# Patient Record
Sex: MALE | Race: WHITE | Employment: OTHER | URBAN - METROPOLITAN AREA
[De-identification: names, ages, dates, MRNs, and addresses within clinical notes are randomized per-mention and may not be internally consistent; named-entity substitution may affect disease eponyms.]

---

## 2017-01-04 ENCOUNTER — OFFICE VISIT (OUTPATIENT)
Dept: INTERNAL MEDICINE CLINIC | Age: 67
End: 2017-01-04

## 2017-01-04 VITALS
WEIGHT: 256.25 LBS | SYSTOLIC BLOOD PRESSURE: 160 MMHG | RESPIRATION RATE: 18 BRPM | HEART RATE: 60 BPM | OXYGEN SATURATION: 97 % | DIASTOLIC BLOOD PRESSURE: 69 MMHG | TEMPERATURE: 98.1 F | BODY MASS INDEX: 38.84 KG/M2 | HEIGHT: 68 IN

## 2017-01-04 DIAGNOSIS — E11.9 TYPE 2 DIABETES MELLITUS WITH HEMOGLOBIN A1C GOAL OF LESS THAN 7.0% (HCC): ICD-10-CM

## 2017-01-04 DIAGNOSIS — J06.9 VIRAL UPPER RESPIRATORY TRACT INFECTION: Primary | ICD-10-CM

## 2017-01-04 LAB — HBA1C MFR BLD HPLC: 7.3 %

## 2017-01-04 RX ORDER — OXYMETAZOLINE HCL 0.05 %
2 SPRAY, NON-AEROSOL (ML) NASAL 2 TIMES DAILY
Qty: 1 BOTTLE | Refills: 0 | COMMUNITY
End: 2017-01-24 | Stop reason: ALTCHOICE

## 2017-01-04 RX ORDER — TADALAFIL 5 MG/1
5 TABLET, FILM COATED ORAL DAILY
COMMUNITY
Start: 2016-11-28 | End: 2020-08-10

## 2017-01-04 RX ORDER — HYDROCODONE POLISTIREX AND CHLORPHENIRAMINE POLISTIREX 10; 8 MG/5ML; MG/5ML
1 SUSPENSION, EXTENDED RELEASE ORAL
Qty: 120 ML | Refills: 0 | Status: SHIPPED | OUTPATIENT
Start: 2017-01-04 | End: 2017-01-24 | Stop reason: ALTCHOICE

## 2017-01-04 RX ORDER — BENZONATATE 200 MG/1
200 CAPSULE ORAL
Qty: 20 CAP | Refills: 1 | Status: SHIPPED | OUTPATIENT
Start: 2017-01-04 | End: 2017-01-11

## 2017-01-04 NOTE — PATIENT INSTRUCTIONS

## 2017-01-04 NOTE — MR AVS SNAPSHOT
Visit Information Date & Time Provider Department Dept. Phone Encounter #  
 1/4/2017  1:45 PM Dominga Way MD [facility] [address] [id number] Upcoming Health Maintenance Date Due DTaP/Tdap/Td series (1 - Tdap) 12/2/2007 FOOT EXAM Q1 7/7/2015 Pneumococcal 65+ Low/Medium Risk (2 of 2 - PPSV23) 9/29/2016 MEDICARE YEARLY EXAM 9/29/2016 LIPID PANEL Q1 12/21/2016 HEMOGLOBIN A1C Q6M 3/28/2017 COLONOSCOPY 7/11/2017 EYE EXAM RETINAL OR DILATED Q1 8/12/2017 MICROALBUMIN Q1 10/18/2017 GLAUCOMA SCREENING Q2Y 8/12/2018 Allergies as of 1/4/2017  Review Complete On: 1/4/2017 By: Dominga Way MD  
  
 Severity Noted Reaction Type Reactions Codeine  12/01/2008   Side Effect Nausea Only Current Immunizations  Reviewed on 9/28/2016 Name Date H1N1 FLU VACCINE 1/1/2010 Influenza Vaccine 8/30/2016, 10/26/2015, 10/2/2014, 10/5/2013 Influenza Vaccine Split 10/17/2011 Influenza Vaccine Whole 10/1/2012, 9/12/2009 Pneumococcal Vaccine (Unspecified Type) 12/1/2005 TD Vaccine 12/1/2007 Zoster Vaccine, Live 4/22/2011 Not reviewed this visit You Were Diagnosed With   
  
 Codes Comments Viral upper respiratory tract infection    -  Primary ICD-10-CM: J06.9, B97.89 ICD-9-CM: 465.9 Type 2 diabetes mellitus with hemoglobin A1c goal of less than 7.0% (HCC)     ICD-10-CM: E11.9 ICD-9-CM: 250.00 Vitals BP Pulse Temp Resp Height(growth percentile) Weight(growth percentile) 160/69 (BP 1 Location: Left arm, BP Patient Position: Sitting) 60 98.1 °F (36.7 °C) (Oral) 18 5' 8\" (1.727 m) 256 lb 4 oz (116.2 kg) SpO2 BMI Smoking Status 97% 38.96 kg/m2 Passive Smoke Exposure - Never Smoker Vitals History BMI and BSA Data Body Mass Index Body Surface Area  
 38.96 kg/m 2 2.36 m 2 Preferred Pharmacy Pharmacy Name Phone [facility] [place], [address] [place] AT [facility] [phone] Your Updated Medication List  
  
   
This list is accurate as of: 1/4/17  2:14 PM.  Always use your most recent med list.  
  
  
  
  
 Rosy Kingston Generic drug:  Blood-Glucose Meter  
by Does Not Apply route. ACCU-CHEK SMARTVIEW TEST STRIP strip Generic drug:  glucose blood VI test strips CHECK FASTING BLOOD SUGAR ONCE DAILY AFRIN (OXYMETAZOLINE) 0.05 % nasal spray Generic drug:  oxymetazoline 2 Sprays two (2) times a day. ARNUITY ELLIPTA IN Take  by inhalation. aspirin 325 mg tablet Commonly known as:  ASPIRIN  
take 325 mg by mouth daily. atorvastatin 40 mg tablet Commonly known as:  LIPITOR  
TAKE 1 TABLET DAILY  
  
 BD INSULIN PEN NEEDLE UF SHORT 31 gauge x 5/16\" Ndle Generic drug:  Insulin Needles (Disposable) USE EVERY DAY WITH LEVEMIR AS DIRECTED Lady Pen Needle 32 gauge x 5/32\" Ndle Generic drug:  Insulin Needles (Disposable) USE 1 FOUR TIMES A DAY WITH INSULIN  
  
 benzonatate 200 mg capsule Commonly known as:  TESSALON Take 1 Cap by mouth three (3) times daily as needed for Cough for up to 7 days. cetirizine 10 mg tablet Commonly known as:  ZYRTEC Take 1 Tab by mouth daily. chlorpheniramine-HYDROcodone 10-8 mg/5 mL suspension Commonly known as:  Lexine Mccreedy ER Take 5 mL by mouth every twelve (12) hours as needed for Cough. Max Daily Amount: 10 mL. CIALIS 5 mg tablet Generic drug:  tadalafil  
  
 citalopram 40 mg tablet Commonly known as:  CELEXA  
TAKE 1 TABLET DAILY  
  
 desonide 0.05 % topical lotion Commonly known as:  Araceli Canales  
as needed. Diabetic Supplies, Sbrown 24. Misc Commonly known as:  BD LANCET DEVICE  
1 Each by Does Not Apply route daily. FISH OIL EXTRA STRENGTH PO Take 1,400 mg by mouth daily (after breakfast). fluticasone 50 mcg/actuation nasal spray Commonly known as:  Renee Guild 2 Sprays by Both Nostrils route daily. Indications: ALLERGIC RHINITIS  
  
 insulin detemir 100 unit/mL (3 mL) Inpn Commonly known as:  LEVEMIR FLEXPEN  
40 Units by SubCUTAneous route nightly. insulin lispro 100 unit/mL injection Commonly known as:  HumaLOG  
15 Units by SubCUTAneous route Before breakfast, lunch, and dinner. Before meals  Indications: TYPE 2 DIABETES MELLITUS INVOKANA 300 mg tablet Generic drug:  canagliflozin TAKE 1 TABLET DAILY JANUMET 50-1,000 mg per tablet Generic drug:  SITagliptin-metFORMIN  
TAKE 1 TABLET TWICE A DAY WITH MEALS  
  
 ketoconazole 2 % topical cream  
Commonly known as:  NIZORAL Apply  to affected area as needed for Skin Irritation. Lancets Misc Commonly known as:  ACCU-CHEK FASTCLIX  
2 Packages by SubCUTAneous route two (2) times a day. Check Blood Glucose twice daily .00  
  
 lisinopril 20 mg tablet Commonly known as:  Ora Bee Take 20 mg by mouth daily. M-VIT PO  
take 1 Tab by mouth daily. meloxicam 15 mg tablet Commonly known as:  MOBIC TK 1 T PO D  
  
 metoprolol tartrate 50 mg tablet Commonly known as:  LOPRESSOR  
TAKE 1 TABLET TWICE A DAY  
  
 montelukast 10 mg tablet Commonly known as:  SINGULAIR  
TAKE 1 TABLET DAILY  
  
 neomycin-polymyxin-dexamethasone 3.5 mg/g-10,000 unit/g-0.1 % ophthalmic ointment Commonly known as:  DEXACINE  
CINDI A SMALL AMOUNT TO RIGHT EYE SOCKET BID PRF IRRITATION  
  
 tamsulosin 0.4 mg capsule Commonly known as:  FLOMAX Take 0.4 mg by mouth daily. VOLTAREN 1 % Gel Generic drug:  diclofenac  
every six (6) hours. Prescriptions Printed Refills  
 chlorpheniramine-HYDROcodone (TUSSIONEX PENNKINETIC ER) 10-8 mg/5 mL suspension 0 Sig: Take 5 mL by mouth every twelve (12) hours as needed for Cough. Max Daily Amount: 10 mL. Class: Print Route: Oral  
  
Prescriptions Sent to Pharmacy Refills  
 benzonatate (TESSALON) 200 mg capsule 1 Sig: Take 1 Cap by mouth three (3) times daily as needed for Cough for up to 7 days. Class: Normal  
 Pharmacy: [facility] [address] [facility] #: [phone] Route: Oral  
  
Patient Instructions Upper Respiratory Infection (Cold): Care Instructions Your Care Instructions An upper respiratory infection, or URI, is an infection of the nose, sinuses, or throat. URIs are spread by coughs, sneezes, and direct contact. The common cold is the most frequent kind of URI. The flu and sinus infections are other kinds of URIs. Almost all URIs are caused by viruses. Antibiotics won't cure them. But you can treat most infections with home care. This may include drinking lots of fluids and taking over-the-counter pain medicine. You will probably feel better in 4 to 10 days. The doctor has checked you carefully, but problems can develop later. If you notice any problems or new symptoms, get medical treatment right away. Follow-up care is a key part of your treatment and safety. Be sure to make and go to all appointments, and call your doctor if you are having problems. It's also a good idea to know your test results and keep a list of the medicines you take. How can you care for yourself at home? · To prevent dehydration, drink plenty of fluids, enough so that your urine is light yellow or clear like water. Choose water and other caffeine-free clear liquids until you feel better. If you have kidney, heart, or liver disease and have to limit fluids, talk with your doctor before you increase the amount of fluids you drink. · Take an over-the-counter pain medicine, such as acetaminophen (Tylenol), ibuprofen (Advil, Motrin), or naproxen (Aleve). Read and follow all instructions on the label. · Before you use cough and cold medicines, check the label. These medicines may not be safe for young children or for people with certain health problems. · Be careful when taking over-the-counter cold or flu medicines and Tylenol at the same time. Many of these medicines have acetaminophen, which is Tylenol. Read the labels to make sure that you are not taking more than the recommended dose. Too much acetaminophen (Tylenol) can be harmful. · Get plenty of rest. 
· Do not smoke or allow others to smoke around you. If you need help quitting, talk to your doctor about stop-smoking programs and medicines. These can increase your chances of quitting for good. When should you call for help? Call 911 anytime you think you may need emergency care. For example, call if: 
· You have severe trouble breathing. Call your doctor now or seek immediate medical care if: 
· You seem to be getting much sicker. · You have new or worse trouble breathing. · You have a new or higher fever. · You have a new rash. Watch closely for changes in your health, and be sure to contact your doctor if: 
· You have a new symptom, such as a sore throat, an earache, or sinus pain. · You cough more deeply or more often, especially if you notice more mucus or a change in the color of your mucus. · You do not get better as expected. Where can you learn more? Go to http://ana maria-anne marie.info/. Enter I614 in the search box to learn more about \"Upper Respiratory Infection (Cold): Care Instructions. \" Current as of: June 30, 2016 Content Version: 11.1 © 4905-5904 [employer]. Care instructions adapted under license by [employer] (which disclaims liability or warranty for this information). If you have questions about a medical condition or this instruction, always ask your healthcare professional. [address] any warranty or liability for your use of this information. Introducing [facility]! Dear Edson Wilson: Thank you for requesting a [employer] account. Our records indicate that you already have an active [employer] account. You can access your account anytime at [email or web address] Did you know that you can access your hospital and ER discharge instructions at any time in [employer]? You can also review all of your test results from your hospital stay or ER visit. Additional Information If you have questions, please visit the Frequently Asked Questions section of the [employer] website at [email or web address] Remember, [employer] is NOT to be used for urgent needs. For medical emergencies, dial 911. Now available from your iPhone and Android! Please provide this summary of care documentation to your next provider. Your primary care clinician is listed as Marta Flynn. If you have any questions after today's visit, please call [phone].

## 2017-01-04 NOTE — PROGRESS NOTES
HISTORY OF PRESENT ILLNESS  Shameka Zimmerman is a 73 y.o. male. HPI  Upper respiratory illness:  Shameka Zimmerman presents with complaints of congestion, post nasal drip and dry cough for 2 weeks. no nausea and no vomiting, but mild diarrhea in am .  he has not had  sore throat, productive cough, myalgias, fever and chills. Symptoms are mild. Over-the-counter remedies including robitussin   has been used with good relief of symptoms. Drinking plenty of fluids: yes  Asthma?:  no  non-smoker  Contacts with similar infections: no           ROS    Physical Exam   Constitutional: He appears well-developed and well-nourished. No distress. /69 (BP 1 Location: Left arm, BP Patient Position: Sitting)  Pulse 60  Temp 98.1 °F (36.7 °C) (Oral)   Resp 18  Ht 5' 8\" (1.727 m)  Wt 256 lb 4 oz (116.2 kg)  SpO2 97%  BMI 38.96 kg/m2   HENT:   Head: Normocephalic and atraumatic. Right Ear: Tympanic membrane and ear canal normal. No decreased hearing is noted. Left Ear: Tympanic membrane and ear canal normal. No decreased hearing is noted. Nose: Mucosal edema and rhinorrhea present. No epistaxis. Right sinus exhibits no maxillary sinus tenderness and no frontal sinus tenderness. Left sinus exhibits no maxillary sinus tenderness and no frontal sinus tenderness. Mouth/Throat: Uvula is midline and mucous membranes are normal. No oral lesions. Normal dentition. Posterior oropharyngeal erythema present. No oropharyngeal exudate, posterior oropharyngeal edema or tonsillar abscesses. Eyes: Conjunctivae are normal. Pupils are equal, round, and reactive to light. Right eye exhibits no discharge. Left eye exhibits no discharge. No scleral icterus. Neck: Neck supple. Cardiovascular: Normal rate, regular rhythm and normal heart sounds. Pulmonary/Chest: Effort normal and breath sounds normal. No accessory muscle usage. No respiratory distress. He has no decreased breath sounds. He has no wheezes. He has no rhonchi.  He has no rales.   Lymphadenopathy:     He has no cervical adenopathy. Neurological: He is alert. Skin: Skin is warm and dry. He is not diaphoretic. Nursing note and vitals reviewed. ASSESSMENT and PLAN  Rex Ulloa was seen today for uri. Diagnoses and all orders for this visit:    Viral upper respiratory tract infection  -     chlorpheniramine-HYDROcodone (TUSSIONEX PENNKINETIC ER) 10-8 mg/5 mL suspension; Take 5 mL by mouth every twelve (12) hours as needed for Cough. Max Daily Amount: 10 mL. -     benzonatate (TESSALON) 200 mg capsule; Take 1 Cap by mouth three (3) times daily as needed for Cough for up to 7 days. John Caldwell was diagnosed with a viral upper respiratory infection. he is advised to drink plenty of water, use shower steam or humidifier to loosen secretions, saline nasal lavage 3 times daily and get plenty of rest.  he may use mucinex 1200mg twice daily along with tylenol or advil as needed for fever and pain. Written instructions were given to the patient emphasizing these recommendations. Type 2 diabetes mellitus with hemoglobin A1c goal of less than 7.0% ([facility]) - improved control. No changes for now.     -     AMB POC HEMOGLOBIN A1C      Follow-up Disposition: Not on File

## 2017-01-24 ENCOUNTER — HOSPITAL ENCOUNTER (OUTPATIENT)
Dept: LAB | Age: 67
Discharge: HOME | End: 2017-01-24
Payer: MEDICARE

## 2017-01-24 ENCOUNTER — TELEPHONE (OUTPATIENT)
Dept: INTERNAL MEDICINE CLINIC | Age: 67
End: 2017-01-24

## 2017-01-24 ENCOUNTER — OFFICE VISIT (OUTPATIENT)
Dept: INTERNAL MEDICINE CLINIC | Age: 67
End: 2017-01-24

## 2017-01-24 VITALS
SYSTOLIC BLOOD PRESSURE: 151 MMHG | WEIGHT: 257.38 LBS | DIASTOLIC BLOOD PRESSURE: 71 MMHG | HEART RATE: 59 BPM | TEMPERATURE: 98.1 F | BODY MASS INDEX: 39.01 KG/M2 | HEIGHT: 68 IN | RESPIRATION RATE: 18 BRPM | OXYGEN SATURATION: 96 %

## 2017-01-24 DIAGNOSIS — Z23 ENCOUNTER FOR IMMUNIZATION: ICD-10-CM

## 2017-01-24 DIAGNOSIS — E78.00 PURE HYPERCHOLESTEROLEMIA: Primary | Chronic | ICD-10-CM

## 2017-01-24 DIAGNOSIS — Z12.11 COLON CANCER SCREENING: ICD-10-CM

## 2017-01-24 DIAGNOSIS — Z00.00 WELCOME TO MEDICARE PREVENTIVE VISIT: Primary | ICD-10-CM

## 2017-01-24 DIAGNOSIS — Z13.6 ENCOUNTER FOR ABDOMINAL AORTIC ANEURYSM (AAA) SCREENING: ICD-10-CM

## 2017-01-24 DIAGNOSIS — Z13.31 DEPRESSION SCREENING: ICD-10-CM

## 2017-01-24 DIAGNOSIS — Z12.5 PROSTATE CANCER SCREENING: ICD-10-CM

## 2017-01-24 DIAGNOSIS — Z13.39 ALCOHOL SCREENING: ICD-10-CM

## 2017-01-24 DIAGNOSIS — Z71.89 ADVANCE CARE PLANNING: ICD-10-CM

## 2017-01-24 PROCEDURE — 80061 LIPID PANEL: CPT

## 2017-01-24 PROCEDURE — 84153 ASSAY OF PSA TOTAL: CPT

## 2017-01-24 RX ORDER — METOPROLOL TARTRATE 50 MG/1
TABLET ORAL
Qty: 180 TAB | Refills: 1 | Status: SHIPPED | OUTPATIENT
Start: 2017-01-24 | End: 2017-07-21 | Stop reason: SDUPTHER

## 2017-01-24 RX ORDER — PEN NEEDLE, DIABETIC 30 GX3/16"
NEEDLE, DISPOSABLE MISCELLANEOUS
Qty: 100 PEN NEEDLE | Refills: 3 | Status: SHIPPED | OUTPATIENT
Start: 2017-01-24 | End: 2018-01-24 | Stop reason: SDUPTHER

## 2017-01-24 RX ORDER — LISINOPRIL 20 MG/1
20 TABLET ORAL DAILY
Qty: 180 TAB | Refills: 1 | Status: SHIPPED | OUTPATIENT
Start: 2017-01-24 | End: 2018-01-24 | Stop reason: SDUPTHER

## 2017-01-24 NOTE — MR AVS SNAPSHOT
Visit Information Date & Time Provider Department Dept. Phone Encounter #  
 1/24/2017  8:30 AM Angélica Quintana MD [facility] [address] [id number] Follow-up Instructions Return in about 3 months (around 4/24/2017). Upcoming Health Maintenance Date Due DTaP/Tdap/Td series (1 - Tdap) 12/2/2007 FOOT EXAM Q1 7/7/2015 Pneumococcal 65+ Low/Medium Risk (2 of 2 - PPSV23) 9/29/2016 MEDICARE YEARLY EXAM 9/29/2016 LIPID PANEL Q1 12/21/2016 COLONOSCOPY 7/11/2017 HEMOGLOBIN A1C Q6M 7/4/2017 EYE EXAM RETINAL OR DILATED Q1 8/12/2017 MICROALBUMIN Q1 10/18/2017 GLAUCOMA SCREENING Q2Y 8/12/2018 Allergies as of 1/24/2017  Review Complete On: 1/24/2017 By: Angélica Quintana MD  
  
 Severity Noted Reaction Type Reactions Codeine  12/01/2008   Side Effect Nausea Only Current Immunizations  Reviewed on 9/28/2016 Name Date H1N1 FLU VACCINE 1/1/2010 Influenza Vaccine 8/30/2016, 10/26/2015, 10/2/2014, 10/5/2013 Influenza Vaccine Split 10/17/2011 Influenza Vaccine Whole 10/1/2012, 9/12/2009 Pneumococcal Vaccine (Unspecified Type) 12/1/2005 TD Vaccine 12/1/2007 Zoster Vaccine, Live 4/22/2011 Not reviewed this visit You Were Diagnosed With   
  
 Codes Comments Welcome to Medicare preventive visit    -  Primary ICD-10-CM: Z00.00 ICD-9-CM: V70.0 Advance care planning     ICD-10-CM: Z71.89 ICD-9-CM: V65.49 Depression screening     ICD-10-CM: Z13.89 ICD-9-CM: V79.0 Alcohol screening     ICD-10-CM: Z13.89 ICD-9-CM: V79.1 Encounter for abdominal aortic aneurysm (AAA) screening     ICD-10-CM: Z13.6 ICD-9-CM: V81.2 Prostate cancer screening     ICD-10-CM: Z12.5 ICD-9-CM: V76.44 Encounter for immunization     ICD-10-CM: F71 ICD-9-CM: V03.89 Colon cancer screening     ICD-10-CM: Z12.11 ICD-9-CM: V76.51 Vitals BP Pulse Temp Resp Height(growth percentile) Weight(growth percentile) 151/71 (BP 1 Location: Left arm, BP Patient Position: Sitting) (!) 59 98.1 °F (36.7 °C) (Oral) 18 5' 8\" (1.727 m) 257 lb 6 oz (116.7 kg) SpO2 BMI Smoking Status 96% 39.13 kg/m2 Passive Smoke Exposure - Never Smoker BMI and BSA Data Body Mass Index Body Surface Area  
 39.13 kg/m 2 2.37 m 2 Preferred Pharmacy Pharmacy Name Phone [address] [facility] [phone] Your Updated Medication List  
  
   
This list is accurate as of: 1/24/17  9:03 AM.  Always use your most recent med list.  
  
  
  
  
 Joleen Mora Generic drug:  Blood-Glucose Meter  
by Does Not Apply route. ACCU-CHEK SMARTVIEW TEST STRIP strip Generic drug:  glucose blood VI test strips CHECK FASTING BLOOD SUGAR ONCE DAILY ARNUITY ELLIPTA IN Take  by inhalation. aspirin 325 mg tablet Commonly known as:  ASPIRIN  
take 325 mg by mouth daily. atorvastatin 40 mg tablet Commonly known as:  LIPITOR  
TAKE 1 TABLET DAILY  
  
 cetirizine 10 mg tablet Commonly known as:  ZYRTEC Take 1 Tab by mouth daily. CIALIS 5 mg tablet Generic drug:  tadalafil Take 5 mg by mouth daily. citalopram 40 mg tablet Commonly known as:  CELEXA  
TAKE 1 TABLET DAILY  
  
 desonide 0.05 % topical lotion Commonly known as:  Ke Anes  
as needed. Diabetic Supplies, [facility] 24. Misc Commonly known as:  BD LANCET DEVICE  
1 Each by Does Not Apply route daily. FISH OIL EXTRA STRENGTH PO Take 1,400 mg by mouth daily (after breakfast). fluticasone 50 mcg/actuation nasal spray Commonly known as:  Marsh Fails 2 Sprays by Both Nostrils route daily. Indications: ALLERGIC RHINITIS  
  
 insulin detemir 100 unit/mL (3 mL) Inpn Commonly known as:  LEVEMIR FLEXPEN  
40 Units by SubCUTAneous route nightly. insulin lispro 100 unit/mL injection Commonly known as:  HumaLOG  
15 Units by SubCUTAneous route Before breakfast, lunch, and dinner. Before meals  Indications: TYPE 2 DIABETES MELLITUS INVOKANA 300 mg tablet Generic drug:  canagliflozin TAKE 1 TABLET DAILY JANUMET 50-1,000 mg per tablet Generic drug:  SITagliptin-metFORMIN  
TAKE 1 TABLET TWICE A DAY WITH MEALS  
  
 ketoconazole 2 % topical cream  
Commonly known as:  NIZORAL Apply  to affected area as needed for Skin Irritation. Lancets Misc Commonly known as:  ACCU-CHEK FASTCLIX  
2 Packages by SubCUTAneous route two (2) times a day. Check Blood Glucose twice daily .00  
  
 lisinopril 20 mg tablet Commonly known as:  Robert Edman Take 1 Tab by mouth daily. M-VIT PO  
take 1 Tab by mouth daily. meloxicam 15 mg tablet Commonly known as:  MOBIC TK 1 T PO D  
  
 metoprolol tartrate 50 mg tablet Commonly known as:  LOPRESSOR  
TAKE 1 TABLET TWICE A DAY  
  
 montelukast 10 mg tablet Commonly known as:  SINGULAIR  
TAKE 1 TABLET DAILY * Lady Pen Needle 32 gauge x 5/32\" Ndle Generic drug:  Insulin Needles (Disposable) USE 1 FOUR TIMES A DAY WITH INSULIN  
  
 * Insulin Needles (Disposable) 31 gauge x 5/16\" Ndle Commonly known as:  BD INSULIN PEN NEEDLE UF SHORT  
USE EVERY DAY WITH LEVEMIR AS DIRECTED  
  
 pneumococcal 13 rona conj dip 0.5 mL Syrg injection Commonly known as:  PREVNAR-13  
0.5 mL by IntraMUSCular route once for 1 dose. tamsulosin 0.4 mg capsule Commonly known as:  FLOMAX Take 0.4 mg by mouth daily. VOLTAREN 1 % Gel Generic drug:  diclofenac  
every six (6) hours. * Notice: This list has 2 medication(s) that are the same as other medications prescribed for you. Read the directions carefully, and ask your doctor or other care provider to review them with you. Prescriptions Printed Refills pneumococcal 13 rona conj dip (PREVNAR-13) 0.5 mL syrg injection 0 Si.5 mL by IntraMUSCular route once for 1 dose. Class: Print Route: IntraMUSCular Prescriptions Sent to Pharmacy Refills  
 metoprolol tartrate (LOPRESSOR) 50 mg tablet 1 Sig: TAKE 1 TABLET TWICE A DAY Class: Normal  
 Pharmacy: [address], [address] Ph #: [phone]  
 lisinopril (PRINIVIL, ZESTRIL) 20 mg tablet 1 Sig: Take 1 Tab by mouth daily. Class: Normal  
 Pharmacy: [address], [address] Ph #: [phone] Route: Oral  
 Insulin Needles, Disposable, (BD INSULIN PEN NEEDLE UF SHORT) 31 gauge x 5/16\" ndle 3 Sig: USE EVERY DAY WITH LEVEMIR AS DIRECTED Class: Normal  
 Pharmacy: [address], [address] Ph #: [phone] We Performed the Following PSA DIAGNOSTIC (PROSTATIC SPECIFIC AG) [id number] CPT(R)] REFERRAL FOR COLONOSCOPY [ELQ212 Custom] Follow-up Instructions Return in about 3 months (around 2017). To-Do List   
 2017 Imaging:  US EXAM SCREENING AAA Referral Information Referral ID Referred By Referred To  
  
 [id number] [facility], [facility] [place] [address] Phone: [phone] Fax: [phone] Visits Status Start Date End Date 1 New Request 17 If your referral has a status of pending review or denied, additional information will be sent to support the outcome of this decision. Patient Instructions Medicare Part B Preventive Services Limitations Recommendation Scheduled Bone Mass Measurement 
(age 72 & older, biennial) Requires diagnosis related to osteoporosis or estrogen deficiency.  Biennial benefit unless patient has history of long-term glucocorticoid tx or baseline is needed because initial test was by other method Cardiovascular Screening Blood Tests (every 5 years) Total cholesterol, HDL, Triglycerides Order as a panel if possible Colorectal Cancer Screening 
-Fecal occult blood test (annual) -Flexible sigmoidoscopy (5y) 
-Screening colonoscopy (10y) -Barium Enema Counseling to Prevent Tobacco Use (up to 8 sessions per year) - Counseling greater than 3 and up to 10 minutes - Counseling greater than 10 minutes Patients must be asymptomatic of tobacco-related conditions to receive as preventive service Diabetes Screening Tests (at least every 3 years, Medicare covers annually or at 6-month intervals for prediabetic patients) Fasting blood sugar (FBS) or glucose tolerance test (GTT) Patient must be diagnosed with one of the following: 
-Hypertension, Dyslipidemia, obesity, previous impaired FBS or GTT 
Or any two of the following: overweight, FH of diabetes, age ? 72, history of gestational diabetes, birth of baby weighing more than 9 pounds Diabetes Self-Management Training (DSMT) (no USPSTF recommendation) Requires referral by treating physician for patient with diabetes or renal disease. 10 hours of initial DSMT session of no less than 30 minutes each in a continuous 12-month period. 2 hours of follow-up DSMT in subsequent years. Glaucoma Screening (no USPSTF recommendation) Diabetes mellitus, family history, , age 48 or over,  American, age 72 or over Human Immunodeficiency Virus (HIV) Screening (annually for increased risk patients) HIV-1 and HIV-2 by EIA, KHADIJAH, rapid antibody test, or oral mucosa transudate Patient must be at increased risk for HIV infection per USPSTF guidelines or pregnant. Tests covered annually for patients at increased risk. Pregnant patients may receive up to 3 test during pregnancy. Medical Nutrition Therapy (MNT) (for diabetes or renal disease not recommended schedule) Requires referral by treating physician for patient with diabetes or renal disease. Can be provided in same year as diabetes self-management training (DSMT), and CMS recommends medical nutrition therapy take place after DSMT. Up to 3 hours for initial year and 2 hours in subsequent years. Prostate Cancer Screening (annually up to age 76) - Digital rectal exam (YODIT) - Prostate specific antigen (PSA) Annually (age 48 or over), YODIT not paid separately when covered E/M service is provided on same date Men up to age 76 may need a screening blood test for prostate cancer at certain intervals, depending on their personal and family history. This decision is between the patient and his provider. Seasonal Influenza Vaccination (annually) Pneumococcal Vaccination (once after 65) Hepatitis B Vaccinations (if medium/high risk) Medium/high risk factors:  End-stage renal disease, Hemophiliacs who received Factor VIII or IX concentrates, Clients of institutions for the mentally retarded, Persons who live in the same house as a HepB virus carrier, Homosexual men, Illicit injectable drug abusers. Shingles Vaccination A shingles vaccine is also recommended once in a lifetime after age 61 Ultrasound Screening for Abdominal Aortic Aneurysm (AAA) (once) Patient must be referred through [facility] and not have had a screening for abdominal aortic aneurysm before under Medicare. Limited to patients who meet one of the following criteria: 
- Men who are 73-68 years old and have smoked more than 100 cigarettes in their lifetime. 
-Anyone with a FH of AAA 
-Anyone recommended for screening by USPSTF Introducing [facility]! Dear Desean Cheney: Thank you for requesting a [employer] account. Our records indicate that you already have an active [employer] account.   You can access your account anytime at [email or web address] Retention [email or web address] Did you know that you can access your hospital and ER discharge instructions at any time in [employer]? You can also review all of your test results from your hospital stay or ER visit. Additional Information If you have questions, please visit the Frequently Asked Questions section of the [employer] website at [email or web address] [email or web address]/[email or web address] Remember, [employer] is NOT to be used for urgent needs. For medical emergencies, dial 911. Now available from your iPhone and Android! Please provide this summary of care documentation to your next provider. Your primary care clinician is listed as Jerzy Scherer. If you have any questions after today's visit, please call [phone].

## 2017-01-24 NOTE — PATIENT INSTRUCTIONS
Medicare Part B Preventive Services Limitations Recommendation Scheduled   Bone Mass Measurement  (age 72 & older, biennial) Requires diagnosis related to osteoporosis or estrogen deficiency. Biennial benefit unless patient has history of long-term glucocorticoid tx or baseline is needed because initial test was by other method     Cardiovascular Screening Blood Tests (every 5 years)  Total cholesterol, HDL, Triglycerides Order as a panel if possible     Colorectal Cancer Screening  -Fecal occult blood test (annual)  -Flexible sigmoidoscopy (5y)  -Screening colonoscopy (10y)  -Barium Enema      Counseling to Prevent Tobacco Use (up to 8 sessions per year)  - Counseling greater than 3 and up to 10 minutes  - Counseling greater than 10 minutes Patients must be asymptomatic of tobacco-related conditions to receive as preventive service     Diabetes Screening Tests (at least every 3 years, Medicare covers annually or at 6-month intervals for prediabetic patients)    Fasting blood sugar (FBS) or glucose tolerance test (GTT) Patient must be diagnosed with one of the following:  -Hypertension, Dyslipidemia, obesity, previous impaired FBS or GTT  Or any two of the following: overweight, FH of diabetes, age ? 72, history of gestational diabetes, birth of baby weighing more than 9 pounds     Diabetes Self-Management Training (DSMT) (no USPSTF recommendation) Requires referral by treating physician for patient with diabetes or renal disease. 10 hours of initial DSMT session of no less than 30 minutes each in a continuous 12-month period. 2 hours of follow-up DSMT in subsequent years.      Glaucoma Screening (no USPSTF recommendation) Diabetes mellitus, family history, , age 48 or over,  American, age 72 or over     Human Immunodeficiency Virus (HIV) Screening (annually for increased risk patients)  HIV-1 and HIV-2 by EIA, KHADIJAH, rapid antibody test, or oral mucosa transudate Patient must be at increased risk for HIV infection per USPSTF guidelines or pregnant. Tests covered annually for patients at increased risk. Pregnant patients may receive up to 3 test during pregnancy. Medical Nutrition Therapy (MNT) (for diabetes or renal disease not recommended schedule) Requires referral by treating physician for patient with diabetes or renal disease. Can be provided in same year as diabetes self-management training (DSMT), and CMS recommends medical nutrition therapy take place after DSMT. Up to 3 hours for initial year and 2 hours in subsequent years. Prostate Cancer Screening (annually up to age 76)  - Digital rectal exam (YODIT)  - Prostate specific antigen (PSA) Annually (age 48 or over), YODIT not paid separately when covered E/M service is provided on same date  Men up to age 76 may need a screening blood test for prostate cancer at certain intervals, depending on their personal and family history. This decision is between the patient and his provider. Seasonal Influenza Vaccination (annually)        Pneumococcal Vaccination (once after 72)      Hepatitis B Vaccinations (if medium/high risk) Medium/high risk factors:  End-stage renal disease,  Hemophiliacs who received Factor VIII or IX concentrates, Clients of institutions for the mentally retarded, Persons who live in the same house as a HepB virus carrier, Homosexual men, Illicit injectable drug abusers. Shingles Vaccination A shingles vaccine is also recommended once in a lifetime after age 61     Ultrasound Screening for Abdominal Aortic Aneurysm (AAA) (once) Patient must be referred through [facility] and not have had a screening for abdominal aortic aneurysm before under Medicare.   Limited to patients who meet one of the following criteria:  - Men who are 73-68 years old and have smoked more than 100 cigarettes in their lifetime.  -Anyone with a FH of AAA  -Anyone recommended for screening by USPSTF

## 2017-01-24 NOTE — PROGRESS NOTES
HISTORY OF PRESENT ILLNESS  Riley Lazar is a 73 y.o. male. HPI  This is a welcome to Medicare Annual Wellness Visit     Patient Active Problem List   Diagnosis Code    DM (diabetes mellitus) ([facility] E11.9    HTN (hypertension) I10    CAD (coronary artery disease) I25.10    Hyperlipemia E78.5    Depressed F32.9    Hypogonadism male E29.1    AR (allergic rhinitis) J30.9    ED (erectile dysfunction) N52.9    HTN (hypertension) I10    LEONA (obstructive sleep apnea) G47.33    S/P excision of lipoma Z98.890, Z86.018    S/P excision of skin lesion, follow-up exam Z09    Essential hypertension with goal blood pressure less than 130/80 I10    Diabetes mellitus type 2, controlled (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 Social History Main Topics    Smoking status: Passive Smoke Exposure - Never Smoker     Years: 10.00    Smokeless tobacco: Never Used    Alcohol use 3.0 oz/week     6 Cans of beer per week      Comment: 1 beer or so per day    Drug use: No      Comment: occasionally smoked marijuana in the past    Sexual activity: Not Currently     Partners: Male     Other Topics Concern    Not on file     Social History Narrative     Family History   Problem Relation Age of Onset    Adopted:  Yes    Family history unknown: Yes     Current Outpatient Prescriptions   Medication Sig    pneumococcal 13 rona conj dip (PREVNAR-13) 0.5 mL syrg injection 0.5 mL by IntraMUSCular route once for 1 dose.  CIALIS 5 mg tablet Take 5 mg by mouth daily.  FLUTICASONE FUROATE (ARNUITY ELLIPTA IN) Take  by inhalation.  INVOKANA 300 mg tablet TAKE 1 TABLET DAILY    JANUMET 50-1,000 mg per tablet TAKE 1 TABLET TWICE A DAY WITH MEALS    citalopram (CELEXA) 40 mg tablet TAKE 1 TABLET DAILY    meloxicam (MOBIC) 15 mg tablet TK 1 T PO D    montelukast (SINGULAIR) 10 mg tablet TAKE 1 TABLET DAILY    atorvastatin (LIPITOR) 40 mg tablet TAKE 1 TABLET DAILY    tamsulosin (FLOMAX) 0.4 mg capsule Take 0.4 mg by mouth daily.  metoprolol tartrate (LOPRESSOR) 50 mg tablet TAKE 1 TABLET TWICE A DAY    insulin lispro (HUMALOG) 100 unit/mL injection 15 Units by SubCUTAneous route Before breakfast, lunch, and dinner. Before meals  Indications: TYPE 2 DIABETES MELLITUS    ACCU-CHEK SMARTVIEW TEST STRIP strip CHECK FASTING BLOOD SUGAR ONCE DAILY    lisinopril (PRINIVIL, ZESTRIL) 20 mg tablet Take 20 mg by mouth daily.  fluticasone (FLONASE) 50 mcg/actuation nasal spray 2 Sprays by Both Nostrils route daily. Indications: ALLERGIC RHINITIS    OMEGA-3 FATTY ACIDS/FISH OIL (FISH OIL EXTRA STRENGTH PO) Take 1,400 mg by mouth daily (after breakfast).  VOLTAREN 1 % gel every six (6) hours.  desonide (TRIDESILON) 0.05 % topical lotion as needed.  SARAI PEN NEEDLE 32 gauge x 5/32\" ndle USE 1 FOUR TIMES A DAY WITH INSULIN    insulin detemir (LEVEMIR FLEXPEN) 100 unit/mL (3 mL) pen 40 Units by SubCUTAneous route nightly.  Diabetic Supplies, [facility]. (BD LANCET DEVICE) misc 1 Each by Does Not Apply route daily.  Lancets (ACCU-CHEK FASTCLIX) misc 2 Packages by SubCUTAneous route two (2) times a day. Check Blood Glucose twice daily .00    Blood-Glucose Meter (ACCU-CHEK SARAI) Misc by Does Not Apply route.  BD INSULIN PEN NEEDLE UF SHORT 31 X 5/16 \" Ndle USE EVERY DAY WITH LEVEMIR AS DIRECTED    cetirizine (ZYRTEC) 10 mg tablet Take 1 Tab by mouth daily.     aspirin (ASPIRIN) 325 mg tablet take 325 mg by mouth daily.  M-VIT PO take 1 Tab by mouth daily.  ketoconazole (NIZORAL) 2 % topical cream Apply  to affected area as needed for Skin Irritation.  [DISCONTINUED] insulin glargine (LANTUS) 100 unit/mL injection 14 Units by SubCUTAneous route. As directed     No current facility-administered medications for this visit. Allergies   Allergen Reactions    Codeine Nausea Only     Immunization History   Administered Date(s) Administered    H1N1 Influenza Virus Vaccine 01/01/2010    Influenza Vaccine 10/05/2013, 10/02/2014, 10/26/2015, 08/30/2016    Influenza Vaccine Split 10/17/2011    Influenza Vaccine Whole 09/12/2009, 10/01/2012    Pneumococcal Vaccine (Unspecified Type) 12/01/2005    TD Vaccine 12/01/2007    Zoster Vaccine, Live 04/22/2011       Depression scale assessment:  PHQ 2 / 9, over the last two weeks 12/21/2015   Little interest or pleasure in doing things Not at all   Feeling down, depressed or hopeless Not at all   Total Score PHQ 2 0       Alcohol screening assessment  On any occasion during the past 3 months, have you had more than 4 drinks containing alcohol? Yes    Do you average more than 14 drinks per week? No      Functional assessment/ safety  Hearing Loss   severe  Uses hearing aids   Activities of Daily Living   Self-care. Requires assistance with: no ADLs    Fall Risk     Fall Risk Assessment, last 12 mths 1/24/2017   Able to walk? Yes   Fall in past 12 months? No       Abuse Risk:  Patient is not abused    Advanced Medical Directive screening:  Irene Dorman does not have an AMD on file in this chart. ( If not, He will provide for us to copy to chart or he has been counseled on the need for this to assist with decision making should they be incapacitated due to illness and the steps necessary to draw up this document.)    Health maintenance hx includes:  Exercise: moderately active.   Form of exercise: walking, gym - wt lifting Diet: generally follows a low fat low cholesterol diet, generally follows a low sodium diet, exercises sporadically, nonsmoker    Cancer screening:    Colon cancer screening:  Last Colonoscopy: 2007 and was normal     Men only: PSA testing 2013 and within normal limits         Review of Systems   Constitutional: Negative for chills, fever, malaise/fatigue and weight loss. HENT: Positive for sore throat (mild. recently on cruise and thinks he picked up infection). Eyes: Negative for blurred vision and pain. Respiratory: Negative for cough, shortness of breath and wheezing. Cardiovascular: Negative for chest pain, palpitations and leg swelling. Gastrointestinal: Negative for blood in stool, constipation, diarrhea, heartburn, nausea and vomiting. Genitourinary: Negative. Musculoskeletal: Negative for back pain, falls, joint pain and myalgias. Skin: Negative for rash. Neurological: Negative for dizziness and headaches. Endo/Heme/Allergies: Negative for environmental allergies. Does not bruise/bleed easily. Psychiatric/Behavioral: Negative for depression. The patient is not nervous/anxious and does not have insomnia. Physical Exam   Constitutional: He is oriented to person, place, and time. He appears well-developed and well-nourished. No distress. Body mass index is 39.13 kg/(m^2). /71 (BP 1 Location: Left arm, BP Patient Position: Sitting)  Pulse (!) 59  Temp 98.1 °F (36.7 °C) (Oral)   Resp 18  Ht 5' 8\" (1.727 m)  Wt 257 lb 6 oz (116.7 kg)  SpO2 96%  BMI 39.13 kg/m2   HENT:   Head: Normocephalic and atraumatic. Right Ear: Hearing normal.   Left Ear: Hearing normal.   Nose: Nose normal.   Mouth/Throat: Oropharynx is clear and moist and mucous membranes are normal. Normal dentition. Eyes: Conjunctivae and lids are normal. Pupils are equal, round, and reactive to light. Right eye exhibits no discharge. Left eye exhibits no discharge. No scleral icterus.    Neck: Trachea normal. No thyromegaly present. Cardiovascular: Normal rate, regular rhythm, normal heart sounds, intact distal pulses and normal pulses. Exam reveals no gallop and no friction rub. No murmur heard. Pulmonary/Chest: Effort normal and breath sounds normal. No respiratory distress. Abdominal: Soft. Normal appearance and bowel sounds are normal. He exhibits no distension and no mass. There is no hepatosplenomegaly. There is no tenderness. There is no CVA tenderness. Musculoskeletal: Normal range of motion. He exhibits no edema or tenderness. Lymphadenopathy:     He has no cervical adenopathy. Neurological: He is alert and oriented to person, place, and time. Skin: Skin is warm and dry. No rash noted. He is not diaphoretic. Psychiatric: He has a normal mood and affect. His speech is normal and behavior is normal. Judgment and thought content normal. Cognition and memory are normal.   Nursing note and vitals reviewed. ASSESSMENT and PLAN  Diagnoses and all orders for this visit:    Welcome to Medicare preventive visit  -     US EXAM SCREENING AAA; Future  Mary Mcghee was counseled on age-appropriate/ guideline-based risk prevention behaviors and screening for a 73y.o. year old   male . We also discussed adjustments in screening based on family history if necessary. Printed instructions for preventative screening guidelines and healthy behaviors given to patient with after visit summary. Advance care planning - he will bring in his AMD for our chart    Depression screening    Alcohol screening    Encounter for abdominal aortic aneurysm (AAA) screening  -     US EXAM SCREENING AAA; Future    Prostate cancer screening  -     PROSTATE SPECIFIC AG    Encounter for immunization  -     pneumococcal 13 rona conj dip (PREVNAR-13) 0.5 mL syrg injection; 0.5 mL by IntraMUSCular route once for 1 dose.     Colon cancer screening  he was advised to have follow up colonoscopy in 2017    -     REFERRAL FOR COLONOSCOPY    Other orders  -     metoprolol tartrate (LOPRESSOR) 50 mg tablet; TAKE 1 TABLET TWICE A DAY  -     lisinopril (PRINIVIL, ZESTRIL) 20 mg tablet; Take 1 Tab by mouth daily.  -     Insulin Needles, Disposable, (BD INSULIN PEN NEEDLE UF SHORT) 31 gauge x 5/16\" ndle; USE EVERY DAY WITH LEVEMIR AS DIRECTED      Follow-up Disposition:  Return in about 3 months (around 4/24/2017).

## 2017-01-24 NOTE — TELEPHONE ENCOUNTER
Pt was seen this morning and had labs done but his cholesterol was not added to his labs. The labs were drawn in our office and the lab needs a new order. Any questions please contact the pt on his home number.  Thanks

## 2017-01-25 LAB
CHOLEST SERPL-MCNC: 155 MG/DL (ref 100–199)
HDLC SERPL-MCNC: 50 MG/DL
INTERPRETATION, 910389: NORMAL
LDLC SERPL CALC-MCNC: 85 MG/DL (ref 0–99)
PSA SERPL-MCNC: 3.2 NG/ML (ref 0–4)
TRIGL SERPL-MCNC: 98 MG/DL (ref 0–149)
VLDLC SERPL CALC-MCNC: 20 MG/DL (ref 5–40)

## 2017-01-28 ENCOUNTER — HOSPITAL ENCOUNTER (OUTPATIENT)
Dept: ULTRASOUND IMAGING | Age: 67
Discharge: HOME | End: 2017-01-28
Attending: INTERNAL MEDICINE
Payer: MEDICARE

## 2017-01-28 DIAGNOSIS — Z00.00 WELCOME TO MEDICARE PREVENTIVE VISIT: ICD-10-CM

## 2017-01-28 DIAGNOSIS — Z13.6 ENCOUNTER FOR ABDOMINAL AORTIC ANEURYSM (AAA) SCREENING: ICD-10-CM

## 2017-01-28 PROCEDURE — 76706 US ABDL AORTA SCREEN AAA: CPT

## 2017-03-03 ENCOUNTER — OFFICE VISIT (OUTPATIENT)
Dept: INTERNAL MEDICINE CLINIC | Age: 67
End: 2017-03-03

## 2017-03-03 VITALS
RESPIRATION RATE: 18 BRPM | DIASTOLIC BLOOD PRESSURE: 57 MMHG | HEART RATE: 59 BPM | OXYGEN SATURATION: 98 % | TEMPERATURE: 98.1 F | WEIGHT: 253 LBS | BODY MASS INDEX: 38.34 KG/M2 | HEIGHT: 68 IN | SYSTOLIC BLOOD PRESSURE: 134 MMHG

## 2017-03-03 DIAGNOSIS — J45.20 MILD INTERMITTENT ASTHMA WITHOUT COMPLICATION: ICD-10-CM

## 2017-03-03 DIAGNOSIS — Z87.09 HISTORY OF BRONCHITIS: Primary | ICD-10-CM

## 2017-03-03 RX ORDER — ALBUTEROL SULFATE 90 UG/1
2 AEROSOL, METERED RESPIRATORY (INHALATION)
Qty: 1 INHALER | Refills: 1 | Status: SHIPPED | OUTPATIENT
Start: 2017-03-03 | End: 2018-02-28 | Stop reason: SDUPTHER

## 2017-03-03 RX ORDER — BENZONATATE 200 MG/1
200 CAPSULE ORAL
Qty: 25 CAP | Refills: 1 | Status: SHIPPED | OUTPATIENT
Start: 2017-03-03 | End: 2017-03-10

## 2017-03-03 RX ORDER — PSEUDOEPHEDRINE HCL 30 MG
TABLET ORAL
COMMUNITY
End: 2017-05-04 | Stop reason: ALTCHOICE

## 2017-03-03 NOTE — MR AVS SNAPSHOT
Visit Information Date & Time Provider Department Dept. Phone Encounter #  
 3/3/2017  2:00 PM Jona Libman, MD [facility] [address] [facility] [id number] Follow-up Instructions Return if symptoms worsen or fail to improve. Your Appointments 4/24/2017  9:45 AM  
ROUTINE CARE with Jona Libman, MD  
[facility] [place] [address] [facility]) Appt Note: 3 mo [address] [place] [facility] [id number]  
[phone]  
  
   
 [address] [place] [address] [facility] [id number] Upcoming Health Maintenance Date Due DTaP/Tdap/Td series (1 - Tdap) 12/2/2007 FOOT EXAM Q1 7/7/2015 Pneumococcal 65+ Low/Medium Risk (2 of 2 - PPSV23) 9/29/2016 COLONOSCOPY 7/11/2017 HEMOGLOBIN A1C Q6M 7/4/2017 EYE EXAM RETINAL OR DILATED Q1 8/12/2017 MICROALBUMIN Q1 10/18/2017 LIPID PANEL Q1 1/24/2018 MEDICARE YEARLY EXAM 1/25/2018 GLAUCOMA SCREENING Q2Y 8/12/2018 Allergies as of 3/3/2017  Review Complete On: 1/24/2017 By: Jona Libman, MD  
  
 Severity Noted Reaction Type Reactions Codeine  12/01/2008   Side Effect Nausea Only Current Immunizations  Reviewed on 9/28/2016 Name Date H1N1 FLU VACCINE 1/1/2010 Influenza Vaccine 8/30/2016, 10/26/2015, 10/2/2014, 10/5/2013 Influenza Vaccine Split 10/17/2011 Influenza Vaccine Whole 10/1/2012, 9/12/2009 Pneumococcal Vaccine (Unspecified Type) 12/1/2005 TD Vaccine 12/1/2007 Zoster Vaccine, Live 4/22/2011 Not reviewed this visit You Were Diagnosed With   
  
 Codes Comments History of bronchitis    -  Primary ICD-10-CM: Z87.09 
ICD-9-CM: V12.69 Mild intermittent asthma without complication     WOK-46-AURELIA: J45.20 ICD-9-CM: 493.90 Vitals BP  
  
  
  
  
  
 134/57 (BP 1 Location: Left arm, BP Patient Position: Sitting) Vitals History BMI and BSA Data Body Mass Index Body Surface Area 38.47 kg/m 2 2.35 m 2 Preferred Pharmacy Pharmacy Name Phone [facility] [address] [facility] [address] AT [facility] [phone] Your Updated Medication List  
  
   
This list is accurate as of: 3/3/17  2:32 PM.  Always use your most recent med list.  
  
  
  
  
 Angel Share Generic drug:  Blood-Glucose Meter  
by Does Not Apply route. ACCU-CHEK SMARTVIEW TEST STRIP strip Generic drug:  glucose blood VI test strips CHECK FASTING BLOOD SUGAR ONCE DAILY  
  
 albuterol 90 mcg/actuation inhaler Commonly known as:  PROVENTIL HFA, VENTOLIN HFA, PROAIR HFA Take 2 Puffs by inhalation every four (4) hours as needed for Wheezing for up to 90 days. ARNUITY ELLIPTA IN Take  by inhalation. aspirin 325 mg tablet Commonly known as:  ASPIRIN  
take 325 mg by mouth daily. atorvastatin 40 mg tablet Commonly known as:  LIPITOR  
TAKE 1 TABLET DAILY  
  
 benzonatate 200 mg capsule Commonly known as:  TESSALON Take 1 Cap by mouth three (3) times daily as needed for Cough for up to 7 days. cetirizine 10 mg tablet Commonly known as:  ZYRTEC Take 1 Tab by mouth daily. CIALIS 5 mg tablet Generic drug:  tadalafil Take 5 mg by mouth daily. citalopram 40 mg tablet Commonly known as:  CELEXA  
TAKE 1 TABLET DAILY  
  
 desonide 0.05 % topical lotion Commonly known as:  Foster Bridegroom  
as needed. Diabetic Supplies, [facility] 24. Misc Commonly known as:  BD LANCET DEVICE  
1 Each by Does Not Apply route daily. FISH OIL EXTRA STRENGTH PO Take 1,400 mg by mouth daily (after breakfast). fluticasone 50 mcg/actuation nasal spray Commonly known as:  Lella Solum 2 Sprays by Both Nostrils route daily. Indications: ALLERGIC RHINITIS  
  
 insulin detemir 100 unit/mL (3 mL) Inpn Commonly known as:  LEVEMIR FLEXPEN  
40 Units by SubCUTAneous route nightly. insulin lispro 100 unit/mL injection Commonly known as:  HumaLOG  
15 Units by SubCUTAneous route Before breakfast, lunch, and dinner. Before meals  Indications: TYPE 2 DIABETES MELLITUS INVOKANA 300 mg tablet Generic drug:  canagliflozin TAKE 1 TABLET DAILY JANUMET 50-1,000 mg per tablet Generic drug:  SITagliptin-metFORMIN  
TAKE 1 TABLET TWICE A DAY WITH MEALS  
  
 ketoconazole 2 % topical cream  
Commonly known as:  NIZORAL Apply  to affected area as needed for Skin Irritation. Lancets Misc Commonly known as:  ACCU-CHEK FASTCLIX  
2 Packages by SubCUTAneous route two (2) times a day. Check Blood Glucose twice daily .00  
  
 lisinopril 20 mg tablet Commonly known as:  Ronalee Ruffing Take 1 Tab by mouth daily. M-VIT PO  
take 1 Tab by mouth daily. meloxicam 15 mg tablet Commonly known as:  MOBIC TK 1 T PO D  
  
 metoprolol tartrate 50 mg tablet Commonly known as:  LOPRESSOR  
TAKE 1 TABLET TWICE A DAY  
  
 montelukast 10 mg tablet Commonly known as:  SINGULAIR  
TAKE 1 TABLET DAILY MUCINEX 1,200 mg Ta12 ER tablet Generic drug:  guaiFENesin Take 1,200 mg by mouth two (2) times a day. * Lady Pen Needle 32 gauge x 5/32\" Ndle Generic drug:  Insulin Needles (Disposable) USE 1 FOUR TIMES A DAY WITH INSULIN  
  
 * Insulin Needles (Disposable) 31 gauge x 5/16\" Ndle Commonly known as:  BD INSULIN PEN NEEDLE UF SHORT  
USE EVERY DAY WITH LEVEMIR AS DIRECTED  
  
 SUDAFED 30 mg tablet Generic drug:  pseudoephedrine Take  by mouth every four (4) hours as needed for Congestion. tamsulosin 0.4 mg capsule Commonly known as:  FLOMAX Take 0.4 mg by mouth daily. VOLTAREN 1 % Gel Generic drug:  diclofenac  
every six (6) hours. * Notice: This list has 2 medication(s) that are the same as other medications prescribed for you.  Read the directions carefully, and ask your doctor or other care provider to review them with you. Prescriptions Sent to Pharmacy Refills  
 benzonatate (TESSALON) 200 mg capsule 1 Sig: Take 1 Cap by mouth three (3) times daily as needed for Cough for up to 7 days. Class: Normal  
 Pharmacy: [address] Ph #: [phone] Route: Oral  
 albuterol (PROVENTIL HFA, VENTOLIN HFA, PROAIR HFA) 90 mcg/actuation inhaler 1 Sig: Take 2 Puffs by inhalation every four (4) hours as needed for Wheezing for up to 90 days. Class: Normal  
 Pharmacy: [address] Ph #: [phone] Route: Inhalation Follow-up Instructions Return if symptoms worsen or fail to improve. Introducing [address]! Dear Mary Alarcon: Thank you for requesting a [employer] account. Our records indicate that you already have an active [employer] account. You can access your account anytime at [email or web address] Did you know that you can access your hospital and ER discharge instructions at any time in [employer]? You can also review all of your test results from your hospital stay or ER visit. Additional Information If you have questions, please visit the Frequently Asked Questions section of the [employer] website at [email or web address] Remember, V[employer]e is NOT to be used for urgent needs. For medical emergencies, dial 911. Now available from your iPhone and Android! Please provide this summary of care documentation to your next provider. Your primary care clinician is listed as Sinan Lopez. If you have any questions after today's visit, please call [phone].

## 2017-03-03 NOTE — PROGRESS NOTES
HISTORY OF PRESENT ILLNESS  Srini Montero is a 73 y.o. male. HPI  Problem follow up: Srini Montero returns for follow up visit regarding dry cough. He was traveling in [place] and [place] over last month. he was seen 4 weeks ago in [place] while on vacation diagnosed with URI and treated with zpack. Workup was significant for URI. Notes, labs, studies, imaging related to this problem during prior visit were not available . Since that visit, he has improved. he has been compliant with prescribed treatment. Residual symptoms include: dry cough, wheezing some and prior dyspnea. Has been chronically on Arnuity inh. Fatigue , malaise, nasal congestion some better. The patient denies fevers, chills or sweats. No nausea, vomiting, diarrhea or constipation. He was also seen in [facility] for rash on lower legs. Treated with steroid cream and much better now. New issues associated with this problem include: none.       Patient Active Problem List   Diagnosis Code    DM (diabetes mellitus) ([facility] [address] E11.9    HTN (hypertension) I10    CAD (coronary artery disease) I25.10    Hyperlipemia E78.5    Depressed F32.9    Hypogonadism male E29.1    AR (allergic rhinitis) J30.9    ED (erectile dysfunction) N52.9    HTN (hypertension) I10    LEONA (obstructive sleep apnea) G47.33    S/P excision of lipoma Z98.890, Z86.018    S/P excision of skin lesion, follow-up exam Z09    Essential hypertension with goal blood pressure less than 130/80 I10    Diabetes mellitus type 2, controlled ([facility] [address] E11.9    Advance care planning Z71.89    Mild intermittent asthma without complication C69.12     Past Medical History:   Diagnosis Date    Arthritis     CAD (coronary artery disease)     CABG - 3 vessel    Diabetes ([address]     X 30years    Hypercholesteremia     Hypertension     Psychiatric disorder     depression    Psychotic disorder     Sleep apnea 1999    CPAP compliant     Allergies   Allergen Reactions    Codeine Nausea Only     Current Outpatient Prescriptions on File Prior to Visit   Medication Sig Dispense Refill    metoprolol tartrate (LOPRESSOR) 50 mg tablet TAKE 1 TABLET TWICE A  Tab 1    lisinopril (PRINIVIL, ZESTRIL) 20 mg tablet Take 1 Tab by mouth daily. 180 Tab 1    Insulin Needles, Disposable, (BD INSULIN PEN NEEDLE UF SHORT) 31 gauge x 5/16\" ndle USE EVERY DAY WITH LEVEMIR AS DIRECTED 100 Pen Needle 3    CIALIS 5 mg tablet Take 5 mg by mouth daily.  FLUTICASONE FUROATE (ARNUITY ELLIPTA IN) Take  by inhalation.  INVOKANA 300 mg tablet TAKE 1 TABLET DAILY 90 Tab 1    JANUMET 50-1,000 mg per tablet TAKE 1 TABLET TWICE A DAY WITH MEALS 180 Tab 1    citalopram (CELEXA) 40 mg tablet TAKE 1 TABLET DAILY 90 Tab 1    meloxicam (MOBIC) 15 mg tablet TK 1 T PO D  0    montelukast (SINGULAIR) 10 mg tablet TAKE 1 TABLET DAILY 90 Tab 1    atorvastatin (LIPITOR) 40 mg tablet TAKE 1 TABLET DAILY 90 Tab 1    tamsulosin (FLOMAX) 0.4 mg capsule Take 0.4 mg by mouth daily.  insulin lispro (HUMALOG) 100 unit/mL injection 15 Units by SubCUTAneous route Before breakfast, lunch, and dinner. Before meals  Indications: TYPE 2 DIABETES MELLITUS 3 Vial 2    ACCU-CHEK SMARTVIEW TEST STRIP strip CHECK FASTING BLOOD SUGAR ONCE DAILY 100 Strip 9    fluticasone (FLONASE) 50 mcg/actuation nasal spray 2 Sprays by Both Nostrils route daily. Indications: ALLERGIC RHINITIS 1 Bottle 0    OMEGA-3 FATTY ACIDS/FISH OIL (FISH OIL EXTRA STRENGTH PO) Take 1,400 mg by mouth daily (after breakfast).  ketoconazole (NIZORAL) 2 % topical cream Apply  to affected area as needed for Skin Irritation.  VOLTAREN 1 % gel every six (6) hours. 1    desonide (TRIDESILON) 0.05 % topical lotion as needed. 1    SARAI PEN NEEDLE 32 gauge x 5/32\" ndle USE 1 FOUR TIMES A DAY WITH INSULIN 360 Each 3    insulin detemir (LEVEMIR FLEXPEN) 100 unit/mL (3 mL) pen 40 Units by SubCUTAneous route nightly.  1 Package 0    Diabetic Supplies, Miscellan. (BD LANCET DEVICE) misc 1 Each by Does Not Apply route daily. 1 Each 0    Lancets (ACCU-CHEK FASTCLIX) misc 2 Packages by SubCUTAneous route two (2) times a day. Check Blood Glucose twice daily .00 2 Package 11    Blood-Glucose Meter (ACCU-CHEK SARAI) Misc by Does Not Apply route.  cetirizine (ZYRTEC) 10 mg tablet Take 1 Tab by mouth daily. 0    aspirin (ASPIRIN) 325 mg tablet take 325 mg by mouth daily.  M-VIT PO take 1 Tab by mouth daily.  [DISCONTINUED] insulin glargine (LANTUS) 100 unit/mL injection 14 Units by SubCUTAneous route. As directed 2 Vial 2     No current facility-administered medications on file prior to visit. Social History   Substance Use Topics    Smoking status: Passive Smoke Exposure - Never Smoker     Years: 10.00    Smokeless tobacco: Never Used    Alcohol use 3.0 oz/week     6 Cans of beer per week      Comment: 1 beer or so per day         ROS    Physical Exam   Constitutional: He appears well-developed and well-nourished. Non-toxic appearance. He does not have a sickly appearance. He does not appear ill. No distress. /57 (BP 1 Location: Left arm, BP Patient Position: Sitting)  Pulse (!) 59  Temp 98.1 °F (36.7 °C) (Oral)   Resp 18  Ht 5' 8\" (1.727 m)  Wt 253 lb (114.8 kg)  SpO2 98%  BMI 38.47 kg/m2   HENT:   Head: Normocephalic and atraumatic. Right Ear: Tympanic membrane and ear canal normal. No decreased hearing is noted. Left Ear: Tympanic membrane and ear canal normal. No decreased hearing is noted. Nose: No mucosal edema or rhinorrhea. No epistaxis. Right sinus exhibits no maxillary sinus tenderness and no frontal sinus tenderness. Left sinus exhibits no maxillary sinus tenderness and no frontal sinus tenderness. Mouth/Throat: Uvula is midline and mucous membranes are normal. No oral lesions. Normal dentition.  No oropharyngeal exudate, posterior oropharyngeal edema, posterior oropharyngeal erythema or tonsillar abscesses. Eyes: Conjunctivae are normal. Pupils are equal, round, and reactive to light. Right eye exhibits no discharge. Left eye exhibits no discharge. No scleral icterus. Neck: Neck supple. Cardiovascular: Normal rate, regular rhythm and normal heart sounds. Pulmonary/Chest: Effort normal. No accessory muscle usage. No respiratory distress. He has decreased breath sounds. He has no wheezes. He has no rhonchi. He has no rales. Lymphadenopathy:     He has no cervical adenopathy. Neurological: He is alert. Skin: Skin is warm and dry. He is not diaphoretic. Nursing note and vitals reviewed. ASSESSMENT and PLAN  Suzanna Kaba was seen today for cough. Diagnoses and all orders for this visit:    History of bronchitis -persistant dry cough -? Allergies vs post bronchitic cough vs cough variant asthma. Continue ICS, consider LABA  -     benzonatate (TESSALON) 200 mg capsule; Take 1 Cap by mouth three (3) times daily as needed for Cough for up to 7 days. -     albuterol (PROVENTIL HFA, VENTOLIN HFA, PROAIR HFA) 90 mcg/actuation inhaler; Take 2 Puffs by inhalation every four (4) hours as needed for Wheezing for up to 90 days. Mild intermittent asthma without complication  -     albuterol (PROVENTIL HFA, VENTOLIN HFA, PROAIR HFA) 90 mcg/actuation inhaler; Take 2 Puffs by inhalation every four (4) hours as needed for Wheezing for up to 90 days. Follow-up Disposition:  Return if symptoms worsen or fail to improve.

## 2017-05-04 ENCOUNTER — HOSPITAL ENCOUNTER (OUTPATIENT)
Dept: LAB | Age: 67
Discharge: HOME | End: 2017-05-04
Payer: MEDICARE

## 2017-05-04 ENCOUNTER — OFFICE VISIT (OUTPATIENT)
Dept: INTERNAL MEDICINE CLINIC | Age: 67
End: 2017-05-04

## 2017-05-04 VITALS
WEIGHT: 254.38 LBS | DIASTOLIC BLOOD PRESSURE: 61 MMHG | TEMPERATURE: 98.5 F | HEIGHT: 68 IN | HEART RATE: 68 BPM | RESPIRATION RATE: 18 BRPM | SYSTOLIC BLOOD PRESSURE: 131 MMHG | OXYGEN SATURATION: 98 % | BODY MASS INDEX: 38.55 KG/M2

## 2017-05-04 DIAGNOSIS — E78.00 PURE HYPERCHOLESTEROLEMIA: Chronic | ICD-10-CM

## 2017-05-04 DIAGNOSIS — E11.9 TYPE 2 DIABETES MELLITUS WITH HEMOGLOBIN A1C GOAL OF LESS THAN 7.0% (HCC): Primary | ICD-10-CM

## 2017-05-04 DIAGNOSIS — I10 ESSENTIAL HYPERTENSION WITH GOAL BLOOD PRESSURE LESS THAN 130/80: Chronic | ICD-10-CM

## 2017-05-04 DIAGNOSIS — I25.10 CORONARY ARTERY DISEASE INVOLVING NATIVE CORONARY ARTERY OF NATIVE HEART WITHOUT ANGINA PECTORIS: ICD-10-CM

## 2017-05-04 LAB — HBA1C MFR BLD HPLC: 6.8 %

## 2017-05-04 PROCEDURE — 80048 BASIC METABOLIC PNL TOTAL CA: CPT

## 2017-05-04 NOTE — PROGRESS NOTES
HISTORY OF PRESENT ILLNESS  David Weathers is a 73 y.o. male. HPI  Diabetes:  He is here for follow up of diabetes. Proteinuria: no  Neuropathy: no  Medication change since last visit:  No   Diabetic Review of Systems - medication compliance: compliant all of the time, diabetic diet compliance: compliant most of the time. Lab Results   Component Value Date/Time    Hemoglobin A1c 8.4 09/28/2016 10:30 AM    Hemoglobin A1c (POC) 6.8 05/04/2017 04:00 AM     Lab Results   Component Value Date/Time    Microalbumin/Creat ratio (mg/g creat) 8 09/14/2009 08:50 AM    Microalb/Creat ratio (ug/mg creat.) 14.0 10/18/2016 01:59 PM    Microalbumin,urine random 1.83 09/14/2009 08:50 AM    Microalbumin, urine 4.4 10/18/2016 01:59 PM         Last Point of Care HGB A1C  Hemoglobin A1c (POC)   Date Value Ref Range Status   05/04/2017 6.8 % Final          Hypertension:  David Weathers is a 73 y.o. male with hypertension. with Chronic kidney disease stage 2   Medication change since last visit: No  The patient reports:  taking medications as instructed, no medication side effects noted, patient does not perform home BP monitoring, no chest pain on exertion, no dyspnea on exertion, noting swelling of ankles, no orthostatic dizziness or lightheadedness, no palpitations.        Lifestyle modification/social history: generally follows a low fat low cholesterol diet, generally follows a low sodium diet, exercises sporadically, nonsmoker    Lab Results   Component Value Date/Time    Sodium 141 09/28/2016 10:30 AM    Potassium 4.9 09/28/2016 10:30 AM    Chloride 99 09/28/2016 10:30 AM    CO2 27 09/28/2016 10:30 AM    Anion gap 6 05/09/2016 01:50 PM    Glucose 124 09/28/2016 10:30 AM    BUN 21 09/28/2016 10:30 AM    Creatinine 1.12 09/28/2016 10:30 AM    BUN/Creatinine ratio 19 09/28/2016 10:30 AM    GFR est AA 80 09/28/2016 10:30 AM    GFR est non-AA 69 09/28/2016 10:30 AM    Calcium 9.6 09/28/2016 10:30 AM Hyperlipidemia:  Flex Torres is following up on his dyslipidemia. Cardiovascular risks for him are: LDL goal is under 80  diabetic  existing CAD  hypertension  obese  sedentary lifestyle. Currently he takes Lipitor (atorvastatin) ,   Lab Results   Component Value Date/Time    Cholesterol, total 155 01/24/2017 09:30 AM    Cholesterol, total 154 12/21/2015 08:50 AM    Cholesterol, total 133 02/20/2015 09:06 AM    Cholesterol, total 160 02/17/2014 08:51 AM    Cholesterol, total 158 08/19/2013 10:09 AM    HDL Cholesterol 50 01/24/2017 09:30 AM    HDL Cholesterol 43 12/21/2015 08:50 AM    HDL Cholesterol 38 02/20/2015 09:06 AM    HDL Cholesterol 41 02/17/2014 08:51 AM    HDL Cholesterol 43 08/19/2013 10:09 AM    LDL, calculated 85 01/24/2017 09:30 AM    LDL, calculated 94 12/21/2015 08:50 AM    LDL, calculated 79 02/20/2015 09:06 AM    LDL, calculated 98 02/17/2014 08:51 AM    LDL, calculated 98 08/19/2013 10:09 AM    Triglyceride 98 01/24/2017 09:30 AM    Triglyceride 87 12/21/2015 08:50 AM    Triglyceride 82 02/20/2015 09:06 AM    Triglyceride 105 02/17/2014 08:51 AM    Triglyceride 86 08/19/2013 10:09 AM    CHOL/HDL Ratio 3.3 03/30/2010 08:48 AM    CHOL/HDL Ratio 3.3 09/14/2009 08:50 AM    CHOL/HDL Ratio 3.1 03/16/2009 08:38 AM     Lab Results   Component Value Date/Time    ALT (SGPT) CANCELED 12/19/2013 12:00 AM    AST (SGOT) CANCELED 12/19/2013 12:00 AM    Alk. phosphatase CANCELED 12/19/2013 12:00 AM    Bilirubin, total CANCELED 12/19/2013 12:00 AM       Myalgias: no  Fatigue: no    Anxiety / depression - he reports control with SSRI.     Patient Active Problem List   Diagnosis Code    DM (diabetes mellitus) ([facility] [address] E11.9    HTN (hypertension) I10    CAD (coronary artery disease) I25.10    Hyperlipemia E78.5    Depressed F32.9    Hypogonadism male E29.1    AR (allergic rhinitis) J30.9    ED (erectile dysfunction) N52.9    HTN (hypertension) I10    LEONA (obstructive sleep apnea) G47.33    S/P excision of lipoma Z98.890, Z86.018    S/P excision of skin lesion, follow-up exam Z09    Essential hypertension with goal blood pressure less than 130/80 I10    Diabetes mellitus type 2, controlled ([facility] E11.9    Advance care planning Z71.89    Mild intermittent asthma without complication B16.70     Past Medical History:   Diagnosis Date    Arthritis     CAD (coronary artery disease)     CABG - 3 vessel    Diabetes (HCC)     X 30years    Hypercholesteremia     Hypertension     Psychiatric disorder     depression    Psychotic disorder     Sleep apnea 1999    CPAP compliant     Allergies   Allergen Reactions    Codeine Nausea Only     Current Outpatient Prescriptions on File Prior to Visit   Medication Sig Dispense Refill    montelukast (SINGULAIR) 10 mg tablet TAKE 1 TABLET DAILY 90 Tab 3    atorvastatin (LIPITOR) 40 mg tablet TAKE 1 TABLET DAILY 90 Tab 1    albuterol (PROVENTIL HFA, VENTOLIN HFA, PROAIR HFA) 90 mcg/actuation inhaler Take 2 Puffs by inhalation every four (4) hours as needed for Wheezing for up to 90 days. 1 Inhaler 1    metoprolol tartrate (LOPRESSOR) 50 mg tablet TAKE 1 TABLET TWICE A  Tab 1    lisinopril (PRINIVIL, ZESTRIL) 20 mg tablet Take 1 Tab by mouth daily. 180 Tab 1    Insulin Needles, Disposable, (BD INSULIN PEN NEEDLE UF SHORT) 31 gauge x 5/16\" ndle USE EVERY DAY WITH LEVEMIR AS DIRECTED 100 Pen Needle 3    CIALIS 5 mg tablet Take 5 mg by mouth daily.  FLUTICASONE FUROATE (ARNUITY ELLIPTA IN) Take  by inhalation.  INVOKANA 300 mg tablet TAKE 1 TABLET DAILY 90 Tab 1    JANUMET 50-1,000 mg per tablet TAKE 1 TABLET TWICE A DAY WITH MEALS 180 Tab 1    citalopram (CELEXA) 40 mg tablet TAKE 1 TABLET DAILY 90 Tab 1    meloxicam (MOBIC) 15 mg tablet TK 1 T PO D  0    tamsulosin (FLOMAX) 0.4 mg capsule Take 0.4 mg by mouth daily.  insulin lispro (HUMALOG) 100 unit/mL injection 15 Units by SubCUTAneous route Before breakfast, lunch, and dinner.  Before meals  Indications: TYPE 2 DIABETES MELLITUS 3 Vial 2    ACCU-CHEK SMARTVIEW TEST STRIP strip CHECK FASTING BLOOD SUGAR ONCE DAILY 100 Strip 9    fluticasone (FLONASE) 50 mcg/actuation nasal spray 2 Sprays by Both Nostrils route daily. Indications: ALLERGIC RHINITIS 1 Bottle 0    OMEGA-3 FATTY ACIDS/FISH OIL (FISH OIL EXTRA STRENGTH PO) Take 1,400 mg by mouth daily (after breakfast).  ketoconazole (NIZORAL) 2 % topical cream Apply  to affected area as needed for Skin Irritation.  VOLTAREN 1 % gel every six (6) hours. 1    desonide (TRIDESILON) 0.05 % topical lotion as needed. 1    SARAI PEN NEEDLE 32 gauge x 5/32\" ndle USE 1 FOUR TIMES A DAY WITH INSULIN 360 Each 3    insulin detemir (LEVEMIR FLEXPEN) 100 unit/mL (3 mL) pen 40 Units by SubCUTAneous route nightly. 1 Package 0    Diabetic Supplies, Miscellan. (BD LANCET DEVICE) misc 1 Each by Does Not Apply route daily. 1 Each 0    Lancets (ACCU-CHEK FASTCLIX) misc 2 Packages by SubCUTAneous route two (2) times a day. Check Blood Glucose twice daily .00 2 Package 11    Blood-Glucose Meter (ACCU-CHEK SARAI) Misc by Does Not Apply route.  cetirizine (ZYRTEC) 10 mg tablet Take 1 Tab by mouth daily. 0    aspirin (ASPIRIN) 325 mg tablet take 325 mg by mouth daily.  M-VIT PO take 1 Tab by mouth daily.  [DISCONTINUED] insulin glargine (LANTUS) 100 unit/mL injection 14 Units by SubCUTAneous route. As directed 2 Vial 2     No current facility-administered medications on file prior to visit. Social History   Substance Use Topics    Smoking status: Passive Smoke Exposure - Never Smoker     Years: 10.00    Smokeless tobacco: Never Used    Alcohol use 3.0 oz/week     6 Cans of beer per week      Comment: 1 beer or so per day             ROS    Physical Exam   Constitutional: He appears well-developed and well-nourished. No distress.    /61 (BP 1 Location: Left arm, BP Patient Position: Sitting)  Pulse 68  Temp 98.5 °F (36.9 °C) (Oral)   Resp 18  Ht 5' 8\" (1.727 m)  Wt 254 lb 6 oz (115.4 kg)  SpO2 98%  BMI 38.68 kg/m2Body mass index is 38.68 kg/(m^2). HENT:   Mouth/Throat: Oropharynx is clear and moist.   Neck: No JVD present. Carotid bruit is not present. Cardiovascular: Normal rate, regular rhythm and intact distal pulses. Murmur heard. Systolic murmur is present with a grade of 2/6   Pulses:       Posterior tibial pulses are 1+ on the right side, and 1+ on the left side. Pulmonary/Chest: Effort normal and breath sounds normal. No respiratory distress. He has no wheezes. He has no rales. Musculoskeletal: He exhibits edema (trace edema bilateral shins). He exhibits no tenderness. Neurological: He is alert. Skin: Skin is warm and dry. He is not diaphoretic. Psychiatric: He has a normal mood and affect. His speech is normal and behavior is normal. Judgment and thought content normal. Cognition and memory are normal.   Nursing note and vitals reviewed. ASSESSMENT and PLAN  John Mccabe was seen today for diabetes. Diagnoses and all orders for this visit:    Type 2 diabetes mellitus with hemoglobin A1c goal of less than 7.0% ([facility]) - Well controlled and stable. his medications were reviewed and refilled where necessary as noted below. Labs ordered as noted. -     AMB POC HEMOGLOBIN A1C    Essential hypertension with goal blood pressure less than 130/80 - Well controlled and stable. his medications were reviewed and refilled where necessary as noted below. Labs ordered as noted. -     METABOLIC PANEL, BASIC    Pure hypercholesterolemia - Well controlled and stable. his medications were reviewed and refilled where necessary as noted below. Labs ordered as noted. Coronary artery disease involving native coronary artery of native heart without angina pectoris - no ischemic symptoms. Good RF control. Follow-up Disposition:  Return in about 6 months (around 11/4/2017).

## 2017-05-04 NOTE — MR AVS SNAPSHOT
Visit Information Date & Time Provider Department Dept. Phone Encounter #  
 5/4/2017  4:00 PM Shant Cooper MD [facility] [address] [id number] Follow-up Instructions Return in about 6 months (around 11/4/2017). Upcoming Health Maintenance Date Due DTaP/Tdap/Td series (1 - Tdap) 12/2/2007 FOOT EXAM Q1 7/7/2015 Pneumococcal 65+ Low/Medium Risk (2 of 2 - PPSV23) 9/29/2016 COLONOSCOPY 7/11/2017 HEMOGLOBIN A1C Q6M 7/4/2017 INFLUENZA AGE 9 TO ADULT 8/1/2017 EYE EXAM RETINAL OR DILATED Q1 8/12/2017 MICROALBUMIN Q1 10/18/2017 LIPID PANEL Q1 1/24/2018 MEDICARE YEARLY EXAM 1/25/2018 GLAUCOMA SCREENING Q2Y 8/12/2018 Allergies as of 5/4/2017  Review Complete On: 5/4/2017 By: Tamika Lynne LPN Severity Noted Reaction Type Reactions Codeine  12/01/2008   Side Effect Nausea Only Current Immunizations  Reviewed on 9/28/2016 Name Date H1N1 FLU VACCINE 1/1/2010 Influenza Vaccine 8/30/2016, 10/26/2015, 10/2/2014, 10/5/2013 Influenza Vaccine Split 10/17/2011 Influenza Vaccine Whole 10/1/2012, 9/12/2009 Pneumococcal Vaccine (Unspecified Type) 12/1/2005 TD Vaccine 12/1/2007 Zoster Vaccine, Live 4/22/2011 Not reviewed this visit You Were Diagnosed With   
  
 Codes Comments Type 2 diabetes mellitus with hemoglobin A1c goal of less than 7.0% ([facility])    -  Primary ICD-10-CM: E11.9 ICD-9-CM: 250.00 Essential hypertension with goal blood pressure less than 130/80     ICD-10-CM: I10 
ICD-9-CM: 401.9 Pure hypercholesterolemia     ICD-10-CM: E78.00 ICD-9-CM: 272.0 Coronary artery disease involving native coronary artery of native heart without angina pectoris     ICD-10-CM: I25.10 ICD-9-CM: 414.01 Vitals BP Pulse Temp Resp Height(growth percentile) Weight(growth percentile)  131/61 (BP 1 Location: Left arm, BP Patient Position: Sitting) 68 98.5 °F (36.9 °C) (Oral) 18 5' 8\" (1.727 m) 254 lb 6 oz (115.4 kg) SpO2 BMI Smoking Status 98% 38.68 kg/m2 Passive Smoke Exposure - Never Smoker Vitals History BMI and BSA Data Body Mass Index Body Surface Area  
 38.68 kg/m 2 2.35 m 2 Preferred Pharmacy Pharmacy Name Phone [address] [facility] [phone] Your Updated Medication List  
  
   
This list is accurate as of: 5/4/17  4:27 PM.  Always use your most recent med list.  
  
  
  
  
 Coralee Lota Generic drug:  Blood-Glucose Meter  
by Does Not Apply route. ACCU-CHEK SMARTVIEW TEST STRIP strip Generic drug:  glucose blood VI test strips CHECK FASTING BLOOD SUGAR ONCE DAILY  
  
 albuterol 90 mcg/actuation inhaler Commonly known as:  PROVENTIL HFA, VENTOLIN HFA, PROAIR HFA Take 2 Puffs by inhalation every four (4) hours as needed for Wheezing for up to 90 days. ARNUITY ELLIPTA IN Take  by inhalation. aspirin 325 mg tablet Commonly known as:  ASPIRIN  
take 325 mg by mouth daily. atorvastatin 40 mg tablet Commonly known as:  LIPITOR  
TAKE 1 TABLET DAILY  
  
 cetirizine 10 mg tablet Commonly known as:  ZYRTEC Take 1 Tab by mouth daily. CIALIS 5 mg tablet Generic drug:  tadalafil Take 5 mg by mouth daily. citalopram 40 mg tablet Commonly known as:  CELEXA  
TAKE 1 TABLET DAILY  
  
 desonide 0.05 % topical lotion Commonly known as:  Eleuterio Pair  
as needed. Diabetic Supplies, [facility]. Misc Commonly known as:  BD LANCET DEVICE  
1 Each by Does Not Apply route daily. FISH OIL EXTRA STRENGTH PO Take 1,400 mg by mouth daily (after breakfast). fluticasone 50 mcg/actuation nasal spray Commonly known as:  Roxi Mad 2 Sprays by Both Nostrils route daily. Indications: ALLERGIC RHINITIS  
  
 insulin detemir 100 unit/mL (3 mL) Inpn Commonly known as:  Yue Thomas  
 40 Units by SubCUTAneous route nightly. insulin lispro 100 unit/mL injection Commonly known as:  HumaLOG  
15 Units by SubCUTAneous route Before breakfast, lunch, and dinner. Before meals  Indications: TYPE 2 DIABETES MELLITUS INVOKANA 300 mg tablet Generic drug:  canagliflozin TAKE 1 TABLET DAILY JANUMET 50-1,000 mg per tablet Generic drug:  SITagliptin-metFORMIN  
TAKE 1 TABLET TWICE A DAY WITH MEALS  
  
 ketoconazole 2 % topical cream  
Commonly known as:  NIZORAL Apply  to affected area as needed for Skin Irritation. Lancets Misc Commonly known as:  ACCU-CHEK FASTCLIX  
2 Packages by SubCUTAneous route two (2) times a day. Check Blood Glucose twice daily .00  
  
 lisinopril 20 mg tablet Commonly known as:  Dulcie Mcburney Take 1 Tab by mouth daily. M-VIT PO  
take 1 Tab by mouth daily. meloxicam 15 mg tablet Commonly known as:  MOBIC TK 1 T PO D  
  
 metoprolol tartrate 50 mg tablet Commonly known as:  LOPRESSOR  
TAKE 1 TABLET TWICE A DAY  
  
 montelukast 10 mg tablet Commonly known as:  SINGULAIR  
TAKE 1 TABLET DAILY * Lady Pen Needle 32 gauge x 5/32\" Ndle Generic drug:  Insulin Needles (Disposable) USE 1 FOUR TIMES A DAY WITH INSULIN  
  
 * Insulin Needles (Disposable) 31 gauge x 5/16\" Ndle Commonly known as:  BD INSULIN PEN NEEDLE UF SHORT  
USE EVERY DAY WITH LEVEMIR AS DIRECTED  
  
 tamsulosin 0.4 mg capsule Commonly known as:  FLOMAX Take 0.4 mg by mouth daily. VOLTAREN 1 % Gel Generic drug:  diclofenac  
every six (6) hours. * Notice: This list has 2 medication(s) that are the same as other medications prescribed for you. Read the directions carefully, and ask your doctor or other care provider to review them with you. We Performed the Following AMB POC HEMOGLOBIN A1C [52851 CPT(R)] METABOLIC PANEL, BASIC [35002 CPT(R)] Follow-up Instructions Return in about 6 months (around 11/4/2017). Introducing [facility]! Dear Radha Elena: Thank you for requesting a [employer] account. Our records indicate that you already have an active [employer] account. You can access your account anytime at [email or web address] Did you know that you can access your hospital and ER discharge instructions at any time in [employer]? You can also review all of your test results from your hospital stay or ER visit. Additional Information If you have questions, please visit the Frequently Asked Questions section of the [employer] website at [email or web address] Remember, [employer] is NOT to be used for urgent needs. For medical emergencies, dial 911. Now available from your iPhone and Android! Please provide this summary of care documentation to your next provider. Your primary care clinician is listed as Ramon Vergara. If you have any questions after today's visit, please call [phone].

## 2017-05-05 LAB
BUN SERPL-MCNC: 21 MG/DL (ref 8–27)
BUN/CREAT SERPL: 20 (ref 10–24)
CALCIUM SERPL-MCNC: 9.4 MG/DL (ref 8.6–10.2)
CHLORIDE SERPL-SCNC: 97 MMOL/L (ref 96–106)
CO2 SERPL-SCNC: 22 MMOL/L (ref 18–29)
CREAT SERPL-MCNC: 1.06 MG/DL (ref 0.76–1.27)
GLUCOSE SERPL-MCNC: 170 MG/DL (ref 65–99)
POTASSIUM SERPL-SCNC: 5 MMOL/L (ref 3.5–5.2)
SODIUM SERPL-SCNC: 139 MMOL/L (ref 134–144)

## 2017-09-29 ENCOUNTER — OFFICE VISIT (OUTPATIENT)
Dept: INTERNAL MEDICINE CLINIC | Age: 67
End: 2017-09-29

## 2017-09-29 VITALS
SYSTOLIC BLOOD PRESSURE: 150 MMHG | TEMPERATURE: 98.3 F | OXYGEN SATURATION: 98 % | BODY MASS INDEX: 39.4 KG/M2 | DIASTOLIC BLOOD PRESSURE: 69 MMHG | WEIGHT: 260 LBS | RESPIRATION RATE: 18 BRPM | HEART RATE: 46 BPM | HEIGHT: 68 IN

## 2017-09-29 DIAGNOSIS — J45.31 MILD PERSISTENT ASTHMA WITH ACUTE EXACERBATION: ICD-10-CM

## 2017-09-29 DIAGNOSIS — J20.9 ACUTE BRONCHITIS, UNSPECIFIED ORGANISM: ICD-10-CM

## 2017-09-29 DIAGNOSIS — R21 FACIAL RASH: ICD-10-CM

## 2017-09-29 DIAGNOSIS — I10 ESSENTIAL HYPERTENSION: Chronic | ICD-10-CM

## 2017-09-29 DIAGNOSIS — E11.9 TYPE 2 DIABETES MELLITUS WITH HEMOGLOBIN A1C GOAL OF LESS THAN 7.0% (HCC): Primary | ICD-10-CM

## 2017-09-29 LAB — HBA1C MFR BLD HPLC: 7.2 %

## 2017-09-29 RX ORDER — FLUTICASONE FUROATE AND VILANTEROL 100; 25 UG/1; UG/1
1 POWDER RESPIRATORY (INHALATION) DAILY
Qty: 3 INHALER | Refills: 1 | Status: SHIPPED | OUTPATIENT
Start: 2017-09-29 | End: 2018-02-28 | Stop reason: SDUPTHER

## 2017-09-29 RX ORDER — CEFDINIR 300 MG/1
600 CAPSULE ORAL DAILY
COMMUNITY
Start: 2017-09-23 | End: 2017-11-15 | Stop reason: ALTCHOICE

## 2017-09-29 NOTE — PROGRESS NOTES
HISTORY OF PRESENT ILLNESS  Moustapha Stevens is a 78 y.o. male. HPI  Problem follow up: Moustapha Stevens returns for follow up visit regarding acute bronchitis. he was seen 6 days ago in [facility] with same and treated with cefdinir, nebulizer. Workup was significant for chest X-ray with ? Fluid lower lungs. Notes, labs, studies, imaging related to this problem during prior visit were not available . Since that visit, he has improved. he has been compliant with prescribed treatment. Residual symptoms include: mild cough improved. The patient denies fevers, chills or sweats. No nausea, vomiting, diarrhea. dyspnea on exertion. New issues associated with this problem include: bilateral red facial rash over last 2 days. No pain, itching associated. No other rashes. Going to allergist and sleep specialist.  On shots. Still having wheezing with climbing stairs. Now on arnuity ellipta and albuterol prn. Still having problems  Diabetes:  He is here for follow up of diabetes. Proteinuria: no  Neuropathy: no  Medication change since last visit:  No        Lab Results   Component Value Date/Time    Hemoglobin A1c 8.4 09/28/2016 10:30 AM    Hemoglobin A1c (POC) 7.2 09/29/2017 08:20 AM     Lab Results   Component Value Date/Time    Microalbumin/Creat ratio (mg/g creat) 8 09/14/2009 08:50 AM    Microalb/Creat ratio (ug/mg creat.) 14.0 10/18/2016 01:59 PM    Microalbumin,urine random 1.83 09/14/2009 08:50 AM         Last Point of Care HGB A1C  Hemoglobin A1c (POC)   Date Value Ref Range Status   09/29/2017 7.2 % Final            ROS    Physical Exam   Constitutional: He appears well-developed and well-nourished. No distress. /69 (BP 1 Location: Left arm, BP Patient Position: Sitting)  Pulse (!) 46  Temp 98.3 °F (36.8 °C) (Oral)   Resp 18  Ht 5' 8\" (1.727 m)  Wt 260 lb (117.9 kg)  SpO2 98%  BMI 39.53 kg/m2Body mass index is 39.53 kg/(m^2).    HENT:   Head:       Mouth/Throat: Oropharynx is clear and moist.   Flat erythematous rash, ? Telangiectatic?, not warm. No other skin rask over torso, extremities. Neck: No JVD present. Carotid bruit is not present. Cardiovascular: Regular rhythm, normal heart sounds and intact distal pulses. Bradycardia present. Pulmonary/Chest: No accessory muscle usage. No respiratory distress. He has decreased breath sounds. He has no wheezes. He has no rhonchi. He has no rales. Musculoskeletal: He exhibits no edema. Neurological: He is alert. Skin: Skin is warm and dry. He is not diaphoretic. Nursing note and vitals reviewed. ASSESSMENT and PLAN  Diagnoses and all orders for this visit:    1. Type 2 diabetes mellitus with hemoglobin A1c goal of less than 7.0% ([facility]) - fair control  -     AMB POC HEMOGLOBIN A1C    2. Mild persistent asthma with acute exacerbation -still having symptoms on steroid/ rescue. Change to ICS/LABA. Quoc Carmona was counseled on this new medication prescribed. Adverse effects, risks and monitoring of medication along with potential benefits of medication were discussed. All of his questions about the medication were answered. -     fluticasone-vilanterol (BREO ELLIPTA) 100-25 mcg/dose inhaler; Take 1 Puff by inhalation daily. 3. Essential hypertension - mild elevation today - ? Due to illness    4. Facial rash - ? Photosensitivity rash vs rosacea? Call if progressing, not resolving or new symptoms/other rash develops. 5. Acute bronchitis, unspecified organism -resolving on antibx      Follow-up Disposition:  Return in about 2 months (around 11/29/2017).

## 2017-09-29 NOTE — MR AVS SNAPSHOT
Visit Information Date & Time Provider Department Dept. Phone Encounter #  
 9/29/2017  8:15 AM Pop Morales MD [facility] [facility] [id number] Follow-up Instructions Return in about 2 months (around 11/29/2017). Your Appointments 11/15/2017  8:00 AM  
ROUTINE CARE with Pop Morales MD  
[facility] [place] [address] [place] [address]) Appt Note: 6 mnth fu  
 [address] [facility] [address] [address]  
[phone]  
  
   
 [address] [place] [address] Upcoming Health Maintenance Date Due DTaP/Tdap/Td series (1 - Tdap) 12/2/2007 FOOT EXAM Q1 7/7/2015 Pneumococcal 65+ Low/Medium Risk (2 of 2 - PPSV23) 9/29/2016 COLONOSCOPY 7/11/2017 EYE EXAM RETINAL OR DILATED Q1 8/12/2017 MICROALBUMIN Q1 10/18/2017 HEMOGLOBIN A1C Q6M 11/4/2017 LIPID PANEL Q1 1/24/2018 MEDICARE YEARLY EXAM 1/25/2018 GLAUCOMA SCREENING Q2Y 8/12/2018 Allergies as of 9/29/2017  Review Complete On: 5/4/2017 By: Korina Miller, LPN Severity Noted Reaction Type Reactions Codeine  12/01/2008   Side Effect Nausea Only Current Immunizations  Reviewed on 9/28/2016 Name Date H1N1 FLU VACCINE 1/1/2010 Influenza High Dose Vaccine PF 9/22/2017 Influenza Vaccine 8/30/2016, 10/26/2015, 10/2/2014, 10/5/2013 Influenza Vaccine Split 10/17/2011 Influenza Vaccine Whole 10/1/2012, 9/12/2009 TD Vaccine 12/1/2007 ZZZ-RETIRED (DO NOT USE) Pneumococcal Vaccine (Unspecified Type) 12/1/2005 Zoster Vaccine, Live 4/22/2011 Not reviewed this visit You Were Diagnosed With   
  
 Codes Comments Type 2 diabetes mellitus with hemoglobin A1c goal of less than 7.0% ([facility])    -  Primary ICD-10-CM: E11.9 ICD-9-CM: 250.00 Mild persistent asthma with acute exacerbation     ICD-10-CM: J45.31 
ICD-9-CM: 493.92 Essential hypertension     ICD-10-CM: I10 
ICD-9-CM: 401.9 Facial rash     ICD-10-CM: R21 
ICD-9-CM: 782.1 Acute bronchitis, unspecified organism     ICD-10-CM: J20.9 ICD-9-CM: 466.0 Vitals BP Pulse Temp Resp Height(growth percentile) Weight(growth percentile) 150/69 (BP 1 Location: Left arm, BP Patient Position: Sitting) (!) 46 98.3 °F (36.8 °C) (Oral) 18 5' 8\" (1.727 m) 260 lb (117.9 kg) SpO2 BMI Smoking Status 98% 39.53 kg/m2 Passive Smoke Exposure - Never Smoker BMI and BSA Data Body Mass Index Body Surface Area  
 39.53 kg/m 2 2.38 m 2 Preferred Pharmacy Pharmacy Name Phone [address] [facility] [phone] Your Updated Medication List  
  
   
This list is accurate as of: 9/29/17  8:43 AM.  Always use your most recent med list.  
  
  
  
  
 Jesus Duncan Generic drug:  Blood-Glucose Meter  
by Does Not Apply route. ACCU-CHEK SMARTVIEW TEST STRIP strip Generic drug:  glucose blood VI test strips CHECK FASTING BLOOD SUGAR ONCE DAILY ARNUITY ELLIPTA IN Take  by inhalation. aspirin 325 mg tablet Commonly known as:  ASPIRIN  
take 325 mg by mouth daily. atorvastatin 40 mg tablet Commonly known as:  LIPITOR  
TAKE 1 TABLET DAILY  
  
 cefdinir 300 mg capsule Commonly known as:  OMNICEF Take 600 mg by mouth daily. cetirizine 10 mg tablet Commonly known as:  ZYRTEC Take 1 Tab by mouth daily. CIALIS 5 mg tablet Generic drug:  tadalafil Take 5 mg by mouth daily. citalopram 40 mg tablet Commonly known as:  CELEXA  
TAKE 1 TABLET DAILY  
  
 desonide 0.05 % topical lotion Commonly known as:  Bibiana An  
as needed. Diabetic Supplies, [address]. Misc Commonly known as:  BD LANCET DEVICE  
1 Each by Does Not Apply route daily. FISH OIL EXTRA STRENGTH PO Take 1,400 mg by mouth daily (after breakfast). fluticasone 50 mcg/actuation nasal spray Commonly known as:  Lorenzo Calk 2 Sprays by Both Nostrils route daily. Indications: ALLERGIC RHINITIS  
  
 fluticasone-vilanterol 100-25 mcg/dose inhaler Commonly known as:  BREO ELLIPTA Take 1 Puff by inhalation daily. HumaLOG KwikPen 100 unit/mL kwikpen Generic drug:  insulin lispro INJECT 15 UNITS UNDER THE SKIN BEFORE BREAKFAST, LUNCH AND DINNER ( BEFORE MEALS ) FOR TYPE 2 DIABETES MELLITUS INVOKANA 300 mg tablet Generic drug:  canagliflozin TAKE 1 TABLET DAILY JANUMET 50-1,000 mg per tablet Generic drug:  SITagliptin-metFORMIN  
TAKE 1 TABLET TWICE A DAY WITH MEALS  
  
 ketoconazole 2 % topical cream  
Commonly known as:  NIZORAL Apply  to affected area as needed for Skin Irritation. Lancets Misc Commonly known as:  ACCU-CHEK FASTCLIX  
2 Packages by SubCUTAneous route two (2) times a day. Check Blood Glucose twice daily .00 LEVEMIR FLEXTOUCH 100 unit/mL (3 mL) Inpn Generic drug:  insulin detemir INJECT 40 UNITS UNDER THE SKIN AS DIRECTED  
  
 lisinopril 20 mg tablet Commonly known as:  Rosa Mark Anthony Take 1 Tab by mouth daily. M-VIT PO  
take 1 Tab by mouth daily. meloxicam 15 mg tablet Commonly known as:  MOBIC TK 1 T PO D  
  
 metoprolol tartrate 50 mg tablet Commonly known as:  LOPRESSOR  
TAKE 1 TABLET TWICE A DAY  
  
 montelukast 10 mg tablet Commonly known as:  SINGULAIR  
TAKE 1 TABLET DAILY * Lady Pen Needle 32 gauge x 5/32\" Ndle Generic drug:  Insulin Needles (Disposable) USE 1 FOUR TIMES A DAY WITH INSULIN  
  
 * Insulin Needles (Disposable) 31 gauge x 5/16\" Ndle Commonly known as:  BD INSULIN PEN NEEDLE UF SHORT  
USE EVERY DAY WITH LEVEMIR AS DIRECTED  
  
 tamsulosin 0.4 mg capsule Commonly known as:  FLOMAX Take 0.4 mg by mouth daily. VOLTAREN 1 % Gel Generic drug:  diclofenac  
every six (6) hours. * Notice:   This list has 2 medication(s) that are the same as other medications prescribed for you. Read the directions carefully, and ask your doctor or other care provider to review them with you. Prescriptions Sent to Pharmacy Refills  
 fluticasone-vilanterol (BREO ELLIPTA) 100-25 mcg/dose inhaler 1 Sig: Take 1 Puff by inhalation daily. Class: Normal  
 Pharmacy: [address], [address] [place] [address] #: [phone] Route: Inhalation We Performed the Following AMB POC HEMOGLOBIN A1C [31429 CPT(R)] Follow-up Instructions Return in about 2 months (around 11/29/2017). Introducing [facility] SERVICES! Dear Zachery Saldana: Thank you for requesting a [employer] account. Our records indicate that you already have an active [employer] account. You can access your account anytime at [email or web address] Did you know that you can access your hospital and ER discharge instructions at any time in [employer]? You can also review all of your test results from your hospital stay or ER visit. Additional Information If you have questions, please visit the Frequently Asked Questions section of the [employer] website at [email or web address] Remember, [employer] is NOT to be used for urgent needs. For medical emergencies, dial 911. Now available from your iPhone and Android! Please provide this summary of care documentation to your next provider. Your primary care clinician is listed as Renetta Madrid. If you have any questions after today's visit, please call [phone].

## 2017-11-06 RX ORDER — ATORVASTATIN CALCIUM 40 MG/1
TABLET, FILM COATED ORAL
Qty: 90 TAB | Refills: 1 | Status: SHIPPED | OUTPATIENT
Start: 2017-11-06 | End: 2017-11-28 | Stop reason: DRUGHIGH

## 2017-11-06 NOTE — TELEPHONE ENCOUNTER
From: Taunya Spatz  To:  Marissa Unger MD  Sent: 11/5/2017 8:56 PM EST  Subject: Medication Renewal Request    Original authorizing provider: Ruthanne Pleasant, MD Taunya Spatz would like a refill of the following medications:  ACCU-CHEK SMARTVIEW TEST STRIP strip Marissa Unger MD]    Preferred pharmacy: [place]    Comment:

## 2017-11-15 ENCOUNTER — OFFICE VISIT (OUTPATIENT)
Dept: INTERNAL MEDICINE CLINIC | Age: 67
End: 2017-11-15

## 2017-11-15 VITALS
RESPIRATION RATE: 18 BRPM | HEIGHT: 68 IN | DIASTOLIC BLOOD PRESSURE: 71 MMHG | TEMPERATURE: 98.5 F | SYSTOLIC BLOOD PRESSURE: 123 MMHG | OXYGEN SATURATION: 98 % | BODY MASS INDEX: 39.31 KG/M2 | WEIGHT: 259.38 LBS | HEART RATE: 57 BPM

## 2017-11-15 DIAGNOSIS — E78.00 PURE HYPERCHOLESTEROLEMIA: Chronic | ICD-10-CM

## 2017-11-15 DIAGNOSIS — L89.212 DECUBITUS ULCER OF RIGHT THIGH, STAGE 2 (HCC): ICD-10-CM

## 2017-11-15 DIAGNOSIS — L98.9 SKIN LESION OF LEFT LEG: ICD-10-CM

## 2017-11-15 DIAGNOSIS — I10 ESSENTIAL HYPERTENSION: Primary | Chronic | ICD-10-CM

## 2017-11-15 DIAGNOSIS — I25.10 CORONARY ARTERY DISEASE INVOLVING NATIVE CORONARY ARTERY OF NATIVE HEART WITHOUT ANGINA PECTORIS: ICD-10-CM

## 2017-11-15 DIAGNOSIS — J45.20 MILD INTERMITTENT ASTHMA WITHOUT COMPLICATION: ICD-10-CM

## 2017-11-15 DIAGNOSIS — E11.9 TYPE 2 DIABETES MELLITUS WITH HEMOGLOBIN A1C GOAL OF LESS THAN 7.0% (HCC): ICD-10-CM

## 2017-11-15 NOTE — MR AVS SNAPSHOT
Visit Information Date & Time Provider Department Dept. Phone Encounter #  
 11/15/2017  8:00 AM Archana Vu MD [facility] [address] [id number] Follow-up Instructions Return in about 4 months (around 3/15/2018). Upcoming Health Maintenance Date Due  
 FOOT EXAM Q1 7/7/2015 COLONOSCOPY 7/11/2017 EYE EXAM RETINAL OR DILATED Q1 8/12/2017 DTaP/Tdap/Td series (1 - Tdap) 9/1/2017 MICROALBUMIN Q1 10/18/2017 Pneumococcal 65+ Low/Medium Risk (2 of 2 - PPSV23) 1/24/2018 LIPID PANEL Q1 1/24/2018 MEDICARE YEARLY EXAM 1/25/2018 HEMOGLOBIN A1C Q6M 3/29/2018 GLAUCOMA SCREENING Q2Y 8/12/2018 Allergies as of 11/15/2017  Review Complete On: 11/15/2017 By: Geraldine Campos LPN Severity Noted Reaction Type Reactions Cefdinir  11/15/2017    Rash Codeine  12/01/2008   Side Effect Nausea Only Current Immunizations  Reviewed on 9/28/2016 Name Date H1N1 FLU VACCINE 1/1/2010 Influenza High Dose Vaccine PF 9/22/2017 12:00 AM  
 Influenza Vaccine 8/30/2016, 10/26/2015, 10/2/2014, 10/5/2013 Influenza Vaccine Split 10/17/2011 Influenza Vaccine Whole 10/1/2012, 9/12/2009 Pneumococcal Conjugate (PCV-13) 1/24/2017 12:00 AM  
 TD Vaccine 12/1/2007 Td 8/31/2017 ZZZ-RETIRED (DO NOT USE) Pneumococcal Vaccine (Unspecified Type) 12/1/2005 Zoster Vaccine, Live 4/22/2011 Not reviewed this visit You Were Diagnosed With   
  
 Codes Comments Essential hypertension    -  Primary ICD-10-CM: I10 
ICD-9-CM: 401.9 Coronary artery disease involving native coronary artery of native heart without angina pectoris     ICD-10-CM: I25.10 ICD-9-CM: 414.01   
 Pure hypercholesterolemia     ICD-10-CM: E78.00 ICD-9-CM: 272.0 Mild intermittent asthma without complication     YVM-18-RH: J45.20 ICD-9-CM: 493.90 Type 2 diabetes mellitus with hemoglobin A1c goal of less than 7.0% ([facility])     ICD-10-CM: E11.9 ICD-9-CM: 250.00 Decubitus ulcer of right thigh, stage 2     ICD-10-CM: U50.001 ICD-9-CM: 707.09, 707.22 Skin lesion of left leg     ICD-10-CM: L98.9 ICD-9-CM: 709.9 Vitals BP Pulse Temp Resp Height(growth percentile) Weight(growth percentile) 123/71 (BP 1 Location: Left arm, BP Patient Position: Sitting) (!) 57 98.5 °F (36.9 °C) (Oral) 18 5' 8\" (1.727 m) 259 lb 6 oz (117.7 kg) SpO2 BMI Smoking Status 98% 39.44 kg/m2 Passive Smoke Exposure - Never Smoker BMI and BSA Data Body Mass Index Body Surface Area  
 39.44 kg/m 2 2.38 m 2 Preferred Pharmacy Pharmacy Name Phone [address] [facility] [phone] Your Updated Medication List  
  
   
This list is accurate as of: 11/15/17  8:24 AM.  Always use your most recent med list.  
  
  
  
  
 Naty Everett Generic drug:  Blood-Glucose Meter  
by Does Not Apply route. aspirin 325 mg tablet Commonly known as:  ASPIRIN  
take 325 mg by mouth daily. atorvastatin 40 mg tablet Commonly known as:  LIPITOR  
TAKE 1 TABLET DAILY  
  
 cetirizine 10 mg tablet Commonly known as:  ZYRTEC Take 1 Tab by mouth daily. CIALIS 5 mg tablet Generic drug:  tadalafil Take 5 mg by mouth daily. citalopram 40 mg tablet Commonly known as:  CELEXA  
TAKE 1 TABLET DAILY  
  
 desonide 0.05 % topical lotion Commonly known as:  Gerard Rolon  
as needed. [facility] Supp[facility], [facility] Misc Commonly known as:  BD LANCET DEVICE  
1 Each by Does Not Apply route daily. FISH OIL EXTRA STRENGTH PO Take 1,400 mg by mouth daily (after breakfast). fluticasone 50 mcg/actuation nasal spray Commonly known as:  Chon Sacks 2 Sprays by Both Nostrils route daily. Indications: ALLERGIC RHINITIS  
  
 fluticasone-vilanterol 100-25 mcg/dose inhaler Commonly known as:  BREO ELLIPTA Take 1 Puff by inhalation daily. glucose blood VI test strips strip Commonly known as:  [address] For blood sugar testing once daily. (ICD-10: E11.9) HumaLOG KwikPen 100 unit/mL kwikpen Generic drug:  insulin lispro INJECT 15 UNITS UNDER THE SKIN BEFORE BREAKFAST, LUNCH AND DINNER ( BEFORE MEALS ) FOR TYPE 2 DIABETES MELLITUS INVOKANA 300 mg tablet Generic drug:  canagliflozin TAKE 1 TABLET DAILY JANUMET 50-1,000 mg per tablet Generic drug:  SITagliptin-metFORMIN  
TAKE 1 TABLET TWICE A DAY WITH MEALS  
  
 ketoconazole 2 % topical cream  
Commonly known as:  NIZORAL Apply  to affected area as needed for Skin Irritation. Lancets Misc Commonly known as:  ACCU-CHEK FASTCLIX  
2 Packages by SubCUTAneous route two (2) times a day. Check Blood Glucose twice daily .00 LEVEMIR FLEXTOUCH 100 unit/mL (3 mL) Inpn Generic drug:  insulin detemir INJECT 40 UNITS UNDER THE SKIN AS DIRECTED  
  
 lisinopril 20 mg tablet Commonly known as:  Marge Alma Take 1 Tab by mouth daily. M-VIT PO  
take 1 Tab by mouth daily. meloxicam 15 mg tablet Commonly known as:  MOBIC TK 1 T PO D  
  
 metoprolol tartrate 50 mg tablet Commonly known as:  LOPRESSOR  
TAKE 1 TABLET TWICE A DAY  
  
 montelukast 10 mg tablet Commonly known as:  SINGULAIR  
TAKE 1 TABLET DAILY * Lady Pen Needle 32 gauge x 5/32\" Ndle Generic drug:  Insulin Needles (Disposable) USE 1 FOUR TIMES A DAY WITH INSULIN  
  
 * Insulin Needles (Disposable) 31 gauge x 5/16\" Ndle Commonly known as:  BD INSULIN PEN NEEDLE UF SHORT  
USE EVERY DAY WITH LEVEMIR AS DIRECTED  
  
 tamsulosin 0.4 mg capsule Commonly known as:  FLOMAX Take 0.4 mg by mouth daily. VOLTAREN 1 % Gel Generic drug:  diclofenac  
every six (6) hours. * Notice: This list has 2 medication(s) that are the same as other medications prescribed for you.  Read the directions carefully, and ask your doctor or other care provider to review them with you. We Performed the Following LIPID PANEL [84264 CPT(R)] METABOLIC PANEL, BASIC [56429 CPT(R)] MICROALBUMIN, UR, RAND W/ MICROALBUMIN/CREA RATIO [id number] CPT(R)] REFERRAL TO DERMATOLOGY [REF19 Custom] Follow-up Instructions Return in about 4 months (around 3/15/2018). Referral Information Referral ID Referred By Referred To  
  
 [id number] Edy Pacheco Not Available Visits Status Start Date End Date 1 New Request 11/15/17 11/15/18 If your referral has a status of pending review or denied, additional information will be sent to support the outcome of this decision. Introducing [facility] HEALTH SERVICES! Dear Makayla Lim: Thank you for requesting a [employer] account. Our records indicate that you already have an active [employer] account. You can access your account anytime at [email or web address] Did you know that you can access your hospital and ER discharge instructions at any time in [employer]? You can also review all of your test results from your hospital stay or ER visit. Additional Information If you have questions, please visit the Frequently Asked Questions section of the [employer] website at [email or web address] Remember, [employer] is NOT to be used for urgent needs. For medical emergencies, dial 911. Now available from your iPhone and Android! Please provide this summary of care documentation to your next provider. Your primary care clinician is listed as Sky Gray. If you have any questions after today's visit, please call [phone].

## 2017-11-15 NOTE — PROGRESS NOTES
HISTORY OF PRESENT ILLNESS  Tung Rodriguez is a 78 y.o. male. HPI  Hypertension:  Tung Rodriguez is a 78 y.o. male with hypertension. without Chronic kidney disease stage    Medication change since last visit: No  The patient reports:  taking medications as instructed, no medication side effects noted, patient does not perform home BP monitoring, no chest pain on exertion, no dyspnea on exertion, no swelling of ankles, no orthostatic dizziness or lightheadedness, no palpitations. Lifestyle modification/social history: generally follows a low fat low cholesterol diet, generally follows a low sodium diet, nonsmoker    Lab Results   Component Value Date/Time    Sodium 139 05/04/2017 05:09 PM    Potassium 5.0 05/04/2017 05:09 PM    Chloride 97 05/04/2017 05:09 PM    CO2 22 05/04/2017 05:09 PM    Anion gap 6 05/09/2016 01:50 PM    Glucose 170 05/04/2017 05:09 PM    BUN 21 05/04/2017 05:09 PM    Creatinine 1.06 05/04/2017 05:09 PM    BUN/Creatinine ratio 20 05/04/2017 05:09 PM    GFR est AA 85 05/04/2017 05:09 PM    GFR est non-AA 73 05/04/2017 05:09 PM    Calcium 9.4 05/04/2017 05:09 PM     Slipped and strained R knee several weeks ago. Ortho put him in rigid brace. Now with pressure ulcer lateral thigh. Using trip antib oint. No drainage, redness. The patient denies fevers, chills or sweats. 2 cm pink nodule L groin for 3 weeks, not changing. Gets irritated with contact.   Patient Active Problem List   Diagnosis Code    DM (diabetes mellitus) ([facility] [address] [facility] E11.9    HTN (hypertension) I10    CAD (coronary artery disease) I25.10    Depressed F32.9    Hypogonadism male E29.1    AR (allergic rhinitis) J30.9    ED (erectile dysfunction) N52.9    LEONA (obstructive sleep apnea) G47.33    S/P excision of lipoma Z98.890, Z86.018    S/P excision of skin lesion, follow-up exam Z09    Essential hypertension with goal blood pressure less than 130/80 I10    Diabetes mellitus type 2, controlled ([facility] [address] E11.9    Advance care planning Z71.89    Mild intermittent asthma without complication D27.18    Pure hypercholesterolemia E78.00     Past Medical History:   Diagnosis Date    Arthritis     CAD (coronary artery disease)     CABG - 3 vessel    Diabetes (HCC)     X 30years    Hypercholesteremia     Hypertension     Psychiatric disorder     depression    Psychotic disorder     Sleep apnea 1999    CPAP compliant     Allergies   Allergen Reactions    Cefdinir Rash    Codeine Nausea Only     Current Outpatient Prescriptions on File Prior to Visit   Medication Sig Dispense Refill    atorvastatin (LIPITOR) 40 mg tablet TAKE 1 TABLET DAILY 90 Tab 1    glucose blood VI test strips (ACCU-CHEK SMARTVIEW TEST STRIP) strip For blood sugar testing once daily. (ICD-10: E11.9) 100 Strip 9    fluticasone-vilanterol (BREO ELLIPTA) 100-25 mcg/dose inhaler Take 1 Puff by inhalation daily. 3 Inhaler 1    metoprolol tartrate (LOPRESSOR) 50 mg tablet TAKE 1 TABLET TWICE A  Tab 1    INVOKANA 300 mg tablet TAKE 1 TABLET DAILY 90 Tab 1    JANUMET 50-1,000 mg per tablet TAKE 1 TABLET TWICE A DAY WITH MEALS 180 Tab 1    HUMALOG KWIKPEN 100 unit/mL kwikpen INJECT 15 UNITS UNDER THE SKIN BEFORE BREAKFAST, LUNCH AND DINNER ( BEFORE MEALS ) FOR TYPE 2 DIABETES MELLITUS 45 mL 1    LEVEMIR FLEXTOUCH 100 unit/mL (3 mL) inpn INJECT 40 UNITS UNDER THE SKIN AS DIRECTED 45 mL 1    citalopram (CELEXA) 40 mg tablet TAKE 1 TABLET DAILY 90 Tab 1    montelukast (SINGULAIR) 10 mg tablet TAKE 1 TABLET DAILY 90 Tab 3    lisinopril (PRINIVIL, ZESTRIL) 20 mg tablet Take 1 Tab by mouth daily. 180 Tab 1    Insulin Needles, Disposable, (BD INSULIN PEN NEEDLE UF SHORT) 31 gauge x 5/16\" ndle USE EVERY DAY WITH LEVEMIR AS DIRECTED 100 Pen Needle 3    CIALIS 5 mg tablet Take 5 mg by mouth daily.  meloxicam (MOBIC) 15 mg tablet TK 1 T PO D  0    tamsulosin (FLOMAX) 0.4 mg capsule Take 0.4 mg by mouth daily.       fluticasone (FLONASE) 50 mcg/actuation nasal spray 2 Sprays by Both Nostrils route daily. Indications: ALLERGIC RHINITIS 1 Bottle 0    OMEGA-3 FATTY ACIDS/FISH OIL (FISH OIL EXTRA STRENGTH PO) Take 1,400 mg by mouth daily (after breakfast).  ketoconazole (NIZORAL) 2 % topical cream Apply  to affected area as needed for Skin Irritation.  VOLTAREN 1 % gel every six (6) hours. 1    desonide (TRIDESILON) 0.05 % topical lotion as needed. 1    SARAI PEN NEEDLE 32 gauge x 5/32\" ndle USE 1 FOUR TIMES A DAY WITH INSULIN 360 Each 3    Diabetic Supplies, Miscellan. (BD LANCET DEVICE) misc 1 Each by Does Not Apply route daily. 1 Each 0    Lancets (ACCU-CHEK FASTCLIX) misc 2 Packages by SubCUTAneous route two (2) times a day. Check Blood Glucose twice daily .00 2 Package 11    Blood-Glucose Meter (ACCU-CHEK SARAI) Misc by Does Not Apply route.  cetirizine (ZYRTEC) 10 mg tablet Take 1 Tab by mouth daily. 0    aspirin (ASPIRIN) 325 mg tablet take 325 mg by mouth daily.  M-VIT PO take 1 Tab by mouth daily.  [DISCONTINUED] insulin glargine (LANTUS) 100 unit/mL injection 14 Units by SubCUTAneous route. As directed 2 Vial 2     No current facility-administered medications on file prior to visit. Social History   Substance Use Topics    Smoking status: Passive Smoke Exposure - Never Smoker     Years: 10.00    Smokeless tobacco: Never Used    Alcohol use 3.0 oz/week     6 Cans of beer per week      Comment: 1 beer or so per day             ROS    Physical Exam   Constitutional: He appears well-developed and well-nourished. No distress. /71 (BP 1 Location: Left arm, BP Patient Position: Sitting)  Pulse (!) 57  Temp 98.5 °F (36.9 °C) (Oral)   Resp 18  Ht 5' 8\" (1.727 m)  Wt 259 lb 6 oz (117.7 kg)  SpO2 98%  BMI 39.44 kg/m2Body mass index is 39.44 kg/(m^2). HENT:   Mouth/Throat: Oropharynx is clear and moist.   Neck: No JVD present. Carotid bruit is not present.    Cardiovascular: Normal rate, regular rhythm, normal heart sounds and intact distal pulses. Pulmonary/Chest: Effort normal and breath sounds normal.   Musculoskeletal: He exhibits no edema. Legs:  2 cm raised round rough pink lesion L groin. No ulceration, pustules. Healing 4 cm shallow ulceration R lat thigh. No erythema, drainage, warmth associated   Neurological: He is alert. Skin: Skin is warm and dry. He is not diaphoretic. Nursing note and vitals reviewed. ASSESSMENT and PLAN  Diagnoses and all orders for this visit:    1. Essential hypertension - Well controlled and stable. his medications were reviewed and refilled where necessary as noted below. Labs ordered as noted. -     METABOLIC PANEL, BASIC    2. Coronary artery disease involving native coronary artery of native heart without angina pectoris  Aggressive RF control  3. Pure hypercholesterolemia  -     LIPID PANEL    4. Mild intermittent asthma without complication - improved control with Breo. No changes    5. Type 2 diabetes mellitus with hemoglobin A1c goal of less than 7.0% ([facility]) - almost to goal  No changes. -     MICROALBUMIN, UR, RAND W/ MICROALBUMIN/CREA RATIO    6. Decubitus ulcer of right thigh, stage 2 - wound care with trip antbx ointment. No cellulitis     7. Skin lesion of left leg -needs derm eval  -     REFERRAL TO DERMATOLOGY      Follow-up Disposition:  Return in about 4 months (around 3/15/2018).

## 2017-11-16 RX ORDER — CITALOPRAM 40 MG/1
TABLET, FILM COATED ORAL
Qty: 90 TAB | Refills: 1 | Status: SHIPPED | OUTPATIENT
Start: 2017-11-16 | End: 2018-06-20 | Stop reason: SDUPTHER

## 2017-11-20 ENCOUNTER — HOSPITAL ENCOUNTER (OUTPATIENT)
Dept: LAB | Age: 67
Discharge: HOME | End: 2017-11-20
Payer: MEDICARE

## 2017-11-20 PROCEDURE — 82043 UR ALBUMIN QUANTITATIVE: CPT

## 2017-11-20 PROCEDURE — 80048 BASIC METABOLIC PNL TOTAL CA: CPT

## 2017-11-20 PROCEDURE — 36415 COLL VENOUS BLD VENIPUNCTURE: CPT

## 2017-11-20 PROCEDURE — 80061 LIPID PANEL: CPT

## 2017-11-21 LAB
ALBUMIN/CREAT UR: 49.5 MG/G CREAT (ref 0–30)
BUN SERPL-MCNC: 22 MG/DL (ref 8–27)
BUN/CREAT SERPL: 19 (ref 10–24)
CALCIUM SERPL-MCNC: 9.5 MG/DL (ref 8.6–10.2)
CHLORIDE SERPL-SCNC: 101 MMOL/L (ref 96–106)
CHOLEST SERPL-MCNC: 166 MG/DL (ref 100–199)
CO2 SERPL-SCNC: 25 MMOL/L (ref 18–29)
CREAT SERPL-MCNC: 1.18 MG/DL (ref 0.76–1.27)
CREAT UR-MCNC: 66.9 MG/DL
GFR SERPLBLD CREATININE-BSD FMLA CKD-EPI: 64 ML/MIN/1.73
GFR SERPLBLD CREATININE-BSD FMLA CKD-EPI: 74 ML/MIN/1.73
GLUCOSE SERPL-MCNC: 173 MG/DL (ref 65–99)
HDLC SERPL-MCNC: 47 MG/DL
INTERPRETATION, 910389: NORMAL
LDLC SERPL CALC-MCNC: 93 MG/DL (ref 0–99)
Lab: NORMAL
MICROALBUMIN UR-MCNC: 33.1 UG/ML
POTASSIUM SERPL-SCNC: 5.2 MMOL/L (ref 3.5–5.2)
SODIUM SERPL-SCNC: 141 MMOL/L (ref 134–144)
TRIGL SERPL-MCNC: 132 MG/DL (ref 0–149)
VLDLC SERPL CALC-MCNC: 26 MG/DL (ref 5–40)

## 2017-11-28 ENCOUNTER — TELEPHONE (OUTPATIENT)
Dept: INTERNAL MEDICINE CLINIC | Age: 67
End: 2017-11-28

## 2017-11-28 RX ORDER — ATORVASTATIN CALCIUM 80 MG/1
80 TABLET, FILM COATED ORAL DAILY
Qty: 90 TAB | Refills: 2 | Status: SHIPPED | OUTPATIENT
Start: 2017-11-28 | End: 2018-07-19 | Stop reason: SDUPTHER

## 2017-11-28 NOTE — TELEPHONE ENCOUNTER
----- Message from Kandra Goldmann, LPN sent at 81/56/2952  9:23 AM EST -----  Regarding: FW: labs  Contact: [phone]  Patient reply, willing to start new medication.  ----- Message -----     From: Stephen Toussaint     Sent: 11/26/2017   5:56 PM       To: [facility]  Subject: RE: labs                                         yes please send the new script over, I will double up on current supply    ----- Message -----  From: Ana Coelho MD  Sent: 11/26/17 5:33 PM  To: Stephen Toussaint  Subject: labs    Dear Stephen Toussaint,    I've reviewed your test results. Results are normal/stable unless otherwise noted. Sodium: NORMAL/STABLE  Potassium: NORMAL/STABLE  Kidney Function: NORMAL/STABLE  Sugar (glucose): elevated  Urine: mild increase in protein  Lipids: Total Cholesterol: NORMAL            HDL (good Cholesterol): NORMAL            LDL (bad Cholesterol): not quite to goal           Triglycerides (bad Cholesterol): NORMAL           RECOMMENDATIONS:  I would advise increasing atorvastatin to 80mg/ day. If your're willing, I'll send that to your pharmacy. Otherwise, Please continue with the plans we discussed at your office visit. Please let me know if you have questions or further concerns. Sincerely,    Nicolás Wilkerson.  Gillian Vasquez MD

## 2017-12-28 ENCOUNTER — TELEPHONE (OUTPATIENT)
Dept: INTERNAL MEDICINE CLINIC | Age: 67
End: 2017-12-28

## 2017-12-28 NOTE — TELEPHONE ENCOUNTER
----- Message from Chelle Askew LPN sent at 81/26/2983 12:59 PM EST -----  Regarding: FW: Prescription Question  Contact: [phone]   Your thoughts on this?   ----- Message -----     From: Taunya Spatz     Sent: 12/27/2017   7:54 PM       To: ac Nurses Pool  Subject: Prescription Question                            I ordered a refill of Levemir Flextouch Pen 3ml 5's100U/ML today and then cancelled it because the last time I was in the office you said we might be able to try a different prescription that did not cause the burning feeling and the welts I sometimes get when using the Levemir. Could you please submit a [facility] day script to [facility] for a replacement med.     thanks

## 2017-12-28 NOTE — TELEPHONE ENCOUNTER
Will try changing to toujeo this time due to irritation with levemir. New medication or Refill was escribed to patient's pharmacy as requested.

## 2018-01-04 ENCOUNTER — PATIENT MESSAGE (OUTPATIENT)
Dept: INTERNAL MEDICINE CLINIC | Age: 68
End: 2018-01-04

## 2018-01-24 RX ORDER — LISINOPRIL 20 MG/1
TABLET ORAL
Qty: 180 TAB | Refills: 1 | Status: SHIPPED | OUTPATIENT
Start: 2018-01-24 | End: 2018-03-22 | Stop reason: SDUPTHER

## 2018-01-24 RX ORDER — SITAGLIPTIN AND METFORMIN HYDROCHLORIDE 50; 1000 MG/1; MG/1
TABLET, FILM COATED ORAL
Qty: 180 TAB | Refills: 1 | Status: SHIPPED | OUTPATIENT
Start: 2018-01-24 | End: 2018-08-13 | Stop reason: SDUPTHER

## 2018-01-24 RX ORDER — CANAGLIFLOZIN 300 MG/1
TABLET, FILM COATED ORAL
Qty: 90 TAB | Refills: 1 | Status: SHIPPED | OUTPATIENT
Start: 2018-01-24 | End: 2018-08-13 | Stop reason: SDUPTHER

## 2018-01-24 RX ORDER — PEN NEEDLE, DIABETIC 31 GX5/16"
NEEDLE, DISPOSABLE MISCELLANEOUS
Qty: 90 PEN NEEDLE | Refills: 3 | Status: SHIPPED | OUTPATIENT
Start: 2018-01-24 | End: 2018-12-31 | Stop reason: SDUPTHER

## 2018-02-19 ENCOUNTER — PATIENT MESSAGE (OUTPATIENT)
Dept: INTERNAL MEDICINE CLINIC | Age: 68
End: 2018-02-19

## 2018-02-28 ENCOUNTER — TELEPHONE (OUTPATIENT)
Dept: INTERNAL MEDICINE CLINIC | Age: 68
End: 2018-02-28

## 2018-02-28 DIAGNOSIS — J45.31 MILD PERSISTENT ASTHMA WITH ACUTE EXACERBATION: ICD-10-CM

## 2018-02-28 DIAGNOSIS — Z87.09 HISTORY OF BRONCHITIS: ICD-10-CM

## 2018-02-28 DIAGNOSIS — J45.20 MILD INTERMITTENT ASTHMA WITHOUT COMPLICATION: ICD-10-CM

## 2018-02-28 RX ORDER — FLUTICASONE FUROATE AND VILANTEROL 100; 25 UG/1; UG/1
1 POWDER RESPIRATORY (INHALATION) DAILY
Qty: 3 INHALER | Refills: 2 | Status: SHIPPED | OUTPATIENT
Start: 2018-02-28 | End: 2019-03-26 | Stop reason: SDUPTHER

## 2018-02-28 RX ORDER — ALBUTEROL SULFATE 90 UG/1
2 AEROSOL, METERED RESPIRATORY (INHALATION)
Qty: 3 INHALER | Refills: 2 | Status: SHIPPED | OUTPATIENT
Start: 2018-02-28 | End: 2019-03-30 | Stop reason: SDUPTHER

## 2018-02-28 NOTE — TELEPHONE ENCOUNTER
----- Message from Sylvia Doherty LPN sent at 0/06/5984  8:29 AM EST -----  Regarding: FW: RE: Prescription Question  Contact: [phone]  Patient requesting a prescription for an inhaler. ----- Message -----     From: Dora Felipe     Sent: 2/26/2018   2:29 PM       To: [facility]  Subject: RE: RE: Prescription Question                    I believe it was Albuterol.    ----- Message -----  From: Sylvia Doherty LPN  Sent: 2/53/98 89:98 AM  To: Dora Felipe  Subject: RE: Prescription Question    Good morning:  I need to know which one you are requesting.    ----- Message -----     From: Dora Felipe     Sent: 2/26/2018  8:10 AM EST       To: Kain Resendiz MD  Subject: Prescription Question    Can you call in a 90 day script for my rescue inhaler, it is the one I use as needed in addition to the once a day Brio.

## 2018-03-12 ENCOUNTER — OFFICE VISIT (OUTPATIENT)
Dept: INTERNAL MEDICINE CLINIC | Age: 68
End: 2018-03-12

## 2018-03-12 VITALS
RESPIRATION RATE: 18 BRPM | OXYGEN SATURATION: 98 % | WEIGHT: 257.25 LBS | HEART RATE: 54 BPM | TEMPERATURE: 98.9 F | HEIGHT: 68 IN | DIASTOLIC BLOOD PRESSURE: 68 MMHG | SYSTOLIC BLOOD PRESSURE: 140 MMHG | BODY MASS INDEX: 38.99 KG/M2

## 2018-03-12 DIAGNOSIS — I10 ESSENTIAL HYPERTENSION: Chronic | ICD-10-CM

## 2018-03-12 DIAGNOSIS — Z23 ENCOUNTER FOR IMMUNIZATION: ICD-10-CM

## 2018-03-12 DIAGNOSIS — E11.9 TYPE 2 DIABETES MELLITUS WITH HEMOGLOBIN A1C GOAL OF LESS THAN 7.0% (HCC): Primary | ICD-10-CM

## 2018-03-12 DIAGNOSIS — E78.00 PURE HYPERCHOLESTEROLEMIA: Chronic | ICD-10-CM

## 2018-03-12 PROBLEM — E11.21 TYPE 2 DIABETES WITH NEPHROPATHY (HCC): Status: ACTIVE | Noted: 2018-03-12

## 2018-03-12 LAB — HBA1C MFR BLD HPLC: 6.7 %

## 2018-03-12 NOTE — MR AVS SNAPSHOT
[facility] 
 
 
 [address] [place] [address] [facility] 
[phone] Patient: Debbie Garcia MRN: [id number] ACV:7/51/[birth year]25 Visit Information Date & Time Provider Department Dept. Phone Encounter #  
 3/12/2018  8:00 AM Vicki Caldwell MD [facility] [address] [facility] [id number] Follow-up Instructions Return in about 3 months (around 6/12/2018). Upcoming Health Maintenance Date Due  
 FOOT EXAM Q1 7/7/2015 Bone Densitometry (Dexa) Screening 9/29/2016 COLONOSCOPY 7/11/2017 EYE EXAM RETINAL OR DILATED Q1 8/12/2017 DTaP/Tdap/Td series (1 - Tdap) 9/1/2017 Pneumococcal 65+ Low/Medium Risk (2 of 2 - PPSV23) 1/24/2018 MEDICARE YEARLY EXAM 1/25/2018 HEMOGLOBIN A1C Q6M 3/29/2018 GLAUCOMA SCREENING Q2Y 8/12/2018 MICROALBUMIN Q1 11/20/2018 LIPID PANEL Q1 11/20/2018 Allergies as of 3/12/2018  Review Complete On: 3/12/2018 By: Michel Bueno LPN Severity Noted Reaction Type Reactions Cefdinir  11/15/2017    Rash Codeine  12/01/2008   Side Effect Nausea Only Current Immunizations  Reviewed on 9/28/2016 Name Date H1N1 FLU VACCINE 1/1/2010 Influenza High Dose Vaccine PF 9/22/2017 12:00 AM  
 Influenza Vaccine 8/30/2016, 10/26/2015, 10/2/2014, 10/5/2013 Influenza Vaccine Split 10/17/2011 Influenza Vaccine Whole 10/1/2012, 9/12/2009 Pneumococcal Conjugate (PCV-13) 1/24/2017 12:00 AM  
 TD Vaccine 12/1/2007 Td 8/31/2017 ZZZ-RETIRED (DO NOT USE) Pneumococcal Vaccine (Unspecified Type) 12/1/2005 Zoster Vaccine, Live 4/22/2011 Not reviewed this visit You Were Diagnosed With   
  
 Codes Comments Type 2 diabetes mellitus with hemoglobin A1c goal of less than 7.0% (HCC)    -  Primary ICD-10-CM: E11.9 ICD-9-CM: 250.00 Encounter for immunization     ICD-10-CM: Y47 ICD-9-CM: V03.89 Essential hypertension     ICD-10-CM: I10 
ICD-9-CM: 401.9 Pure hypercholesterolemia     ICD-10-CM: E78.00 ICD-9-CM: 272.0 Vitals BP Pulse Temp Resp Height(growth percentile) Weight(growth percentile) 140/68 (!) 54 98.9 °F (37.2 °C) (Oral) 18 5' 8\" (1.727 m) 257 lb 4 oz (116.7 kg) SpO2 BMI Smoking Status 98% 39.11 kg/m2 Passive Smoke Exposure - Never Smoker Vitals History BMI and BSA Data Body Mass Index Body Surface Area  
 39.11 kg/m 2 2.37 m 2 Preferred Pharmacy Pharmacy Name Phone [address] [facility] [phone] Your Updated Medication List  
  
   
This list is accurate as of 3/12/18  8:34 AM.  Always use your most recent med list.  
  
  
  
  
 Will Amador Generic drug:  Blood-Glucose Meter  
by Does Not Apply route. albuterol 90 mcg/actuation inhaler Commonly known as:  PROVENTIL HFA, VENTOLIN HFA, PROAIR HFA Take 2 Puffs by inhalation every four (4) hours as needed for Wheezing for up to 90 days. aspirin 325 mg tablet Commonly known as:  ASPIRIN  
take 325 mg by mouth daily. atorvastatin 80 mg tablet Commonly known as:  LIPITOR Take 1 Tab by mouth daily. cetirizine 10 mg tablet Commonly known as:  ZYRTEC Take 1 Tab by mouth daily. CIALIS 5 mg tablet Generic drug:  tadalafil Take 5 mg by mouth daily. citalopram 40 mg tablet Commonly known as:  CELEXA  
TAKE 1 TABLET DAILY  
  
 desonide 0.05 % topical lotion Commonly known as:  Alicia   
as needed. Diabetic Supp[facility], [facility] Misc Commonly known as:  BD LANCET DEVICE  
1 Each by Does Not Apply route daily. FISH OIL EXTRA STRENGTH PO Take 1,400 mg by mouth daily (after breakfast). fluticasone 50 mcg/actuation nasal spray Commonly known as:  Loren Paget 2 Sprays by Both Nostrils route daily. Indications: ALLERGIC RHINITIS  
  
 fluticasone-vilanterol 100-25 mcg/dose inhaler Commonly known as:  BREO ELLIPTA Take 1 Puff by inhalation daily. glucose blood VI test strips strip Commonly known as:  [address] For blood sugar testing once daily. (ICD-10: E11.9) HumaLOG KwikPen Insulin 100 unit/mL kwikpen Generic drug:  insulin lispro INJECT 15 UNITS UNDER THE SKIN BEFORE BREAKFAST, LUNCH AND DINNER ( BEFORE MEALS ) FOR TYPE 2 DIABETES MELLITUS  
  
 insulin glargine 300 unit/mL (1.5 mL) Inpn Commonly known as:  TOUJEO SOLOSTAR U-300 INSULIN  
40 Units by SubCUTAneous route daily. INVOKANA 300 mg tablet Generic drug:  canagliflozin TAKE 1 TABLET DAILY JANUMET 50-1,000 mg per tablet Generic drug:  SITagliptin-metFORMIN  
TAKE 1 TABLET TWICE A DAY WITH MEALS  
  
 ketoconazole 2 % topical cream  
Commonly known as:  NIZORAL Apply  to affected area as needed for Skin Irritation. Lancets Misc Commonly known as:  ACCU-CHEK FASTCLIX  
2 Packages by SubCUTAneous route two (2) times a day. Check Blood Glucose twice daily .00  
  
 lisinopril 20 mg tablet Commonly known as:  PRINIVIL, ZESTRIL  
TAKE 1 TABLET DAILY  
  
 M-VIT PO  
take 1 Tab by mouth daily. metoprolol tartrate 50 mg tablet Commonly known as:  LOPRESSOR  
TAKE 1 TABLET TWICE A DAY  
  
 montelukast 10 mg tablet Commonly known as:  SINGULAIR  
TAKE 1 TABLET DAILY * Lady Pen Needle 32 gauge x 5/32\" Ndle Generic drug:  Insulin Needles (Disposable) USE 1 FOUR TIMES A DAY WITH INSULIN  
  
 * BD INSULIN PEN NEEDLE UF SHORT 31 gauge x 5/16\" Ndle Generic drug:  Insulin Needles (Disposable) USE DAILY WITH LEVEMIR AS DIRECTED  
  
 pneumococcal 23-valent 25 mcg/0.5 mL injection Commonly known as:  PNEUMOVAX 23  
0.5 mL by IntraMUSCular route once for 1 dose. tamsulosin 0.4 mg capsule Commonly known as:  FLOMAX Take 0.4 mg by mouth daily. VOLTAREN 1 % Gel Generic drug:  diclofenac  
every six (6) hours. * Notice: This list has 2 medication(s) that are the same as other medications prescribed for you. Read the directions carefully, and ask your doctor or other care provider to review them with you. Prescriptions Printed Refills  
 pneumococcal 23-valent (PNEUMOVAX 23) 25 mcg/0.5 mL injection 0 Si.5 mL by IntraMUSCular route once for 1 dose. Class: Print Route: IntraMUSCular We Performed the Following AMB POC HEMOGLOBIN A1C [44295 CPT(R)] Follow-up Instructions Return in about 3 months (around 2018). Introducing [facility]! Dear Héctor Bashir: Thank you for requesting a [employer] account. Our records indicate that you already have an active [employer] account. You can access your account anytime at [email or web address] Did you know that you can access your hospital and ER discharge instructions at any time in [employer]? You can also review all of your test results from your hospital stay or ER visit. Additional Information If you have questions, please visit the Frequently Asked Questions section of the [employer] website at [email or web address] Remember, [employer] is NOT to be used for urgent needs. For medical emergencies, dial 911. Now available from your iPhone and Android! Please provide this summary of care documentation to your next provider. Your primary care clinician is listed as Scott Goldberg. If you have any questions after today's visit, please call [phone].

## 2018-03-12 NOTE — PROGRESS NOTES
HISTORY OF PRESENT ILLNESS  Marianela Zaman is a 78 y.o. male. HPI  Diabetes:  He is here for follow up of diabetes. Proteinuria: yes  Neuropathy: no  Medication change since last visit:  No   Diabetic Review of Systems - home glucose monitoring: is not performed. Hypoglycemic symptoms: no  Dilated eye exam in the last year: yes      Lab Results   Component Value Date/Time    Hemoglobin A1c 8.4 (H) 09/28/2016 10:30 AM    Hemoglobin A1c (POC) 6.7 03/12/2018 08:10 AM     Lab Results   Component Value Date/Time    Microalbumin/Creat ratio (mg/g creat) 8 09/14/2009 08:50 AM    Microalb/Creat ratio (ug/mg creat.) 49.5 (H) 11/20/2017 09:26 AM    Microalbumin,urine random 1.83 09/14/2009 08:50 AM         Last Point of Care HGB A1C  Hemoglobin A1c (POC)   Date Value Ref Range Status   03/12/2018 6.7 % Final      Hypertension:  Marianela Zaman is a 78 y.o. male with hypertension. with Chronic kidney disease stage 2   Medication change since last visit: No  The patient reports:  taking medications as instructed, no medication side effects noted, home BP monitoring in range of 577'F systolic over 49'E diastolic, no chest pain on exertion, no dyspnea on exertion, no swelling of ankles, no orthostatic dizziness or lightheadedness, no palpitations.        Lifestyle modification/social history: generally follows a low fat low cholesterol diet, generally follows a low sodium diet, exercises regularly    Lab Results   Component Value Date/Time    Sodium 141 11/20/2017 09:26 AM    Potassium 5.2 11/20/2017 09:26 AM    Chloride 101 11/20/2017 09:26 AM    CO2 25 11/20/2017 09:26 AM    Anion gap 6 05/09/2016 01:50 PM    Glucose 173 (H) 11/20/2017 09:26 AM    BUN 22 11/20/2017 09:26 AM    Creatinine 1.18 11/20/2017 09:26 AM    BUN/Creatinine ratio 19 11/20/2017 09:26 AM    GFR est AA 74 11/20/2017 09:26 AM    GFR est non-AA 64 11/20/2017 09:26 AM    Calcium 9.5 11/20/2017 09:26 AM     Hyperlipidemia:  Marianela Zaman is following up on his dyslipidemia. Cardiovascular risks for him are: LDL goal is under 80  diabetic  existing CAD  hypertension  obese. Currently he takes Lipitor (atorvastatin) ,   Lab Results   Component Value Date/Time    Cholesterol, total 166 11/20/2017 09:26 AM    Cholesterol, total 155 01/24/2017 09:30 AM    Cholesterol, total 154 12/21/2015 08:50 AM    Cholesterol, total 133 02/20/2015 09:06 AM    Cholesterol, total 160 02/17/2014 08:51 AM    HDL Cholesterol 47 11/20/2017 09:26 AM    HDL Cholesterol 50 01/24/2017 09:30 AM    HDL Cholesterol 43 12/21/2015 08:50 AM    HDL Cholesterol 38 (L) 02/20/2015 09:06 AM    HDL Cholesterol 41 02/17/2014 08:51 AM    LDL, calculated 93 11/20/2017 09:26 AM    LDL, calculated 85 01/24/2017 09:30 AM    LDL, calculated 94 12/21/2015 08:50 AM    LDL, calculated 79 02/20/2015 09:06 AM    LDL, calculated 98 02/17/2014 08:51 AM    Triglyceride 132 11/20/2017 09:26 AM    Triglyceride 98 01/24/2017 09:30 AM    Triglyceride 87 12/21/2015 08:50 AM    Triglyceride 82 02/20/2015 09:06 AM    Triglyceride 105 02/17/2014 08:51 AM    CHOL/HDL Ratio 3.3 03/30/2010 08:48 AM    CHOL/HDL Ratio 3.3 09/14/2009 08:50 AM    CHOL/HDL Ratio 3.1 03/16/2009 08:38 AM     Lab Results   Component Value Date/Time    ALT (SGPT) CANCELED 12/19/2013 12:00 AM    AST (SGOT) CANCELED 12/19/2013 12:00 AM    Alk. phosphatase CANCELED 12/19/2013 12:00 AM    Bilirubin, total CANCELED 12/19/2013 12:00 AM       Myalgias: no  Fatigue: no          ROS    Physical Exam  Visit Vitals    /68    Pulse (!) 54    Temp 98.9 °F (37.2 °C) (Oral)    Resp 18    Ht 5' 8\" (1.727 m)    Wt 257 lb 4 oz (116.7 kg)    SpO2 98%    BMI 39.11 kg/m2       ASSESSMENT and PLAN  Diagnoses and all orders for this visit:    1. Type 2 diabetes mellitus with hemoglobin A1c goal of less than 7.0% ([facility]) - Well controlled and stable. his medications were reviewed and refilled where necessary as noted below. Labs ordered as noted.   Continue good exercise/ diet  -     AMB POC HEMOGLOBIN A1C    2. Encounter for immunization  -     pneumococcal 23-valent (PNEUMOVAX 23) 25 mcg/0.5 mL injection; 0.5 mL by IntraMUSCular route once for 1 dose. 3. Essential hypertension - just above goal.   Log blood pressures at home while sitting, relaxed 3-5 times weekly and bring to next visit. Pt educated on goal BP of 130/80 on average or lower. Call office as soon as possible if BP's over 140/90 or below 110/50 on multiple occasions and/or with symptoms of dizziness, chest pain, shortness of breath, headache or ankle swelling. Recheck log and bp here in 3 month(s). 4. Pure hypercholesterolemia - near goal.  Recheck after good exercise/ diet routine for wt loss in 3 months. Follow-up Disposition:  Return in about 3 months (around 6/12/2018).

## 2018-03-22 DIAGNOSIS — I10 ESSENTIAL HYPERTENSION WITH GOAL BLOOD PRESSURE LESS THAN 130/80: Primary | Chronic | ICD-10-CM

## 2018-03-22 RX ORDER — LISINOPRIL 20 MG/1
20 TABLET ORAL 2 TIMES DAILY
Qty: 180 TAB | Refills: 1 | Status: SHIPPED | OUTPATIENT
Start: 2018-03-22 | End: 2018-09-29 | Stop reason: SDUPTHER

## 2018-04-17 RX ORDER — MONTELUKAST SODIUM 10 MG/1
TABLET ORAL
Qty: 90 TAB | Refills: 3 | Status: SHIPPED | OUTPATIENT
Start: 2018-04-17 | End: 2019-05-19 | Stop reason: SDUPTHER

## 2018-04-25 ENCOUNTER — TELEPHONE (OUTPATIENT)
Dept: INTERNAL MEDICINE CLINIC | Age: 68
End: 2018-04-25

## 2018-04-25 RX ORDER — CHLORTHALIDONE 25 MG/1
25 TABLET ORAL DAILY
Qty: 30 TAB | Refills: 1 | Status: SHIPPED | OUTPATIENT
Start: 2018-04-25 | End: 2019-03-26 | Stop reason: SDUPTHER

## 2018-04-25 NOTE — TELEPHONE ENCOUNTER
----- Message from Aishwarya Snyder LPN sent at 4/32/7455  2:55 PM EDT -----  Regarding: FW: Non-Urgent Medical Question  Contact: [phone]  BP readings. ----- Message -----     From: Lance Nina     Sent: 4/24/2018  12:44 PM       To:  [facility] Nurses Pool  Subject: RE: Non-Urgent Medical Question                  these are the only ones I have taken so far but given how high they were and the headacks I tjought I should report them  ----- Message -----  From: Aishwarya Snyder LPN  Sent: 5/88/0971 11:29 AM EDT  To: Lance Nina  Subject: RE: Non-Urgent Medical Question    Good morning:  What have they been since 3/22 when you were instructed to increase your medication?    ----- Message -----     From: Lance Nina     Sent: 4/24/2018  9:33 AM EDT       To: Selena Nixon MD  Subject: Non-Urgent Medical Question    two blood pressures this week both in the am    Sun 156/78.   64    today 164/78.  67    i have been waking up with headacks in the morning occasionally have one this morning

## 2018-06-18 ENCOUNTER — OFFICE VISIT (OUTPATIENT)
Dept: INTERNAL MEDICINE CLINIC | Age: 68
End: 2018-06-18

## 2018-06-18 ENCOUNTER — HOSPITAL ENCOUNTER (OUTPATIENT)
Dept: LAB | Age: 68
Discharge: HOME | End: 2018-06-18
Payer: MEDICARE

## 2018-06-18 VITALS
OXYGEN SATURATION: 100 % | DIASTOLIC BLOOD PRESSURE: 62 MMHG | TEMPERATURE: 98 F | HEART RATE: 49 BPM | WEIGHT: 257 LBS | HEIGHT: 68 IN | BODY MASS INDEX: 38.95 KG/M2 | RESPIRATION RATE: 18 BRPM | SYSTOLIC BLOOD PRESSURE: 100 MMHG

## 2018-06-18 DIAGNOSIS — E78.00 PURE HYPERCHOLESTEROLEMIA: Chronic | ICD-10-CM

## 2018-06-18 DIAGNOSIS — I10 ESSENTIAL HYPERTENSION WITH GOAL BLOOD PRESSURE LESS THAN 130/80: Chronic | ICD-10-CM

## 2018-06-18 DIAGNOSIS — E11.21 TYPE 2 DIABETES WITH NEPHROPATHY (HCC): Primary | ICD-10-CM

## 2018-06-18 PROBLEM — E66.01 SEVERE OBESITY (BMI 35.0-39.9): Status: ACTIVE | Noted: 2018-06-18

## 2018-06-18 LAB — HBA1C MFR BLD HPLC: 7.3 %

## 2018-06-18 PROCEDURE — 80061 LIPID PANEL: CPT

## 2018-06-18 PROCEDURE — 80048 BASIC METABOLIC PNL TOTAL CA: CPT

## 2018-06-18 NOTE — MR AVS SNAPSHOT
[address] [facility] 
 
 
 [address] 
[phone] Patient: Marsha García MRN: [id number] GVP:[id number] Visit Information Date & Time Provider Department Dept. Phone Encounter #  
 6/18/2018  8:00 AM Malia Mosley MD [facility] [address]  [id number] Follow-up Instructions Return in about 3 months (around 9/18/2018). Upcoming Health Maintenance Date Due  
 FOOT EXAM Q1 7/7/2015 EYE EXAM RETINAL OR DILATED Q1 8/12/2017 DTaP/Tdap/Td series (1 - Tdap) 9/1/2017 MEDICARE YEARLY EXAM 3/14/2018 GLAUCOMA SCREENING Q2Y 8/12/2018 Influenza Age 5 to Adult 8/1/2018 HEMOGLOBIN A1C Q6M 9/12/2018 MICROALBUMIN Q1 11/20/2018 LIPID PANEL Q1 11/20/2018 Allergies as of 6/18/2018  Review Complete On: 6/18/2018 By: Nadege Duran LPN Severity Noted Reaction Type Reactions Cefdinir  11/15/2017    Rash Codeine  12/01/2008   Side Effect Nausea Only Current Immunizations  Reviewed on 9/28/2016 Name Date H1N1 FLU VACCINE 1/1/2010 Influenza High Dose Vaccine PF 9/22/2017 12:00 AM  
 Influenza Vaccine 8/30/2016, 10/26/2015, 10/2/2014, 10/5/2013 Influenza Vaccine Split 10/17/2011 Influenza Vaccine Whole 10/1/2012, 9/12/2009 Pneumococcal Conjugate (PCV-13) 1/24/2017 12:00 AM  
 Pneumococcal Polysaccharide (PPSV-23) 3/18/2018 12:00 AM  
 TD Vaccine 12/1/2007 Td 8/31/2017 ZZZ-RETIRED (DO NOT USE) Pneumococcal Vaccine (Unspecified Type) 12/1/2005 Zoster Vaccine, Live 4/22/2011 Not reviewed this visit You Were Diagnosed With   
  
 Codes Comments Type 2 diabetes with nephropathy (HCC)    -  Primary ICD-10-CM: E11.21 
ICD-9-CM: 250.40, 583.81 Essential hypertension with goal blood pressure less than 130/80     ICD-10-CM: I10 
ICD-9-CM: 401.9 Pure hypercholesterolemia     ICD-10-CM: E78.00 ICD-9-CM: 272.0 Vitals BP Pulse Temp Resp Height(growth percentile) Weight(growth percentile) 100/62 (BP 1 Location: Left arm, BP Patient Position: Sitting) (!) 49 98 °F (36.7 °C) (Oral) 18 5' 8\" (1.727 m) 257 lb (116.6 kg) SpO2 BMI Smoking Status 100% 39.08 kg/m2 Passive Smoke Exposure - Never Smoker BMI and BSA Data Body Mass Index Body Surface Area 39.08 kg/m 2 2.37 m 2 Preferred Pharmacy Pharmacy Name Phone [address] [phone] Your Updated Medication List  
  
   
This list is accurate as of 6/18/18  8:20 AM.  Always use your most recent med list.  
  
  
  
  
 Ana Downing Generic drug:  Blood-Glucose Meter  
by Does Not Apply route. aspirin 325 mg tablet Commonly known as:  ASPIRIN  
take 325 mg by mouth daily. atorvastatin 80 mg tablet Commonly known as:  LIPITOR Take 1 Tab by mouth daily. cetirizine 10 mg tablet Commonly known as:  ZYRTEC Take 1 Tab by mouth daily. chlorthalidone 25 mg tablet Commonly known as:  Micki [place] Take 1 Tab by mouth daily. CIALIS 5 mg tablet Generic drug:  tadalafil Take 5 mg by mouth daily. citalopram 40 mg tablet Commonly known as:  CELEXA  
TAKE 1 TABLET DAILY  
  
 desonide 0.05 % topical lotion Commonly known as:  Vivienne Hampton  
as needed. Diabetic Supplies, [facility] 24. Misc Commonly known as:  BD LANCET DEVICE  
1 Each by Does Not Apply route daily. FISH OIL EXTRA STRENGTH PO Take 1,400 mg by mouth daily (after breakfast). fluticasone 50 mcg/actuation nasal spray Commonly known as:  Truett Dowse 2 Sprays by Both Nostrils route daily. Indications: ALLERGIC RHINITIS  
  
 fluticasone-vilanterol 100-25 mcg/dose inhaler Commonly known as:  BREO ELLIPTA Take 1 Puff by inhalation daily. glucose blood VI test strips strip Commonly known as:  [address]  
 For blood sugar testing once daily. (ICD-10: E11.9) HumaLOG KwikPen Insulin 100 unit/mL kwikpen Generic drug:  insulin lispro INJECT 15 UNITS UNDER THE SKIN BEFORE BREAKFAST, LUNCH AND DINNER ( BEFORE MEALS ) FOR TYPE 2 DIABETES MELLITUS  
  
 insulin glargine 300 unit/mL (1.5 mL) Inpn Commonly known as:  TOUJEO SOLOSTAR U-300 INSULIN INJECT 40 UNITS UNDER THE SKIN DAILY INVOKANA 300 mg tablet Generic drug:  canagliflozin TAKE 1 TABLET DAILY JANUMET 50-1,000 mg per tablet Generic drug:  SITagliptin-metFORMIN  
TAKE 1 TABLET TWICE A DAY WITH MEALS  
  
 ketoconazole 2 % topical cream  
Commonly known as:  NIZORAL Apply  to affected area as needed for Skin Irritation. Lancets Misc Commonly known as:  ACCU-CHEK FASTCLIX  
2 Packages by SubCUTAneous route two (2) times a day. Check Blood Glucose twice daily .00  
  
 lisinopril 20 mg tablet Commonly known as:  Randa Golder Take 1 Tab by mouth two (2) times a day. M-VIT PO  
take 1 Tab by mouth daily. metoprolol tartrate 50 mg tablet Commonly known as:  LOPRESSOR  
TAKE 1 TABLET TWICE A DAY  
  
 montelukast 10 mg tablet Commonly known as:  SINGULAIR  
TAKE 1 TABLET DAILY * Lady Pen Needle 32 gauge x 5/32\" Ndle Generic drug:  Insulin Needles (Disposable) USE 1 FOUR TIMES A DAY WITH INSULIN  
  
 * BD ULTRA-FINE SHORT PEN NEEDLE 31 gauge x 5/16\" Ndle Generic drug:  Insulin Needles (Disposable) USE DAILY WITH LEVEMIR AS DIRECTED  
  
 tamsulosin 0.4 mg capsule Commonly known as:  FLOMAX Take 0.4 mg by mouth daily. VOLTAREN 1 % Gel Generic drug:  diclofenac  
every six (6) hours. * Notice: This list has 2 medication(s) that are the same as other medications prescribed for you. Read the directions carefully, and ask your doctor or other care provider to review them with you. Prescriptions Sent to Pharmacy Refills insulin glargine (TOUJEO SOLOSTAR U-300 INSULIN) 300 unit/mL (1.5 mL) inpn 2 Sig: INJECT 40 UNITS UNDER THE SKIN DAILY Class: Normal  
 Pharmacy: [address], [address] [place] [address] #: [phone] We Performed the Following AMB POC HEMOGLOBIN A1C [01945 CPT(R)] LIPID PANEL [46767 CPT(R)] METABOLIC PANEL, BASIC [55436 CPT(R)] Follow-up Instructions Return in about 3 months (around 9/18/2018). Introducing [facility] [place] [facility] SERVICES! Dear Fabiano Menchaca: Thank you for requesting a Springr account. Our records indicate that you already have an active Springr account. You can access your account anytime at [email or web address] Query [email or web address] Did you know that you can access your hospital and ER discharge instructions at any time in Springr? You can also review all of your test results from your hospital stay or ER visit. Additional Information If you have questions, please visit the Frequently Asked Questions section of the Springr website at [email or web address] Query [email or web address] Remember, Springr is NOT to be used for urgent needs. For medical emergencies, dial 911. Now available from your iPhone and Android! Please provide this summary of care documentation to your next provider. Your primary care clinician is listed as Sergio Singletary. If you have any questions after today's visit, please call [phone].

## 2018-06-18 NOTE — PROGRESS NOTES
HISTORY OF PRESENT ILLNESS  Diana House is a 78 y.o. male. HPI  Hyperlipidemia:  Diana House is following up on his dyslipidemia. Cardiovascular risks for him are: LDL goal is under 80  diabetic  existing CAD  hypertension  prior CVA/TIA or known carotid artery disease. Currently he takes Lipitor (atorvastatin) , high intensity    Lab Results   Component Value Date/Time    Cholesterol, total 166 11/20/2017 09:26 AM    Cholesterol, total 155 01/24/2017 09:30 AM    Cholesterol, total 154 12/21/2015 08:50 AM    Cholesterol, total 133 02/20/2015 09:06 AM    Cholesterol, total 160 02/17/2014 08:51 AM    HDL Cholesterol 47 11/20/2017 09:26 AM    HDL Cholesterol 50 01/24/2017 09:30 AM    HDL Cholesterol 43 12/21/2015 08:50 AM    HDL Cholesterol 38 (L) 02/20/2015 09:06 AM    HDL Cholesterol 41 02/17/2014 08:51 AM    LDL, calculated 93 11/20/2017 09:26 AM    LDL, calculated 85 01/24/2017 09:30 AM    LDL, calculated 94 12/21/2015 08:50 AM    LDL, calculated 79 02/20/2015 09:06 AM    LDL, calculated 98 02/17/2014 08:51 AM    Triglyceride 132 11/20/2017 09:26 AM    Triglyceride 98 01/24/2017 09:30 AM    Triglyceride 87 12/21/2015 08:50 AM    Triglyceride 82 02/20/2015 09:06 AM    Triglyceride 105 02/17/2014 08:51 AM    CHOL/HDL Ratio 3.3 03/30/2010 08:48 AM    CHOL/HDL Ratio 3.3 09/14/2009 08:50 AM    CHOL/HDL Ratio 3.1 03/16/2009 08:38 AM     Lab Results   Component Value Date/Time    ALT (SGPT) CANCELED 12/19/2013 12:00 AM    AST (SGOT) CANCELED 12/19/2013 12:00 AM    Alk. phosphatase CANCELED 12/19/2013 12:00 AM    Bilirubin, total CANCELED 12/19/2013 12:00 AM       Myalgias: no  Fatigue: no        Diabetes:  He is here for follow up of diabetes. Proteinuria: yes  Neuropathy: no  Medication change since last visit:  No   Diabetic Review of Systems - medication compliance: compliant all of the time, diabetic diet compliance: compliant most of the time.     Hypoglycemic symptoms: no  Dilated eye exam in the last year: yes      Lab Results   Component Value Date/Time    Hemoglobin A1c 8.4 (H) 09/28/2016 10:30 AM    Hemoglobin A1c (POC) 7.3 06/18/2018 08:04 AM     Lab Results   Component Value Date/Time    Microalbumin/Creat ratio (mg/g creat) 8 09/14/2009 08:50 AM    Microalb/Creat ratio (ug/mg creat.) 49.5 (H) 11/20/2017 09:26 AM    Microalbumin,urine random 1.83 09/14/2009 08:50 AM         Last Point of Care HGB A1C  Hemoglobin A1c (POC)   Date Value Ref Range Status   06/18/2018 7.3 % Final      .    ROS    Physical Exam   Constitutional: He appears well-developed and well-nourished. No distress. /62 (BP 1 Location: Left arm, BP Patient Position: Sitting)  Pulse (!) 49  Temp 98 °F (36.7 °C) (Oral)   Resp 18  Ht 5' 8\" (1.727 m)  Wt 257 lb (116.6 kg)  SpO2 100%  BMI 39.08 kg/m2Body mass index is 39.08 kg/(m^2). HENT:   Mouth/Throat: Oropharynx is clear and moist.   Neck: No JVD present. Carotid bruit is not present. Cardiovascular: Normal rate, regular rhythm, normal heart sounds and intact distal pulses. Pulmonary/Chest: Effort normal and breath sounds normal.   Musculoskeletal: He exhibits edema (trace bilateral pretibial areas). Neurological: He is alert. Skin: Skin is warm and dry. He is not diaphoretic. Nursing note and vitals reviewed. ASSESSMENT and PLAN  Diagnoses and all orders for this visit:    1. Type 2 diabetes with nephropathy (HCC) - almost to goal.  Good exercise/ diet now but not losing much wt. Continue current plans and recheck in 3 months. -     AMB POC HEMOGLOBIN A1C  -     insulin glargine (TOUJEO SOLOSTAR U-300 INSULIN) 300 unit/mL (1.5 mL) inpn; INJECT 40 UNITS UNDER THE SKIN DAILY  -     METABOLIC PANEL, BASIC    2. Essential hypertension with goal blood pressure less than 130/80 -Well controlled and improved with increased exercise. his medications were reviewed and refilled where necessary as noted below. Labs ordered as noted.       3. Pure hypercholesterolemia - recheck now. -     LIPID PANEL      Follow-up Disposition:  Return in about 3 months (around 9/18/2018).

## 2018-06-19 LAB
BUN SERPL-MCNC: 19 MG/DL (ref 8–27)
BUN/CREAT SERPL: 17 (ref 10–24)
CALCIUM SERPL-MCNC: 9.1 MG/DL (ref 8.6–10.2)
CHLORIDE SERPL-SCNC: 104 MMOL/L (ref 96–106)
CHOLEST SERPL-MCNC: 116 MG/DL (ref 100–199)
CO2 SERPL-SCNC: 23 MMOL/L (ref 20–29)
CREAT SERPL-MCNC: 1.13 MG/DL (ref 0.76–1.27)
GFR SERPLBLD CREATININE-BSD FMLA CKD-EPI: 67 ML/MIN/1.73
GFR SERPLBLD CREATININE-BSD FMLA CKD-EPI: 78 ML/MIN/1.73
GLUCOSE SERPL-MCNC: 153 MG/DL (ref 65–99)
HDLC SERPL-MCNC: 39 MG/DL
INTERPRETATION, 910389: NORMAL
LDLC SERPL CALC-MCNC: 62 MG/DL (ref 0–99)
POTASSIUM SERPL-SCNC: 4.8 MMOL/L (ref 3.5–5.2)
SODIUM SERPL-SCNC: 142 MMOL/L (ref 134–144)
TRIGL SERPL-MCNC: 74 MG/DL (ref 0–149)
VLDLC SERPL CALC-MCNC: 15 MG/DL (ref 5–40)

## 2018-06-20 RX ORDER — CITALOPRAM 40 MG/1
TABLET, FILM COATED ORAL
Qty: 90 TAB | Refills: 1 | Status: SHIPPED | OUTPATIENT
Start: 2018-06-20 | End: 2019-01-25 | Stop reason: SDUPTHER

## 2018-06-20 RX ORDER — METOPROLOL TARTRATE 50 MG/1
TABLET ORAL
Qty: 180 TAB | Refills: 1 | Status: SHIPPED | OUTPATIENT
Start: 2018-06-20 | End: 2018-08-20 | Stop reason: ALTCHOICE

## 2018-07-19 ENCOUNTER — OFFICE VISIT (OUTPATIENT)
Dept: INTERNAL MEDICINE CLINIC | Age: 68
End: 2018-07-19

## 2018-07-19 VITALS
HEART RATE: 59 BPM | OXYGEN SATURATION: 97 % | TEMPERATURE: 98.6 F | BODY MASS INDEX: 37.51 KG/M2 | RESPIRATION RATE: 18 BRPM | HEIGHT: 68 IN | SYSTOLIC BLOOD PRESSURE: 126 MMHG | WEIGHT: 247.5 LBS | DIASTOLIC BLOOD PRESSURE: 72 MMHG

## 2018-07-19 DIAGNOSIS — I87.8 VENOUS STASIS: Primary | ICD-10-CM

## 2018-07-19 RX ORDER — FUROSEMIDE 20 MG/1
TABLET ORAL
Refills: 0 | COMMUNITY
Start: 2018-06-30 | End: 2018-07-19 | Stop reason: ALTCHOICE

## 2018-07-19 RX ORDER — ATORVASTATIN CALCIUM 80 MG/1
40 TABLET, FILM COATED ORAL DAILY
Qty: 1 TAB | Refills: 2
Start: 2018-07-19 | End: 2019-01-25 | Stop reason: SDUPTHER

## 2018-07-19 NOTE — MR AVS SNAPSHOT
[address] [facility] [place] 
 
 
 [address] [facility] [address] [facility] 
[phone] Patient: Mikey Johnson MRN: [id number] AOD:9/89/[birth year] Visit Information Date & Time Provider Department Dept. Phone Encounter #  
 7/19/2018  8:15 AM Angélica Quintana MD [facility] [id number] Follow-up Instructions Return if symptoms worsen or fail to improve. Your Appointments 9/26/2018  9:00 AM  
Medicare Physical with Angélica Quintana MD  
[facility]) Appt Note: lindsey Davaloslacarmen [facility] [id number]  
[phone]  
  
   
 [address] [facility] [address] [facility] [address] Upcoming Health Maintenance Date Due  
 FOOT EXAM Q1 7/7/2015 EYE EXAM RETINAL OR DILATED Q1 8/12/2017 DTaP/Tdap/Td series (1 - Tdap) 9/1/2017 MEDICARE YEARLY EXAM 3/14/2018 GLAUCOMA SCREENING Q2Y 8/12/2018 Influenza Age 5 to Adult 8/1/2018 MICROALBUMIN Q1 11/20/2018 HEMOGLOBIN A1C Q6M 12/18/2018 LIPID PANEL Q1 6/18/2019 Allergies as of 7/19/2018  Review Complete On: 7/19/2018 By: Db Gomez LPN Severity Noted Reaction Type Reactions Cefdinir  11/15/2017    Rash Codeine  12/01/2008   Side Effect Nausea Only Current Immunizations  Reviewed on 9/28/2016 Name Date H1N1 FLU VACCINE 1/1/2010 Influenza High Dose Vaccine PF 9/22/2017 12:00 AM  
 Influenza Vaccine 8/30/2016, 10/26/2015, 10/2/2014, 10/5/2013 Influenza Vaccine Split 10/17/2011 Influenza Vaccine Whole 10/1/2012, 9/12/2009 Pneumococcal Conjugate (PCV-13) 1/24/2017 12:00 AM  
 Pneumococcal Polysaccharide (PPSV-23) 3/18/2018 12:00 AM  
 TD Vaccine 12/1/2007 Td 8/31/2017 ZZZ-RETIRED (DO NOT USE) Pneumococcal Vaccine (Unspecified Type) 12/1/2005 Zoster Vaccine, Live 4/22/2011 Not reviewed this visit You Were Diagnosed With   
  
 Codes Comments Venous stasis    -  Primary ICD-10-CM: I87.8 ICD-9-CM: 459.81 Vitals BP Pulse Temp Resp Height(growth percentile) Weight(growth percentile) 126/72 (BP 1 Location: Left arm, BP Patient Position: Sitting) (!) 59 98.6 °F (37 °C) (Oral) 18 5' 8\" (1.727 m) 247 lb 8 oz (112.3 kg) SpO2 BMI Smoking Status 97% 37.63 kg/m2 Passive Smoke Exposure - Never Smoker Vitals History BMI and BSA Data Body Mass Index Body Surface Area  
 37.63 kg/m 2 2.32 m 2 Preferred Pharmacy Pharmacy Name Phone [facility], [facility] [phone] Your Updated Medication List  
  
   
This list is accurate as of 7/19/18  8:42 AM.  Always use your most recent med list.  
  
  
  
  
 Tamiko Key Generic drug:  Blood-Glucose Meter  
by Does Not Apply route. aspirin 325 mg tablet Commonly known as:  ASPIRIN  
take 325 mg by mouth daily. atorvastatin 80 mg tablet Commonly known as:  LIPITOR Take 0.5 Tabs by mouth daily. cetirizine 10 mg tablet Commonly known as:  ZYRTEC Take 1 Tab by mouth daily. chlorthalidone 25 mg tablet Commonly known as:  Minor Median Take 1 Tab by mouth daily. CIALIS 5 mg tablet Generic drug:  tadalafil Take 5 mg by mouth daily. citalopram 40 mg tablet Commonly known as:  CELEXA  
TAKE 1 TABLET DAILY  
  
 desonide 0.05 % topical lotion Commonly known as:  Lady Jorge A  
as needed. [facility] Misc Commonly known as:  BD LANCET DEVICE  
1 Each by Does Not Apply route daily. FISH OIL EXTRA STRENGTH PO Take 1,400 mg by mouth daily (after breakfast). fluticasone 50 mcg/actuation nasal spray Commonly known as:  Shelvia Birks 2 Sprays by Both Nostrils route daily. Indications: ALLERGIC RHINITIS  
  
 fluticasone-vilanterol 100-25 mcg/dose inhaler Commonly known as:  BREO ELLIPTA Take 1 Puff by inhalation daily. glucose blood VI test strips strip Commonly known as:  [address] For blood sugar testing once daily. (ICD-10: E11.9) HumaLOG KwikPen Insulin 100 unit/mL kwikpen Generic drug:  insulin lispro INJECT 15 UNITS UNDER THE SKIN BEFORE BREAKFAST, LUNCH AND DINNER ( BEFORE MEALS ) FOR TYPE 2 DIABETES MELLITUS  
  
 insulin glargine 300 unit/mL (1.5 mL) Inpn Commonly known as:  TOUJEO SOLOSTAR U-300 INSULIN INJECT 40 UNITS UNDER THE SKIN DAILY INVOKANA 300 mg tablet Generic drug:  canagliflozin TAKE 1 TABLET DAILY JANUMET 50-1,000 mg per tablet Generic drug:  SITagliptin-metFORMIN  
TAKE 1 TABLET TWICE A DAY WITH MEALS  
  
 ketoconazole 2 % topical cream  
Commonly known as:  NIZORAL Apply  to affected area as needed for Skin Irritation. Lancets Misc Commonly known as:  ACCU-CHEK FASTCLIX  
2 Packages by SubCUTAneous route two (2) times a day. Check Blood Glucose twice daily .00  
  
 lisinopril 20 mg tablet Commonly known as:  Marilynne Shows Take 1 Tab by mouth two (2) times a day. M-VIT PO  
take 1 Tab by mouth daily. metoprolol tartrate 50 mg tablet Commonly known as:  LOPRESSOR  
TAKE 1 TABLET TWICE A DAY  
  
 montelukast 10 mg tablet Commonly known as:  SINGULAIR  
TAKE 1 TABLET DAILY * Lady Pen Needle 32 gauge x 5/32\" Ndle Generic drug:  Insulin Needles (Disposable) USE 1 FOUR TIMES A DAY WITH INSULIN  
  
 * BD ULTRA-FINE SHORT PEN NEEDLE 31 gauge x 5/16\" Ndle Generic drug:  Insulin Needles (Disposable) USE DAILY WITH LEVEMIR AS DIRECTED  
  
 tamsulosin 0.4 mg capsule Commonly known as:  FLOMAX Take 0.4 mg by mouth daily. VOLTAREN 1 % Gel Generic drug:  diclofenac  
every six (6) hours. * Notice: This list has 2 medication(s) that are the same as other medications prescribed for you. Read the directions carefully, and ask your doctor or other care provider to review them with you. Follow-up Instructions Return if symptoms worsen or fail to improve. Introducing [facility] [place] & HEALTH SERVICES! Dear Suzanna Kaba: Thank you for requesting a Ward[employer] Housekeeper account. Our records indicate that you already have an active [employer]keeper account. You can access your account anytime at [email or web address] Did you know that you can access your hospital and ER discharge instructions at any time in [employer] Housekeeper? You can also review all of your test results from your hospital stay or ER visit. Additional Information If you have questions, please visit the Frequently Asked Questions section of the [employer] website at [email or web address] Remember, [employer]keeper is NOT to be used for urgent needs. For medical emergencies, dial 911. Now available from your iPhone and Android! Please provide this summary of care documentation to your next provider. Your primary care clinician is listed as Stacey Gary. If you have any questions after today's visit, please call [phone].

## 2018-07-19 NOTE — PROGRESS NOTES
HISTORY OF PRESENT ILLNESS  Mason Salazar is a 78 y.o. male. HPI  Problem follow up: Mason Salazar returns for follow up visit regarding leg pains and swelling. he was seen 3 weeks ago in [facility] ER diagnosed with venous stasis and treated with lasix. Workup was significant for normal bp, negative doppler US, BNP was 561, EKG was at baseline. Notes, labs, studies, imaging related to this problem during prior visit were available . Since that visit, he has not changed. he has been compliant with prescribed treatment. Residual symptoms include: swelling ? unchanged. Still having pain in back of legs bilaterally. New issues associated with this problem include: none.       Patient Active Problem List   Diagnosis Code    CAD (coronary artery disease) I25.10    Depressed F32.9    Hypogonadism male E29.1    AR (allergic rhinitis) J30.9    ED (erectile dysfunction) N52.9    LEONA (obstructive sleep apnea) G47.33    S/P excision of lipoma Z98.890, Z86.018    S/P excision of skin lesion, follow-up exam Z09    Essential hypertension with goal blood pressure less than 130/80 I10    Diabetes mellitus type 2, controlled ([address] E11.9    Advance care planning Z71.89    Mild intermittent asthma without complication C66.53    Pure hypercholesterolemia E78.00    Type 2 diabetes with nephropathy ([address] E11.21    Severe obesity (BMI 35.0-39.9) ([address] E66.01     Past Medical History:   Diagnosis Date    Arthritis     CAD (coronary artery disease)     CABG - 3 vessel    Diabetes ([address]     X 30years    Hypercholesteremia     Hypertension     Psychiatric disorder     depression    Psychotic disorder     Sleep apnea 1999    CPAP compliant     Allergies   Allergen Reactions    Cefdinir Rash    Codeine Nausea Only     Current Outpatient Prescriptions on File Prior to Visit   Medication Sig Dispense Refill    citalopram (CELEXA) 40 mg tablet TAKE 1 TABLET DAILY 90 Tab 1    metoprolol tartrate (LOPRESSOR) 50 mg tablet TAKE 1 TABLET TWICE A  Tab 1    insulin glargine (TOUJEO SOLOSTAR U-300 INSULIN) 300 unit/mL (1.5 mL) inpn INJECT 40 UNITS UNDER THE SKIN DAILY 13.5 mL 2    chlorthalidone (HYGROTEN) 25 mg tablet Take 1 Tab by mouth daily. 30 Tab 1    montelukast (SINGULAIR) 10 mg tablet TAKE 1 TABLET DAILY 90 Tab 3    lisinopril (PRINIVIL, ZESTRIL) 20 mg tablet Take 1 Tab by mouth two (2) times a day. 180 Tab 1    fluticasone-vilanterol (BREO ELLIPTA) 100-25 mcg/dose inhaler Take 1 Puff by inhalation daily. 3 Inhaler 2    BD INSULIN PEN NEEDLE UF SHORT 31 gauge x 5/16\" ndle USE DAILY WITH LEVEMIR AS DIRECTED 90 Pen Needle 3    JANUMET 50-1,000 mg per tablet TAKE 1 TABLET TWICE A DAY WITH MEALS 180 Tab 1    INVOKANA 300 mg tablet TAKE 1 TABLET DAILY 90 Tab 1    glucose blood VI test strips (ACCU-CHEK SMARTVIEW TEST STRIP) strip For blood sugar testing once daily. (ICD-10: E11.9) 100 Strip 9    HUMALOG KWIKPEN 100 unit/mL kwikpen INJECT 15 UNITS UNDER THE SKIN BEFORE BREAKFAST, LUNCH AND DINNER ( BEFORE MEALS ) FOR TYPE 2 DIABETES MELLITUS 45 mL 1    CIALIS 5 mg tablet Take 5 mg by mouth daily.  tamsulosin (FLOMAX) 0.4 mg capsule Take 0.4 mg by mouth daily.  fluticasone (FLONASE) 50 mcg/actuation nasal spray 2 Sprays by Both Nostrils route daily. Indications: ALLERGIC RHINITIS 1 Bottle 0    OMEGA-3 FATTY ACIDS/FISH OIL (FISH OIL EXTRA STRENGTH PO) Take 1,400 mg by mouth daily (after breakfast).  ketoconazole (NIZORAL) 2 % topical cream Apply  to affected area as needed for Skin Irritation.  VOLTAREN 1 % gel every six (6) hours. 1    desonide (TRIDESILON) 0.05 % topical lotion as needed. 1    SARAI PEN NEEDLE 32 gauge x 5/32\" ndle USE 1 FOUR TIMES A DAY WITH INSULIN 360 Each 3    Lancets (ACCU-CHEK FASTCLIX) misc 2 Packages by SubCUTAneous route two (2) times a day.  Check Blood Glucose twice daily .00 2 Package 11    Blood-Glucose Meter (ACCU-CHEK SARAI) Misc by Does Not Apply route.      cetirizine (ZYRTEC) 10 mg tablet Take 1 Tab by mouth daily. 0    aspirin (ASPIRIN) 325 mg tablet take 325 mg by mouth daily.  M-VIT PO take 1 Tab by mouth daily.  Diabetic Supplies, [address] (BD LANCET DEVICE) misc 1 Each by Does Not Apply route daily. 1 Each 0     No current facility-administered medications on file prior to visit. Social History   Substance Use Topics    Smoking status: Passive Smoke Exposure - Never Smoker     Years: 10.00    Smokeless tobacco: Never Used    Alcohol use 3.0 oz/week     6 Cans of beer per week      Comment: 1 beer or so per day         ROS    Physical Exam   Constitutional: He appears well-developed and well-nourished. No distress. /72 (BP 1 Location: Left arm, BP Patient Position: Sitting)  Pulse (!) 59  Temp 98.6 °F (37 °C) (Oral)   Resp 18  Ht 5' 8\" (1.727 m)  Wt 247 lb 8 oz (112.3 kg)  SpO2 97%  BMI 37.63 kg/m2Body mass index is 37.63 kg/(m^2). HENT:   Mouth/Throat: Oropharynx is clear and moist.   Neck: No JVD present. Carotid bruit is not present. Cardiovascular: Normal rate, regular rhythm, normal heart sounds and intact distal pulses. Pulmonary/Chest: Effort normal and breath sounds normal.   Musculoskeletal: He exhibits no edema (no edema notable. ). Neurological: He is alert. Skin: Skin is warm and dry. He is not diaphoretic. Nursing note and vitals reviewed. ASSESSMENT and PLAN  Diagnoses and all orders for this visit:    1. Venous stasis -can stop lasix. Compression hose to knees recommended. Posterior leg pain could be due to statin again. He feels it may be better on lower dose of lipitor. Continue for now. Other orders  -     atorvastatin (LIPITOR) 80 mg tablet; Take 0.5 Tabs by mouth daily. Follow-up Disposition:  Return if symptoms worsen or fail to improve.

## 2018-08-13 RX ORDER — CANAGLIFLOZIN 300 MG/1
TABLET, FILM COATED ORAL
Qty: 90 TAB | Refills: 1 | Status: SHIPPED | OUTPATIENT
Start: 2018-08-13 | End: 2019-03-26 | Stop reason: SDUPTHER

## 2018-08-13 RX ORDER — SITAGLIPTIN AND METFORMIN HYDROCHLORIDE 50; 1000 MG/1; MG/1
TABLET, FILM COATED ORAL
Qty: 180 TAB | Refills: 1 | Status: SHIPPED | OUTPATIENT
Start: 2018-08-13 | End: 2019-02-08 | Stop reason: SDUPTHER

## 2018-08-20 ENCOUNTER — OFFICE VISIT (OUTPATIENT)
Dept: INTERNAL MEDICINE CLINIC | Age: 68
End: 2018-08-20

## 2018-08-20 VITALS
RESPIRATION RATE: 18 BRPM | HEIGHT: 68 IN | HEART RATE: 48 BPM | WEIGHT: 260 LBS | BODY MASS INDEX: 39.4 KG/M2 | SYSTOLIC BLOOD PRESSURE: 137 MMHG | OXYGEN SATURATION: 94 % | TEMPERATURE: 98.1 F | DIASTOLIC BLOOD PRESSURE: 65 MMHG

## 2018-08-20 DIAGNOSIS — R55 POSTURAL DIZZINESS WITH PRESYNCOPE: ICD-10-CM

## 2018-08-20 DIAGNOSIS — R42 POSTURAL DIZZINESS WITH PRESYNCOPE: Primary | ICD-10-CM

## 2018-08-20 DIAGNOSIS — R55 POSTURAL DIZZINESS WITH PRESYNCOPE: Primary | ICD-10-CM

## 2018-08-20 DIAGNOSIS — R42 POSTURAL DIZZINESS WITH PRESYNCOPE: ICD-10-CM

## 2018-08-20 RX ORDER — METOPROLOL TARTRATE 25 MG/1
TABLET, FILM COATED ORAL
Qty: 180 TAB | Refills: 1 | Status: SHIPPED | OUTPATIENT
Start: 2018-08-20 | End: 2018-09-26 | Stop reason: SDUPTHER

## 2018-08-20 RX ORDER — METOPROLOL TARTRATE 25 MG/1
25 TABLET, FILM COATED ORAL 2 TIMES DAILY
Qty: 60 TAB | Refills: 1 | Status: SHIPPED | OUTPATIENT
Start: 2018-08-20 | End: 2018-08-20 | Stop reason: SDUPTHER

## 2018-08-20 NOTE — MR AVS SNAPSHOT
[address] [facility] 
 
 
 [address] [place] [address] [facility] 
[phone] Patient: Altagracia Mills MRN: [id number] DSM:[id number] Visit Information Date & Time Provider Department Dept. Phone Encounter #  
 8/20/2018  8:00 AM Maryan Alvarado MD [facility] [address] [facility] [id number] Your Appointments 9/26/2018  9:00 AM  
Medicare Physical with Maninder Noe MD  
[facility]) Appt Note: lindsey Wallace [facility] [address]  
[phone]  
  
   
 [address] [facility] [address] Upcoming Health Maintenance Date Due  
 FOOT EXAM Q1 7/7/2015 EYE EXAM RETINAL OR DILATED Q1 8/12/2017 DTaP/Tdap/Td series (1 - Tdap) 9/1/2017 MEDICARE YEARLY EXAM 3/14/2018 Influenza Age 5 to Adult 8/1/2018 GLAUCOMA SCREENING Q2Y 8/12/2018 MICROALBUMIN Q1 11/20/2018 HEMOGLOBIN A1C Q6M 12/18/2018 LIPID PANEL Q1 6/18/2019 Allergies as of 8/20/2018  Review Complete On: 7/50/2613 By: Crescencio Patient Wears Severity Noted Reaction Type Reactions Cefdinir  11/15/2017    Rash Codeine  12/01/2008   Side Effect Nausea Only Current Immunizations  Reviewed on 9/28/2016 Name Date H1N1 FLU VACCINE 1/1/2010 Influenza High Dose Vaccine PF 9/22/2017 12:00 AM  
 Influenza Vaccine 8/30/2016, 10/26/2015, 10/2/2014, 10/5/2013 Influenza Vaccine Split 10/17/2011 Influenza Vaccine Whole 10/1/2012, 9/12/2009 Pneumococcal Conjugate (PCV-13) 1/24/2017 12:00 AM  
 Pneumococcal Polysaccharide (PPSV-23) 3/18/2018 12:00 AM  
 TD Vaccine 12/1/2007 Td 8/31/2017 ZZZ-RETIRED (DO NOT USE) Pneumococcal Vaccine (Unspecified Type) 12/1/2005 Zoster Vaccine, Live 4/22/2011 Not reviewed this visit You Were Diagnosed With   
  
 Codes Comments  Postural dizziness with presyncope    -  Primary ICD-10-CM: Sean Lawrence 
 ICD-9-CM: 780.4, 780.2 Vitals BP Pulse Temp Resp Height(growth percentile) Weight(growth percentile)  
 137/65 (BP 1 Location: Left arm, BP Patient Position: Sitting) (!) 48 98.1 °F (36.7 °C) (Oral) 18 5' 8\" (1.727 m) 260 lb (117.9 kg) SpO2 BMI Smoking Status 94% 39.53 kg/m2 Passive Smoke Exposure - Never Smoker Vitals History BMI and BSA Data Body Mass Index Body Surface Area  
 39.53 kg/m 2 2.38 m 2 Preferred Pharmacy Pharmacy Name Phone [facility] [address] [place] [facility] [phone] Your Updated Medication List  
  
   
This list is accurate as of 8/20/18  8:40 AM.  Always use your most recent med list.  
  
  
  
  
 Stacey Carcamo Generic drug:  Blood-Glucose Meter  
by Does Not Apply route. aspirin 325 mg tablet Commonly known as:  ASPIRIN  
take 325 mg by mouth daily. atorvastatin 80 mg tablet Commonly known as:  LIPITOR Take 0.5 Tabs by mouth daily. cetirizine 10 mg tablet Commonly known as:  ZYRTEC Take 1 Tab by mouth daily. chlorthalidone 25 mg tablet Commonly known as:  Frankey Shook Take 1 Tab by mouth daily. CIALIS 5 mg tablet Generic drug:  tadalafil Take 5 mg by mouth daily. citalopram 40 mg tablet Commonly known as:  CELEXA  
TAKE 1 TABLET DAILY  
  
 desonide 0.05 % topical lotion Commonly known as:  Lulu La  
as needed. Diabetic Supplies, [facility] 24. Misc Commonly known as:  BD LANCET DEVICE  
1 Each by Does Not Apply route daily. FISH OIL EXTRA STRENGTH PO Take 1,400 mg by mouth daily (after breakfast). fluticasone 50 mcg/actuation nasal spray Commonly known as:  Chandrakant Holladay 2 Sprays by Both Nostrils route daily. Indications: ALLERGIC RHINITIS  
  
 fluticasone-vilanterol 100-25 mcg/dose inhaler Commonly known as:  BREO ELLIPTA Take 1 Puff by inhalation daily. glucose blood VI test strips strip Commonly known as:  [address] For blood sugar testing once daily. (ICD-10: E11.9) HumaLOG KwikPen Insulin 100 unit/mL kwikpen Generic drug:  insulin lispro INJECT 15 UNITS UNDER THE SKIN BEFORE BREAKFAST, LUNCH AND DINNER ( BEFORE MEALS ) FOR TYPE 2 DIABETES MELLITUS  
  
 insulin glargine 300 unit/mL (1.5 mL) Inpn Commonly known as:  TOUJEO SOLOSTAR U-300 INSULIN INJECT 40 UNITS UNDER THE SKIN DAILY INVOKANA 300 mg tablet Generic drug:  canagliflozin TAKE 1 TABLET DAILY JANUMET 50-1,000 mg per tablet Generic drug:  SITagliptin-metFORMIN  
TAKE 1 TABLET TWICE A DAY WITH MEALS  
  
 ketoconazole 2 % topical cream  
Commonly known as:  NIZORAL Apply  to affected area as needed for Skin Irritation. Lancets Misc Commonly known as:  ACCU-CHEK FASTCLIX LANCING DEV  
2 Packages by SubCUTAneous route two (2) times a day. Check Blood Glucose twice daily .00  
  
 lisinopril 20 mg tablet Commonly known as:  Teresita Sanford Take 1 Tab by mouth two (2) times a day. M-VIT PO  
take 1 Tab by mouth daily. metoprolol tartrate 25 mg tablet Commonly known as:  LOPRESSOR Take 1 Tab by mouth two (2) times a day. montelukast 10 mg tablet Commonly known as:  SINGULAIR  
TAKE 1 TABLET DAILY * Lady Pen Needle 32 gauge x 5/32\" Ndle Generic drug:  Insulin Needles (Disposable) USE 1 FOUR TIMES A DAY WITH INSULIN  
  
 * BD ULTRA-FINE SHORT PEN NEEDLE 31 gauge x 5/16\" Ndle Generic drug:  Insulin Needles (Disposable) USE DAILY WITH LEVEMIR AS DIRECTED  
  
 tamsulosin 0.4 mg capsule Commonly known as:  FLOMAX Take 0.4 mg by mouth daily. VOLTAREN 1 % Gel Generic drug:  diclofenac  
every six (6) hours. * Notice: This list has 2 medication(s) that are the same as other medications prescribed for you.  Read the directions carefully, and ask your doctor or other care provider to review them with you. Prescriptions Sent to Pharmacy Refills  
 metoprolol tartrate (LOPRESSOR) 25 mg tablet 1 Sig: Take 1 Tab by mouth two (2) times a day. Class: Normal  
 Pharmacy: [facility] [address] [address] [facility] #: [phone] Route: Oral  
  
Introducing [facility]! Dear Reina Tate: Thank you for requesting a [employer] account. Our records indicate that you already have an active [employer] account. You can access your account anytime at [email or web address] Did you know that you can access your hospital and ER discharge instructions at any time in [employer]? You can also review all of your test results from your hospital stay or ER visit. Additional Information If you have questions, please visit the Frequently Asked Questions section of the [employer] website at [email or web address] Remember, [employer] is NOT to be used for urgent needs. For medical emergencies, dial 911. Now available from your iPhone and Android! Please provide this summary of care documentation to your next provider. Your primary care clinician is listed as Meredith Del Castillo. If you have any questions after today's visit, please call [phone].

## 2018-08-20 NOTE — PROGRESS NOTES
Tayla Lamar is a 78 y.o. male who presents today for Dizziness and Extremity Weakness  . He has a history of   Patient Active Problem List   Diagnosis Code    CAD (coronary artery disease) I25.10    Depressed F32.9    Hypogonadism male E29.1    AR (allergic rhinitis) J30.9    ED (erectile dysfunction) N52.9    LEONA (obstructive sleep apnea) G47.33    S/P excision of lipoma Z98.890, Z86.018    S/P excision of skin lesion, follow-up exam Z09    Essential hypertension with goal blood pressure less than 130/80 I10    Diabetes mellitus type 2, controlled ([facility] E11.9    Advance care planning Z71.89    Mild intermittent asthma without complication J64.27    Pure hypercholesterolemia E78.00    Type 2 diabetes with nephropathy ([facility] E11.21    Severe obesity (BMI 35.0-39.9) ([facility] E66.01   . Today patient is here for an acute visit. Problem visit:  Tayla Lamar is here for complaint of presyncope and dizziness. Problem began 1 year(s) ago. Severity is moderate  Character of problem: dizziness upon sitting up. Notes often after sitting for a while. Never LOC. Pulse today very low. Noted that he had more symptoms last week leading to this visit. Usually feels dizzy for 30 seconds, but last week lasted for a minute or two. Notes that his watch does show that his pulse is often in low 50's. No CP, palpitations or SOB. ROS  Review of Systems   Constitutional: Negative for chills, fever, malaise/fatigue and weight loss. Eyes: Negative for blurred vision, double vision, photophobia and pain. Respiratory: Negative for cough and shortness of breath. Cardiovascular: Negative for chest pain, palpitations and leg swelling. Gastrointestinal: Negative for abdominal pain, constipation, diarrhea, nausea and vomiting. Genitourinary: Negative for dysuria, frequency, hematuria and urgency. Musculoskeletal: Positive for joint pain. Negative for back pain, falls, myalgias and neck pain. Neurological: Positive for dizziness. Negative for tingling, tremors, sensory change, speech change, focal weakness, seizures and headaches. Endo/Heme/Allergies: Does not bruise/bleed easily. Psychiatric/Behavioral: Negative for depression, hallucinations, substance abuse and suicidal ideas. The patient is not nervous/anxious. Visit Vitals    /65 (BP 1 Location: Left arm, BP Patient Position: Sitting)    Pulse (!) 48    Temp 98.1 °F (36.7 °C) (Oral)    Resp 18    Ht 5' 8\" (1.727 m)    Wt 260 lb (117.9 kg)    SpO2 94%    BMI 39.53 kg/m2       Physical Exam   Constitutional: He is oriented to person, place, and time. He appears well-developed and well-nourished. HENT:   Head: Normocephalic and atraumatic. Right Ear: External ear normal.   Left Ear: External ear normal.   Chronic eye changes. Cardiovascular: Regular rhythm. No murmur heard. Bradycardic   Pulmonary/Chest: Effort normal and breath sounds normal. No respiratory distress. Musculoskeletal: Normal range of motion. He exhibits no edema. Neurological: He is alert and oriented to person, place, and time. Skin: Skin is warm and dry. He is not diaphoretic. Psychiatric: He has a normal mood and affect. His behavior is normal.         Current Outpatient Prescriptions   Medication Sig    metoprolol tartrate (LOPRESSOR) 25 mg tablet Take 1 Tab by mouth two (2) times a day.  JANUMET 50-1,000 mg per tablet TAKE 1 TABLET TWICE A DAY WITH MEALS    INVOKANA 300 mg tablet TAKE 1 TABLET DAILY    atorvastatin (LIPITOR) 80 mg tablet Take 0.5 Tabs by mouth daily.  citalopram (CELEXA) 40 mg tablet TAKE 1 TABLET DAILY    insulin glargine (TOUJEO SOLOSTAR U-300 INSULIN) 300 unit/mL (1.5 mL) inpn INJECT 40 UNITS UNDER THE SKIN DAILY    chlorthalidone (HYGROTEN) 25 mg tablet Take 1 Tab by mouth daily.     montelukast (SINGULAIR) 10 mg tablet TAKE 1 TABLET DAILY    lisinopril (PRINIVIL, ZESTRIL) 20 mg tablet Take 1 Tab by mouth two (2) times a day. (Patient taking differently: Take 20 mg by mouth daily.)    fluticasone-vilanterol (BREO ELLIPTA) 100-25 mcg/dose inhaler Take 1 Puff by inhalation daily.  BD INSULIN PEN NEEDLE UF SHORT 31 gauge x 5/16\" ndle USE DAILY WITH LEVEMIR AS DIRECTED    glucose blood VI test strips (ACCU-CHEK SMARTVIEW TEST STRIP) strip For blood sugar testing once daily. (ICD-10: E11.9)    HUMALOG KWIKPEN 100 unit/mL kwikpen INJECT 15 UNITS UNDER THE SKIN BEFORE BREAKFAST, LUNCH AND DINNER ( BEFORE MEALS ) FOR TYPE 2 DIABETES MELLITUS    CIALIS 5 mg tablet Take 5 mg by mouth daily.  tamsulosin (FLOMAX) 0.4 mg capsule Take 0.4 mg by mouth daily.  fluticasone (FLONASE) 50 mcg/actuation nasal spray 2 Sprays by Both Nostrils route daily. Indications: ALLERGIC RHINITIS    OMEGA-3 FATTY ACIDS/FISH OIL (FISH OIL EXTRA STRENGTH PO) Take 1,400 mg by mouth daily (after breakfast).  ketoconazole (NIZORAL) 2 % topical cream Apply  to affected area as needed for Skin Irritation.  VOLTAREN 1 % gel every six (6) hours.  desonide (TRIDESILON) 0.05 % topical lotion as needed.  SARAI PEN NEEDLE 32 gauge x 5/32\" ndle USE 1 FOUR TIMES A DAY WITH INSULIN    Diabetic Supplies, Miscellan. (BD LANCET DEVICE) misc 1 Each by Does Not Apply route daily.  Lancets (ACCU-CHEK FASTCLIX) misc 2 Packages by SubCUTAneous route two (2) times a day. Check Blood Glucose twice daily .00    Blood-Glucose Meter (ACCU-CHEK SARAI) Misc by Does Not Apply route.  cetirizine (ZYRTEC) 10 mg tablet Take 1 Tab by mouth daily.  aspirin (ASPIRIN) 325 mg tablet take 325 mg by mouth daily.  M-VIT PO take 1 Tab by mouth daily. No current facility-administered medications for this visit.          Past Medical History:   Diagnosis Date    Arthritis     CAD (coronary artery disease)     CABG - 3 vessel    Diabetes ([address]     X 30years    Hypercholesteremia     Hypertension     Psychiatric disorder     depression  Psychotic disorder     Sleep apnea 1999    CPAP compliant      Past Surgical History:   Procedure Laterality Date    CABG, ARTERY-VEIN, THREE  2005    Del Cid    HX CYST REMOVAL  5/12/2016    neck and chest    HX HEENT  2012    right eye prosthesis    HX ORTHOPAEDIC      right shoulder surgery, rotator cuff repair    HX RETINAL DETACHMENT REPAIR  1984    R Blindness    HX RHINOPLASTY      septoplasty      Social History   Substance Use Topics    Smoking status: Passive Smoke Exposure - Never Smoker     Years: 10.00    Smokeless tobacco: Never Used    Alcohol use 3.0 oz/week     6 Cans of beer per week      Comment: 1 beer or so per day      Family History   Problem Relation Age of Onset    Adopted: Yes    Family history unknown: Yes        Allergies   Allergen Reactions    Cefdinir Rash    Codeine Nausea Only        Assessment/Plan  Diagnoses and all orders for this visit:    1. Postural dizziness with presyncope - always upon standing. Suggested watching pulse. Decrease BB to 25mg PO BID. If better, keep PCP appt in sept, if not see PCP sooner. -     metoprolol tartrate (LOPRESSOR) 25 mg tablet; Take 1 Tab by mouth two (2) times a day. Follow-up Disposition:  Return if symptoms worsen or fail to improve.     Ronak Valdivia MD  8/20/2018

## 2018-09-14 ENCOUNTER — PATIENT MESSAGE (OUTPATIENT)
Dept: INTERNAL MEDICINE CLINIC | Age: 68
End: 2018-09-14

## 2018-09-14 NOTE — TELEPHONE ENCOUNTER
From: Waleska Lewis  Sent: 9/14/2018 1:11 PM EDT  Subject: Update Medical Information    Last eye appt with dilation 8/9/18

## 2018-09-26 ENCOUNTER — HOSPITAL ENCOUNTER (OUTPATIENT)
Dept: LAB | Age: 68
Discharge: HOME | End: 2018-09-26
Payer: MEDICARE

## 2018-09-26 ENCOUNTER — OFFICE VISIT (OUTPATIENT)
Dept: INTERNAL MEDICINE CLINIC | Age: 68
End: 2018-09-26

## 2018-09-26 VITALS
HEIGHT: 68 IN | RESPIRATION RATE: 18 BRPM | TEMPERATURE: 98.5 F | OXYGEN SATURATION: 97 % | DIASTOLIC BLOOD PRESSURE: 70 MMHG | WEIGHT: 256.25 LBS | SYSTOLIC BLOOD PRESSURE: 126 MMHG | HEART RATE: 52 BPM | BODY MASS INDEX: 38.84 KG/M2

## 2018-09-26 DIAGNOSIS — Z71.89 ADVANCE CARE PLANNING: ICD-10-CM

## 2018-09-26 DIAGNOSIS — I25.10 CORONARY ARTERY DISEASE INVOLVING NATIVE CORONARY ARTERY OF NATIVE HEART WITHOUT ANGINA PECTORIS: ICD-10-CM

## 2018-09-26 DIAGNOSIS — I10 ESSENTIAL HYPERTENSION WITH GOAL BLOOD PRESSURE LESS THAN 130/80: Chronic | ICD-10-CM

## 2018-09-26 DIAGNOSIS — R42 POSTURAL DIZZINESS WITH PRESYNCOPE: ICD-10-CM

## 2018-09-26 DIAGNOSIS — E78.00 PURE HYPERCHOLESTEROLEMIA: Chronic | ICD-10-CM

## 2018-09-26 DIAGNOSIS — Z00.00 MEDICARE ANNUAL WELLNESS VISIT, INITIAL: Primary | ICD-10-CM

## 2018-09-26 DIAGNOSIS — R55 POSTURAL DIZZINESS WITH PRESYNCOPE: ICD-10-CM

## 2018-09-26 DIAGNOSIS — F34.1 DYSTHYMIA: ICD-10-CM

## 2018-09-26 DIAGNOSIS — Z13.31 SCREENING FOR DEPRESSION: ICD-10-CM

## 2018-09-26 DIAGNOSIS — E11.21 TYPE 2 DIABETES WITH NEPHROPATHY (HCC): ICD-10-CM

## 2018-09-26 DIAGNOSIS — Z23 ENCOUNTER FOR IMMUNIZATION: ICD-10-CM

## 2018-09-26 DIAGNOSIS — G47.33 OSA (OBSTRUCTIVE SLEEP APNEA): ICD-10-CM

## 2018-09-26 DIAGNOSIS — Z12.5 PROSTATE CANCER SCREENING: ICD-10-CM

## 2018-09-26 LAB — HBA1C MFR BLD HPLC: 6.6 %

## 2018-09-26 PROCEDURE — 84153 ASSAY OF PSA TOTAL: CPT

## 2018-09-26 RX ORDER — METOPROLOL TARTRATE 25 MG/1
TABLET, FILM COATED ORAL
Qty: 180 TAB | Refills: 1 | Status: SHIPPED | OUTPATIENT
Start: 2018-09-26 | End: 2019-05-19 | Stop reason: SDUPTHER

## 2018-09-26 NOTE — PROGRESS NOTES
HISTORY OF PRESENT ILLNESS Tyler Huerta is a 78 y.o. male. HPI Diabetes: He is here for follow up of diabetes. Proteinuria: yes Neuropathy: no 
Medication change since last visit:  No 
 Diabetic Review of Systems - medication compliance: compliant most of the time, diabetic diet compliance: compliant most of the time, home glucose monitoring: is not performed. Hypoglycemic symptoms: no 
Dilated eye exam in the last year: yes Lab Results Component Value Date/Time Hemoglobin A1c 8.4 (H) 09/28/2016 10:30 AM  
 Hemoglobin A1c (POC) 6.6 09/26/2018 09:10 AM  
 
Lab Results Component Value Date/Time Microalbumin/Creat ratio (mg/g creat) 8 09/14/2009 08:50 AM  
 Microalb/Creat ratio (ug/mg creat.) 49.5 (H) 11/20/2017 09:26 AM  
 Microalbumin,urine random 1.83 09/14/2009 08:50 AM  
 
 
 
Last Point of Care HGB A1C Hemoglobin A1c (POC) Date Value Ref Range Status 09/26/2018 6.6 % Final  
  
 
Hypertension: 
Tyler Huerta is a 78 y.o. male with hypertension. with Chronic kidney disease stage 2 Medication change since last visit: Yes: Comment: decreased metoprolol The patient reports:  taking medications as instructed, no medication side effects noted, home BP monitoring in range of 460'D systolic over 13'C diastolic, no chest pain on exertion, no dyspnea on exertion, no swelling of ankles, no orthostatic dizziness or lightheadedness, no palpitations. Lifestyle modification/social history: generally follows a low sodium diet, sedentary, nonsmoker Lab Results Component Value Date/Time  Sodium 142 06/18/2018 08:30 AM  
 Potassium 4.8 06/18/2018 08:30 AM  
 Chloride 104 06/18/2018 08:30 AM  
 CO2 23 06/18/2018 08:30 AM  
 Anion gap 6 05/09/2016 01:50 PM  
 Glucose 153 (H) 06/18/2018 08:30 AM  
 BUN 19 06/18/2018 08:30 AM  
 Creatinine 1.13 06/18/2018 08:30 AM  
 BUN/Creatinine ratio 17 06/18/2018 08:30 AM  
 GFR est AA 78 06/18/2018 08:30 AM  
 GFR est non-AA 67 06/18/2018 08:30 AM  
 Calcium 9.1 06/18/2018 08:30 AM  
 
 
Hyperlipidemia: 
Willian Benson is following up on his dyslipidemia. Cardiovascular risks for him are: LDL goal is under 80 
diabetic 
existing CAD 
hypertension 
obese. Currently he takes Lipitor (atorvastatin) , Lab Results Component Value Date/Time Cholesterol, total 116 06/18/2018 08:30 AM  
 Cholesterol, total 166 11/20/2017 09:26 AM  
 Cholesterol, total 155 01/24/2017 09:30 AM  
 Cholesterol, total 154 12/21/2015 08:50 AM  
 Cholesterol, total 133 02/20/2015 09:06 AM  
 HDL Cholesterol 39 (L) 06/18/2018 08:30 AM  
 HDL Cholesterol 47 11/20/2017 09:26 AM  
 HDL Cholesterol 50 01/24/2017 09:30 AM  
 HDL Cholesterol 43 12/21/2015 08:50 AM  
 HDL Cholesterol 38 (L) 02/20/2015 09:06 AM  
 LDL, calculated 62 06/18/2018 08:30 AM  
 LDL, calculated 93 11/20/2017 09:26 AM  
 LDL, calculated 85 01/24/2017 09:30 AM  
 LDL, calculated 94 12/21/2015 08:50 AM  
 LDL, calculated 79 02/20/2015 09:06 AM  
 Triglyceride 74 06/18/2018 08:30 AM  
 Triglyceride 132 11/20/2017 09:26 AM  
 Triglyceride 98 01/24/2017 09:30 AM  
 Triglyceride 87 12/21/2015 08:50 AM  
 Triglyceride 82 02/20/2015 09:06 AM  
 CHOL/HDL Ratio 3.3 03/30/2010 08:48 AM  
 CHOL/HDL Ratio 3.3 09/14/2009 08:50 AM  
 CHOL/HDL Ratio 3.1 03/16/2009 08:38 AM  
 
Lab Results Component Value Date/Time ALT (SGPT) CANCELED 12/19/2013 12:00 AM  
 AST (SGOT) CANCELED 12/19/2013 12:00 AM  
 Alk. phosphatase CANCELED 12/19/2013 12:00 AM  
 Bilirubin, total CANCELED 12/19/2013 12:00 AM  
 
 
Myalgias: no Fatigue: no 
 
 
History of depression -still on SSRI with good result. No new symptoms 
 
 
obstructive sleep apnea - 
 he currently is using CPAP/oral appliance every night.  he does not report daytime hypersomnolence. he does not report am headaches. Compliance with CPAP/ oral appliance is reinforced with him.   he  does follow up with a sleep specialist -  
 
 Patient Active Problem List  
Diagnosis Code  CAD (coronary artery disease) I25.10  Hypogonadism male E29.1  AR (allergic rhinitis) J30.9  ED (erectile dysfunction) N52.9  LEONA (obstructive sleep apnea) G47.33  
 S/P excision of lipoma Z98.890, Z86.018  
 S/P excision of skin lesion, follow-up exam Z09  Essential hypertension with goal blood pressure less than 130/80 I10  
 Diabetes mellitus type 2, controlled ([facility] E11.9  Advance care planning Z71.89  
 Mild intermittent asthma without complication P08.57  Pure hypercholesterolemia E78.00  Type 2 diabetes with nephropathy ([facility]) E11.21  
 Severe obesity (BMI 35.0-39.9) ([facility]) E66.01  
 Dysthymia F34.1 Past Medical History:  
Diagnosis Date  Arthritis  CAD (coronary artery disease) CABG - 3 vessel  Diabetes ([facility] X 30years  Hypercholesteremia  Hypertension  Psychiatric disorder   
 depression  Psychotic disorder  Sleep apnea 1999 CPAP compliant Allergies Allergen Reactions  Cefdinir Rash  Codeine Nausea Only Current Outpatient Prescriptions on File Prior to Visit Medication Sig Dispense Refill  metoprolol tartrate (LOPRESSOR) 25 mg tablet TAKE 1 TABLET BY MOUTH TWICE DAILY 180 Tab 1  JANUMET 50-1,000 mg per tablet TAKE 1 TABLET TWICE A DAY WITH MEALS 180 Tab 1  
 INVOKANA 300 mg tablet TAKE 1 TABLET DAILY 90 Tab 1  
 atorvastatin (LIPITOR) 80 mg tablet Take 0.5 Tabs by mouth daily. 1 Tab 2  
 citalopram (CELEXA) 40 mg tablet TAKE 1 TABLET DAILY 90 Tab 1  
 insulin glargine (TOUJEO SOLOSTAR U-300 INSULIN) 300 unit/mL (1.5 mL) inpn INJECT 40 UNITS UNDER THE SKIN DAILY 13.5 mL 2  chlorthalidone (HYGROTEN) 25 mg tablet Take 1 Tab by mouth daily.  30 Tab 1  
 montelukast (SINGULAIR) 10 mg tablet TAKE 1 TABLET DAILY 90 Tab 3  
 lisinopril (PRINIVIL, ZESTRIL) 20 mg tablet Take 1 Tab by mouth two (2) times a day. (Patient taking differently: Take 20 mg by mouth daily. ) 180 Tab 1  
 fluticasone-vilanterol (BREO ELLIPTA) 100-25 mcg/dose inhaler Take 1 Puff by inhalation daily. 3 Inhaler 2  
 BD INSULIN PEN NEEDLE UF SHORT 31 gauge x 5/16\" ndle USE DAILY WITH LEVEMIR AS DIRECTED 90 Pen Needle 3  
 glucose blood VI test strips (ACCU-CHEK SMARTVIEW TEST STRIP) strip For blood sugar testing once daily. (ICD-10: E11.9) 100 Strip 9  
 HUMALOG KWIKPEN 100 unit/mL kwikpen INJECT 15 UNITS UNDER THE SKIN BEFORE BREAKFAST, LUNCH AND DINNER ( BEFORE MEALS ) FOR TYPE 2 DIABETES MELLITUS 45 mL 1  
 CIALIS 5 mg tablet Take 5 mg by mouth daily.  tamsulosin (FLOMAX) 0.4 mg capsule Take 0.4 mg by mouth daily.  fluticasone (FLONASE) 50 mcg/actuation nasal spray 2 Sprays by Both Nostrils route daily. Indications: ALLERGIC RHINITIS 1 Bottle 0  
 OMEGA-3 FATTY ACIDS/FISH OIL (FISH OIL EXTRA STRENGTH PO) Take 1,400 mg by mouth daily (after breakfast).  ketoconazole (NIZORAL) 2 % topical cream Apply  to affected area as needed for Skin Irritation.  VOLTAREN 1 % gel every six (6) hours. 1  
 desonide (TRIDESILON) 0.05 % topical lotion as needed. 1  
 SARAI PEN NEEDLE 32 gauge x 5/32\" ndle USE 1 FOUR TIMES A DAY WITH INSULIN 360 Each 3  
 Diabetic Supplies, Miscellan. (BD LANCET DEVICE) misc 1 Each by Does Not Apply route daily. 1 Each 0  
 Lancets (ACCU-CHEK FASTCLIX) misc 2 Packages by SubCUTAneous route two (2) times a day. Check Blood Glucose twice daily .00 2 Package 11  Blood-Glucose Meter (ACCU-CHEK SARAI) Misc by Does Not Apply route.  cetirizine (ZYRTEC) 10 mg tablet Take 1 Tab by mouth daily. 0  
 aspirin (ASPIRIN) 325 mg tablet take 325 mg by mouth daily.  M-VIT PO take 1 Tab by mouth daily. No current facility-administered medications on file prior to visit. Social History Substance Use Topics  Smoking status: Passive Smoke Exposure - Never Smoker Years: 10.00  Smokeless tobacco: Never Used  Alcohol use 3.0 oz/week 6 Cans of beer per week Comment: 1 beer or so per day Nelsyandria Chaudhary [facility] Physical Exam  
Constitutional: He appears well-developed and well-nourished. No distress. /70 (BP 1 Location: Left arm, BP Patient Position: Sitting)  Pulse (!) 52  Temp 98.5 °F (36.9 °C) (Oral)   Resp 18  Ht 5' 8\" (1.727 m)  Wt 256 lb 4 oz (116.2 kg)  SpO2 97%  BMI 38.96 kg/m2Body mass index is 38.96 kg/(m^2). HENT:  
Mouth/Throat: Oropharynx is clear and moist.  
Neck: No JVD present. Carotid bruit is not present. Cardiovascular: Normal rate, regular rhythm, normal heart sounds and intact distal pulses. Pulmonary/Chest: Effort normal and breath sounds normal.  
Musculoskeletal: He exhibits no edema. Neurological: He is alert. Skin: Skin is warm and dry. He is not diaphoretic. Psychiatric: He has a normal mood and affect. His behavior is normal. Judgment and thought content normal.  
Nursing note and vitals reviewed. ASSESSMENT and PLAN Diagnoses and all orders for this visit: 
 
1. Medicare annual wellness visit, initial 
Deric Walsh received a complete medicare wellness assessment today by Valerie Robin RN. I've reviewed her assessment note and all screening/preventative plans addressed. I also addressed all questions and concerns surrounding the assessment and plans with Deric Walsh. 2. Type 2 diabetes with nephropathy ([facility] - Well controlled and stable. his medications were reviewed and refilled where necessary as noted below. Labs ordered as noted. -     AMB POC HEMOGLOBIN A1C 
-     MICROALBUMIN, UR, RAND W/ MICROALB/CREAT RATIO 3. Encounter for immunization -     Influenza Vaccine Inactivated (IIV) (FLUAD), Subunit, Adjuvanted, IM ([id number]) -     NE IMMUNIZ ADMIN,1 SINGLE/COMB VAC/TOXOID 4. Pure hypercholesterolemia -Well controlled and stable.   his medications were reviewed and refilled where necessary as noted below. Labs ordered as noted. Recheck next visit 5. Essential hypertension with goal blood pressure less than 130/80 -Well controlled and stable. his medications were reviewed and refilled where necessary as noted below. Labs ordered as noted. 6. Dysthymia -Well controlled and stable. his medications were reviewed and refilled where necessary as noted below. Labs ordered as noted. 7. Coronary artery disease involving native coronary artery of native heart without angina pectoris -no ischemic symptoms 8. LEONA (obstructive sleep apnea) -Stephen Toussaint 's sleep apnea appears controlled by history. he reports good compliance with CPAP or oral appliance prescribed by his sleep specialist.  No changes were made today. 9. Prostate cancer screening 
-     PROSTATE SPECIFIC AG 
 
10. Postural dizziness with presyncope -resolved with reduction in BB 
-     metoprolol tartrate (LOPRESSOR) 25 mg tablet; TAKE 1 TABLET BY MOUTH TWICE DAILY Follow-up Disposition: 
Return in about 6 months (around 3/26/2019).

## 2018-09-26 NOTE — PATIENT INSTRUCTIONS
Medicare Part B Preventive Services Guidelines/Limitations Date last completed and Frequency Due Date Cardiovascular Screening Blood Tests (every 5 years) Total cholesterol, HDL, Triglycerides Order as a panel if possible Completed 6/2018 As recommended by your PCP As recommended by your PCP or Specialist  
Colorectal Cancer Screening 
-Fecal occult blood test (annual) -Flexible sigmoidoscopy (5y) 
-Screening colonoscopy (10y) -Barium Enema Age 49-80; After age [de-identified] if history of abnormal results Completed 7/25/2017 Recommended follow-up in 3 years  As recommended by your PCP or Specialist 
  
Counseling to Prevent Tobacco Use (up to 8 sessions per year) - Counseling greater than 3 and up to 10 minutes - Counseling greater than 10 minutes Patients must be asymptomatic of tobacco-related conditions to receive as preventive service N/A N/A Diabetes Screening Tests (at least every 3 years, Medicare covers annually or at 6-month intervals for prediabetic patients) Fasting blood sugar (FBS) or glucose tolerance test (GTT) Patient must be diagnosed with one of the following: 
-Hypertension, Dyslipidemia, obesity, previous impaired FBS or GTT 
Or any two of the following: overweight, FH of diabetes, age ? 72, history of gestational diabetes, birth of baby weighing more than 9 pounds Completed 6/2018 Recommended every 3 years for non-diabetics Recommended every 3-6 months for Pre-Diabetics and Diabetics As recommended by your PCP or Specialist 
  
Glaucoma Screening (no USPSTF recommendation) Diabetes mellitus, family history, , age 48 or over,  American, age 72 or over Completed 8/2018 Recommended annually As recommended by your PCP or Specialist  
Prostate Cancer Screening (annually up to age 76) - Digital rectal exam (YODIT) - Prostate specific antigen (PSA) Annually (age 48 or over), YODIT not paid separately when covered E/M service is provided on same date Completed 1/2017 Recommended annually to age 76 As recommended by your PCP or Specialist 
  
Seasonal Influenza Vaccination (annually)  Completed 9/2018 Recommended Annually Complete TDAP Vaccination  Td completed 8/2017 Recommended every 10 years As recommended by your PCP or Specialist  
Zoster (Shingles) Vaccination Covered by Medicare Part D through the pharmacy- PCP provides prescription Completed 4/2011 Recommended once over age 48  Complete Pneumococcal Vaccination (once after 65)  Pneumo 23- 3/2018 Recommended once over the age of 72 Prevnar 13- 1/2017 Recommended once over the age of 72 Complete Complete Ultrasound Screening for Abdominal Aortic Aneurysm (AAA) (once) Patient must be referred through IPPE and not have had a screening for abdominal aortic aneurysm before under Medicare. Limited to patients who meet one of the following criteria: 
- Men who are 73-68 years old and have smoked more than 100 cigarettes in their lifetime. 
-Anyone with a FH of AAA 
-Anyone recommended for screening by USPSTF Not indicated unless recommended by PCP Not indicated unless recommended by PCP Family Practice Management 2011 If you have any questions or concerns please feel free to contact me at [phone]. It was a pleasure meeting you today and participating in your healthcare. Vandy Crigler, RN Medicare Wellness Visit, Male The best way to live healthy is to have a lifestyle where you eat a well-balanced diet, exercise regularly, limit alcohol use, and quit all forms of tobacco/nicotine, if applicable. Regular preventive services are another way to keep healthy. Preventive services (vaccines, screening tests, monitoring & exams) can help personalize your care plan, which helps you manage your own care. Screening tests can find health problems at the earliest stages, when they are easiest to treat.   
Arvind Flanagan follows the current, evidence-based guidelines published by the [facility] (USPSTF) when recommending preventive services for our patients. Because we follow these guidelines, sometimes recommendations change over time as research supports it. (For example, a prostate screening blood test is no longer routinely recommended for men with no symptoms.) Of course, you and your doctor may decide to screen more often for some diseases, based on your risk and co-morbidities (chronic disease you are already diagnosed with). Preventive services for you include: - Medicare offers their members a free annual wellness visit, which is time for you and your primary care provider to discuss and plan for your preventive service needs. Take advantage of this benefit every year! 
-All adults over age 72 should receive the recommended pneumonia vaccines. Current USPSTF guidelines recommend a series of two vaccines for the best pneumonia protection.  
-All adults should have a flu vaccine yearly and an ECG. All adults age 61 and older should receive a shingles vaccine once in their lifetime.   
-All adults age 38-68 who are overweight should have a diabetes screening test once every three years.  
-Other screening tests & preventive services for persons with diabetes include: an eye exam to screen for diabetic retinopathy, a kidney function test, a foot exam, and stricter control over your cholesterol.  
-Cardiovascular screening for adults with routine risk involves an electrocardiogram (ECG) at intervals determined by the provider.  
-Colorectal cancer screening should be done for adults age 54-65 with no increased risk factors for colorectal cancer. There are a number of acceptable methods of screening for this type of cancer. Each test has its own benefits and drawbacks. Discuss with your provider what is most appropriate for you during your annual wellness visit.  The different tests include: colonoscopy (considered the best screening method), a fecal occult blood test, a fecal DNA test, and sigmoidoscopy. 
-All adults born between [facility] should be screened once for Hepatitis C. 
-An Abdominal Aortic Aneurysm (AAA) Screening is recommended for men age 73-68 who has ever smoked in their lifetime. Here is a list of your current Health Maintenance items (your personalized list of preventive services) with a due date: 
Health Maintenance Due Topic Date Due  Shingles Vaccine (1 of 2) 09/29/2001 [facility] Diabetic Foot [facility]  07/07/2015  Eye Exam  08/12/2017  
 DTaP/Tdap/Td  (1 - Tdap) 09/01/2017  Glaucoma Screening   08/12/2018

## 2018-09-26 NOTE — PROGRESS NOTES
Nurse Navigator Medicare Wellness Visit performed by PENNIE Claros This is an Initial Medicare Annual Wellness Exam (AWV) (Performed 12 months after IPPE or effective date of Medicare Part B enrollment, Once in a lifetime) I have reviewed the patient's medical history in detail and updated the computerized patient record. History Past Medical History:  
Diagnosis Date  Arthritis  CAD (coronary artery disease) CABG - 3 vessel  Diabetes ([address] X 30years  Hypercholesteremia  Hypertension  Psychiatric disorder   
 depression  Psychotic disorder  Sleep apnea 1999 CPAP compliant Past Surgical History:  
Procedure Laterality Date  CABG, ARTERY-VEIN, THREE  2005 [facility]  HX CYST REMOVAL  5/12/2016  
 neck and chest  
 HX HEENT  2012  
 right eye prosthesis  HX ORTHOPAEDIC    
 right shoulder surgery, rotator cuff repair [address] R Blindness  HX RHINOPLASTY    
 septoplasty Current Outpatient Prescriptions Medication Sig Dispense Refill  metoprolol tartrate (LOPRESSOR) 25 mg tablet TAKE 1 TABLET BY MOUTH TWICE DAILY 180 Tab 1  JANUMET 50-1,000 mg per tablet TAKE 1 TABLET TWICE A DAY WITH MEALS 180 Tab 1  
 INVOKANA 300 mg tablet TAKE 1 TABLET DAILY 90 Tab 1  
 atorvastatin (LIPITOR) 80 mg tablet Take 0.5 Tabs by mouth daily. 1 Tab 2  
 citalopram (CELEXA) 40 mg tablet TAKE 1 TABLET DAILY 90 Tab 1  
 insulin glargine (TOUJEO SOLOSTAR U-300 INSULIN) 300 unit/mL (1.5 mL) inpn INJECT 40 UNITS UNDER THE SKIN DAILY 13.5 mL 2  chlorthalidone (HYGROTEN) 25 mg tablet Take 1 Tab by mouth daily. 30 Tab 1  
 montelukast (SINGULAIR) 10 mg tablet TAKE 1 TABLET DAILY 90 Tab 3  
 lisinopril (PRINIVIL, ZESTRIL) 20 mg tablet Take 1 Tab by mouth two (2) times a day. (Patient taking differently: Take 20 mg by mouth daily. ) 180 Tab 1  
 fluticasone-vilanterol (BREO ELLIPTA) 100-25 mcg/dose inhaler Take 1 Puff by inhalation daily. 3 Inhaler 2  
 BD INSULIN PEN NEEDLE UF SHORT 31 gauge x 5/16\" ndle USE DAILY WITH LEVEMIR AS DIRECTED 90 Pen Needle 3  
 glucose blood VI test strips (ACCU-CHEK SMARTVIEW TEST STRIP) strip For blood sugar testing once daily. (ICD-10: E11.9) 100 Strip 9  
 HUMALOG KWIKPEN 100 unit/mL kwikpen INJECT 15 UNITS UNDER THE SKIN BEFORE BREAKFAST, LUNCH AND DINNER ( BEFORE MEALS ) FOR TYPE 2 DIABETES MELLITUS 45 mL 1  
 CIALIS 5 mg tablet Take 5 mg by mouth daily.  tamsulosin (FLOMAX) 0.4 mg capsule Take 0.4 mg by mouth daily.  fluticasone (FLONASE) 50 mcg/actuation nasal spray 2 Sprays by Both Nostrils route daily. Indications: ALLERGIC RHINITIS 1 Bottle 0  
 OMEGA-3 FATTY ACIDS/FISH OIL (FISH OIL EXTRA STRENGTH PO) Take 1,400 mg by mouth daily (after breakfast).  ketoconazole (NIZORAL) 2 % topical cream Apply  to affected area as needed for Skin Irritation.  VOLTAREN 1 % gel every six (6) hours. 1  
 desonide (TRIDESILON) 0.05 % topical lotion as needed. 1  
 SARAI PEN NEEDLE 32 gauge x 5/32\" ndle USE 1 FOUR TIMES A DAY WITH INSULIN 360 Each 3  
 Diabetic Supplies, Miscellan. (BD LANCET DEVICE) misc 1 Each by Does Not Apply route daily. 1 Each 0  
 Lancets (ACCU-CHEK FASTCLIX) misc 2 Packages by SubCUTAneous route two (2) times a day. Check Blood Glucose twice daily .00 2 Package 11  Blood-Glucose Meter (ACCU-CHEK SARAI) Misc by Does Not Apply route.  cetirizine (ZYRTEC) 10 mg tablet Take 1 Tab by mouth daily. 0  
 aspirin (ASPIRIN) 325 mg tablet take 325 mg by mouth daily.  M-VIT PO take 1 Tab by mouth daily. Allergies Allergen Reactions  Cefdinir Rash  Codeine Nausea Only Family History Problem Relation Age of Onset  Adopted: Yes  Family history unknown: Yes  
 
Social History Substance Use Topics  Smoking status: Passive Smoke Exposure - Never Smoker Years: 10.00  Smokeless tobacco: Never Used  Alcohol use 3.0 oz/week 6 Cans of beer per week Comment: 1 beer or so per day Patient Active Problem List  
Diagnosis Code  CAD (coronary artery disease) I25.10  Hypogonadism male E29.1  AR (allergic rhinitis) J30.9  ED (erectile dysfunction) N52.9  LEONA (obstructive sleep apnea) G47.33  
 S/P excision of lipoma Z98.890, Z86.018  
 S/P excision of skin lesion, follow-up exam Z09  Essential hypertension with goal blood pressure less than 130/80 I10  
 Diabetes mellitus type 2, controlled ([facility] E11.9  Advance care planning Z71.89  
 Mild intermittent asthma without complication S33.52  Pure hypercholesterolemia E78.00  Type 2 diabetes with nephropathy ([facility]) E11.21  
 Severe obesity (BMI 35.0-39.9) ([facility]) E66.01  
 Dysthymia F34.1 Depression Risk Factor Screening:  
Patient denies feelings of being down, depressed or hopeless at this time. Patient states that they have a strong support system within their family & friends. PHQ over the last two weeks 9/26/2018 Little interest or pleasure in doing things Not at all Feeling down, depressed, irritable, or hopeless Not at all Total Score PHQ 2 0 Alcohol Risk Factor Screening: You do not drink alcohol or very rarely. Functional Ability and Level of Safety:  
 
Hearing Loss The patient wears hearing aids. Activities of Daily Living The home contains: no safety equipment. Patient does total self care Patient states that he lives in a private residence. Patient states independence in all ADLs & denies the use of assistive devices for ambulation. NN encouraged patient to continue and/ or introduce routine physical exercise into their daily routine as applicable & as recommended by PCP. Patient verbalized understanding & agreement to take this into consideration. Patient shares that he is engaged in keeping his preventative screenings up to date. ADL Assessment 9/26/2018 Feeding yourself No Help Needed Getting from bed to chair No Help Needed Getting dressed No Help Needed Bathing or showering No Help Needed Walk across the room (includes cane/walker) No Help Needed Using the telphone No Help Needed Taking your medications No Help Needed Preparing meals No Help Needed Managing money (expenses/bills) No Help Needed Moderately strenuous housework (laundry) No Help Needed Shopping for personal items (toiletries/medicines) No Help Needed Shopping for groceries No Help Needed Driving No Help Needed Climbing a flight of stairs No Help Needed Getting to places beyond walking distances No Help Needed Patient denies falls within the past year & verbalizes awareness of fall prevention strategies. Fall Risk Fall Risk Assessment, last 12 mths 9/26/2018 Able to walk? Yes Fall in past 12 months? No  
Fall with injury? -  
Number of falls in past 12 months - Fall Risk Score -  
 
 
Abuse Screen Patient is not abused Abuse Screening Questionnaire 9/26/2018 Do you ever feel afraid of your partner? Cesilia Ores Are you in a relationship with someone who physically or mentally threatens you? Cesilia Ores Is it safe for you to go home? Romario Guardian Cognitive Screening Evaluation of Cognitive Function: 
Has your family/caregiver stated any concerns about your memory: no 
 
Patient Care Team  
Patient Care Team: 
Alex Herrera MD as PCP - Gonzales Donald MD as Surgeon (Surgery) Assessment/Plan Education and counseling provided: 
Are appropriate based on today's review and evaluation End-of-Life planning (with patient's consent) Pneumococcal Vaccine Influenza Vaccine Prostate cancer screening tests (PSA, covered annually) Colorectal cancer screening tests Screening for glaucoma 
tdap & shingles vaccinations Diagnoses and all orders for this visit: 
 
1. Medicare annual wellness visit, initial 
 
2. Type 2 diabetes with nephropathy (HCC) -     AMB POC HEMOGLOBIN A1C 
-     MICROALBUMIN, UR, RAND W/ MICROALB/CREAT RATIO 3. Encounter for immunization -     Influenza Vaccine Inactivated (IIV) (FLUAD), Subunit, Adjuvanted, IM ([id number]) -     IA IMMUNIZ ADMIN,1 SINGLE/COMB VAC/TOXOID 4. Pure hypercholesterolemia 5. Essential hypertension with goal blood pressure less than 130/80 6. Dysthymia 7. Coronary artery disease involving native coronary artery of native heart without angina pectoris 8. LEONA (obstructive sleep apnea) 9. Prostate cancer screening 
-     PROSTATE SPECIFIC AG 
 
10. Postural dizziness with presyncope 
-     metoprolol tartrate (LOPRESSOR) 25 mg tablet; TAKE 1 TABLET BY MOUTH TWICE DAILY 11. Advance care planning AWV Summary: 1. A copy of patient's completed Advanced Medical Directive is on file in the patient's medical record. NN reviewed Advanced Medical Directive document with patient & patient denies the need for changes/ updates. 2. Patient is up to date on the following immunizations: 
Immunization History Administered Date(s) Administered  H1N1 Influenza Virus Vaccine 01/01/2010  Influenza High Dose Vaccine PF 09/22/2017  Influenza Vaccine 10/05/2013, 10/02/2014, 10/26/2015, 08/30/2016  Influenza Vaccine (Tri) Adjuvanted 09/26/2018  Influenza Vaccine Split 10/17/2011  Influenza Vaccine Whole 09/12/2009, 10/01/2012  Pneumococcal Conjugate (PCV-13) 01/24/2017  Pneumococcal Polysaccharide (PPSV-23) 03/18/2018  Pneumococcal Vaccine (Unspecified Type) 10/01/2012  TD Vaccine 12/01/2007  Td 08/31/2017  ZZZ-RETIRED (DO NOT USE) Pneumococcal Vaccine (Unspecified Type) 12/01/2005  Zoster Vaccine, Live 04/22/2011 Patient confirmed the aforementioned preventative immunization dates are correct. Patient states that he is on the waiting list at [facility] to get his Shingrix vaccines. Patient's health maintenance immunization record has been updated & is current. 3. Patient states that he follows his PCP's recommendations for PSA screenings (report on file from 1/2017) & Colonoscopy screenings (report on file from 7/25/2017 performed by Dr. Karen Keyes at [facility] with recommended follow-up screening in 3 years, 2020). Patient confirmed the aforementioned preventative screening dates. Today, PCP provided patient with an order for a screening PSA lab test.  
 
4. Patient wears corrective lenses. Patient reports having a routine eye exam & glaucoma screening within the last year performed by an ophthalmologist at [facility]. ROSEMARY faxed requesting a copy of patient's last eye exam with glaucoma screening with patient's verbal approval.  
 
Patient verbalized understanding of all information discussed. Patient was given the opportunity to ask questions. Medication reconciliation completed by MA/ LPN and reviewed by PCP. Patient provided AVS, either a printed version or electronic version in [employer], which includes Medicare Wellness Preventative Screening Table. Health Maintenance Due Topic Date Due  Shingrix Vaccine Age 50> (1 of 2) 09/29/2001  
 FOOT EXAM Q1  07/07/2015  
 EYE EXAM RETINAL OR DILATED Q1  08/12/2017  
 DTaP/Tdap/Td series (1 - Tdap) 09/01/2017  GLAUCOMA SCREENING Q2Y  08/12/2018

## 2018-09-26 NOTE — MR AVS SNAPSHOT
Nestor Isaac 
 
 
 [address] [facility] [place] 
[phone] Patient: Zacarias Bustillos MRN: [id number] FXX:[id number] Visit Information Date & Time Provider Department Dept. Phone Encounter #  
 9/26/2018  9:00 AM Kathy Ontiveros MD [facility] [address]  [id number] Follow-up Instructions Return in about 6 months (around 3/26/2019). Upcoming Health Maintenance Date Due Shingrix Vaccine Age 50> (1 of 2) 9/29/2001 FOOT EXAM Q1 7/7/2015 EYE EXAM RETINAL OR DILATED Q1 8/12/2017 DTaP/Tdap/Td series (1 - Tdap) 9/1/2017 MEDICARE YEARLY EXAM 3/14/2018 Influenza Age 5 to Adult 8/1/2018 GLAUCOMA SCREENING Q2Y 8/12/2018 MICROALBUMIN Q1 11/20/2018 HEMOGLOBIN A1C Q6M 12/18/2018 LIPID PANEL Q1 6/18/2019 COLONOSCOPY 7/25/2020 Allergies as of 9/26/2018  Review Complete On: 8/20/2018 By: Sergio Pritchard MD  
  
 Severity Noted Reaction Type Reactions Cefdinir  11/15/2017    Rash Codeine  12/01/2008   Side Effect Nausea Only Current Immunizations  Reviewed on 9/28/2016 Name Date H1N1 FLU VACCINE 1/1/2010 Influenza High Dose Vaccine PF 9/22/2017 12:00 AM  
 Influenza Vaccine 8/30/2016, 10/26/2015, 10/2/2014, 10/5/2013 Influenza Vaccine (Tri) Adjuvanted 9/26/2018 Influenza Vaccine Split 10/17/2011 Influenza Vaccine Whole 10/1/2012, 9/12/2009 Pneumococcal Conjugate (PCV-13) 1/24/2017 12:00 AM  
 Pneumococcal Polysaccharide (PPSV-23) 3/18/2018 12:00 AM  
 Pneumococcal Vaccine (Unspecified Type) 10/1/2012 12:00 AM  
 TD Vaccine 12/1/2007 Td 8/31/2017 ZZZ-RETIRED (DO NOT USE) Pneumococcal Vaccine (Unspecified Type) 12/1/2005 Zoster Vaccine, Live 4/22/2011 Not reviewed this visit You Were Diagnosed With   
  
 Codes Comments  Type 2 diabetes with nephropathy (HCC)    -  Primary ICD-10-CM: E11.21 
ICD-9-CM: 250.40, 583.81   
 Encounter for immunization     ICD-10-CM: W32 ICD-9-CM: V03.89 Pure hypercholesterolemia     ICD-10-CM: E78.00 ICD-9-CM: 272.0 Essential hypertension with goal blood pressure less than 130/80     ICD-10-CM: I10 
ICD-9-CM: 401.9 Dysthymia     ICD-10-CM: F34.1 ICD-9-CM: 300.4 Coronary artery disease involving native coronary artery of native heart without angina pectoris     ICD-10-CM: I25.10 ICD-9-CM: 414.01   
 LEONA (obstructive sleep apnea)     ICD-10-CM: G47.33 
ICD-9-CM: 327.23 Prostate cancer screening     ICD-10-CM: Z12.5 ICD-9-CM: V76.44 Postural dizziness with presyncope     ICD-10-CM: R42, R55 
ICD-9-CM: 780.4, 780.2 Medicare annual wellness visit, initial     ICD-10-CM: Z00.00 ICD-9-CM: V70.0 Vitals BP Pulse Temp Resp Height(growth percentile) Weight(growth percentile) 126/70 (BP 1 Location: Left arm, BP Patient Position: Sitting) (!) 52 98.5 °F (36.9 °C) (Oral) 18 5' 8\" (1.727 m) 256 lb 4 oz (116.2 kg) SpO2 BMI Smoking Status 97% 38.96 kg/m2 Passive Smoke Exposure - Never Smoker Vitals History BMI and BSA Data Body Mass Index Body Surface Area  
 38.96 kg/m 2 2.36 m 2 Preferred Pharmacy Pharmacy Name Phone [facility], [facility] [phone] Your Updated Medication List  
  
   
This list is accurate as of 9/26/18  9:38 AM.  Always use your most recent med list.  
  
  
  
  
 Dusty Lyons Generic drug:  Blood-Glucose Meter  
by Does Not Apply route. aspirin 325 mg tablet Commonly known as:  ASPIRIN  
take 325 mg by mouth daily. atorvastatin 80 mg tablet Commonly known as:  LIPITOR Take 0.5 Tabs by mouth daily. cetirizine 10 mg tablet Commonly known as:  ZYRTEC Take 1 Tab by mouth daily. chlorthalidone 25 mg tablet Commonly known as:  Bernadette Juniper Take 1 Tab by mouth daily. CIALIS 5 mg tablet Generic drug:  tadalafil Take 5 mg by mouth daily. citalopram 40 mg tablet Commonly known as:  CELEXA  
TAKE 1 TABLET DAILY  
  
 desonide 0.05 % topical lotion Commonly known as:  Kenton Parisa  
as needed. Diabetic Supplies, [facility]. Misc Commonly known as:  BD LANCET DEVICE  
1 Each by Does Not Apply route daily. FISH OIL EXTRA STRENGTH PO Take 1,400 mg by mouth daily (after breakfast). fluticasone 50 mcg/actuation nasal spray Commonly known as:  [place] Finely 2 Sprays by Both Nostrils route daily. Indications: ALLERGIC RHINITIS  
  
 fluticasone-vilanterol 100-25 mcg/dose inhaler Commonly known as:  BREO ELLIPTA Take 1 Puff by inhalation daily. glucose blood VI test strips strip Commonly known as:  [address] For blood sugar testing once daily. (ICD-10: E11.9) HumaLOG KwikPen Insulin 100 unit/mL kwikpen Generic drug:  insulin lispro INJECT 15 UNITS UNDER THE SKIN BEFORE BREAKFAST, LUNCH AND DINNER ( BEFORE MEALS ) FOR TYPE 2 DIABETES MELLITUS  
  
 insulin glargine 300 unit/mL (1.5 mL) Inpn Commonly known as:  TOUJEO SOLOSTAR U-300 INSULIN INJECT 40 UNITS UNDER THE SKIN DAILY INVOKANA 300 mg tablet Generic drug:  canagliflozin TAKE 1 TABLET DAILY JANUMET 50-1,000 mg per tablet Generic drug:  SITagliptin-metFORMIN  
TAKE 1 TABLET TWICE A DAY WITH MEALS  
  
 ketoconazole 2 % topical cream  
Commonly known as:  NIZORAL Apply  to affected area as needed for Skin Irritation. Lancets Misc Commonly known as:  ACCU-CHEK FASTCLIX LANCING DEV  
2 Packages by SubCUTAneous route two (2) times a day. Check Blood Glucose twice daily .00  
  
 lisinopril 20 mg tablet Commonly known as:  Judi Reasons Take 1 Tab by mouth two (2) times a day. M-VIT PO  
take 1 Tab by mouth daily. metoprolol tartrate 25 mg tablet Commonly known as:  LOPRESSOR  
TAKE 1 TABLET BY MOUTH TWICE DAILY montelukast 10 mg tablet Commonly known as:  SINGULAIR  
TAKE 1 TABLET DAILY * Lady Pen Needle 32 gauge x 5/32\" Ndle Generic drug:  Insulin Needles (Disposable) USE 1 FOUR TIMES A DAY WITH INSULIN  
  
 * BD ULTRA-FINE SHORT PEN NEEDLE 31 gauge x 5/16\" Ndle Generic drug:  Insulin Needles (Disposable) USE DAILY WITH LEVEMIR AS DIRECTED  
  
 tamsulosin 0.4 mg capsule Commonly known as:  FLOMAX Take 0.4 mg by mouth daily. VOLTAREN 1 % Gel Generic drug:  diclofenac  
every six (6) hours. * Notice: This list has 2 medication(s) that are the same as other medications prescribed for you. Read the directions carefully, and ask your doctor or other care provider to review them with you. Prescriptions Sent to Pharmacy Refills  
 metoprolol tartrate (LOPRESSOR) 25 mg tablet 1 Sig: TAKE 1 TABLET BY MOUTH TWICE DAILY Class: Normal  
 Pharmacy: [address] [place] [address], [address] [place] [address] #: [phone] We Performed the Following AMB POC HEMOGLOBIN A1C [86135 CPT(R)] INFLUENZA VACCINE INACTIVATED (IIV), SUBUNIT, ADJUVANTED, IM [id number] CPT(R)] MICROALBUMIN, UR, RAND W/ MICROALB/CREAT RATIO [id number] CPT(R)] MA IMMUNIZ ADMIN,1 SINGLE/COMB VAC/TOXOID [id number] CPT(R)] PSA, DIAGNOSTIC (PROSTATE SPECIFIC AG) [id number] CPT(R)] Follow-up Instructions Return in about 6 months (around 3/26/2019). Introducing [facility]! Dear Ginny Paige: Thank you for requesting a [employer] account. Our records indicate that you already have an active [employer] account. You can access your account anytime at [email or web address] Did you know that you can access your hospital and ER discharge instructions at any time in [employer]? You can also review all of your test results from your hospital stay or ER visit. Additional Information If you have questions, please visit the Frequently Asked Questions section of the [employer] website at [email or web address] Remember, [employer] is NOT to be used for urgent needs. For medical emergencies, dial 911. Now available from your iPhone and Android! Please provide this summary of care documentation to your next provider. Your primary care clinician is listed as Gisela Taylor. If you have any questions after today's visit, please call [phone].

## 2018-09-27 LAB — PSA SERPL-MCNC: 3.3 NG/ML (ref 0–4)

## 2018-09-29 LAB
CREAT UR-MCNC: NORMAL MG/DL
Lab: NORMAL
MICROALBUMIN UR-MCNC: NORMAL

## 2018-09-29 RX ORDER — LISINOPRIL 20 MG/1
TABLET ORAL
Qty: 180 TAB | Refills: 1 | Status: SHIPPED | OUTPATIENT
Start: 2018-09-29 | End: 2019-04-24 | Stop reason: SDUPTHER

## 2018-09-29 RX ORDER — INSULIN LISPRO 100 [IU]/ML
INJECTION, SOLUTION INTRAVENOUS; SUBCUTANEOUS
Qty: 45 ML | Refills: 1 | Status: SHIPPED | OUTPATIENT
Start: 2018-09-29 | End: 2019-08-09 | Stop reason: SDUPTHER

## 2018-10-01 ENCOUNTER — TELEPHONE (OUTPATIENT)
Dept: INTERNAL MEDICINE CLINIC | Age: 68
End: 2018-10-01

## 2018-10-01 DIAGNOSIS — E11.21 TYPE 2 DIABETES WITH NEPHROPATHY (HCC): Primary | ICD-10-CM

## 2018-10-01 NOTE — TELEPHONE ENCOUNTER
----- Message from Anna Mccullough LPN sent at 92/8/0989  4:04 PM EDT -----  Regarding: FW: Non-Urgent Medical Question  Contact: [phone]      ----- Message -----     From: Radha Lucas     Sent: 10/1/2018  12:47 PM       To: [facility]  Subject: Non-Urgent Medical Question                      It appears the urine sample was insufficient for testing do I need a new order to get it done? Do I need to be fasting?     This is a lab [facility] that is close to me    [address] [facility] [address] [facility], [address]    [phone]

## 2018-10-08 ENCOUNTER — HOSPITAL ENCOUNTER (OUTPATIENT)
Dept: LAB | Age: 68
Discharge: HOME | End: 2018-10-08
Payer: MEDICARE

## 2018-10-08 PROCEDURE — 82043 UR ALBUMIN QUANTITATIVE: CPT

## 2018-10-09 LAB
ALBUMIN/CREAT UR: 19.3 MG/G CREAT (ref 0–30)
CREAT UR-MCNC: 147.9 MG/DL
MICROALBUMIN UR-MCNC: 28.5 UG/ML

## 2018-11-26 ENCOUNTER — TELEPHONE (OUTPATIENT)
Dept: INTERNAL MEDICINE CLINIC | Age: 68
End: 2018-11-26

## 2018-11-26 NOTE — TELEPHONE ENCOUNTER
Regarding: FW: Non-Urgent Medical Question  Contact: [phone]  If approved, prescription placed on desk to be signed. ----- Message -----  From: Pierre Chahal  Sent: 11/25/2018   2:27 PM  To: [facility] Nurses Pool  Subject: Non-Urgent Medical Question                      ----- Message from [address] [place] [address] 951, [facility] sent at 11/25/2018  2:27 PM EST -----    I would like to try this since I hate to stick my finger and therefore don't monitor like I should. Can you mail me a script or sent one to [facility]. [employer] 14 day system!     Thanks

## 2018-12-31 RX ORDER — PEN NEEDLE, DIABETIC 31 GX5/16"
NEEDLE, DISPOSABLE MISCELLANEOUS
Qty: 90 PEN NEEDLE | Refills: 3 | Status: SHIPPED | OUTPATIENT
Start: 2018-12-31 | End: 2019-03-26 | Stop reason: ALTCHOICE

## 2019-01-25 RX ORDER — CITALOPRAM 40 MG/1
TABLET, FILM COATED ORAL
Qty: 90 TAB | Refills: 1 | Status: SHIPPED | OUTPATIENT
Start: 2019-01-25 | End: 2019-06-03 | Stop reason: DRUGHIGH

## 2019-01-25 NOTE — TELEPHONE ENCOUNTER
Last visit noted, labs checked and refill deemed appropriate at this time. Verbal refill order authorized by covering physicians at [facility] for Dr. Brii Allen during his absence.     Claudio Dasilva, KrystynaD, BCPS, CDE

## 2019-02-08 RX ORDER — SITAGLIPTIN AND METFORMIN HYDROCHLORIDE 50; 1000 MG/1; MG/1
TABLET, FILM COATED ORAL
Qty: 180 TAB | Refills: 1 | Status: SHIPPED | OUTPATIENT
Start: 2019-02-08 | End: 2019-08-09 | Stop reason: SDUPTHER

## 2019-02-08 NOTE — TELEPHONE ENCOUNTER
Last visit noted, labs checked and refill deemed appropriate at this time. Verbal refill order authorized by covering physicians at [facility] for Dr. Nely Robertson during his absence.     Tara Bass, PharmD, BCPS, CDE

## 2019-03-26 ENCOUNTER — OFFICE VISIT (OUTPATIENT)
Dept: INTERNAL MEDICINE CLINIC | Age: 69
End: 2019-03-26

## 2019-03-26 ENCOUNTER — HOSPITAL ENCOUNTER (OUTPATIENT)
Dept: LAB | Age: 69
Discharge: HOME | End: 2019-03-26
Payer: MEDICARE

## 2019-03-26 VITALS
OXYGEN SATURATION: 97 % | BODY MASS INDEX: 38.99 KG/M2 | DIASTOLIC BLOOD PRESSURE: 70 MMHG | RESPIRATION RATE: 18 BRPM | HEIGHT: 68 IN | HEART RATE: 55 BPM | WEIGHT: 257.25 LBS | SYSTOLIC BLOOD PRESSURE: 158 MMHG | TEMPERATURE: 97.6 F

## 2019-03-26 DIAGNOSIS — J45.20 MILD INTERMITTENT ASTHMA WITHOUT COMPLICATION: ICD-10-CM

## 2019-03-26 DIAGNOSIS — R80.9 TYPE 2 DIABETES MELLITUS WITH MICROALBUMINURIA, WITH LONG-TERM CURRENT USE OF INSULIN (HCC): Primary | ICD-10-CM

## 2019-03-26 DIAGNOSIS — M19.049 HAND ARTHROPATHY: ICD-10-CM

## 2019-03-26 DIAGNOSIS — E11.29 TYPE 2 DIABETES MELLITUS WITH MICROALBUMINURIA, WITH LONG-TERM CURRENT USE OF INSULIN (HCC): Primary | ICD-10-CM

## 2019-03-26 DIAGNOSIS — J45.31 MILD PERSISTENT ASTHMA WITH ACUTE EXACERBATION: ICD-10-CM

## 2019-03-26 DIAGNOSIS — I10 ESSENTIAL HYPERTENSION WITH GOAL BLOOD PRESSURE LESS THAN 130/80: Chronic | ICD-10-CM

## 2019-03-26 DIAGNOSIS — I25.10 CORONARY ARTERY DISEASE WITHOUT ANGINA PECTORIS, UNSPECIFIED VESSEL OR LESION TYPE, UNSPECIFIED WHETHER NATIVE OR TRANSPLANTED HEART: ICD-10-CM

## 2019-03-26 DIAGNOSIS — Z79.4 TYPE 2 DIABETES MELLITUS WITH MICROALBUMINURIA, WITH LONG-TERM CURRENT USE OF INSULIN (HCC): Primary | ICD-10-CM

## 2019-03-26 DIAGNOSIS — E78.00 PURE HYPERCHOLESTEROLEMIA: Chronic | ICD-10-CM

## 2019-03-26 DIAGNOSIS — E66.01 SEVERE OBESITY WITH BODY MASS INDEX (BMI) OF 35.0 TO 39.9 WITH SERIOUS COMORBIDITY (HCC): ICD-10-CM

## 2019-03-26 DIAGNOSIS — E11.21 TYPE 2 DIABETES WITH NEPHROPATHY (HCC): ICD-10-CM

## 2019-03-26 LAB — HBA1C MFR BLD HPLC: 7.1 %

## 2019-03-26 PROCEDURE — 80061 LIPID PANEL: CPT

## 2019-03-26 PROCEDURE — 80053 COMPREHEN METABOLIC PANEL: CPT

## 2019-03-26 PROCEDURE — 36415 COLL VENOUS BLD VENIPUNCTURE: CPT

## 2019-03-26 RX ORDER — CANAGLIFLOZIN 300 MG/1
TABLET, FILM COATED ORAL
Qty: 90 TAB | Refills: 1 | Status: SHIPPED | OUTPATIENT
Start: 2019-03-26 | End: 2019-09-02 | Stop reason: SDUPTHER

## 2019-03-26 RX ORDER — FLUTICASONE FUROATE AND VILANTEROL TRIFENATATE 100; 25 UG/1; UG/1
POWDER RESPIRATORY (INHALATION)
Qty: 3 INHALER | Refills: 2 | Status: SHIPPED | OUTPATIENT
Start: 2019-03-26 | End: 2019-06-09 | Stop reason: ALTCHOICE

## 2019-03-26 RX ORDER — CHLORTHALIDONE 25 MG/1
25 TABLET ORAL DAILY
Qty: 90 TAB | Refills: 1 | Status: SHIPPED | COMMUNITY
Start: 2019-03-26 | End: 2019-08-12 | Stop reason: SDUPTHER

## 2019-03-26 RX ORDER — BENZONATATE 100 MG/1
CAPSULE ORAL
Refills: 0 | COMMUNITY
Start: 2019-03-21 | End: 2019-05-02

## 2019-03-26 RX ORDER — CEFUROXIME AXETIL 250 MG/1
250 TABLET ORAL 2 TIMES DAILY
COMMUNITY
Start: 2019-03-18 | End: 2019-05-02

## 2019-03-26 RX ORDER — PREDNISONE 20 MG/1
TABLET ORAL
Refills: 0 | COMMUNITY
Start: 2019-03-21 | End: 2019-05-02

## 2019-03-26 RX ORDER — FLASH GLUCOSE SCANNING READER
1 EACH MISCELLANEOUS ONCE
Qty: 1 EACH | Refills: 0 | Status: SHIPPED | OUTPATIENT
Start: 2019-03-26 | End: 2019-03-26

## 2019-03-26 RX ORDER — FLASH GLUCOSE SENSOR
1 KIT MISCELLANEOUS
Qty: 1 KIT | Refills: 5 | Status: SHIPPED | OUTPATIENT
Start: 2019-03-26 | End: 2020-06-10 | Stop reason: SDUPTHER

## 2019-03-26 RX ORDER — CHLORTHALIDONE 25 MG/1
25 TABLET ORAL DAILY
Qty: 90 TAB | Refills: 1 | Status: SHIPPED | OUTPATIENT
Start: 2019-03-26 | End: 2019-03-26 | Stop reason: SDUPTHER

## 2019-03-26 NOTE — PROGRESS NOTES
HISTORY OF PRESENT ILLNESS    Chief Complaint   Patient presents with    Diabetes     With A1c = 7.1       Presents for follow-up  Patient of Dr. Reta Mosley of 20 years. Is scheduled to see me today for interim follow-up. Saw urgent care last week for bronchitis and asthma. Feeling better on ceftin and prednisone. He does not think he necessarily been has asthma. That said, he has a prescription for Brio. Diabetes Mellitus follow-up  Last hemoglobin a1c   Lab Results   Component Value Date/Time    Hemoglobin A1c 8.4 (H) 09/28/2016 10:30 AM    Hemoglobin A1c (POC) 7.1 03/26/2019 08:40 AM   medication compliance: compliant all of the time. He is using Humalog 15 units tid-ac and Toujeo 40 units hs. Diabetic diet compliance: compliant most of the time. Home glucose monitoring: fasting values range not avail. -He admits he does not like to check his glucoses due to needle fear  Hypoglycemic episodes:  None. Further diabetic ROS: no polyuria or polydipsia, no chest pain, dyspnea or TIA's, no numbness, tingling or pain in extremities. Hypertension- NOT taking chlorthalidone. On lower dose of metoprolol due to bradycardia this summer. Hypertension ROS: taking medications as instructed, no medication side effects noted, no TIA's, no chest pain on exertion, no dyspnea on exertion, no swelling of ankles     reports that he is a non-smoker but has been exposed to tobacco smoke. He has been exposed to tobacco smoke for the past 10.00 years. He has never used smokeless tobacco.    reports that he drinks about 3.0 oz of alcohol per week.    BP Readings from Last 2 Encounters:   03/26/19 158/70   09/26/18 126/70         Review of Systems   All other systems reviewed and are negative, except as noted in HPI    Past Medical and Surgical History   has a past medical history of AR (allergic rhinitis) (12/1/2008), Arthritis, CAD (coronary artery disease), Depression, DM type 2 (diabetes mellitus, type 2) ([facility] [address] Essential hypertension with goal blood pressure less than 130/80 (9/28/2016), Hypercholesteremia, Hypertension, Hypogonadism male (12/1/2008), Mild intermittent asthma without complication (9/0/6193), LEONA (obstructive sleep apnea) (7/16/2010), Pure hypercholesterolemia (5/4/2017), Severe obesity (BMI 35.0-39.9) (6/18/2018), Sleep apnea (1999), and Type 2 diabetes with nephropathy ([facility] [address] (3/12/2018). has a past surgical history that includes hx rhinoplasty; hx retinal detachment repair (1984); hx heent (2012); pr cabg, artery-vein, three (2005); hx orthopaedic; and hx cyst removal (5/12/2016). reports that he is a non-smoker but has been exposed to tobacco smoke. He has been exposed to tobacco smoke for the past 10.00 years. He has never used smokeless tobacco. He reports that he drinks about 3.0 oz of alcohol per week. He reports that he does not use drugs. He was adopted. Family history is unknown by patient. Physical Exam   Nursing note and vitals reviewed. Blood pressure 184/81, pulse (!) 55, temperature 97.6 °F (36.4 °C), temperature source Oral, resp. rate 18, height 5' 8\" (1.727 m), weight 257 lb 4 oz (116.7 kg), SpO2 97 %. Constitutional:  No distress. Eyes: Conjunctivae are normal.   Ears:  Hearing grossly intact  Cardiovascular: Normal rate. regular rhythm, no murmurs or gallops  No edema  Pulmonary/Chest: Effort normal.   CTAB  Musculoskeletal: moves all 4 extremities   Neurological: Alert and oriented to person, place, and time. Skin: No rash noted. Psychiatric: Normal mood and affect. Behavior is normal.     ASSESSMENT and PLAN  Diagnoses and all orders for this visit:    1. Type 2 diabetes mellitus with microalbuminuria, with long-term current use of insulin ([facility] [address]  2. Type 2 diabetes with nephropathy (HCC)  A1c is in reasonable control, 7.1.     Addendum 5/23/19  A needleless glucometer is medically necessary to monitor his glucose  He takes insulin 4 times per day (3 fast acting and 1 timed release), dosing per sliding scale. He needs to test at least 4-5 times per day, in AM, bedtime and before meals evening since insulin injections will cause high risk of hypoglycemia and glucose fluctuations. He also has some concerns about finger sticks. -     AMB POC HEMOGLOBIN A1C  -     FREESTYLE SILVINO 14 DAY READER misc; 1 Units by Does Not Apply route once for 1 dose. -     FREESTYLE SILVINO 14 DAY SENSOR kit; 1 Units by Does Not Apply route every fourteen (14) days. 3. Coronary artery disease without angina pectoris, unspecified vessel or lesion type, unspecified whether native or transplanted heart  Currently asymptomatic    4. Pure hypercholesterolemia  Controlled on current regimen. Continue current medications as written in chart.  -     LIPID PANEL  -     METABOLIC PANEL, COMPREHENSIVE    5. Essential hypertension with goal blood pressure less than 130/80  Uncontrolled in the office today. Resume chlorthalidone. Monitor electrolytes and renal function since he is also taking Invokana  -     METABOLIC PANEL, COMPREHENSIVE  -     chlorthalidone (HYGROTEN) 25 mg tablet; Take 1 Tab by mouth daily. 6. Hand arthropathy  Very likely osteoarthritis. Offered trial of diclofenac gel, but he declined at this time. 7. Severe obesity with body mass index (BMI) of 35.0 to 39.9 with serious comorbidity ([address]  The patient is asked to make an attempt to improve diet and exercise patterns to aid in medical management of this problem. 8. Mild intermittent asthma without complication  Recent exacerbation. Completing prednisone. Refilled Brio    lab results and schedule of future lab studies reviewed with patient  reviewed medications and side effects in detail  Return to clinic for further evaluation if new symptoms develop    He should have repeat A1c in 4 months. Follow-up with Dr. Verito Pabon if available. He is aware he may need to find a new medical practice since his doctor is on medical leave. Very pleasant patient. Current Outpatient Medications   Medication Sig    benzonatate (TESSALON) 100 mg capsule TK ONE C PO  Q 12 H PRF COUGH    predniSONE (DELTASONE) 20 mg tablet     cefUROXime (CEFTIN) 250 mg tablet Take 250 mg by mouth two (2) times a day.  chlorthalidone (HYGROTEN) 25 mg tablet Take 1 Tab by mouth daily.  FREESTYLE SILVINO 14 DAY READER misc 1 Units by Does Not Apply route once for 1 dose.  FREESTYLE SILVINO 14 DAY SENSOR kit 1 Units by Does Not Apply route every fourteen (14) days.  JANUMET 50-1,000 mg per tablet TAKE 1 TABLET TWICE A DAY WITH MEALS    atorvastatin (LIPITOR) 40 mg tablet Take 1 Tab by mouth daily.  citalopram (CELEXA) 40 mg tablet TAKE 1 TABLET DAILY    HUMALOG KWIKPEN INSULIN 100 unit/mL kwikpen INJECT 15 UNITS UNDER THE SKIN BEFORE BREAKFAST, LUNCH AND DINNER ( BEFORE MEALS ) FOR TYPE 2 DIABETES MELLITUS    lisinopril (PRINIVIL, ZESTRIL) 20 mg tablet TAKE 1 TABLET TWICE A DAY    metoprolol tartrate (LOPRESSOR) 25 mg tablet TAKE 1 TABLET BY MOUTH TWICE DAILY    insulin glargine (TOUJEO SOLOSTAR U-300 INSULIN) 300 unit/mL (1.5 mL) inpn INJECT 40 UNITS UNDER THE SKIN DAILY    montelukast (SINGULAIR) 10 mg tablet TAKE 1 TABLET DAILY    CIALIS 5 mg tablet Take 5 mg by mouth daily.  tamsulosin (FLOMAX) 0.4 mg capsule Take 0.4 mg by mouth daily.  fluticasone (FLONASE) 50 mcg/actuation nasal spray 2 Sprays by Both Nostrils route daily. Indications: ALLERGIC RHINITIS    OMEGA-3 FATTY ACIDS/FISH OIL (FISH OIL EXTRA STRENGTH PO) Take 1,400 mg by mouth daily (after breakfast).  ketoconazole (NIZORAL) 2 % topical cream Apply  to affected area as needed for Skin Irritation.  VOLTAREN 1 % gel every six (6) hours.  desonide (TRIDESILON) 0.05 % topical lotion as needed.  cetirizine (ZYRTEC) 10 mg tablet Take 1 Tab by mouth daily.  aspirin (ASPIRIN) 325 mg tablet take 325 mg by mouth daily.  M-VIT PO take 1 Tab by mouth daily.      BREO ELLIPTA 100-25 mcg/dose inhaler USE 1 INHALATION DAILY    INVOKANA 300 mg tablet TAKE 1 TABLET DAILY     No current facility-administered medications for this visit.

## 2019-03-27 LAB
ALBUMIN SERPL-MCNC: 4.5 G/DL (ref 3.6–4.8)
ALBUMIN/GLOB SERPL: 1.7 {RATIO} (ref 1.2–2.2)
ALP SERPL-CCNC: 58 IU/L (ref 39–117)
ALT SERPL-CCNC: 23 IU/L (ref 0–44)
AST SERPL-CCNC: 18 IU/L (ref 0–40)
BILIRUB SERPL-MCNC: 0.5 MG/DL (ref 0–1.2)
BUN SERPL-MCNC: 29 MG/DL (ref 8–27)
BUN/CREAT SERPL: 23 (ref 10–24)
CALCIUM SERPL-MCNC: 9.8 MG/DL (ref 8.6–10.2)
CHLORIDE SERPL-SCNC: 101 MMOL/L (ref 96–106)
CHOLEST SERPL-MCNC: 184 MG/DL (ref 100–199)
CO2 SERPL-SCNC: 22 MMOL/L (ref 20–29)
CREAT SERPL-MCNC: 1.24 MG/DL (ref 0.76–1.27)
GLOBULIN SER CALC-MCNC: 2.6 G/DL (ref 1.5–4.5)
GLUCOSE SERPL-MCNC: 163 MG/DL (ref 65–99)
HDLC SERPL-MCNC: 51 MG/DL
INTERPRETATION, 910389: NORMAL
LDLC SERPL CALC-MCNC: 113 MG/DL (ref 0–99)
POTASSIUM SERPL-SCNC: 5 MMOL/L (ref 3.5–5.2)
PROT SERPL-MCNC: 7.1 G/DL (ref 6–8.5)
SODIUM SERPL-SCNC: 136 MMOL/L (ref 134–144)
TRIGL SERPL-MCNC: 101 MG/DL (ref 0–149)
VLDLC SERPL CALC-MCNC: 20 MG/DL (ref 5–40)

## 2019-03-30 DIAGNOSIS — Z87.09 HISTORY OF BRONCHITIS: ICD-10-CM

## 2019-03-30 DIAGNOSIS — J45.20 MILD INTERMITTENT ASTHMA WITHOUT COMPLICATION: ICD-10-CM

## 2019-04-01 RX ORDER — ALBUTEROL SULFATE 90 UG/1
AEROSOL, METERED RESPIRATORY (INHALATION)
Qty: 3 INHALER | Refills: 2 | Status: SHIPPED | OUTPATIENT
Start: 2019-04-01 | End: 2019-04-08 | Stop reason: CLARIF

## 2019-04-01 NOTE — TELEPHONE ENCOUNTER
Last visit noted, labs checked and refill deemed appropriate at this time. Verbal refill order authorized by covering physicians at [facility] for Dr. Jordan Galeano during his absence. Just [facility] last week.     Nelly Bundy, PharmD, BCPS, CDE

## 2019-04-05 ENCOUNTER — TELEPHONE (OUTPATIENT)
Dept: INTERNAL MEDICINE CLINIC | Age: 69
End: 2019-04-05

## 2019-04-05 DIAGNOSIS — Z87.09 HISTORY OF BRONCHITIS: ICD-10-CM

## 2019-04-05 DIAGNOSIS — J45.20 MILD INTERMITTENT ASTHMA WITHOUT COMPLICATION: ICD-10-CM

## 2019-04-05 NOTE — TELEPHONE ENCOUNTER
----- Message from Ellen Gunderson sent at 4/5/2019  4:59 PM EDT -----  Regarding: Dr. Darlene Longoria  Contact: [phone]  Tamanna Thurman with [facility] called to speak with someone regarding a prescription \"Proair inhaler\" written by Dr. Mana Damon. The pt's insurance does not cover the inhaler and she would like to discuss alternative options, such as \"Ventolin inhaler\". Reference number for call back is [phone].

## 2019-04-08 ENCOUNTER — TELEPHONE (OUTPATIENT)
Dept: INTERNAL MEDICINE CLINIC | Age: 69
End: 2019-04-08

## 2019-04-08 RX ORDER — ALBUTEROL SULFATE 90 UG/1
2 AEROSOL, METERED RESPIRATORY (INHALATION)
Qty: 3 INHALER | Refills: 1 | Status: SHIPPED | OUTPATIENT
Start: 2019-04-08 | End: 2020-10-30 | Stop reason: SDUPTHER

## 2019-04-08 NOTE — TELEPHONE ENCOUNTER
----- Message from Ellen Gunderson sent at 4/5/2019  4:59 PM EDT -----  Regarding: Dr. Darlene Longoria  Contact: [phone]  Tamanna Thurman with [facility] called to speak with someone regarding a prescription \"Proair inhaler\" written by Dr. Mana Damon. The pt's insurance does not cover the inhaler and she would like to discuss alternative options, such as \"Ventolin inhaler\". Reference number for call back is [phone].

## 2019-04-08 NOTE — TELEPHONE ENCOUNTER
[facility] called back to say they need us to call back and speak to them about Ventolin In [facility]. Contact: [phone]  Ebonilo Singer with [facility] called to speak with someone regarding a prescription \"Proair inhaler\" written by Dr. Marco Ruiz. The pt's insurance does not cover the inhaler and she would like to discuss alternative options, such as \"Ventolin inhaler\".  Reference number for call back is [id number].     negative...

## 2019-04-09 ENCOUNTER — TELEPHONE (OUTPATIENT)
Dept: INTERNAL MEDICINE CLINIC | Age: 69
End: 2019-04-09

## 2019-04-09 NOTE — TELEPHONE ENCOUNTER
Attempted call to Yana Lew at [facility], (o) [phone] , but hold time for representative was over 10 minutes.  If Yana Lew calls back, please have her refax the request. Thank you

## 2019-04-09 NOTE — TELEPHONE ENCOUNTER
Elver Scherer, (o) [phone] , requesting confirmation that request for recent office notes regarding pt's diabetic care have been received by office for pt to receive Diabetic Freestyle [facility] and [place].

## 2019-04-11 ENCOUNTER — PATIENT MESSAGE (OUTPATIENT)
Dept: INTERNAL MEDICINE CLINIC | Age: 69
End: 2019-04-11

## 2019-04-12 NOTE — TELEPHONE ENCOUNTER
Spoke with patient an advised his insurance would cover ventolin inhaler and not the proair. Advised Rx for ventolin was e-scribed to local pharamacy. Pt stated he would pick that up an to disregard the PA on the proair.

## 2019-04-12 NOTE — TELEPHONE ENCOUNTER
From: Shameka Search  To: Delvis Madrid MD  Sent: 4/11/2019 3:50 PM EDT  Subject: Prescription Question    Insurance is requesting a pre authorization in order to fill my Proair, apparently this is a new requirement for 2019. They said the number for you to call is [phone].  See attached

## 2019-04-24 RX ORDER — LISINOPRIL 20 MG/1
TABLET ORAL
Qty: 180 TAB | Refills: 0 | Status: SHIPPED | OUTPATIENT
Start: 2019-04-24 | End: 2019-07-25 | Stop reason: DRUGHIGH

## 2019-04-24 NOTE — TELEPHONE ENCOUNTER
Last visit noted, labs checked and refill deemed appropriate at this time. Verbal refill order authorized by covering physicians at [facility] for Dr. Trino Ellsworth during his absence. Patient made aware at March appointment with  [facility] that would likely need to find new PCP given Dr. Trino Ellsworth situation.      Vick Bailey, PharmD, BCPS, CDE

## 2019-05-01 NOTE — PROGRESS NOTES
ICD-10-CM ICD-9-CM   1. Essential hypertension with goal blood pressure less than 130/80 I10 401.9   2. Coronary artery disease involving native coronary artery of native heart without angina pectoris I25.10 414.01   3. Type 2 diabetes mellitus with microalbuminuria, with long-term current use of insulin ([facility]) E11.29 250.40    R80.9 791.0    Z79.4 V58.67            Deric Walsh is a 80 y.o. male with diabetes, hypercholesterolemia, CAD, LEONA, and HTN is a new patient who was self-referred. Cardiac risk factors: diabetes mellitus, male gender, hypertension  I have personally obtained the history from the patient. HISTORY OF PRESENTING ILLNESS     Deric Walsh reports he has not seen cardiologist since 2006. He states he had bypass in June 2005. He notes he had a stress test in 2015. Today, pt states he has intermittent chest pain occasionally with certain movements. He adds he also has intermittent SOB but attributes this to his congestion. He states his last A1c was 7.1. He notes he also has some swelling in his legs. He denies regular exercise. The patient denies orthopnea, palpitations, syncope, presyncope or fatigue.          ACTIVE PROBLEM LIST     Patient Active Problem List    Diagnosis Date Noted    Dysthymia 09/26/2018    Severe obesity (BMI 35.0-39.9) 06/18/2018    Type 2 diabetes mellitus with microalbuminuria, with long-term current use of insulin ([facility] [address] 03/12/2018    Pure hypercholesterolemia 05/04/2017    Mild intermittent asthma without complication 45/82/0781    Advance care planning 01/24/2017    Essential hypertension with goal blood pressure less than 130/80 09/28/2016    Diabetes mellitus type 2, controlled ([facility] [address] 09/28/2016    S/P excision of lipoma 05/25/2016    S/P excision of skin lesion, follow-up exam 05/25/2016    LEONA (obstructive sleep apnea) 07/16/2010    CAD (coronary artery disease) 12/01/2008    Hypogonadism male 12/01/2008    AR (allergic rhinitis) 12/01/2008    ED (erectile dysfunction) 12/01/2008           PAST MEDICAL HISTORY     Past Medical History:   Diagnosis Date    AR (allergic rhinitis) 12/01/2008    sees allergist for shots    Arthritis     BPH (benign prostatic hyperplasia)     Dr. Alaina Forman CAD (coronary artery disease)     CABG - 3 vessel    Depression     DM type 2 (diabetes mellitus, type 2) ([facility] [address]     27 years    Essential hypertension with goal blood pressure less than 130/80 9/28/2016    Hypercholesteremia     Hypertension     Hypogonadism male 12/1/2008    Mild intermittent asthma without complication 19/69/9374    sees allergy    LEONA on CPAP 07/16/2010    Dr. Dennis Galvez    Pure hypercholesterolemia 5/4/2017    Severe obesity (BMI 35.0-39.9) 6/18/2018    Type 2 diabetes with nephropathy ([facility] 3/12/2018           PAST SURGICAL HISTORY     Past Surgical History:   Procedure Laterality Date    CABG, ARTERY-VEIN, THREE  2005    [place] Cid    HX CYST REMOVAL  5/12/2016    neck and chest    HX HEENT  2012    right eye prosthesis    HX ORTHOPAEDIC      right shoulder surgery, rotator cuff repair    HX RETINAL DETACHMENT REPAIR  1984    R Blindness    HX RHINOPLASTY      septoplasty          ALLERGIES     Allergies   Allergen Reactions    Cefdinir Rash    Codeine Nausea Only          FAMILY HISTORY     Family History   Adopted: Yes   Family history unknown: Yes    negative for cardiac disease       SOCIAL HISTORY     Social History     Socioeconomic History    Marital status: SINGLE     Spouse name: Not on file    Number of children: Not on file    Years of education: Not on file    Highest education level: Not on file   Tobacco Use    Smoking status: Passive Smoke Exposure - Never Smoker    Smokeless tobacco: Never Used   Substance and Sexual Activity    Alcohol use:  Yes     Alcohol/week: 3.0 oz     Types: 6 Cans of beer per week     Comment: 1 beer or so per day    Drug use: No     Comment: occasionally smoked marijuana in the past    Sexual activity: Not Currently     Partners: Male         MEDICATIONS     Current Outpatient Medications   Medication Sig    rosuvastatin (CRESTOR) 40 mg tablet Take 1 Tab by mouth daily.  lisinopril (PRINIVIL, ZESTRIL) 20 mg tablet TAKE 1 TABLET TWICE A DAY    albuterol (PROVENTIL HFA, VENTOLIN HFA, PROAIR HFA) 90 mcg/actuation inhaler Take 2 Puffs by inhalation every four (4) hours as needed for Wheezing.  BREO ELLIPTA 100-25 mcg/dose inhaler USE 1 INHALATION DAILY    INVOKANA 300 mg tablet TAKE 1 TABLET DAILY    chlorthalidone (HYGROTEN) 25 mg tablet Take 1 Tab by mouth daily.  FREESTYLE SILVINO 14 DAY SENSOR kit 1 Units by Does Not Apply route every fourteen (14) days.  JANUMET 50-1,000 mg per tablet TAKE 1 TABLET TWICE A DAY WITH MEALS    citalopram (CELEXA) 40 mg tablet TAKE 1 TABLET DAILY    HUMALOG KWIKPEN INSULIN 100 unit/mL kwikpen INJECT 15 UNITS UNDER THE SKIN BEFORE BREAKFAST, LUNCH AND DINNER ( BEFORE MEALS ) FOR TYPE 2 DIABETES MELLITUS    metoprolol tartrate (LOPRESSOR) 25 mg tablet TAKE 1 TABLET BY MOUTH TWICE DAILY    insulin glargine (TOUJEO SOLOSTAR U-300 INSULIN) 300 unit/mL (1.5 mL) inpn INJECT 40 UNITS UNDER THE SKIN DAILY    montelukast (SINGULAIR) 10 mg tablet TAKE 1 TABLET DAILY    CIALIS 5 mg tablet Take 5 mg by mouth daily.  tamsulosin (FLOMAX) 0.4 mg capsule Take 0.4 mg by mouth daily.  fluticasone (FLONASE) 50 mcg/actuation nasal spray 2 Sprays by Both Nostrils route daily. Indications: ALLERGIC RHINITIS    OMEGA-3 FATTY ACIDS/FISH OIL (FISH OIL EXTRA STRENGTH PO) Take 1,400 mg by mouth daily (after breakfast).  ketoconazole (NIZORAL) 2 % topical cream Apply  to affected area as needed for Skin Irritation.  VOLTAREN 1 % gel every six (6) hours.  desonide (TRIDESILON) 0.05 % topical lotion as needed.  cetirizine (ZYRTEC) 10 mg tablet Take 1 Tab by mouth daily.  aspirin (ASPIRIN) 325 mg tablet take 325 mg by mouth daily.      M-VIT PO take 1 Tab by mouth daily. No current facility-administered medications for this visit. I have reviewed the nurses notes, vitals, problem list, allergy list, medical history, family, social history and medications. REVIEW OF SYMPTOMS      General: Pt denies excessive weight gain or loss. Pt is able to conduct ADL's  HEENT: Denies blurred vision, headaches, hearing loss, epistaxis and difficulty swallowing. Respiratory: Denies cough, wheezing or stridor. +SOB, congestion  Cardiovascular: Denies, palpitations. +chest pain, edema  Gastrointestinal: Denies poor appetite, indigestion, abdominal pain or blood in stool  Genitourinary: Denies hematuria, dysuria, increased urinary frequency  Musculoskeletal: Denies joint pain or swelling from muscles or joints  Neurologic: Denies tremor, paresthesias, headache, or sensory motor disturbance  Psychiatric: Denies confusion, insomnia, depression  Integumentray: Denies rash, itching or ulcers. Hematologic: Denies easy bruising, bleeding     PHYSICAL EXAMINATION      Vitals:    05/02/19 1101   BP: 142/62   Pulse: (!) 56   Resp: 18   SpO2: 93%   Weight: 253 lb (114.8 kg)   Height: 5' 8\" (1.727 m)     General: Well developed, in no acute distress. HEENT: No jaundice, oral mucosa moist, no oral ulcers  Neck: Supple, no stiffness, no lymphadenopathy, supple  Heart:  Normal S1/S2 negative S3 or S4. Regular, no murmur, gallop or rub, no jugular venous distention  Respiratory: Clear bilaterally x 4, no wheezing or rales  Abdomen:   Soft, non-tender, bowel sounds are active. Extremities:  No edema, normal cap refill, no cyanosis. Musculoskeletal: No clubbing, no deformities  Neuro: A&Ox3, speech clear, gait stable, cooperative, no focal neurologic deficits  Skin: Skin color is normal. No rashes or lesions. Non diaphoretic, moist.  Vascular: 2+ pulses symmetric in all extremities      EKG: Sinus brachycardia with first degree AV block of 216 seconds.       DIAGNOSTIC DATA     1. Stress Test  NM- 3/17/15- no ischemia, EF 65%    2. LE Venous Doppler  6/30/18- no DVT isaak    3. Lipids  3/26/19- , HDL 51, ,          LABORATORY DATA            Lab Results   Component Value Date/Time    WBC 6.5 05/09/2016 01:50 PM    HGB 13.9 05/09/2016 01:50 PM    HCT 43.2 05/09/2016 01:50 PM    PLATELET 608 (L) 48/85/4563 01:50 PM    MCV 89.3 05/09/2016 01:50 PM      Lab Results   Component Value Date/Time    Sodium 136 03/26/2019 09:38 AM    Potassium 5.0 03/26/2019 09:38 AM    Chloride 101 03/26/2019 09:38 AM    CO2 22 03/26/2019 09:38 AM    Anion gap 6 05/09/2016 01:50 PM    Glucose 163 (H) 03/26/2019 09:38 AM    BUN 29 (H) 03/26/2019 09:38 AM    Creatinine 1.24 03/26/2019 09:38 AM    BUN/Creatinine ratio 23 03/26/2019 09:38 AM    GFR est AA 69 03/26/2019 09:38 AM    GFR est non-AA 60 03/26/2019 09:38 AM    Calcium 9.8 03/26/2019 09:38 AM    Bilirubin, total 0.5 03/26/2019 09:38 AM    AST (SGOT) 18 03/26/2019 09:38 AM    Alk. phosphatase 58 03/26/2019 09:38 AM    Protein, total 7.1 03/26/2019 09:38 AM    Albumin 4.5 03/26/2019 09:38 AM    Globulin 3.0 03/30/2010 08:48 AM    A-G Ratio 1.7 03/26/2019 09:38 AM    ALT (SGPT) 23 03/26/2019 09:38 AM           ASSESSMENT/RECOMMENDATIONS:.        1. CAD, S/p CABG 2005. - He has some limitations including SOB which is somewhat concerning. He is overweight with elevated A1C and diabetes. Last stress test was 2015 so I favor moving forward with Exercise Cardiolite. We will also order SHARAD scan to look at LV function. - ECG was ordered ordered. - Nuclear stress test was ordered. 1. HTN  - BP is borderline, continue current regiment at this point. 2. Dyslipidemia  - LDL is currently 113. LDL goal should be closer to 70. - I favor increased stop Lipitor and start Crestor 40MG  - Start Coenzyme Q10 200MG   - Recheck cholesterol in 2 months. 3. DM  - On insulin.  - His last A1c on 3/26/19 was 7.1.  Goal should be 6.0 or lower.   - I am concerned about vascular disease so will check his carotids. 4. LEONA  - Continue wearing C-PAP machine, 12 mercury. Return in 6 weeks or PRN      Orders Placed This Encounter    HEPATIC FUNCTION PANEL     Standing Status:   Future     Standing Expiration Date:   5/2/2020    LIPID PANEL    rosuvastatin (CRESTOR) 40 mg tablet     Sig: Take 1 Tab by mouth daily. Dispense:  90 Tab     Refill:  4          Follow-up and Dispositions  ·   Return in about 6 weeks (around 6/13/2019). I have discussed the diagnosis with  Deric Walsh and the intended plan as seen in the above orders. Questions were answered concerning future plans. I have discussed medication side effects and warnings with the patient as well. Thank you,  Saloni Sanchez MD for involving me in the care of  Deric Walsh. Please do not hesitate to contact me for further questions/concerns. Written by Kaila Huizar, as dictated by Re Garcia MD.     July Raygoza MD, [facility] [place]    Patient Care Team:  Saloni Sanchez MD as PCP - Jesús Barcenas MD as Surgeon (Surgery)    [address], [address] [place] [address] Drive      [phone] / [phone] Fax

## 2019-05-02 ENCOUNTER — OFFICE VISIT (OUTPATIENT)
Dept: CARDIOLOGY CLINIC | Age: 69
End: 2019-05-02

## 2019-05-02 VITALS
SYSTOLIC BLOOD PRESSURE: 142 MMHG | WEIGHT: 253 LBS | OXYGEN SATURATION: 93 % | BODY MASS INDEX: 38.34 KG/M2 | RESPIRATION RATE: 18 BRPM | HEART RATE: 56 BPM | HEIGHT: 68 IN | DIASTOLIC BLOOD PRESSURE: 62 MMHG

## 2019-05-02 DIAGNOSIS — I25.10 CORONARY ARTERY DISEASE INVOLVING NATIVE CORONARY ARTERY OF NATIVE HEART WITHOUT ANGINA PECTORIS: ICD-10-CM

## 2019-05-02 DIAGNOSIS — I10 ESSENTIAL HYPERTENSION WITH GOAL BLOOD PRESSURE LESS THAN 130/80: Primary | ICD-10-CM

## 2019-05-02 DIAGNOSIS — E11.29 TYPE 2 DIABETES MELLITUS WITH MICROALBUMINURIA, WITH LONG-TERM CURRENT USE OF INSULIN (HCC): ICD-10-CM

## 2019-05-02 DIAGNOSIS — R80.9 TYPE 2 DIABETES MELLITUS WITH MICROALBUMINURIA, WITH LONG-TERM CURRENT USE OF INSULIN (HCC): ICD-10-CM

## 2019-05-02 DIAGNOSIS — Z79.4 TYPE 2 DIABETES MELLITUS WITH MICROALBUMINURIA, WITH LONG-TERM CURRENT USE OF INSULIN (HCC): ICD-10-CM

## 2019-05-02 DIAGNOSIS — I10 ESSENTIAL HYPERTENSION WITH GOAL BLOOD PRESSURE LESS THAN 130/80: ICD-10-CM

## 2019-05-02 RX ORDER — ROSUVASTATIN CALCIUM 40 MG/1
40 TABLET, COATED ORAL DAILY
Qty: 90 TAB | Refills: 4 | Status: SHIPPED | OUTPATIENT
Start: 2019-05-02 | End: 2020-06-04

## 2019-05-02 NOTE — PROGRESS NOTES
Tami George is a 80 y.o. male    Chief Complaint   Patient presents with    New Patient    Abnormal Chest X Ray       Visit Vitals  /62 (BP 1 Location: Left arm, BP Patient Position: Sitting)   Pulse (!) 56   Resp 18   Ht 5' 8\" (1.727 m)   Wt 253 lb (114.8 kg)   SpO2 93%   BMI 38.47 kg/m²       1. Have you been to the ER, urgent care clinic since your last visit? Hospitalized since your last visit? NO    2. Have you seen or consulted any other health care providers outside of the [address] [place] [address] since your last visit? Include any pap smears or colon screening.   NO

## 2019-05-02 NOTE — PATIENT INSTRUCTIONS
1. Stop Lipitor and begin Crestor 40MG daily. 2. Start Coenzyme Q10 over the counter 200MG    3.  Have cholesterol rechecked in 2 months

## 2019-05-19 DIAGNOSIS — R55 POSTURAL DIZZINESS WITH PRESYNCOPE: ICD-10-CM

## 2019-05-19 DIAGNOSIS — R42 POSTURAL DIZZINESS WITH PRESYNCOPE: ICD-10-CM

## 2019-05-20 RX ORDER — METOPROLOL TARTRATE 25 MG/1
TABLET, FILM COATED ORAL
Qty: 180 TAB | Refills: 0 | Status: SHIPPED | OUTPATIENT
Start: 2019-05-20 | End: 2019-08-09 | Stop reason: SDUPTHER

## 2019-05-20 RX ORDER — MONTELUKAST SODIUM 10 MG/1
TABLET ORAL
Qty: 90 TAB | Refills: 0 | Status: SHIPPED | OUTPATIENT
Start: 2019-05-20 | End: 2019-08-09 | Stop reason: SDUPTHER

## 2019-05-20 NOTE — TELEPHONE ENCOUNTER
Last visit noted, labs checked and refill deemed appropriate at this time. Verbal refill order authorized by covering physicians at [facility] for Dr. Trino Ellsworth during his absence.     Patient has an appt with Dr. Lnida Byrd on 6/3/19    Vick Bailey, PharmD, BCPS, CDE

## 2019-05-28 ENCOUNTER — TELEPHONE (OUTPATIENT)
Dept: CARDIOLOGY CLINIC | Age: 69
End: 2019-05-28

## 2019-05-28 NOTE — TELEPHONE ENCOUNTER
Informed pt that he will have a cardiolite stress test - hold metoprolol night prior and morning of.

## 2019-05-28 NOTE — TELEPHONE ENCOUNTER
Patient states he has a couple questions about his upcoming tests tomorrow, 5/29. Please call back at [phone].

## 2019-06-01 ENCOUNTER — DOCUMENTATION ONLY (OUTPATIENT)
Dept: CARDIOLOGY CLINIC | Age: 69
End: 2019-06-01

## 2019-06-03 ENCOUNTER — OFFICE VISIT (OUTPATIENT)
Dept: FAMILY MEDICINE CLINIC | Age: 69
End: 2019-06-03

## 2019-06-03 VITALS
TEMPERATURE: 98.3 F | BODY MASS INDEX: 38.83 KG/M2 | HEIGHT: 68 IN | WEIGHT: 256.2 LBS | SYSTOLIC BLOOD PRESSURE: 145 MMHG | RESPIRATION RATE: 18 BRPM | DIASTOLIC BLOOD PRESSURE: 73 MMHG | OXYGEN SATURATION: 95 % | HEART RATE: 53 BPM

## 2019-06-03 DIAGNOSIS — J45.20 MILD INTERMITTENT ASTHMA WITHOUT COMPLICATION: ICD-10-CM

## 2019-06-03 DIAGNOSIS — Z79.4 TYPE 2 DIABETES MELLITUS WITH MICROALBUMINURIA, WITH LONG-TERM CURRENT USE OF INSULIN (HCC): ICD-10-CM

## 2019-06-03 DIAGNOSIS — F34.1 DYSTHYMIA: ICD-10-CM

## 2019-06-03 DIAGNOSIS — I10 ESSENTIAL HYPERTENSION WITH GOAL BLOOD PRESSURE LESS THAN 130/80: Chronic | ICD-10-CM

## 2019-06-03 DIAGNOSIS — E66.9 OBESITY, CLASS II, BMI 35-39.9: ICD-10-CM

## 2019-06-03 DIAGNOSIS — I25.10 CORONARY ARTERY DISEASE INVOLVING NATIVE CORONARY ARTERY OF NATIVE HEART WITHOUT ANGINA PECTORIS: ICD-10-CM

## 2019-06-03 DIAGNOSIS — E66.01 SEVERE OBESITY (HCC): ICD-10-CM

## 2019-06-03 DIAGNOSIS — E11.29 TYPE 2 DIABETES MELLITUS WITH MICROALBUMINURIA, WITH LONG-TERM CURRENT USE OF INSULIN (HCC): ICD-10-CM

## 2019-06-03 DIAGNOSIS — R80.9 TYPE 2 DIABETES MELLITUS WITH MICROALBUMINURIA, WITH LONG-TERM CURRENT USE OF INSULIN (HCC): ICD-10-CM

## 2019-06-03 DIAGNOSIS — F34.1 DYSTHYMIA: Primary | ICD-10-CM

## 2019-06-03 RX ORDER — FLUTICASONE FUROATE AND VILANTEROL TRIFENATATE 200; 25 UG/1; UG/1
POWDER RESPIRATORY (INHALATION)
Refills: 6 | COMMUNITY
Start: 2019-05-22 | End: 2019-09-30 | Stop reason: SDUPTHER

## 2019-06-03 RX ORDER — CITALOPRAM 20 MG/1
20 TABLET, FILM COATED ORAL DAILY
Qty: 30 TAB | Refills: 0 | Status: SHIPPED | OUTPATIENT
Start: 2019-06-03 | End: 2019-06-03 | Stop reason: SDUPTHER

## 2019-06-03 NOTE — PROGRESS NOTES
Chief Complaint   Patient presents with    Establish Care     Blood pressure 145/73, pulse (!) 53, temperature 98.3 °F (36.8 °C), temperature source Oral, resp. rate 18, height 5' 8\" (1.727 m), weight 256 lb 3.2 oz (116.2 kg), SpO2 95 %. 1. Have you been to the ER, urgent care clinic since your last visit? Hospitalized since your last visit? No    2. Have you seen or consulted any other health care providers outside of the [address] [place] [address] since your last visit? Include any pap smears or colon screening.  No

## 2019-06-03 NOTE — PATIENT INSTRUCTIONS
Deciding About Stopping Your Antidepressant  How can you decide about stopping your antidepressant? How do antidepressants work? Antidepressants help restore the normal balance of brain chemicals. When these brain chemicals are in proper balance, your depression gets better. Most people need to take this medicine for 6 to 8 weeks to get the full benefit and feel much better. Taking your medicine for at least 6 months after you feel better can help keep you from getting depressed again. If this is not the first time you have been depressed, your doctor may want you to take it for an even longer time. But don't worry. No matter how long you take this medicine, you can't get addicted to it. If you have questions or concerns about your medicines, talk to your doctor. What are key points about this decision? · The best reason to stop taking your antidepressant is because you feel better and you and your doctor believe that you will stay well after you stop taking it. · An antidepressant needs time to work. You may need to take it for 1 to 3 weeks before you start to feel better. And it may take 6 to 8 weeks before you feel much better. · Most side effects are more bothersome than serious. They can often be managed. Or your doctor may be able to prescribe a different medicine. · If you feel you can't afford the medicine, your doctor may be able to prescribe one that costs less. · At least half of people who have depression will get it again. This is called a relapse. But if you keep taking your medicine for at least 6 months after you feel better, you may lower the chance that you will have a relapse. · If you plan to stop taking your medicine, talk with your doctor first about how to do it safely. You may need to stop slowly over time. Suddenly stopping some medicines may cause side effects. These include flu-like symptoms and dizziness.   · Seeing a counselor works well to help people with depression feel better. · Depression is nothing to be embarrassed about. It is a health problem, not a character flaw. Why might you choose to stop taking your medicine? · You are feeling better, and you and the doctor agree that it is time to stop. · You have been taking the medicine for at least 6 months. · You are having counseling to help you cope with problems and help change how you think and feel. · You are not worried about the depression coming back. Why might you choose not to stop your medicine? · You are not yet feeling better. · You have not taken the medicine for 6 months. · You need to make a plan to stop taking the medicine when you and your doctor think you are ready. · You are worried that the depression may come back. Your decision  Thinking about the facts and your feelings can help you make a decision that is right for you. Be sure you understand the benefits and risks of your options, and think about what else you need to do before you make the decision. Where can you learn more? Go to http://ana maria-anne marie.info/. Enter R575 in the search box to learn more about \"Deciding About Stopping Your Antidepressant. \"  Current as of: September 11, 2018  Content Version: 11.9  © 8926-4725 [employer]. Care instructions adapted under license by [employer] (which disclaims liability or warranty for this information). If you have questions about a medical condition or this instruction, always ask your healthcare professional. [place] [address] any warranty or liability for your use of this information.

## 2019-06-03 NOTE — PROGRESS NOTES
Kenia Pearson  80 y.o. male  1950  [address] [facility] [address]  [address] [place] [address] Ne: Progress Note  Elie Perez MD       Encounter Date: 6/3/2019    Chief Complaint   Patient presents with   [address] [place] [address] Care     History of Present Illness   Kenia Pearson is a 80 y.o. male who presents to clinic today to establish care with this office and provider. Patient previously followed by Dr. Isabelle Pappas. Due to Dr. Ritu Ulloa health issues patient has been advised to seek a new primary care physician. I have reviewed his medical history as documented in [facility]. I have updated it as needed. Patient with known history of hypertension and CAD, followed by Dr. John Caldwell.  He has obstructive sleep apnea and uses CPAP regularly. He has diabetes, last hemoglobin A1c was 7.1% in March. Notes his blood sugars are fairly well controlled in the 100s to 150s. He does use a new glucose meter Belkis 14-day, as prescribed by Dr. Ritu Ulloa. This provides real-time blood glucose monitoring. Patient (has made it easier to control his blood sugars. He is currently on Janumet, Invokana, Toujeo, and Humalog. Patient has history of dysthymia. Is been on Celexa. He is interested in weaning off of this medication. Denies any suicidal thoughts or ideations at this time.     Rejoined the ca and increased exercise    Review of Systems   _General: Denies fevers, chills, confusion  HEENT: Denies congestion, rhinorrhea, sore throat, ear pain  Cardiac: Denies chest pain, palpitations, dyspnea on exertion  Pulmonary: Denies shortness of breath, wheezing, cough  Abdominal: Denies abdominal pain, nausea, vomiting, diarrhea or constipation  : Denies dysuria, hematuria  MSK: Denies musculoskeletal or joint pain  Skin: Denies rash  Psych: Denies depression or anxiety  Vitals/Objective:     Vitals:    06/03/19 0821   BP: 145/73   Pulse: (!) 53   Resp: 18   Temp: 98.3 °F (36.8 °C) TempSrc: Oral   SpO2: 95%   Weight: 256 lb 3.2 oz (116.2 kg)   Height: 5' 8\" (1.727 m)     Body mass index is 38.96 kg/m². General: Patient alert and oriented and in NAD  HEENT: PER/EOMI, no conjunctival pallor or scleral icterus. No thyromegaly or cervical lymphadenopathy  Heart: Regular rate and rhythm, No murmurs, rubs or gallops. 2+ peripheral pulses  Lungs: Clear to auscultation bilaterally, no wheezing, rales or rhonchi  Abd: +BS, non-tender, non-distended  Ext: No edema  Skin: No rashes or lesions noted on exposed skin,  Psych: Appropriate mood and affect      Assessment and Plan:   1. Dysthymia  Discussed the importance of weaning off Celexa. Have given a weaning schedule, see patient instructions. 2. Essential hypertension with goal blood pressure less than 130/80  Blood pressure slightly elevated, especially with diabetes. Goal less than 130/80. Advised patient to check blood pressures at home with close follow-up. He is advised to bring a log at that time. We may need to adjust his medications at that time. 3. Coronary artery disease involving native coronary artery of native heart without angina pectoris  Followed by Dr. Addie Miller.    4. Type 2 diabetes mellitus with microalbuminuria, with long-term current use of insulin ([address]  We will plan on getting A1c at next visit. Continue current therapies. Continue blood glucose monitoring. 5. Obesity, Class II, BMI 35-39.9  Patient joining [employer] and working on portion size. Will follow up weight at next visit  6. Severe obesity ([address]      7. Mild intermittent asthma without complication  Unclear PFTs have been completed previously. The patient is on Breo regularly, it is unlikely that he has mild intermittent asthma.   We will follow-up at next visit and potentially scheduled for pulmonary function testing.  - BREO ELLIPTA 200-25 mcg/dose inhaler; INHALE 1 PUFF PO QD; Refill: 6      I have discussed the diagnosis with the patient and the intended plan as seen in the above orders. he has expressed understanding. The patient has received an after-visit summary and questions were answered concerning future plans. I have discussed medication side effects and warnings with the patient as well. Electronically Signed: Homer Lopez MD     History   Patients past medical, surgical and family histories were reviewed and updated.     Past Medical History:   Diagnosis Date    AR (allergic rhinitis) 12/01/2008    sees allergist for shots    Arthritis     BPH (benign prostatic hyperplasia)     Dr. Kira Greenowod CAD (coronary artery disease)     CABG - 3 vessel    Depression     DM type 2 (diabetes mellitus, type 2) ([facility]     30 years    Essential hypertension with goal blood pressure less than 130/80 9/28/2016    Hypercholesteremia     Hypertension     Hypogonadism male 12/1/2008    Mild intermittent asthma without complication 07/90/7920    sees allergy    LEONA on CPAP 07/16/2010    Dr. Maria Luisa Mariano    Pure hypercholesterolemia 5/4/2017    Severe obesity (BMI 35.0-39.9) 6/18/2018    Type 2 diabetes with nephropathy ([facility] 3/12/2018     Past Surgical History:   Procedure Laterality Date    CABG, ARTERY-VEIN, THREE  2005    [place]    HX CYST REMOVAL  5/12/2016    neck and chest    HX HEENT  2012    right eye prosthesis    HX ORTHOPAEDIC      right shoulder surgery, rotator cuff repair    HX RETINAL DETACHMENT REPAIR  1984    R Blindness    HX RHINOPLASTY      septoplasty     Family History   Adopted: Yes   Family history unknown: Yes     Social History     Socioeconomic History    Marital status: SINGLE     Spouse name: Not on file    Number of children: Not on file    Years of education: Not on file    Highest education level: Not on file   Occupational History    Not on file   Social Needs    Financial resource strain: Not on file    Food insecurity:     Worry: Not on file     Inability: Not on file    Transportation needs: Medical: Not on file     Non-medical: Not on file   Tobacco Use    Smoking status: Passive Smoke Exposure - Never Smoker    Smokeless tobacco: Never Used   Substance and Sexual Activity    Alcohol use: Yes     Alcohol/week: 3.0 oz     Types: 6 Cans of beer per week     Comment: 1 beer or so per day    Drug use: No     Comment: occasionally smoked marijuana in the past    Sexual activity: Not Currently     Partners: Male   Lifestyle    Physical activity:     Days per week: Not on file     Minutes per session: Not on file    Stress: Not on file   Relationships    Social connections:     Talks on phone: Not on file     Gets together: Not on file     Attends Jain service: Not on file     Active member of club or organization: Not on file     Attends meetings of clubs or organizations: Not on file     Relationship status: Not on file    Intimate partner violence:     Fear of current or ex partner: Not on file     Emotionally abused: Not on file     Physically abused: Not on file     Forced sexual activity: Not on file   Other Topics Concern    Not on file   Social History Narrative    Not on file            Current Medications/Allergies     Current Outpatient Medications   Medication Sig Dispense Refill    montelukast (SINGULAIR) 10 mg tablet TAKE 1 TABLET DAILY 90 Tab 0    metoprolol tartrate (LOPRESSOR) 25 mg tablet TAKE 1 TABLET TWICE A  Tab 0    rosuvastatin (CRESTOR) 40 mg tablet Take 1 Tab by mouth daily. 90 Tab 4    lisinopril (PRINIVIL, ZESTRIL) 20 mg tablet TAKE 1 TABLET TWICE A  Tab 0    albuterol (PROVENTIL HFA, VENTOLIN HFA, PROAIR HFA) 90 mcg/actuation inhaler Take 2 Puffs by inhalation every four (4) hours as needed for Wheezing. 3 Inhaler 1    INVOKANA 300 mg tablet TAKE 1 TABLET DAILY 90 Tab 1    chlorthalidone (HYGROTEN) 25 mg tablet Take 1 Tab by mouth daily. 90 Tab 1    FREESTYLE SILVINO 14 DAY SENSOR kit 1 Units by Does Not Apply route every fourteen (14) days.  1 Kit 5    JANUMET 50-1,000 mg per tablet TAKE 1 TABLET TWICE A DAY WITH MEALS 180 Tab 1    citalopram (CELEXA) 40 mg tablet TAKE 1 TABLET DAILY 90 Tab 1    HUMALOG KWIKPEN INSULIN 100 unit/mL kwikpen INJECT 15 UNITS UNDER THE SKIN BEFORE BREAKFAST, LUNCH AND DINNER ( BEFORE MEALS ) FOR TYPE 2 DIABETES MELLITUS 45 mL 1    insulin glargine (TOUJEO SOLOSTAR U-300 INSULIN) 300 unit/mL (1.5 mL) inpn INJECT 40 UNITS UNDER THE SKIN DAILY 13.5 mL 2    CIALIS 5 mg tablet Take 5 mg by mouth daily.  tamsulosin (FLOMAX) 0.4 mg capsule Take 0.4 mg by mouth daily.  fluticasone (FLONASE) 50 mcg/actuation nasal spray 2 Sprays by Both Nostrils route daily. Indications: ALLERGIC RHINITIS 1 Bottle 0    OMEGA-3 FATTY ACIDS/FISH OIL (FISH OIL EXTRA STRENGTH PO) Take 1,400 mg by mouth daily (after breakfast).  ketoconazole (NIZORAL) 2 % topical cream Apply  to affected area as needed for Skin Irritation.  desonide (TRIDESILON) 0.05 % topical lotion as needed. 1    cetirizine (ZYRTEC) 10 mg tablet Take 1 Tab by mouth daily. 0    aspirin (ASPIRIN) 325 mg tablet take 325 mg by mouth daily.  M-VIT PO take 1 Tab by mouth daily.  BREO ELLIPTA 200-25 mcg/dose inhaler INHALE 1 PUFF PO QD  6    BREO ELLIPTA 100-25 mcg/dose inhaler USE 1 INHALATION DAILY 3 Inhaler 2    VOLTAREN 1 % gel every six (6) hours.   1     Allergies   Allergen Reactions    Cefdinir Rash    Codeine Nausea Only

## 2019-06-04 RX ORDER — CITALOPRAM 20 MG/1
TABLET, FILM COATED ORAL
Qty: 90 TAB | Refills: 0 | Status: SHIPPED | OUTPATIENT
Start: 2019-06-04 | End: 2020-10-30 | Stop reason: ALTCHOICE

## 2019-06-09 PROBLEM — E66.01 SEVERE OBESITY (HCC): Status: ACTIVE | Noted: 2019-06-09

## 2019-07-04 LAB
ALBUMIN SERPL-MCNC: 4.5 G/DL (ref 3.6–4.8)
ALP SERPL-CCNC: 46 IU/L (ref 39–117)
ALT SERPL-CCNC: 19 IU/L (ref 0–44)
AST SERPL-CCNC: 23 IU/L (ref 0–40)
BILIRUB DIRECT SERPL-MCNC: 0.13 MG/DL (ref 0–0.4)
BILIRUB SERPL-MCNC: 0.4 MG/DL (ref 0–1.2)
CHOLEST SERPL-MCNC: 154 MG/DL (ref 100–199)
HDLC SERPL-MCNC: 43 MG/DL
INTERPRETATION, 910389: NORMAL
LDLC SERPL CALC-MCNC: 85 MG/DL (ref 0–99)
PROT SERPL-MCNC: 6.9 G/DL (ref 6–8.5)
TRIGL SERPL-MCNC: 128 MG/DL (ref 0–149)
VLDLC SERPL CALC-MCNC: 26 MG/DL (ref 5–40)

## 2019-07-10 ENCOUNTER — OFFICE VISIT (OUTPATIENT)
Dept: CARDIOLOGY CLINIC | Age: 69
End: 2019-07-10

## 2019-07-10 VITALS
HEART RATE: 60 BPM | OXYGEN SATURATION: 98 % | SYSTOLIC BLOOD PRESSURE: 102 MMHG | HEIGHT: 68 IN | WEIGHT: 253.2 LBS | RESPIRATION RATE: 17 BRPM | DIASTOLIC BLOOD PRESSURE: 54 MMHG | BODY MASS INDEX: 38.37 KG/M2

## 2019-07-10 DIAGNOSIS — I10 ESSENTIAL HYPERTENSION WITH GOAL BLOOD PRESSURE LESS THAN 130/80: ICD-10-CM

## 2019-07-10 DIAGNOSIS — I25.10 CORONARY ARTERY DISEASE INVOLVING NATIVE CORONARY ARTERY OF NATIVE HEART WITHOUT ANGINA PECTORIS: ICD-10-CM

## 2019-07-10 DIAGNOSIS — E78.5 DYSLIPIDEMIA: ICD-10-CM

## 2019-07-10 DIAGNOSIS — I25.10 CORONARY ARTERY DISEASE INVOLVING NATIVE CORONARY ARTERY OF NATIVE HEART WITHOUT ANGINA PECTORIS: Primary | ICD-10-CM

## 2019-07-10 DIAGNOSIS — E66.01 SEVERE OBESITY (HCC): ICD-10-CM

## 2019-07-10 DIAGNOSIS — G47.33 OSA (OBSTRUCTIVE SLEEP APNEA): ICD-10-CM

## 2019-07-10 DIAGNOSIS — Z79.4 TYPE 2 DIABETES MELLITUS WITH MICROALBUMINURIA, WITH LONG-TERM CURRENT USE OF INSULIN (HCC): ICD-10-CM

## 2019-07-10 DIAGNOSIS — R80.9 TYPE 2 DIABETES MELLITUS WITH MICROALBUMINURIA, WITH LONG-TERM CURRENT USE OF INSULIN (HCC): ICD-10-CM

## 2019-07-10 DIAGNOSIS — E11.29 TYPE 2 DIABETES MELLITUS WITH MICROALBUMINURIA, WITH LONG-TERM CURRENT USE OF INSULIN (HCC): ICD-10-CM

## 2019-07-10 RX ORDER — EZETIMIBE 10 MG/1
10 TABLET ORAL DAILY
Qty: 30 TAB | Refills: 5 | Status: SHIPPED | OUTPATIENT
Start: 2019-07-10 | End: 2019-07-25 | Stop reason: SDUPTHER

## 2019-07-10 NOTE — LETTER
7/12/19 Patient: Eleuterio Rodriguez YOB: 1950 Date of Visit: 7/10/2019 Julisa Kovacs MD 
[address] [facility] [address] VIA In Basket Dear Julisa Kovacs MD, Thank you for referring Mr. Glenn Mccloud to [facility] for evaluation. My notes for this consultation are attached. If you have questions, please do not hesitate to call me. I look forward to following your patient along with you.  
 
 
Sincerely, 
 
Oanh Mccormick MD 
 
 (0) independent

## 2019-07-10 NOTE — PROGRESS NOTES
LAST VISIT: 5/2/19      ICD-10-CM ICD-9-CM   1. Coronary artery disease involving native coronary artery of native heart without angina pectoris I25.10 414.01   2. Essential hypertension with goal blood pressure less than 130/80 I10 401.9   3. Dyslipidemia E78.5 272.4   4. Type 2 diabetes mellitus with microalbuminuria, with long-term current use of insulin (formerly [facility]) E11.29 250.40    R80.9 791.0    Z79.4 V58.67   5. LEONA (obstructive sleep apnea) G47.33 327.23   6. Severe obesity (formerly [facility]) E66.01 278.01            Kristy Luis is a 80 y.o. male with diabetes, hypercholesterolemia, CAD, LEONA, and HTN last seen by me 2 months ago. Cardiac risk factors: diabetes mellitus, male gender, hypertension  I have personally obtained the history from the patient. HISTORY OF PRESENTING ILLNESS     Kristy Luis reports his breathing has improved. He still experiences some wheezing. He reports his diet \"comes and goes\" and he loves red meat. He has not kept up with exercise. He is interested in going to [employer]. He is adherent with CPAP. Due to venous insufficiency, he wears compression stockings when he stands for long periods of time or flies. BP on recheck 110/50. The patient denies exertional chest pain, orthopnea, palpitations, syncope, presyncope or fatigue.          ACTIVE PROBLEM LIST     Patient Active Problem List    Diagnosis Date Noted    Severe obesity ([facility] [address] 06/09/2019    Dysthymia 09/26/2018    Obesity, Class II, BMI 35-39.9 06/18/2018    Type 2 diabetes mellitus with microalbuminuria, with long-term current use of insulin ([facility] [address] 03/12/2018    Pure hypercholesterolemia 05/04/2017    Mild intermittent asthma without complication 96/97/6370    Advance care planning 01/24/2017    Essential hypertension with goal blood pressure less than 130/80 09/28/2016    Diabetes mellitus type 2, controlled ([facility] [address] 09/28/2016    S/P excision of lipoma 05/25/2016    S/P excision of skin lesion, follow-up exam 05/25/2016    LEONA (obstructive sleep apnea) 07/16/2010    CAD (coronary artery disease) 12/01/2008    Hypogonadism male 12/01/2008    AR (allergic rhinitis) 12/01/2008    ED (erectile dysfunction) 12/01/2008           PAST MEDICAL HISTORY     Past Medical History:   Diagnosis Date    AR (allergic rhinitis) 12/01/2008    sees allergist for shots    Arthritis     Blind right eye     BPH (benign prostatic hyperplasia)     Dr. Staci Morrow CAD (coronary artery disease)     CABG - 3 vessel    Depression     DM type 2 (diabetes mellitus, type 2) ([facility] [address]     27 years    Essential hypertension with goal blood pressure less than 130/80 9/28/2016    Hearing loss     Hypercholesteremia     Hypogonadism male 12/1/2008    Mild intermittent asthma without complication 56/45/4972    sees allergy    LEONA on CPAP 07/16/2010    Dr. Tyler Handy    Severe obesity (BMI 35.0-39.9) 6/18/2018    Type 2 diabetes with nephropathy ([facility] [address] 3/12/2018           PAST SURGICAL HISTORY     Past Surgical History:   Procedure Laterality Date    CABG, ARTERY-VEIN, THREE  2005    [place]    HX CIRCUMCISION  2016    HX CYST REMOVAL  5/12/2016    neck and chest    HX HEENT  2012    right eye prosthesis    HX ORTHOPAEDIC      right shoulder surgery, rotator cuff repair    HX RETINAL DETACHMENT REPAIR  1984    R Blindness    HX RHINOPLASTY      septoplasty          ALLERGIES     Allergies   Allergen Reactions    Cefdinir Rash    Codeine Nausea Only          FAMILY HISTORY     Family History   Adopted: Yes   Family history unknown: Yes    negative for cardiac disease       SOCIAL HISTORY     Social History     Socioeconomic History    Marital status: SINGLE     Spouse name: Not on file    Number of children: Not on file    Years of education: Not on file    Highest education level: Associate degree: academic program   Occupational History     Comment: Technology     Comment: [employer]   Social Needs    Financial resource strain: Not hard at all    Food insecurity:     Worry: Never true     Inability: Never true    Transportation needs:     Medical: No     Non-medical: No   Tobacco Use    Smoking status: Passive Smoke Exposure - Never Smoker    Smokeless tobacco: Never Used   Substance and Sexual Activity    Alcohol use: Yes     Alcohol/week: 3.0 oz     Types: 6 Cans of beer per week     Comment: 1 beer or so per day    Drug use: No     Comment: occasionally smoked marijuana in the past    Sexual activity: Not Currently     Partners: Male   Lifestyle    Physical activity:     Days per week: 2 days     Minutes per session: 40 min    Stress: To some extent   Relationships    Social connections:     Talks on phone: Three times a week     Gets together: Twice a week     Attends Sabianist service: Never     Active member of club or organization: Yes     Attends meetings of clubs or organizations: More than 4 times per year     Relationship status: Living with partner         MEDICATIONS     Current Outpatient Medications   Medication Sig    ezetimibe (ZETIA) 10 mg tablet Take 1 Tab by mouth daily.  insulin glargine U-300 conc (TOUJEO SOLOSTAR U-300 INSULIN) 300 unit/mL (1.5 mL) inpn pen INJECT 40 UNITS UNDER THE SKIN DAILY    citalopram (CELEXA) 20 mg tablet TAKE 1 TABLET BY MOUTH EVERY DAY. WEAN AS DIRECTED    BREO ELLIPTA 200-25 mcg/dose inhaler INHALE 1 PUFF PO QD    montelukast (SINGULAIR) 10 mg tablet TAKE 1 TABLET DAILY    metoprolol tartrate (LOPRESSOR) 25 mg tablet TAKE 1 TABLET TWICE A DAY    rosuvastatin (CRESTOR) 40 mg tablet Take 1 Tab by mouth daily.  lisinopril (PRINIVIL, ZESTRIL) 20 mg tablet TAKE 1 TABLET TWICE A DAY    albuterol (PROVENTIL HFA, VENTOLIN HFA, PROAIR HFA) 90 mcg/actuation inhaler Take 2 Puffs by inhalation every four (4) hours as needed for Wheezing.  INVOKANA 300 mg tablet TAKE 1 TABLET DAILY    chlorthalidone (HYGROTEN) 25 mg tablet Take 1 Tab by mouth daily.     FREESTYLE SILVINO 14 DAY SENSOR kit 1 Units by Does Not Apply route every fourteen (14) days.  JANUMET 50-1,000 mg per tablet TAKE 1 TABLET TWICE A DAY WITH MEALS    HUMALOG KWIKPEN INSULIN 100 unit/mL kwikpen INJECT 15 UNITS UNDER THE SKIN BEFORE BREAKFAST, LUNCH AND DINNER ( BEFORE MEALS ) FOR TYPE 2 DIABETES MELLITUS    CIALIS 5 mg tablet Take 5 mg by mouth daily.  tamsulosin (FLOMAX) 0.4 mg capsule Take 0.4 mg by mouth daily.  fluticasone (FLONASE) 50 mcg/actuation nasal spray 2 Sprays by Both Nostrils route daily. Indications: ALLERGIC RHINITIS    OMEGA-3 FATTY ACIDS/FISH OIL (FISH OIL EXTRA STRENGTH PO) Take 1,400 mg by mouth daily (after breakfast).  ketoconazole (NIZORAL) 2 % topical cream Apply  to affected area as needed for Skin Irritation.  VOLTAREN 1 % gel every six (6) hours.  desonide (TRIDESILON) 0.05 % topical lotion as needed.  cetirizine (ZYRTEC) 10 mg tablet Take 1 Tab by mouth daily.  aspirin (ASPIRIN) 325 mg tablet take 325 mg by mouth daily.  M-VIT PO take 1 Tab by mouth daily. No current facility-administered medications for this visit. I have reviewed the nurses notes, vitals, problem list, allergy list, medical history, family, social history and medications. REVIEW OF SYMPTOMS      General: Pt denies excessive weight gain or loss. Pt is able to conduct ADL's  HEENT: Denies blurred vision, headaches, hearing loss, epistaxis and difficulty swallowing. Respiratory: Denies cough or stridor. +SOB, wheezing  Cardiovascular: Denies, palpitations. edema  Gastrointestinal: Denies poor appetite, indigestion, abdominal pain or blood in stool  Genitourinary: Denies hematuria, dysuria, increased urinary frequency  Musculoskeletal: Denies joint pain or swelling from muscles or joints  Neurologic: Denies tremor, paresthesias, headache, or sensory motor disturbance  Psychiatric: Denies confusion, insomnia, depression  Integumentray: Denies rash, itching or ulcers.   Hematologic: Denies easy bruising, bleeding     PHYSICAL EXAMINATION      Vitals:    07/10/19 1449   BP: 102/54   Pulse: 60   Resp: 17   SpO2: 98%   Weight: 253 lb 3.2 oz (114.9 kg)   Height: 5' 8\" (1.727 m)     General: Well developed, in no acute distress. HEENT: No jaundice, oral mucosa moist, no oral ulcers  Neck: Supple, no stiffness, no lymphadenopathy, supple  Heart:  Normal S1/S2 negative S3 or S4. Regular, no murmur, gallop or rub, no jugular venous distention  Respiratory: Clear bilaterally x 4, no wheezing or rales  Abdomen:   Soft, non-tender, bowel sounds are active. Extremities:  No  normal cap refill, no cyanosis. +trace pedal edema  Musculoskeletal: No clubbing, no deformities  Neuro: A&Ox3, speech clear, gait stable, cooperative, no focal neurologic deficits  Skin: Skin color is normal. No rashes or lesions. Non diaphoretic, moist.  Vascular: 2+ pulses symmetric in all extremities      EKG: Sinus brachycardia with first degree AV block of 216 seconds. DIAGNOSTIC DATA     1. Stress Test  NM- 3/17/15- no ischemia, EF 65%  NM- 5/29/19-· Abnormal stress myocardial perfusion with very small area of mild perfusion defect of the mid anterior wall and mid infeiror wall with SDS 2 which is reversible. This suggest very small area of focal ischemia. Abnormal septal motion consistent with prior cardiac surgery. Normal wall motion with normal LV function with LVEF 67%. · Excellent functional capacity. · Compared to prior study of 3/17/2015, there is minimal change. The small anterior mid wall stress perfusion defect may be new    2. LE Venous Doppler  6/30/18- no DVT isaak    3. Lipids  3/26/19- , HDL 51, ,   7/3/19- , HDL 43, LDL 85,     4.  Carotid Doppler  5/31/19- 0-9% R/L    5. Echo  5/29/19- EF 61-65%         LABORATORY DATA            Lab Results   Component Value Date/Time    WBC 6.5 05/09/2016 01:50 PM    HGB 13.9 05/09/2016 01:50 PM    HCT 43.2 05/09/2016 01:50 PM    PLATELET 997 (L) 05/09/2016 01:50 PM    MCV 89.3 05/09/2016 01:50 PM      Lab Results   Component Value Date/Time    Sodium 136 03/26/2019 09:38 AM    Potassium 5.0 03/26/2019 09:38 AM    Chloride 101 03/26/2019 09:38 AM    CO2 22 03/26/2019 09:38 AM    Anion gap 6 05/09/2016 01:50 PM    Glucose 163 (H) 03/26/2019 09:38 AM    BUN 29 (H) 03/26/2019 09:38 AM    Creatinine 1.24 03/26/2019 09:38 AM    BUN/Creatinine ratio 23 03/26/2019 09:38 AM    GFR est AA 69 03/26/2019 09:38 AM    GFR est non-AA 60 03/26/2019 09:38 AM    Calcium 9.8 03/26/2019 09:38 AM    Bilirubin, total 0.4 07/03/2019 11:28 AM    AST (SGOT) 23 07/03/2019 11:28 AM    Alk. phosphatase 46 07/03/2019 11:28 AM    Protein, total 6.9 07/03/2019 11:28 AM    Albumin 4.5 07/03/2019 11:28 AM    Globulin 3.0 03/30/2010 08:48 AM    A-G Ratio 1.7 03/26/2019 09:38 AM    ALT (SGPT) 19 07/03/2019 11:28 AM           ASSESSMENT/RECOMMENDATIONS:.        1. CAD, S/p CABG 2005.   - Nuclear stress test 5/19 suggested a very small area of ischemia with EF 67%, compared to 2015 there was minimal change. Echo demonstrated EF normal at 61-65%. cardiac testing demonstrated inferior defect from the past unchanged.  -Continue risk factor modification  - I do no think he needs to under go cath. His EF is normal by echo  - Will check ProBNP today    1. HTN  - BP is low today. Decrease Lisinopril to 10mg. On recheck today it was 110/50    2. Dyslipidemia  -Add Zetia 10mg/d to his regimen to get his LDL under 70  - Will recheck again in 2 months. 3. DM  - On insulin. - A1c goal should be 6.0  - Carotids were normal, 0-9% bilaterally. 4. LEONA  - Continue wearing C-PAP machine, 12 mercury. Return in 6 months or PRN      Orders Placed This Encounter    HEPATIC FUNCTION PANEL     Standing Status:   Future     Standing Expiration Date:   7/10/2020    LIPID PANEL    NT-PRO BNP    ezetimibe (ZETIA) 10 mg tablet     Sig: Take 1 Tab by mouth daily.      Dispense:  30 Tab     Refill:  5 Follow-up and Dispositions  ·   Return in about 6 months (around 1/10/2020). I have discussed the diagnosis with  [place] and the intended plan as seen in the above orders. Questions were answered concerning future plans. I have discussed medication side effects and warnings with the patient as well. Thank you,  Jeevan Deutsch MD for involving me in the care of  [place]. Please do not hesitate to contact me for further questions/concerns. Written by Lulu Lorenzana, as dictated by Wali Harrington MD.      Kevyn Arellano. Brenna Morfin MD, [facility] [place]    Patient Care Team:  Jeevan Deutsch MD as PCP - General (Family Practice)  Ruperto Dawson MD as Surgeon (Surgery)  Bala Arguelles MD (Urology)    [address]      [phone] / [phone] Fax

## 2019-07-10 NOTE — PROGRESS NOTES
Griselda Popper is a 80 y.o. male    Chief Complaint   Patient presents with    Shortness of Breath     follow up     1. Have you been to the ER, urgent care clinic since your last visit? Hospitalized since your last visit? No    2. Have you seen or consulted any other health care providers outside of the [address] [place] [address] since your last visit? Include any pap smears or colon screening.  No     .  Visit Vitals  /54 (BP 1 Location: Right arm, BP Patient Position: Sitting)   Pulse 60   Resp 17   Ht 5' 8\" (1.727 m)   Wt 253 lb 3.2 oz (114.9 kg)   SpO2 98%   BMI 38.50 kg/m²

## 2019-07-11 LAB — NT-PROBNP SERPL-MCNC: 64 PG/ML (ref 0–376)

## 2019-07-25 ENCOUNTER — OFFICE VISIT (OUTPATIENT)
Dept: CARDIOLOGY CLINIC | Age: 69
End: 2019-07-25

## 2019-07-25 VITALS
HEIGHT: 68 IN | DIASTOLIC BLOOD PRESSURE: 50 MMHG | SYSTOLIC BLOOD PRESSURE: 120 MMHG | HEART RATE: 64 BPM | BODY MASS INDEX: 38.65 KG/M2 | WEIGHT: 255 LBS

## 2019-07-25 DIAGNOSIS — I25.10 CORONARY ARTERY DISEASE INVOLVING NATIVE CORONARY ARTERY OF NATIVE HEART WITHOUT ANGINA PECTORIS: Primary | ICD-10-CM

## 2019-07-25 RX ORDER — LISINOPRIL 10 MG/1
TABLET ORAL 2 TIMES DAILY
COMMUNITY
End: 2019-07-25 | Stop reason: SDUPTHER

## 2019-07-25 RX ORDER — LISINOPRIL 10 MG/1
10 TABLET ORAL 2 TIMES DAILY
Qty: 180 TAB | Refills: 3 | Status: SHIPPED | OUTPATIENT
Start: 2019-07-25 | End: 2019-09-02 | Stop reason: SDUPTHER

## 2019-07-25 RX ORDER — EZETIMIBE 10 MG/1
10 TABLET ORAL DAILY
Qty: 90 TAB | Refills: 3 | Status: SHIPPED | OUTPATIENT
Start: 2019-07-25 | End: 2020-07-13

## 2019-07-25 NOTE — PROGRESS NOTES
LAST VISIT: 7/10/19    No diagnosis found. Juana Kim is a 80 y.o. male with diabetes, hypercholesterolemia, CAD, LEONA, and HTN last seen by me 2 weeks ago    Cardiac risk factors: diabetes mellitus, male gender, hypertension  I have personally obtained the history from the patient. HISTORY OF PRESENTING ILLNESS     Juana Kim reports doing well and he comes in with his BP readings. He does have some increase in lower extermity edema. No chest pain or shortness of breath. He knows his BNP is 65. Adherent with CPAP    Due to venous insufficiency, he wears compression stockings when he stands for long periods of time or flies. The patient denies exertional chest pain, orthopnea, palpitations, syncope, presyncope or fatigue.          ACTIVE PROBLEM LIST     Patient Active Problem List    Diagnosis Date Noted    Severe obesity ([facility] 06/09/2019    Dysthymia 09/26/2018    Obesity, Class II, BMI 35-39.9 06/18/2018    Type 2 diabetes mellitus with microalbuminuria, with long-term current use of insulin ([facility] 03/12/2018    Pure hypercholesterolemia 05/04/2017    Mild intermittent asthma without complication 03/13/2753    Advance care planning 01/24/2017    Essential hypertension with goal blood pressure less than 130/80 09/28/2016    Diabetes mellitus type 2, controlled ([facility] 09/28/2016    S/P excision of lipoma 05/25/2016    S/P excision of skin lesion, follow-up exam 05/25/2016    LEONA (obstructive sleep apnea) 07/16/2010    CAD (coronary artery disease) 12/01/2008    Hypogonadism male 12/01/2008    AR (allergic rhinitis) 12/01/2008    ED (erectile dysfunction) 12/01/2008           PAST MEDICAL HISTORY     Past Medical History:   Diagnosis Date    AR (allergic rhinitis) 12/01/2008    sees allergist for shots    Arthritis     Blind right eye     BPH (benign prostatic hyperplasia)     Dr. Lawson Pineda CAD (coronary artery disease)     CABG - 3 vessel    Depression     DM type 2 (diabetes mellitus, type 2) ([facility]     30 years    Essential hypertension with goal blood pressure less than 130/80 9/28/2016    Hearing loss     Hypercholesteremia     Hypogonadism male 12/1/2008    Mild intermittent asthma without complication 30/85/5956    sees allergy    LEONA on CPAP 07/16/2010    Dr. Joaquina Webster    Severe obesity (BMI 35.0-39.9) 6/18/2018    Type 2 diabetes with nephropathy ([facility] 3/12/2018           PAST SURGICAL HISTORY     Past Surgical History:   Procedure Laterality Date    CABG, ARTERY-VEIN, THREE  2005    [place] Cid    HX CIRCUMCISION  2016    HX CYST REMOVAL  5/12/2016    neck and chest    HX HEENT  2012    right eye prosthesis    HX ORTHOPAEDIC      right shoulder surgery, rotator cuff repair    HX RETINAL DETACHMENT REPAIR  1984    R Blindness    HX RHINOPLASTY      septoplasty          ALLERGIES     Allergies   Allergen Reactions    Cefdinir Rash    Codeine Nausea Only          FAMILY HISTORY     Family History   Adopted: Yes   Family history unknown: Yes    negative for cardiac disease       SOCIAL HISTORY     Social History     Socioeconomic History    Marital status: SINGLE     Spouse name: Not on file    Number of children: Not on file    Years of education: Not on file    Highest education level: Associate degree: academic program   Occupational History     Comment: Technology     Comment: [employer]   Social Needs    Financial resource strain: Not hard at all   [employer] insecurity:     Worry: Never true     Inability: Never true   [employer] needs:     Medical: No     Non-medical: No   Tobacco Use    Smoking status: Passive Smoke Exposure - Never Smoker    Smokeless tobacco: Never Used   Substance and Sexual Activity    Alcohol use:  Yes     Alcohol/week: 5.0 standard drinks     Types: 6 Cans of beer per week     Comment: 1 beer or so per day    Drug use: No     Comment: occasionally smoked marijuana in the past    Sexual activity: Not Currently     Partners: Male Lifestyle    Physical activity:     Days per week: 2 days     Minutes per session: 40 min    Stress: To some extent   Relationships    Social connections:     Talks on phone: Three times a week     Gets together: Twice a week     Attends [employer] service: Never     Active member of club or organization: Yes     Attends meetings of clubs or organizations: More than 4 times per year     Relationship status: Living with partner         MEDICATIONS     Current Outpatient Medications   Medication Sig    ezetimibe (ZETIA) 10 mg tablet Take 1 Tab by mouth daily.  insulin glargine U-300 conc (TOUJEO SOLOSTAR U-300 INSULIN) 300 unit/mL (1.5 mL) inpn pen INJECT 40 UNITS UNDER THE SKIN DAILY    citalopram (CELEXA) 20 mg tablet TAKE 1 TABLET BY MOUTH EVERY DAY. WEAN AS DIRECTED    BREO ELLIPTA 200-25 mcg/dose inhaler INHALE 1 PUFF PO QD    montelukast (SINGULAIR) 10 mg tablet TAKE 1 TABLET DAILY    metoprolol tartrate (LOPRESSOR) 25 mg tablet TAKE 1 TABLET TWICE A DAY    rosuvastatin (CRESTOR) 40 mg tablet Take 1 Tab by mouth daily.  lisinopril (PRINIVIL, ZESTRIL) 20 mg tablet TAKE 1 TABLET TWICE A DAY    albuterol (PROVENTIL HFA, VENTOLIN HFA, PROAIR HFA) 90 mcg/actuation inhaler Take 2 Puffs by inhalation every four (4) hours as needed for Wheezing.  INVOKANA 300 mg tablet TAKE 1 TABLET DAILY    chlorthalidone (HYGROTEN) 25 mg tablet Take 1 Tab by mouth daily.  FREESTYLE SILVINO 14 DAY SENSOR kit 1 Units by Does Not Apply route every fourteen (14) days.  JANUMET 50-1,000 mg per tablet TAKE 1 TABLET TWICE A DAY WITH MEALS    HUMALOG KWIKPEN INSULIN 100 unit/mL kwikpen INJECT 15 UNITS UNDER THE SKIN BEFORE BREAKFAST, LUNCH AND DINNER ( BEFORE MEALS ) FOR TYPE 2 DIABETES MELLITUS    CIALIS 5 mg tablet Take 5 mg by mouth daily.  tamsulosin (FLOMAX) 0.4 mg capsule Take 0.4 mg by mouth daily.  fluticasone (FLONASE) 50 mcg/actuation nasal spray 2 Sprays by Both Nostrils route daily.  Indications: ALLERGIC RHINITIS    OMEGA-3 FATTY ACIDS/FISH OIL (FISH OIL EXTRA STRENGTH PO) Take 1,400 mg by mouth daily (after breakfast).  ketoconazole (NIZORAL) 2 % topical cream Apply  to affected area as needed for Skin Irritation.  VOLTAREN 1 % gel every six (6) hours.  desonide (TRIDESILON) 0.05 % topical lotion as needed.  cetirizine (ZYRTEC) 10 mg tablet Take 1 Tab by mouth daily.  aspirin (ASPIRIN) 325 mg tablet take 325 mg by mouth daily.  M-VIT PO take 1 Tab by mouth daily. No current facility-administered medications for this visit. I have reviewed the nurses notes, vitals, problem list, allergy list, medical history, family, social history and medications. REVIEW OF SYMPTOMS      General: Pt denies excessive weight gain or loss. Pt is able to conduct ADL's  HEENT: Denies blurred vision, headaches, hearing loss, epistaxis and difficulty swallowing. Respiratory: Denies cough or stridor. +SOB, wheezing  Cardiovascular: Denies, palpitations. >1+ edema  Gastrointestinal: Denies poor appetite, indigestion, abdominal pain or blood in stool  Genitourinary: Denies hematuria, dysuria, increased urinary frequency  Musculoskeletal: Denies joint pain or swelling from muscles or joints  Neurologic: Denies tremor, paresthesias, headache, or sensory motor disturbance  Psychiatric: Denies confusion, insomnia, depression  Integumentray: Denies rash, itching or ulcers. Hematologic: Denies easy bruising, bleeding     PHYSICAL EXAMINATION      Visit Vitals  /50   Pulse 64   Ht 5' 8\" (1.727 m)   Wt 255 lb (115.7 kg)   BMI 38.77 kg/m²         General: Well developed, in no acute distress. HEENT: No jaundice, oral mucosa moist, no oral ulcers  Neck: Supple, no stiffness, no lymphadenopathy, supple  Heart:  RRR  Respiratory: Clear bilaterally x 4, no wheezing or rales  Abdomen:   Soft, non-tender, bowel sounds are active. Extremities:  No  normal cap refill, no cyanosis.  +trace pedal edema;discolorization from hemosiderin. Musculoskeletal: No clubbing, no deformities  Neuro: A&Ox3, speech clear, gait stable, cooperative, no focal neurologic deficits  Skin: Skin color is normal. No rashes or lesions. Non diaphoretic, moist.        EKG: Sinus brachycardia with first degree AV block of 216 seconds. DIAGNOSTIC DATA     1. Stress Test  NM- 3/17/15- no ischemia, EF 65%  NM- 5/29/19-· Abnormal stress myocardial perfusion with very small area of mild perfusion defect of the mid anterior wall and mid infeiror wall with SDS 2 which is reversible. This suggest very small area of focal ischemia. Abnormal septal motion consistent with prior cardiac surgery. Normal wall motion with normal LV function with LVEF 67%. · Excellent functional capacity. · Compared to prior study of 3/17/2015, there is minimal change. The small anterior mid wall stress perfusion defect may be new    2. LE Venous Doppler  6/30/18- no DVT isaak    3. Lipids  3/26/19- , HDL 51, ,   7/3/19- , HDL 43, LDL 85,     4. Carotid Doppler  5/31/19- 0-9% R/L    5. Echo  5/29/19- EF 61-65%         LABORATORY DATA            Lab Results   Component Value Date/Time    WBC 6.5 05/09/2016 01:50 PM    HGB 13.9 05/09/2016 01:50 PM    HCT 43.2 05/09/2016 01:50 PM    PLATELET 916 (L) 67/49/7688 01:50 PM    MCV 89.3 05/09/2016 01:50 PM      Lab Results   Component Value Date/Time    Sodium 136 03/26/2019 09:38 AM    Potassium 5.0 03/26/2019 09:38 AM    Chloride 101 03/26/2019 09:38 AM    CO2 22 03/26/2019 09:38 AM    Anion gap 6 05/09/2016 01:50 PM    Glucose 163 (H) 03/26/2019 09:38 AM    BUN 29 (H) 03/26/2019 09:38 AM    Creatinine 1.24 03/26/2019 09:38 AM    BUN/Creatinine ratio 23 03/26/2019 09:38 AM    GFR est AA 69 03/26/2019 09:38 AM    GFR est non-AA 60 03/26/2019 09:38 AM    Calcium 9.8 03/26/2019 09:38 AM    Bilirubin, total 0.4 07/03/2019 11:28 AM    AST (SGOT) 23 07/03/2019 11:28 AM    Alk.  phosphatase 46 07/03/2019 11:28 AM    Protein, total 6.9 07/03/2019 11:28 AM    Albumin 4.5 07/03/2019 11:28 AM    Globulin 3.0 03/30/2010 08:48 AM    A-G Ratio 1.7 03/26/2019 09:38 AM    ALT (SGPT) 19 07/03/2019 11:28 AM           ASSESSMENT/RECOMMENDATIONS:.        1. CAD, S/p CABG 2005.   - no chest pain and workup essentially negative    1. HTN  - BP better on his readings and mine today. 118/58    2. Dyslipidemia  -Add Zetia 10mg/d to his regimen to get his LDL under 70  - Will recheck again in Sept.    3. DM  - On insulin. - A1c goal should be 6.0  - Carotids were normal, 0-9% bilaterally. 4. LEONA  - Continue wearing C-PAP machine, 12 mercury. Return in 6 months or PRN      No orders of the defined types were placed in this encounter. I have discussed the diagnosis with  Ulices Olivarez and the intended plan as seen in the above orders. Questions were answered concerning future plans. I have discussed medication side effects and warnings with the patient as well. Thank you,  Raymond Correia MD for involving me in the care of  Ulices Olivarez. Please do not hesitate to contact me for further questions/concerns. Written by Jose Yi, as dictated by Roxi Cavanaugh MD.       Dameon Ramos. Rissa Schumacher MD, [facility] [place]    Patient Care Team:  Raymond Correia MD as PCP - General (Family Practice)  Pola Gamboa., MD as Surgeon (Surgery)  Korin Farmer MD (Urology)    [address]      [phone] / [phone] Fax

## 2019-07-25 NOTE — LETTER
7/25/19 Patient: David Toth YOB: 1950 Date of Visit: 7/25/2019 Colby Plasencia MD 
[address] [facility] [address] [id number] VIA In Basket Dear Colby Plasencia MD, Thank you for referring Mr. Marshall Richter to [facility] for evaluation. My notes for this consultation are attached. If you have questions, please do not hesitate to call me. I look forward to following your patient along with you.  
 
 
Sincerely, 
 
Do Ontiveros MD

## 2019-08-07 NOTE — TELEPHONE ENCOUNTER
Spoke with Alma Stacy at [facility] to provide him with patient's new PCP information. Per Alma Stacy, they cannot accept new PCP information over the phone. A prescription will have to be submitted from the new PCP for the [facility] records to be updated. Nurse informed Alma Stacy that the medication refill requests that were received from [facility] have been refused with the comment, 'patient no longer under this provider's care.' Alma Stacy verbalized agreement that this was the correct process.

## 2019-08-08 DIAGNOSIS — R42 POSTURAL DIZZINESS WITH PRESYNCOPE: ICD-10-CM

## 2019-08-08 DIAGNOSIS — R55 POSTURAL DIZZINESS WITH PRESYNCOPE: ICD-10-CM

## 2019-08-08 NOTE — TELEPHONE ENCOUNTER
Requested Prescriptions     Pending Prescriptions Disp Refills    SITagliptin-metFORMIN (JANUMET) 50-1,000 mg per tablet 180 Tab 1    montelukast (SINGULAIR) 10 mg tablet 90 Tab 0    metoprolol tartrate (LOPRESSOR) 25 mg tablet 180 Tab 0     Sig: TAKE 1 TABLET TWICE A DAY    insulin lispro (HUMALOG KWIKPEN INSULIN) 100 unit/mL kwikpen 45 mL 1     90 day supplies.

## 2019-08-09 DIAGNOSIS — R55 POSTURAL DIZZINESS WITH PRESYNCOPE: ICD-10-CM

## 2019-08-09 DIAGNOSIS — R42 POSTURAL DIZZINESS WITH PRESYNCOPE: ICD-10-CM

## 2019-08-09 RX ORDER — MONTELUKAST SODIUM 10 MG/1
TABLET ORAL
Qty: 90 TAB | Refills: 0 | Status: CANCELLED | OUTPATIENT
Start: 2019-08-09

## 2019-08-09 RX ORDER — INSULIN LISPRO 100 [IU]/ML
INJECTION, SOLUTION INTRAVENOUS; SUBCUTANEOUS
Qty: 45 ML | Refills: 1 | Status: SHIPPED | OUTPATIENT
Start: 2019-08-09 | End: 2019-08-27 | Stop reason: SDUPTHER

## 2019-08-09 RX ORDER — METOPROLOL TARTRATE 25 MG/1
TABLET, FILM COATED ORAL
Qty: 180 TAB | Refills: 0 | Status: SHIPPED | OUTPATIENT
Start: 2019-08-09 | End: 2019-09-30 | Stop reason: SDUPTHER

## 2019-08-09 RX ORDER — MONTELUKAST SODIUM 10 MG/1
TABLET ORAL
Qty: 90 TAB | Refills: 0 | Status: SHIPPED | OUTPATIENT
Start: 2019-08-09 | End: 2019-11-03 | Stop reason: SDUPTHER

## 2019-08-09 RX ORDER — METOPROLOL TARTRATE 25 MG/1
TABLET, FILM COATED ORAL
Qty: 180 TAB | Refills: 0 | Status: CANCELLED | OUTPATIENT
Start: 2019-08-09

## 2019-08-09 RX ORDER — INSULIN LISPRO 100 [IU]/ML
INJECTION, SOLUTION INTRAVENOUS; SUBCUTANEOUS
Qty: 45 ML | Refills: 1 | Status: CANCELLED | OUTPATIENT
Start: 2019-08-09

## 2019-08-09 NOTE — TELEPHONE ENCOUNTER
--[phone]    Patient called for update and is now just about out of the humalog. Please order a 30 day supply of this at  [facility]. Patient is leaving town on a plane this afternoon and will be out of town next week. Please contact patient as soon as possible.

## 2019-08-11 DIAGNOSIS — I10 ESSENTIAL HYPERTENSION WITH GOAL BLOOD PRESSURE LESS THAN 130/80: Chronic | ICD-10-CM

## 2019-08-12 RX ORDER — CHLORTHALIDONE 25 MG/1
25 TABLET ORAL DAILY
Qty: 90 TAB | Refills: 1 | Status: SHIPPED | OUTPATIENT
Start: 2019-08-12 | End: 2020-02-05 | Stop reason: ALTCHOICE

## 2019-08-12 NOTE — TELEPHONE ENCOUNTER
From: Eleuterio Rodriguez  Sent: 8/11/2019 8:53 PM EDT  Subject: Medication Renewal Request    Eleuterio Jennifer would like a refill of the following medications:    Other - n/a    Preferred pharmacy: [address] [place] [address]      Medication renewals requested in this message routed separately:     chlorthalidone (HYGROTEN) 25 mg tablet Maksim Flood MD]   Patient Comment: Please refer the above request to Dr Treva Bejarano since I no longer see  [facility]

## 2019-08-29 RX ORDER — INSULIN LISPRO 100 [IU]/ML
INJECTION, SOLUTION INTRAVENOUS; SUBCUTANEOUS
Qty: 14 PEN | Refills: 1 | Status: SHIPPED | OUTPATIENT
Start: 2019-08-29 | End: 2019-09-30 | Stop reason: CLARIF

## 2019-08-29 NOTE — TELEPHONE ENCOUNTER
[phone]    Patient is now switching this medication to mail order as it is cheaper than getting through local pharmacy. Please order correctly through mail order. Said he is getting low and this request should go to Dr. Bruna Jackson and not Dr. Leon Aguirre.

## 2019-09-03 RX ORDER — LISINOPRIL 10 MG/1
10 TABLET ORAL 2 TIMES DAILY
Qty: 180 TAB | Refills: 3 | Status: SHIPPED | OUTPATIENT
Start: 2019-09-03 | End: 2020-08-27

## 2019-09-03 NOTE — TELEPHONE ENCOUNTER
Refill for lisinopril 10 mg BID per Dr. Melody Laughlin.    Lieutenant Gutiérrez 7/25/19  NOV 1/9/20

## 2019-09-20 ENCOUNTER — HOSPITAL ENCOUNTER (OUTPATIENT)
Dept: LAB | Age: 69
Discharge: HOME | End: 2019-09-20
Payer: MEDICARE

## 2019-09-20 PROCEDURE — 36415 COLL VENOUS BLD VENIPUNCTURE: CPT

## 2019-09-20 PROCEDURE — 80076 HEPATIC FUNCTION PANEL: CPT

## 2019-09-20 PROCEDURE — 80061 LIPID PANEL: CPT

## 2019-09-21 LAB
ALBUMIN SERPL-MCNC: 4.3 G/DL (ref 3.6–4.8)
ALP SERPL-CCNC: 36 IU/L (ref 39–117)
ALT SERPL-CCNC: 26 IU/L (ref 0–44)
AST SERPL-CCNC: 27 IU/L (ref 0–40)
BILIRUB DIRECT SERPL-MCNC: 0.14 MG/DL (ref 0–0.4)
BILIRUB SERPL-MCNC: 0.4 MG/DL (ref 0–1.2)
CHOLEST SERPL-MCNC: 94 MG/DL (ref 100–199)
HDLC SERPL-MCNC: 33 MG/DL
INTERPRETATION, 910389: NORMAL
LDLC SERPL CALC-MCNC: 35 MG/DL (ref 0–99)
PROT SERPL-MCNC: 6.7 G/DL (ref 6–8.5)
TRIGL SERPL-MCNC: 131 MG/DL (ref 0–149)
VLDLC SERPL CALC-MCNC: 26 MG/DL (ref 5–40)

## 2019-09-23 DIAGNOSIS — I25.10 CORONARY ARTERY DISEASE INVOLVING NATIVE CORONARY ARTERY OF NATIVE HEART WITHOUT ANGINA PECTORIS: ICD-10-CM

## 2019-09-23 DIAGNOSIS — E78.5 DYSLIPIDEMIA: Primary | ICD-10-CM

## 2019-09-30 ENCOUNTER — TELEPHONE (OUTPATIENT)
Dept: FAMILY MEDICINE CLINIC | Age: 69
End: 2019-09-30

## 2019-09-30 ENCOUNTER — OFFICE VISIT (OUTPATIENT)
Dept: FAMILY MEDICINE CLINIC | Age: 69
End: 2019-09-30

## 2019-09-30 ENCOUNTER — HOSPITAL ENCOUNTER (OUTPATIENT)
Dept: LAB | Age: 69
Discharge: HOME | End: 2019-09-30
Payer: MEDICARE

## 2019-09-30 VITALS
HEART RATE: 61 BPM | RESPIRATION RATE: 18 BRPM | SYSTOLIC BLOOD PRESSURE: 102 MMHG | HEIGHT: 68 IN | WEIGHT: 255 LBS | DIASTOLIC BLOOD PRESSURE: 46 MMHG | OXYGEN SATURATION: 96 % | TEMPERATURE: 98.9 F | BODY MASS INDEX: 38.65 KG/M2

## 2019-09-30 DIAGNOSIS — E11.21 TYPE 2 DIABETES WITH NEPHROPATHY (HCC): ICD-10-CM

## 2019-09-30 DIAGNOSIS — Z79.4 TYPE 2 DIABETES MELLITUS WITH MICROALBUMINURIA, WITH LONG-TERM CURRENT USE OF INSULIN (HCC): ICD-10-CM

## 2019-09-30 DIAGNOSIS — Z13.31 SCREENING FOR DEPRESSION: ICD-10-CM

## 2019-09-30 DIAGNOSIS — Z13.39 SCREENING FOR ALCOHOLISM: ICD-10-CM

## 2019-09-30 DIAGNOSIS — I10 ESSENTIAL HYPERTENSION WITH GOAL BLOOD PRESSURE LESS THAN 130/80: Chronic | ICD-10-CM

## 2019-09-30 DIAGNOSIS — J45.20 MILD INTERMITTENT ASTHMA WITHOUT COMPLICATION: ICD-10-CM

## 2019-09-30 DIAGNOSIS — R80.9 TYPE 2 DIABETES MELLITUS WITH MICROALBUMINURIA, WITH LONG-TERM CURRENT USE OF INSULIN (HCC): ICD-10-CM

## 2019-09-30 DIAGNOSIS — Z00.00 MEDICARE ANNUAL WELLNESS VISIT, SUBSEQUENT: Primary | ICD-10-CM

## 2019-09-30 DIAGNOSIS — R42 POSTURAL DIZZINESS WITH PRESYNCOPE: ICD-10-CM

## 2019-09-30 DIAGNOSIS — L23.7 POISON IVY: Primary | ICD-10-CM

## 2019-09-30 DIAGNOSIS — E29.1 HYPOGONADISM MALE: Chronic | ICD-10-CM

## 2019-09-30 DIAGNOSIS — L23.7 POISON IVY: ICD-10-CM

## 2019-09-30 DIAGNOSIS — E11.29 TYPE 2 DIABETES MELLITUS WITH MICROALBUMINURIA, WITH LONG-TERM CURRENT USE OF INSULIN (HCC): ICD-10-CM

## 2019-09-30 DIAGNOSIS — R55 POSTURAL DIZZINESS WITH PRESYNCOPE: ICD-10-CM

## 2019-09-30 PROCEDURE — 85027 COMPLETE CBC AUTOMATED: CPT

## 2019-09-30 PROCEDURE — 80048 BASIC METABOLIC PNL TOTAL CA: CPT

## 2019-09-30 PROCEDURE — 83036 HEMOGLOBIN GLYCOSYLATED A1C: CPT

## 2019-09-30 PROCEDURE — 36415 COLL VENOUS BLD VENIPUNCTURE: CPT

## 2019-09-30 PROCEDURE — 82043 UR ALBUMIN QUANTITATIVE: CPT

## 2019-09-30 RX ORDER — INSULIN ASPART 100 [IU]/ML
INJECTION, SOLUTION INTRAVENOUS; SUBCUTANEOUS
Qty: 18 PEN | Refills: 5 | Status: SHIPPED | OUTPATIENT
Start: 2019-09-30 | End: 2020-01-08 | Stop reason: SDUPTHER

## 2019-09-30 RX ORDER — METOPROLOL TARTRATE 25 MG/1
TABLET, FILM COATED ORAL
Qty: 180 TAB | Refills: 0 | Status: SHIPPED | OUTPATIENT
Start: 2019-09-30 | End: 2020-02-06

## 2019-09-30 RX ORDER — CLOMIPHENE CITRATE 50 MG/1
50 TABLET ORAL DAILY
COMMUNITY
End: 2020-08-10 | Stop reason: ALTCHOICE

## 2019-09-30 RX ORDER — PEN NEEDLE, DIABETIC 30 GX3/16"
NEEDLE, DISPOSABLE MISCELLANEOUS
Qty: 1 PACKAGE | Refills: 11 | Status: SHIPPED | OUTPATIENT
Start: 2019-09-30 | End: 2021-02-22 | Stop reason: SDUPTHER

## 2019-09-30 RX ORDER — PREDNISONE 10 MG/1
TABLET ORAL
Qty: 21 TAB | Refills: 0 | Status: SHIPPED | OUTPATIENT
Start: 2019-09-30 | End: 2019-10-01 | Stop reason: CLARIF

## 2019-09-30 RX ORDER — FLUTICASONE FUROATE AND VILANTEROL TRIFENATATE 200; 25 UG/1; UG/1
POWDER RESPIRATORY (INHALATION)
Qty: 3 EACH | Refills: 1 | Status: SHIPPED | OUTPATIENT
Start: 2019-09-30 | End: 2020-08-10 | Stop reason: ALTCHOICE

## 2019-09-30 NOTE — TELEPHONE ENCOUNTER
Patient was to have a steroid sent to the pharmacy along with his other medications from his appt today 09/30/19. Please send to the [facility] on file asap.

## 2019-09-30 NOTE — PROGRESS NOTES
Identified Patient with two Patient identifiers (Name and ). Two Patient Identifiers confirmed. Reviewed record in preparation for visit and have obtained necessary documentation. Chief Complaint   Patient presents with    Annual Wellness Visit    Skin Problem       Visit Vitals  /46 (BP 1 Location: Left arm, BP Patient Position: Sitting)   Pulse 61   Temp 98.9 °F (37.2 °C) (Oral)   Resp 18   Ht 5' 8\" (1.727 m)   Wt 255 lb (115.7 kg)   SpO2 96%   BMI 38.77 kg/m²       1. Have you been to the ER, urgent care clinic since your last visit? Hospitalized since your last visit? No    2. Have you seen or consulted any other health care providers outside of the [address] [place] [address] since your last visit? Include any pap smears or colon screening. - Patient seen by [facility] for rash yesterday. Patient got into some sort of poison ivy or poison oak last week. General Health Questions   -During the past 4 weeks:   -how would you rate your health in general? Fair   -how often have you been bothered by feeling dizzy when standing up? None.   -how much have you been bothered by bodily pain? not   -Have you noticed any hearing difficulties? Patient wears hearing aids. -has your physical and emotional health limited your social activities with family or friends? no    Emotional Health Questions   -Do you have a history of depression, anxiety, or emotional problems? yes  -Over the past 2 weeks, have you felt down, depressed or hopeless? no  -Over the past 2 weeks, have you felt little interest or pleasure in doing things? no    Health Habits   Please describe your diet habits: 3 meals a day and 1 snack - normal diet. Do you get 5 servings of fruits or vegetables daily? no  Do you exercise regularly?  no    Activities of Daily Living and Functional Status   -Do you need help with eating, walking, dressing, bathing, toileting, the phone, transportation, shopping, preparing meals, housework, laundry, medications or managing money? no  -In the past four weeks, was someone available to help you if you needed and wanted help with anything? yes  -Are you confident are you that you can control and manage most of your health problems? yes  -Have you been given information to help you keep track of your medications? yes  -How often do you have trouble taking your medications as prescribed? never    Fall Risk and Home Safety   Have you fallen 2 or more times in the past year? yes  Does your home have rugs in the hallways? no, Do you have grab bars in the bathrooms?  no, Does your home have handrails on the stairs? No stairs, Do you have adequate lighting in your home? yes  Do you have smoke detectors and check them regularly?  yes  Do you have difficulties driving a car/vehicle? no  Do you always fasten your seat belt when you are in a car? yes

## 2019-09-30 NOTE — PROGRESS NOTES
Maxine Hoffman  77 y.o. male  1950  [id number]  [facility] [id number]  [address] [place] [address] Ne: Progress Note  Lizzie Mcgovern MD       Encounter Date: 9/30/2019    Chief Complaint   Patient presents with    Annual Wellness Visit    Skin Problem     History of Present Illness   Maxine Hoffman is a 77 y.o. male who presents to clinic today for the Subsequent Medicare Annual Wellness Visit providing Personalized Prevention Plan Services (PPPS) (Performed 12 months after initial AWV and PPPS )      Concerns today   (Patient understands that medical problems addressed today may incur additional cost as this is a preventive visit)  Following up on chronic conditions. HTN: Stable. Denies chest pain, palpitations, lightheadedness  DM: Stable. Last A1c 7.1. Key Antihyperglycemic Medications             insulin glargine U-300 conc (TOUJEO SOLOSTAR U-300 INSULIN) 300 unit/mL (1.5 mL) inpn pen (Taking) INJECT 40 UNITS EVERY MORNING AND 20 UNITS EVERY EVENING    insulin aspart U-100 (NOVOLOG FLEXPEN U-100 INSULIN) 100 unit/mL (3 mL) inpn (Taking) If Glucose: 150-199: Take 5 units 200-249: Take 15 units 250-299: Take 20 units 300+: take 25 units    canagliflozin (INVOKANA) 300 mg tablet (Taking) Take 1 Tab by mouth Daily (before breakfast). SITagliptin-metFORMIN (JANUMET) 50-1,000 mg per tablet (Taking) TAKE 1 TABLET TWICE A DAY WITH MEALS        Hypogonadism:  Postural Dizziness  Mild asthma: Stable    Rash: blistering rash in streaks    Specialists/Care Team   Maxine Hofmfan has established care with the following healthcare providers:  Patient Care Team:  Wily Kaplan MD as PCP - General (Family Practice)  Wily Kaplan MD as PCP - [facility] Provider  Mauricio Byers MD as Surgeon (Surgery)  Jessica Mcneill MD (Urology)      Depression Risk Factor Screening:   PHQ-2 negative    Alcohol Risk Factor Screening:    On any occasion in the past three months you have had more than 7 drinks containing alcohol per week      Functional Ability and Level of Safety:     Hearing Loss   Uses hearing aids    Activities of Daily Living   Self-care. Requires assistance with: no ADLs    Fall Risk     Fall Risk Assessment, last 12 mths 9/30/2019   Able to walk? Yes   Fall in past 12 months? Yes   Fall with injury?  No   Number of falls in past 12 months 2   Fall Risk Score 2       Patient is not abused      Advice/Referrals/Counseling (as indicated)   Education and counseling provided for any problems identified above:      Preventive Services     Immunization History   Administered Date(s) Administered    (RETIRED) Pneumococcal Vaccine (Unspecified Type) 12/01/2005    H1N1 Influenza Virus Vaccine 01/01/2010    Influenza High Dose Vaccine PF 09/22/2017, 09/18/2019    Influenza Vaccine 10/05/2013, 10/02/2014, 10/26/2015, 08/30/2016    Influenza Vaccine (Tri) Adjuvanted 09/26/2018    Influenza Vaccine Split 10/17/2011    Influenza Vaccine Whole 09/12/2009, 10/01/2012    Pneumococcal Conjugate (PCV-13) 01/24/2017    Pneumococcal Polysaccharide (PPSV-23) 03/18/2018    Pneumococcal Vaccine (Unspecified Type) 10/01/2012    TD Vaccine 12/01/2007    Td 08/31/2017    Zoster Recombinant 10/19/2018, 10/19/2018, 12/27/2018    Zoster Vaccine, Live 04/22/2011     Health Maintenance   Topic Date Due    DTaP/Tdap/Td series (1 - Tdap) 08/31/2027 (Originally 9/1/2017)    FOOT EXAM Q1  03/26/2020    HEMOGLOBIN A1C Q6M  03/30/2020    COLONOSCOPY  07/25/2020    GLAUCOMA SCREENING Q2Y  08/09/2020    EYE EXAM RETINAL OR DILATED  08/09/2020    LIPID PANEL Q1  09/20/2020    MEDICARE YEARLY EXAM  09/30/2020    MICROALBUMIN Q1  09/30/2020    Hepatitis C Screening  Completed    Shingrix Vaccine Age 50>  Completed    Influenza Age 5 to Adult  Completed    Pneumococcal 65+ years  Completed     Discussion of Advance Directive   Discussed with Jt Calderón his ability to prepare and advance directive in the case that an injury or illness causes him to be unable to make health care decisions. Review of Systems   Review of Systems   Constitutional: Negative for chills and fever. HENT: Negative for congestion and sore throat. Eyes: Negative for blurred vision and double vision. Respiratory: Negative for cough, shortness of breath and wheezing. Cardiovascular: Negative for chest pain and palpitations. Gastrointestinal: Negative for abdominal pain, constipation, diarrhea and nausea. Genitourinary: Negative for dysuria and hematuria. Musculoskeletal: Negative for back pain, falls and myalgias. Skin: Negative for rash. Neurological: Negative for dizziness, tremors and headaches. Psychiatric/Behavioral: Negative for depression. The patient is not nervous/anxious. All other systems reviewed and are negative. Vitals/Objective:     Vitals:    09/30/19 1304   BP: 102/46   Pulse: 61   Resp: 18   Temp: 98.9 °F (37.2 °C)   TempSrc: Oral   SpO2: 96%   Weight: 255 lb (115.7 kg)   Height: 5' 8\" (1.727 m)     Body mass index is 38.77 kg/m². General: Patient alert and oriented and in NAD  HEENT: PER/EOMI, no conjunctival pallor or scleral icterus. No thyromegaly or cervical lymphadenopathy  Heart: Regular rate and rhythm, No murmurs, rubs or gallops. 2+ peripheral pulses  Lungs: Clear to auscultation bilaterally, no wheezing, rales or rhonchi  Abd: +BS, non-tender, non-distended  Ext: No edema  Skin: No rashes or lesions noted on exposed skin,  Psych: Appropriate mood and affect      Evaluation of Cognitive Function   Mood/affect:  happy  Orientation: Person, Place, Time and Situation  Appearance: age appropriate and casually dressed  Family member/caregiver input: n/a    No results found for this or any previous visit (from the past 24 hour(s)). Assessment and Plan:   1.  Medicare annual wellness visit, subsequent  Shabana Ayers was counseled on age-appropriate/ guideline-based risk prevention behaviors and screening for a 77y.o. year old   male . We also discussed adjustments in screening based on family history if necessary. Printed instructions for preventative screening guidelines and healthy behaviors given to patient with after visit summary. 2. Screening for alcoholism  - FL ANNUAL ALCOHOL SCREEN 15 MIN    3. Screening for depression  - DEPRESSION SCREEN ANNUAL    4. Essential hypertension with goal blood pressure less than 130/80  Key CAD CHF Meds             metoprolol tartrate (LOPRESSOR) 25 mg tablet (Taking) TAKE 1 TABLET TWICE A DAY    lisinopril (PRINIVIL, ZESTRIL) 10 mg tablet (Taking) Take 1 Tab by mouth two (2) times a day. chlorthalidone (HYGROTEN) 25 mg tablet (Taking) Take 1 Tab by mouth daily. ezetimibe (ZETIA) 10 mg tablet (Taking) Take 1 Tab by mouth daily. rosuvastatin (CRESTOR) 40 mg tablet (Taking) Take 1 Tab by mouth daily. OMEGA-3 FATTY ACIDS/FISH OIL (FISH OIL EXTRA STRENGTH PO) (Taking) Take 1,400 mg by mouth daily (after breakfast). aspirin (ASPIRIN) 325 mg tablet (Taking) take 325 mg by mouth daily. 5. Type 2 diabetes mellitus with microalbuminuria, with long-term current use of insulin (HCC)  Key Antihyperglycemic Medications             insulin glargine U-300 conc (TOUJEO SOLOSTAR U-300 INSULIN) 300 unit/mL (1.5 mL) inpn pen (Taking) INJECT 40 UNITS EVERY MORNING AND 20 UNITS EVERY EVENING    insulin aspart U-100 (NOVOLOG FLEXPEN U-100 INSULIN) 100 unit/mL (3 mL) inpn (Taking) If Glucose: 150-199: Take 5 units 200-249: Take 15 units 250-299: Take 20 units 300+: take 25 units    canagliflozin (INVOKANA) 300 mg tablet (Taking) Take 1 Tab by mouth Daily (before breakfast).     SITagliptin-metFORMIN (JANUMET) 50-1,000 mg per tablet (Taking) TAKE 1 TABLET TWICE A DAY WITH MEALS        - CBC W/O DIFF  - METABOLIC PANEL, BASIC  - HEMOGLOBIN A1C WITH EAG  - MICROALBUMIN, UR, RAND W/ MICROALB/CREAT RATIO    6. Hypogonadism male  Follow-up labs. Followed by urology    7. Type 2 diabetes with nephropathy (HCC)  Refill insulin  - insulin glargine U-300 conc (TOUJEO SOLOSTAR U-300 INSULIN) 300 unit/mL (1.5 mL) inpn pen; INJECT 40 UNITS EVERY MORNING AND 20 UNITS EVERY EVENING  Dispense: 18 Pen; Refill: 5  - insulin aspart U-100 (NOVOLOG FLEXPEN U-100 INSULIN) 100 unit/mL (3 mL) inpn; If Glucose: 150-199: Take 5 units 200-249: Take 15 units 250-299: Take 20 units 300+: take 25 units  Dispense: 18 Pen; Refill: 5  - Insulin Needles, Disposable, 31 gauge x 5/16\" ndle; 5x daily with insulin. IDDMII. E11.21  Dispense: 1 Package; Refill: 11    8. Postural dizziness with presyncope  - metoprolol tartrate (LOPRESSOR) 25 mg tablet; TAKE 1 TABLET TWICE A DAY  Dispense: 180 Tab; Refill: 0    9. Mild intermittent asthma without complication  - BREO ELLIPTA 200-25 mcg/dose inhaler; INHALE 1 PUFF PO QD  Dispense: 3 Each; Refill: 1    10. Poison ivy  Discussed symptomatic treatment      I have discussed the diagnosis with the patient and the intended plan as seen in the above orders. he has expressed understanding. The patient has received an after-visit summary and questions were answered concerning future plans. I have discussed medication side effects and warnings with the patient as well. Follow-up and Dispositions  ·   Return in about 3 months (around 12/30/2019). Electronically Signed: Daxa Reynolds MD     History   Patients past medical, surgical and family histories were reviewed and updated.     Past Medical History:   Diagnosis Date    AR (allergic rhinitis) 12/01/2008    sees allergist for shots    Arthritis     Blind right eye     BPH (benign prostatic hyperplasia)     Dr. Bigg Jernigan CAD (coronary artery disease)     CABG - 3 vessel    Depression     [facility]di[facility]     30 years    Essential hypertension with goal blood pressure less than 130/80 9/28/2016  Hearing loss     Hypercholesteremia     Hypogonadism male 12/1/2008    Mild intermittent asthma without complication 96/28/4641    sees allergy    LEONA on CPAP 07/16/2010    Dr. Aida Hutton    Severe obesity (BMI 35.0-39.9) 6/18/2018    Type 2 diabetes with nephropathy ([facility] 3/12/2018     Past Surgical History:   Procedure Laterality Date    CABG, ARTERY-VEIN, THREE  2005    [place]    HX CIRCUMCISION  2016    HX CYST REMOVAL  5/12/2016    neck and chest    HX HEENT  2012    right eye prosthesis    HX ORTHOPAEDIC      right shoulder surgery, rotator cuff repair    HX RETINAL DETACHMENT REPAIR  1984    R Blindness    HX RHINOPLASTY      septoplasty     Family History   Adopted: Yes   Family history unknown: Yes     Social History     Socioeconomic History    Marital status: SINGLE     Spouse name: Not on file    Number of children: Not on file    Years of education: Not on file    Highest education level: Associate degree: academic program   Occupational History     Comment: Technology     Comment: [employer]   Social Needs    Financial resource strain: Not hard at all   [employer] insecurity:     Worry: Never true     Inability: Never true   [employer] needs:     Medical: No     Non-medical: No   Tobacco Use    Smoking status: Passive Smoke Exposure - Never Smoker    Smokeless tobacco: Never Used   Substance and Sexual Activity    Alcohol use: Yes     Alcohol/week: 5.0 standard drinks     Types: 6 Cans of beer per week     Comment: 1 beer or so per day    Drug use: No     Comment: occasionally smoked marijuana in the past    Sexual activity: Not Currently     Partners: Male   Lifestyle    Physical activity:     Days per week: 2 days     Minutes per session: 40 min    Stress: To some extent   Relationships    Social connections:     Talks on phone:  Three times a week     Gets together: Twice a week     Attends Adventism service: Never     Active member of club or organization: Yes     Attends meetings of clubs or organizations: More than 4 times per year     Relationship status: Living with partner    Intimate partner violence:     Fear of current or ex partner: No     Emotionally abused: No     Physically abused: No     Forced sexual activity: No   Other Topics Concern    Not on file   Social History Narrative    Not on file            Current Medications/Allergies     Current Outpatient Medications   Medication Sig Dispense Refill    canagliflozin (INVOKANA) 300 mg tablet Take 1 Tab by mouth Daily (before breakfast). 90 Tab 1    lisinopril (PRINIVIL, ZESTRIL) 10 mg tablet Take 1 Tab by mouth two (2) times a day. 180 Tab 3    insulin lispro (HUMALOG KWIKPEN INSULIN) 100 unit/mL kwikpen INJECT 15 UNITS UNDER THE SKIN BEFORE BREAKFAST, LUNCH AND DINNER ( BEFORE MEALS ) FOR TYPE 2 DIABETES MELLITUS 14 Pen 1    chlorthalidone (HYGROTEN) 25 mg tablet Take 1 Tab by mouth daily. 90 Tab 1    metoprolol tartrate (LOPRESSOR) 25 mg tablet TAKE 1 TABLET TWICE A  Tab 0    montelukast (SINGULAIR) 10 mg tablet TAKE 1 TABLET DAILY 90 Tab 0    SITagliptin-metFORMIN (JANUMET) 50-1,000 mg per tablet TAKE 1 TABLET TWICE A DAY WITH MEALS 180 Tab 1    ezetimibe (ZETIA) 10 mg tablet Take 1 Tab by mouth daily. 90 Tab 3    insulin glargine U-300 conc (TOUJEO SOLOSTAR U-300 INSULIN) 300 unit/mL (1.5 mL) inpn pen INJECT 40 UNITS UNDER THE SKIN DAILY 12 Pen 5    citalopram (CELEXA) 20 mg tablet TAKE 1 TABLET BY MOUTH EVERY DAY. WEAN AS DIRECTED 90 Tab 0    BREO ELLIPTA 200-25 mcg/dose inhaler INHALE 1 PUFF PO QD  6    rosuvastatin (CRESTOR) 40 mg tablet Take 1 Tab by mouth daily. 90 Tab 4    albuterol (PROVENTIL HFA, VENTOLIN HFA, PROAIR HFA) 90 mcg/actuation inhaler Take 2 Puffs by inhalation every four (4) hours as needed for Wheezing. 3 Inhaler 1    FREESTYLE SILVINO 14 DAY SENSOR kit 1 Units by Does Not Apply route every fourteen (14) days. 1 Kit 5    CIALIS 5 mg tablet Take 5 mg by mouth daily.       tamsulosin (FLOMAX) 0.4 mg capsule Take 0.4 mg by mouth daily.  fluticasone (FLONASE) 50 mcg/actuation nasal spray 2 Sprays by Both Nostrils route daily. Indications: ALLERGIC RHINITIS 1 Bottle 0    OMEGA-3 FATTY ACIDS/FISH OIL (FISH OIL EXTRA STRENGTH PO) Take 1,400 mg by mouth daily (after breakfast).  ketoconazole (NIZORAL) 2 % topical cream Apply  to affected area as needed for Skin Irritation.  VOLTAREN 1 % gel every six (6) hours. 1    desonide (TRIDESILON) 0.05 % topical lotion as needed. 1    cetirizine (ZYRTEC) 10 mg tablet Take 1 Tab by mouth daily. 0    aspirin (ASPIRIN) 325 mg tablet take 325 mg by mouth daily.  M-VIT PO take 1 Tab by mouth daily.         Allergies   Allergen Reactions    Cefdinir Rash    Codeine Nausea Only

## 2019-09-30 NOTE — PATIENT INSTRUCTIONS
Medicare Wellness Visit, Male The best way to live healthy is to have a lifestyle where you eat a well-balanced diet, exercise regularly, limit alcohol use, and quit all forms of tobacco/nicotine, if applicable. Regular preventive services are another way to keep healthy. Preventive services (vaccines, screening tests, monitoring & exams) can help personalize your care plan, which helps you manage your own care. Screening tests can find health problems at the earliest stages, when they are easiest to treat. [address] [facility] follows the current, evidence-based guidelines published by the [facility] ([facility]) when recommending preventive services for our patients. Because we follow these guidelines, sometimes recommendations change over time as research supports it. (For example, a prostate screening blood test is no longer routinely recommended for men with no symptoms.) Of course, you and your doctor may decide to screen more often for some diseases, based on your risk and co-morbidities (chronic disease you are already diagnosed with). Preventive services for you include: - Medicare offers their members a free annual wellness visit, which is time for you and your primary care provider to discuss and plan for your preventive service needs. Take advantage of this benefit every year! 
-All adults over age 72 should receive the recommended pneumonia vaccines. Current USPSTF guidelines recommend a series of two vaccines for the best pneumonia protection.  
-All adults should have a flu vaccine yearly and an ECG.  All adults age 61 and older should receive a shingles vaccine once in their lifetime.   
-All adults age 38-68 who are overweight should have a diabetes screening test once every three years.  
-Other screening tests & preventive services for persons with diabetes include: an eye exam to screen for diabetic retinopathy, a kidney function test, a foot exam, and stricter control over your cholesterol.  
-Cardiovascular screening for adults with routine risk involves an electrocardiogram (ECG) at intervals determined by the provider.  
-Colorectal cancer screening should be done for adults age 54-65 with no increased risk factors for colorectal cancer. There are a number of acceptable methods of screening for this type of cancer. Each test has its own benefits and drawbacks. Discuss with your provider what is most appropriate for you during your annual wellness visit. The different tests include: colonoscopy (considered the best screening method), a fecal occult blood test, a fecal DNA test, and sigmoidoscopy. 
-All adults born between [facility] should be screened once for Hepatitis C. 
-An Abdominal Aortic Aneurysm (AAA) Screening is recommended for men age 73-68 who has ever smoked in their lifetime. Here is a list of your current Health Maintenance items (your personalized list of preventive services) with a due date: 
Health Maintenance Due Topic Date Due  
 Hemoglobin A1C    09/26/2019 [facility] Annual Well Visit  09/27/2019  Albumin Urine Test  10/08/2019 Advance Care Planning: Care Instructions Your Care Instructions It can be hard to live with an illness that cannot be cured. But if your health is getting worse, you may want to make decisions about end-of-life care. Planning for the end of your life does not mean that you are giving up. It is a way to make sure that your wishes are met. Clearly stating your wishes can make it easier for your loved ones. Making plans while you are still able may also ease your mind and make your final days less stressful and more meaningful. Follow-up care is a key part of your treatment and safety. Be sure to make and go to all appointments, and call your doctor if you are having problems. It's also a good idea to know your test results and keep a list of the medicines you take. What can you do to plan for the end of life? · You can bring these issues up with your doctor. You do not need to wait until your doctor starts the conversation. You might start with \"I would not be willing to live with . Pete Muck Pete Muck Pete Muck \" When you complete this sentence it helps your doctor understand your wishes. · Talk openly and honestly with your doctor. This is the best way to understand the decisions you will need to make as your health changes. Know that you can always change your mind. · Ask your doctor about commonly used life-support measures. These include tube feedings, breathing machines, and fluids given through a vein (IV). Understanding these treatments will help you decide whether you want them. · You may choose to have these life-supporting treatments for a limited time. This allows a trial period to see whether they will help you. You may also decide that you want your doctor to take only certain measures to keep you alive. It is important to spell out these conditions so that your doctor and family understand them. · Talk to your doctor about how long you are likely to live. He or she may be able to give you an idea of what usually happens with your specific illness. · Think about preparing papers that state your wishes. This way there will not be any confusion about what you want. You can change your instructions at any time. Which papers should you prepare? Advance directives are legal papers that tell doctors how you want to be cared for at the end of your life. You do not need a  to write these papers. Ask your doctor or your state health department for information on how to write your advance directives. They may have the forms for each of these types of papers. Make sure your doctor has a copy of these on file, and give a copy to a family member or close friend. · Consider a do-not-resuscitate order (DNR).  This order asks that no extra treatments be done if your heart stops or you stop breathing. Extra treatments may include cardiopulmonary resuscitation (CPR), electrical shock to restart your heart, or a machine to breathe for you. If you decide to have a DNR order, ask your doctor to explain and write it. Place the order in your home where everyone can easily see it. · Consider a living will. A living will explains your wishes about life support and other treatments at the end of your life if you become unable to speak for yourself. Living araya tell doctors to use or not use treatments that would keep you alive. You must have one or two witnesses or a notary present when you sign this form. · Consider a durable power of  for health care. This allows you to name a person to make decisions about your care if you are not able to. Most people ask a close friend or family member. Talk to this person about the kinds of treatments you want and those that you do not want. Make sure this person understands your wishes. These legal papers are simple to change. Tell your doctor what you want to change, and ask him or her to make a note in your medical file. Give your family updated copies of the papers. Where can you learn more? Go to http://ana maria-anne marie.info/. Enter P184 in the search box to learn more about \"Advance Care Planning: Care Instructions. \" Current as of: November 17, 2016 Content Version: 11.3 © 9651-0551 [employer]. Care instructions adapted under license by [employer] (which disclaims liability or warranty for this information). If you have questions about a medical condition or this instruction, always ask your healthcare professional. [address] any warranty or liability for your use of this information.

## 2019-10-01 LAB
ALBUMIN/CREAT UR: 15.3 MG/G CREAT (ref 0–30)
BUN SERPL-MCNC: 34 MG/DL (ref 8–27)
BUN/CREAT SERPL: 22 (ref 10–24)
CALCIUM SERPL-MCNC: 9.6 MG/DL (ref 8.6–10.2)
CHLORIDE SERPL-SCNC: 102 MMOL/L (ref 96–106)
CO2 SERPL-SCNC: 22 MMOL/L (ref 20–29)
CREAT SERPL-MCNC: 1.57 MG/DL (ref 0.76–1.27)
CREAT UR-MCNC: 50.9 MG/DL
ERYTHROCYTE [DISTWIDTH] IN BLOOD BY AUTOMATED COUNT: 12.4 % (ref 12.3–15.4)
EST. AVERAGE GLUCOSE BLD GHB EST-MCNC: 174 MG/DL
GLUCOSE SERPL-MCNC: 156 MG/DL (ref 65–99)
HBA1C MFR BLD: 7.7 % (ref 4.8–5.6)
HCT VFR BLD AUTO: 38.1 % (ref 37.5–51)
HGB BLD-MCNC: 12.5 G/DL (ref 13–17.7)
INTERPRETATION: NORMAL
Lab: NORMAL
Lab: NORMAL
MCH RBC QN AUTO: 29.6 PG (ref 26.6–33)
MCHC RBC AUTO-ENTMCNC: 32.8 G/DL (ref 31.5–35.7)
MCV RBC AUTO: 90 FL (ref 79–97)
MICROALBUMIN UR-MCNC: 7.8 UG/ML
PLATELET # BLD AUTO: 151 X10E3/UL (ref 150–450)
POTASSIUM SERPL-SCNC: 5 MMOL/L (ref 3.5–5.2)
RBC # BLD AUTO: 4.23 X10E6/UL (ref 4.14–5.8)
SODIUM SERPL-SCNC: 139 MMOL/L (ref 134–144)
WBC # BLD AUTO: 8 X10E3/UL (ref 3.4–10.8)

## 2019-10-01 RX ORDER — PREDNISONE 10 MG/1
TABLET ORAL
Qty: 21 TAB | Refills: 0 | Status: SHIPPED | OUTPATIENT
Start: 2019-10-01 | End: 2020-01-07 | Stop reason: ALTCHOICE

## 2019-10-20 DIAGNOSIS — N18.30 CKD (CHRONIC KIDNEY DISEASE), STAGE III (HCC): ICD-10-CM

## 2019-10-20 DIAGNOSIS — D64.9 ANEMIA, UNSPECIFIED TYPE: Primary | ICD-10-CM

## 2019-10-30 PROBLEM — E11.21 TYPE 2 DIABETES WITH NEPHROPATHY (HCC): Status: ACTIVE | Noted: 2019-10-30

## 2019-11-03 RX ORDER — MONTELUKAST SODIUM 10 MG/1
TABLET ORAL
Qty: 90 TAB | Refills: 4 | Status: SHIPPED | OUTPATIENT
Start: 2019-11-03 | End: 2020-08-10 | Stop reason: ALTCHOICE

## 2019-11-07 ENCOUNTER — HOSPITAL ENCOUNTER (OUTPATIENT)
Dept: LAB | Age: 69
Discharge: HOME | End: 2019-11-07

## 2019-11-07 ENCOUNTER — OFFICE VISIT (OUTPATIENT)
Dept: FAMILY MEDICINE CLINIC | Age: 69
End: 2019-11-07

## 2019-11-07 VITALS
HEIGHT: 68 IN | HEART RATE: 66 BPM | WEIGHT: 265 LBS | TEMPERATURE: 98.5 F | OXYGEN SATURATION: 96 % | RESPIRATION RATE: 16 BRPM | DIASTOLIC BLOOD PRESSURE: 60 MMHG | SYSTOLIC BLOOD PRESSURE: 112 MMHG | BODY MASS INDEX: 40.16 KG/M2

## 2019-11-07 DIAGNOSIS — R07.89 DISCOMFORT OF CHEST WALL: Primary | ICD-10-CM

## 2019-11-07 DIAGNOSIS — G25.81 RESTLESS LEG: ICD-10-CM

## 2019-11-07 LAB — FERRITIN SERPL-MCNC: 111 NG/ML (ref 26–388)

## 2019-11-07 NOTE — PROGRESS NOTES
Chief Complaint(s): Leg Pain    SUBJECTIVE:    Arvind Moon is a 77 y.o. male who presents for evaluation of the followin) Leg pain:    Intermittent bilatderal leg pain present for about 4 months and occurs only at night. It occurs in both legs but mostly the left leg. Pt describes symptoms as \"cramping, pulling and tingling\" requiring him to stand up before it resolves. Symptoms generally last less that 2 minutes . Symptoms him want to move his legs and better while moving. Denies any calf pain. Pt reports having couple of short flight recently. Returned from [facility] last week but states that these symptoms have been ongoing prior to his travelling. Of note, pt states that he had a sleep study about 3 years ago and was told that he has restless leg but it was not as bad by then. Risk factors:  CKD III, anemia, diabetes. 2) Chest discomfort:    Pt reports intermittent chest discomfort for a few months. Pt states that it usually happen about once a week but he just had an episode while sitting in the exam room. Pt describes it as \"soreness\" located specifically along the left upper chest. Denies any nausea, vomiting, diaphoresis or sob. No fever or chills. Of note, pt had a CABG in 2005 but no history of MI.   -Sress test (2019) - \"Abnormal stress myocardial perfusion with very small area of mild perfusion defect of the mid anterior wall and mid infeiror wall with SDS 2 which is reversible. This suggest very small area of focal ischemia. Abnormal septal motion consistent with prior cardiac surgery. Normal wall motion with normal LV function with LVEF 67%. Excellent functional capacity. \"      Allergies - reviewed:    Allergies   Allergen Reactions    Cefdinir Rash    Codeine Nausea Only         Medications - reviewed:   Current Outpatient Medications   Medication Sig    montelukast (SINGULAIR) 10 mg tablet TAKE 1 TABLET DAILY    predniSONE (STERAPRED DS) 10 mg dose pack See administration instruction per 10mg dose pack    clomiPHENE (CLOMID) 50 mg tablet Take 50 mg by mouth daily.  insulin glargine U-300 conc (TOUJEO SOLOSTAR U-300 INSULIN) 300 unit/mL (1.5 mL) inpn pen INJECT 40 UNITS EVERY MORNING AND 20 UNITS EVERY EVENING    insulin aspart U-100 (NOVOLOG FLEXPEN U-100 INSULIN) 100 unit/mL (3 mL) inpn If Glucose: 150-199: Take 5 units 200-249: Take 15 units 250-299: Take 20 units 300+: take 25 units    metoprolol tartrate (LOPRESSOR) 25 mg tablet TAKE 1 TABLET TWICE A DAY    BREO ELLIPTA 200-25 mcg/dose inhaler INHALE 1 PUFF PO QD    Insulin Needles, Disposable, 31 gauge x 5/16\" ndle 5x daily with insulin. IDDMII. E11.21    canagliflozin (INVOKANA) 300 mg tablet Take 1 Tab by mouth Daily (before breakfast).  lisinopril (PRINIVIL, ZESTRIL) 10 mg tablet Take 1 Tab by mouth two (2) times a day.  chlorthalidone (HYGROTEN) 25 mg tablet Take 1 Tab by mouth daily.  SITagliptin-metFORMIN (JANUMET) 50-1,000 mg per tablet TAKE 1 TABLET TWICE A DAY WITH MEALS    ezetimibe (ZETIA) 10 mg tablet Take 1 Tab by mouth daily.  citalopram (CELEXA) 20 mg tablet TAKE 1 TABLET BY MOUTH EVERY DAY. WEAN AS DIRECTED    rosuvastatin (CRESTOR) 40 mg tablet Take 1 Tab by mouth daily.  albuterol (PROVENTIL HFA, VENTOLIN HFA, PROAIR HFA) 90 mcg/actuation inhaler Take 2 Puffs by inhalation every four (4) hours as needed for Wheezing.  FREESTYLE SILVINO 14 DAY SENSOR kit 1 Units by Does Not Apply route every fourteen (14) days.  CIALIS 5 mg tablet Take 5 mg by mouth daily.  tamsulosin (FLOMAX) 0.4 mg capsule Take 0.4 mg by mouth daily.  fluticasone (FLONASE) 50 mcg/actuation nasal spray 2 Sprays by Both Nostrils route daily. Indications: ALLERGIC RHINITIS    OMEGA-3 FATTY ACIDS/FISH OIL (FISH OIL EXTRA STRENGTH PO) Take 1,400 mg by mouth daily (after breakfast).  ketoconazole (NIZORAL) 2 % topical cream Apply  to affected area as needed for Skin Irritation.     VOLTAREN 1 % gel every six (6) hours.  desonide (TRIDESILON) 0.05 % topical lotion as needed.  cetirizine (ZYRTEC) 10 mg tablet Take 1 Tab by mouth daily.  aspirin (ASPIRIN) 325 mg tablet take 325 mg by mouth daily.  M-VIT PO take 1 Tab by mouth daily. No current facility-administered medications for this visit.           Past Medical History - reviewed:  Past Medical History:   Diagnosis Date    AR (allergic rhinitis) 12/01/2008    sees allergist for shots    Arthritis     Blind right eye     BPH (benign prostatic hyperplasia)     Dr. Harshal Wharton CAD (coronary artery disease)     CABG - 3 vessel    Depression     DM type 2 (diabetes mellitus, type 2) ([facility] [address]     30 years    Essential hypertension with goal blood pressure less than 130/80 9/28/2016    Hearing loss     Hypercholesteremia     Hypogonadism male 12/1/2008    Mild intermittent asthma without complication 28/16/4683    sees allergy    LEONA on CPAP 07/16/2010    Dr. Margot Alvarez    Severe obesity (BMI 35.0-39.9) 6/18/2018    Type 2 diabetes with nephropathy ([facility] [address] 3/12/2018         Past Surgical History - reviewed:   Past Surgical History:   Procedure Laterality Date    CABG, ARTERY-VEIN, THREE  2005    [facility]    HX CIRCUMCISION  2016    HX CYST REMOVAL  5/12/2016    neck and chest    HX HEENT  2012    right eye prosthesis    HX ORTHOPAEDIC      right shoulder surgery, rotator cuff repair    HX RETINAL DETACHMENT REPAIR  1984    R Blindness    HX RHINOPLASTY      septoplasty         Social History - reviewed:  Social History     Socioeconomic History    Marital status: SINGLE     Spouse name: Not on file    Number of children: Not on file    Years of education: Not on file    Highest education level: Associate degree: academic program   Occupational History     Comment: Technology     Comment: [employer]   Social Needs    Financial resource strain: Not hard at all   [employer] insecurity:     Worry: Never true     Inability: Never true   [address] [place] [address] Transportation needs:     Medical: No     Non-medical: No   Tobacco Use    Smoking status: Passive Smoke Exposure - Never Smoker    Smokeless tobacco: Never Used   Substance and Sexual Activity    Alcohol use: Yes     Alcohol/week: 5.0 standard drinks     Types: 6 Cans of beer per week     Comment: 1 beer or so per day    Drug use: No     Comment: occasionally smoked marijuana in the past    Sexual activity: Not Currently     Partners: Male   Lifestyle    Physical activity:     Days per week: 2 days     Minutes per session: 40 min    Stress: To some extent   Relationships    Social connections:     Talks on phone: Three times a week     Gets together: Twice a week     Attends Synagogue service: Never     Active member of club or organization: Yes     Attends meetings of clubs or organizations: More than 4 times per year     Relationship status: Living with partner    Intimate partner violence:     Fear of current or ex partner: No     Emotionally abused: No     Physically abused: No     Forced sexual activity: No   Other Topics Concern    Not on file   Social History Narrative    Not on file         Family History - reviewed:  Family History   Adopted: Yes   Family history unknown: Yes         ROS    A comprehensive review of systems was negative except for that written in the History of Present Illness. Physical Exam  Current vital are:   Visit Vitals  /60 (BP 1 Location: Left arm, BP Patient Position: Sitting)   Pulse 66   Temp 98.5 °F (36.9 °C) (Oral)   Resp 16   Ht 5' 8\" (1.727 m)   Wt 265 lb (120.2 kg)   SpO2 96%   BMI 40.29 kg/m²       General appearance - alert, well appearing, and in no distress. Chest - clear to auscultation, no wheezes, rales or rhonchi, symmetric air entry. Chest wall mildly tender to palpation   Heart - normal rate, regular rhythm, normal S1, S2, no murmurs, rubs, clicks or gallops  Neuro/Vascular : Pulses intact, no edema, and neurologically intact .   Skin: No obvious rash or skin breakdown. Lower Extremities: No Edema. Palpable pulses bilaterally       Assessment/Plan    ICD-10-CM ICD-9-CM    1. Discomfort of chest wall R07.89 786.52 AMB POC EKG ROUTINE W/ 12 LEADS, INTER & REP   2. Restless leg G25.81 333.94 FERRITIN     1. Discomfort of chest wall: Chest  Wall tender on exam. Likely msk in etiology. No n/v or diaphoresis. -EKG - Rhythm: normal sinus rhythm; and regular . Rate (approx.): 68; Axis: negative axis deviation; P wave: normal with prolonged AZ interval; QRS interval: normal ; ST/T wave: normal; Other findings: First degree AV- block with incomplete RBB and left ant fascicular block. Findings similar to EKG of 9/28/2016  - Recommended to f/u with his cardiologist  - Discussed red flags which should prompt him to immediately go to ER. 2. Restless leg: Differential also includes lumbar radiculopathy   - Will check Ferritin to rule out iron deficiency. Will replete iron if needed. Had other labwork done recently   - Should Iron be normal, will start pt on Mirapex. I have discussed the diagnosis with the patient and the intended plan as seen in the above orders. The patient has received an after-visit summary and questions were answered concerning future plans. I have discussed medication side effects and warnings with the patient as well.     Patient seen and discussed with Dr. Soraida Bear (supervising provider)    Tim Calloway MD

## 2019-11-07 NOTE — PROGRESS NOTES
I saw and evaluated the patient, performing the key elements of the service. I discussed the findings, assessment and plan with the resident and agree with the resident's findings and plan as documented in the resident's note. Estimated Creatinine Clearance: 56.8 mL/min (A) (by C-G formula based on SCr of 1.57 mg/dL (H)).

## 2019-11-09 DIAGNOSIS — G25.81 RESTLESS LEG: Primary | ICD-10-CM

## 2019-11-09 RX ORDER — PRAMIPEXOLE DIHYDROCHLORIDE 0.12 MG/1
0.12 TABLET ORAL
Qty: 90 TAB | Refills: 0 | Status: SHIPPED | OUTPATIENT
Start: 2019-11-09 | End: 2020-02-19

## 2019-11-09 NOTE — PROGRESS NOTES
Reviewed. Normal Ferritin. Will send mychart message.  As per our conversation with pt, will be starting him on mirapex

## 2019-11-21 ENCOUNTER — TELEPHONE (OUTPATIENT)
Dept: FAMILY MEDICINE CLINIC | Age: 69
End: 2019-11-21

## 2019-11-21 NOTE — TELEPHONE ENCOUNTER
Raza Ruvalcaba from [facility] called to see the date the patient was last seen for diabetes. Informed 9/30/19.

## 2020-01-03 ENCOUNTER — LAB ONLY (OUTPATIENT)
Dept: FAMILY MEDICINE CLINIC | Age: 70
End: 2020-01-03

## 2020-01-03 ENCOUNTER — HOSPITAL ENCOUNTER (OUTPATIENT)
Dept: LAB | Age: 70
Discharge: HOME | End: 2020-01-03

## 2020-01-03 LAB
ALBUMIN SERPL-MCNC: 3.9 G/DL (ref 3.5–5)
ALBUMIN/GLOB SERPL: 1.3 {RATIO} (ref 1.1–2.2)
ALP SERPL-CCNC: 41 U/L (ref 45–117)
ALT SERPL-CCNC: 30 U/L (ref 12–78)
ANION GAP SERPL CALC-SCNC: 4 MMOL/L (ref 5–15)
AST SERPL-CCNC: 22 U/L (ref 15–37)
BASOPHILS # BLD: 0 K/UL (ref 0–0.1)
BASOPHILS NFR BLD: 0 % (ref 0–1)
BILIRUB DIRECT SERPL-MCNC: 0.2 MG/DL (ref 0–0.2)
BILIRUB SERPL-MCNC: 0.6 MG/DL (ref 0.2–1)
BUN SERPL-MCNC: 28 MG/DL (ref 6–20)
BUN/CREAT SERPL: 16 (ref 12–20)
CALCIUM SERPL-MCNC: 9.7 MG/DL (ref 8.5–10.1)
CHLORIDE SERPL-SCNC: 108 MMOL/L (ref 97–108)
CHOLEST SERPL-MCNC: 91 MG/DL
CO2 SERPL-SCNC: 29 MMOL/L (ref 21–32)
CREAT SERPL-MCNC: 1.71 MG/DL (ref 0.7–1.3)
DIFFERENTIAL METHOD BLD: ABNORMAL
EOSINOPHIL # BLD: 0.3 K/UL (ref 0–0.4)
EOSINOPHIL NFR BLD: 5 % (ref 0–7)
ERYTHROCYTE [DISTWIDTH] IN BLOOD BY AUTOMATED COUNT: 14.4 % (ref 11.5–14.5)
FERRITIN SERPL-MCNC: 52 NG/ML (ref 26–388)
GLOBULIN SER CALC-MCNC: 3.1 G/DL (ref 2–4)
GLUCOSE SERPL-MCNC: 123 MG/DL (ref 65–100)
HCT VFR BLD AUTO: 41.5 % (ref 36.6–50.3)
HDLC SERPL-MCNC: 35 MG/DL
HDLC SERPL: 2.6 {RATIO} (ref 0–5)
HGB BLD-MCNC: 12.5 G/DL (ref 12.1–17)
IMM GRANULOCYTES # BLD AUTO: 0 K/UL (ref 0–0.04)
IMM GRANULOCYTES NFR BLD AUTO: 0 % (ref 0–0.5)
LDLC SERPL CALC-MCNC: 28.4 MG/DL (ref 0–100)
LIPID PROFILE,FLP: NORMAL
LYMPHOCYTES # BLD: 1.5 K/UL (ref 0.8–3.5)
LYMPHOCYTES NFR BLD: 20 % (ref 12–49)
MCH RBC QN AUTO: 29.2 PG (ref 26–34)
MCHC RBC AUTO-ENTMCNC: 30.1 G/DL (ref 30–36.5)
MCV RBC AUTO: 97 FL (ref 80–99)
MONOCYTES # BLD: 0.8 K/UL (ref 0–1)
MONOCYTES NFR BLD: 11 % (ref 5–13)
NEUTS SEG # BLD: 4.5 K/UL (ref 1.8–8)
NEUTS SEG NFR BLD: 64 % (ref 32–75)
NRBC # BLD: 0 K/UL (ref 0–0.01)
NRBC BLD-RTO: 0 PER 100 WBC
PLATELET # BLD AUTO: 144 K/UL (ref 150–400)
PMV BLD AUTO: 10.5 FL (ref 8.9–12.9)
POTASSIUM SERPL-SCNC: 5.2 MMOL/L (ref 3.5–5.1)
PROT SERPL-MCNC: 7 G/DL (ref 6.4–8.2)
RBC # BLD AUTO: 4.28 M/UL (ref 4.1–5.7)
SODIUM SERPL-SCNC: 141 MMOL/L (ref 136–145)
TRIGL SERPL-MCNC: 138 MG/DL (ref ?–150)
VLDLC SERPL CALC-MCNC: 27.6 MG/DL
WBC # BLD AUTO: 7.2 K/UL (ref 4.1–11.1)

## 2020-01-07 ENCOUNTER — OFFICE VISIT (OUTPATIENT)
Dept: FAMILY MEDICINE CLINIC | Age: 70
End: 2020-01-07

## 2020-01-07 VITALS
RESPIRATION RATE: 18 BRPM | HEIGHT: 68 IN | OXYGEN SATURATION: 95 % | TEMPERATURE: 98 F | HEART RATE: 57 BPM | BODY MASS INDEX: 41.34 KG/M2 | WEIGHT: 272.8 LBS | SYSTOLIC BLOOD PRESSURE: 127 MMHG | DIASTOLIC BLOOD PRESSURE: 66 MMHG

## 2020-01-07 DIAGNOSIS — R80.9 TYPE 2 DIABETES MELLITUS WITH MICROALBUMINURIA, WITH LONG-TERM CURRENT USE OF INSULIN (HCC): Primary | ICD-10-CM

## 2020-01-07 DIAGNOSIS — R79.89 ELEVATED SERUM CREATININE: ICD-10-CM

## 2020-01-07 DIAGNOSIS — E11.29 TYPE 2 DIABETES MELLITUS WITH MICROALBUMINURIA, WITH LONG-TERM CURRENT USE OF INSULIN (HCC): Primary | ICD-10-CM

## 2020-01-07 DIAGNOSIS — Z79.4 TYPE 2 DIABETES MELLITUS WITH MICROALBUMINURIA, WITH LONG-TERM CURRENT USE OF INSULIN (HCC): Primary | ICD-10-CM

## 2020-01-07 DIAGNOSIS — E11.21 TYPE 2 DIABETES WITH NEPHROPATHY (HCC): ICD-10-CM

## 2020-01-07 PROBLEM — N18.30 CKD (CHRONIC KIDNEY DISEASE) STAGE 3, GFR 30-59 ML/MIN (HCC): Status: ACTIVE | Noted: 2020-01-07

## 2020-01-07 LAB — HBA1C MFR BLD HPLC: 7.2 %

## 2020-01-07 RX ORDER — METFORMIN HYDROCHLORIDE 1000 MG/1
1000 TABLET ORAL 2 TIMES DAILY WITH MEALS
Qty: 60 TAB | Refills: 5 | Status: SHIPPED | OUTPATIENT
Start: 2020-01-07 | End: 2020-01-10 | Stop reason: SDUPTHER

## 2020-01-07 NOTE — PROGRESS NOTES
Mary Mcghee  77 y.o. male  1950  Pieter Puentes  [id number]   [address]: Progress Note  Malik Rowan MD       Encounter Date: 1/7/2020    Chief Complaint   Patient presents with    Diabetes     follow up      History of Present Illness   Mary Mcghee is a 77 y.o. male who presents to clinic today for follow-up on anemia and abnormal creatinine. Lab Results   Component Value Date/Time    HGB 12.5 01/03/2020 08:23 AM     Lab Results   Component Value Date/Time    Sodium 141 01/03/2020 08:23 AM    Potassium 5.2 (H) 01/03/2020 08:23 AM    Chloride 108 01/03/2020 08:23 AM    CO2 29 01/03/2020 08:23 AM    Anion gap 4 (L) 01/03/2020 08:23 AM    Glucose 123 (H) 01/03/2020 08:23 AM    BUN 28 (H) 01/03/2020 08:23 AM    Creatinine 1.71 (H) 01/03/2020 08:23 AM    BUN/Creatinine ratio 16 01/03/2020 08:23 AM    GFR est AA 48 (L) 01/03/2020 08:23 AM    GFR est non-AA 40 (L) 01/03/2020 08:23 AM    Calcium 9.7 01/03/2020 08:23 AM     Blood glucoses have been stable, however creatinine continues to rise. Patient denies s/sx of hypoglycemia. Review of Systems   Review of Systems   Constitutional: Negative for malaise/fatigue and weight loss. Genitourinary: Negative for frequency and urgency. Endo/Heme/Allergies: Negative for polydipsia. Vitals/Objective:     Vitals:    01/07/20 0939   BP: 127/66   Pulse: (!) 57   Resp: 18   Temp: 98 °F (36.7 °C)   TempSrc: Oral   SpO2: 95%   Weight: 272 lb 12.8 oz (123.7 kg)   Height: 5' 8\" (1.727 m)     Body mass index is 41.48 kg/m². General: Patient alert and oriented and in NAD  Heart: Regular rate and rhythm, No murmurs, rubs or gallops.   2+ peripheral pulses  Lungs: Clear to auscultation bilaterally, no wheezing, rales or rhonchi  Abd: +BS, non-tender, non-distended  Psych: Appropriate mood and affect    Recent Results (from the past 24 hour(s))   AMB POC HEMOGLOBIN A1C    Collection Time: 01/07/20 11:05 AM   Result Value Ref Range    Hemoglobin A1c (POC) 7.2 %     Assessment and Plan:   1. Type 2 diabetes mellitus with microalbuminuria, with long-term current use of insulin (HCC)  Stop Invokana and Janumet, Start Ozempic and Metformin  - AMB POC HEMOGLOBIN A1C  - COLLECTION CAPILLARY BLOOD SPECIMEN  - semaglutide (OZEMPIC) 0.25 mg/0.2 mL (2 mg/1.5 mL) sub-q pen; 0.25mg subQ injection weekly x 4 weeks, then increase to 0.5mg subQ weekly  Dispense: 1 Box; Refill: 3  - metFORMIN (GLUCOPHAGE) 1,000 mg tablet; Take 1 Tab by mouth two (2) times daily (with meals). Dispense: 60 Tab; Refill: 5      I have discussed the diagnosis with the patient and the intended plan as seen in the above orders. he has expressed understanding. The patient has received an after-visit summary and questions were answered concerning future plans. I have discussed medication side effects and warnings with the patient as well. Follow-up and Dispositions  ·   Return in about 4 weeks (around 2/4/2020). Electronically Signed: Anthony Sneed MD     History   Patients past medical, surgical and family histories were reviewed and updated.     Past Medical History:   Diagnosis Date    AR (allergic rhinitis) 12/01/2008    sees allergist for shots    Arthritis     Blind right eye     BPH (benign prostatic hyperplasia)     Dr. Celeste Candelario CAD (coronary artery disease)     CABG - 3 vessel    Depression     DM type 2 (diabetes mellitus, type 2) ([address]     30 years    Essential hypertension with goal blood pressure less than 130/80 9/28/2016    Hearing loss     Hypercholesteremia     Hypogonadism male 12/1/2008    Mild intermittent asthma without complication 90/03/0226    sees allergy    LEONA on CPAP 07/16/2010    Dr. Griselda Singh    Severe obesity (BMI 35.0-39.9) 6/18/2018    Type 2 diabetes with nephropathy ([address] 3/12/2018     Past Surgical History:   Procedure Laterality Date    [facility]    HX CIRCUMCISION  2016    HX CYST REMOVAL  5/12/2016    neck and chest    HX HEENT  2012    right eye prosthesis    HX ORTHOPAEDIC      right shoulder surgery, rotator cuff repair    HX RETINAL DETACHMENT REPAIR  1984    R Blindness    HX RHINOPLASTY      septoplasty     Family History   Adopted: Yes   Family history unknown: Yes     Social History     Socioeconomic History    Marital status: SINGLE     Spouse name: Not on file    Number of children: Not on file    Years of education: Not on file    Highest education level: Associate degree: academic program   Occupational History     Comment: Technology     Comment: [employer]   Social Needs    Financial resource strain: Not hard at all   [employer] insecurity:     Worry: Never true     Inability: Never true   [email or web address] needs:     Medical: No     Non-medical: No   Tobacco Use    Smoking status: Passive Smoke Exposure - Never Smoker    Smokeless tobacco: Never Used   Substance and Sexual Activity    Alcohol use: Yes     Alcohol/week: 5.0 standard drinks     Types: 6 Cans of beer per week     Comment: 1 beer or so per day    Drug use: No     Comment: occasionally smoked marijuana in the past    Sexual activity: Not Currently     Partners: Male   Lifestyle    Physical activity:     Days per week: 2 days     Minutes per session: 40 min    Stress: To some extent   Relationships    Social connections:     Talks on phone:  Three times a week     Gets together: Twice a week     Attends Yazidism service: Never     Active member of club or organization: Yes     Attends meetings of clubs or organizations: More than 4 times per year     Relationship status: Living with partner    Intimate partner violence:     Fear of current or ex partner: No     Emotionally abused: No     Physically abused: No     Forced sexual activity: No   Other Topics Concern    Not on file   Social History Narrative    Not on file            Current Medications/Allergies     Current Outpatient Medications   Medication Sig Dispense Refill    testosterone undecanoate (AVEED IM) by IntraMUSCular route.  semaglutide (OZEMPIC) 0.25 mg/0.2 mL (2 mg/1.5 mL) sub-q pen 0.25mg subQ injection weekly x 4 weeks, then increase to 0.5mg subQ weekly 1 Box 3    metFORMIN (GLUCOPHAGE) 1,000 mg tablet Take 1 Tab by mouth two (2) times daily (with meals). 60 Tab 5    pramipexole (MIRAPEX) 0.125 mg tablet Take 1 Tab by mouth nightly. Take once daily about 2 to 3 hours prior to bedtime 90 Tab 0    montelukast (SINGULAIR) 10 mg tablet TAKE 1 TABLET DAILY 90 Tab 4    clomiPHENE (CLOMID) 50 mg tablet Take 50 mg by mouth daily.  insulin glargine U-300 conc (TOUJEO SOLOSTAR U-300 INSULIN) 300 unit/mL (1.5 mL) inpn pen INJECT 40 UNITS EVERY MORNING AND 20 UNITS EVERY EVENING 18 Pen 5    insulin aspart U-100 (NOVOLOG FLEXPEN U-100 INSULIN) 100 unit/mL (3 mL) inpn If Glucose: 150-199: Take 5 units 200-249: Take 15 units 250-299: Take 20 units 300+: take 25 units 18 Pen 5    metoprolol tartrate (LOPRESSOR) 25 mg tablet TAKE 1 TABLET TWICE A  Tab 0    BREO ELLIPTA 200-25 mcg/dose inhaler INHALE 1 PUFF PO QD 3 Each 1    Insulin Needles, Disposable, 31 gauge x 5/16\" ndle 5x daily with insulin. IDDMII. E11.21 1 Package 11    lisinopril (PRINIVIL, ZESTRIL) 10 mg tablet Take 1 Tab by mouth two (2) times a day. 180 Tab 3    chlorthalidone (HYGROTEN) 25 mg tablet Take 1 Tab by mouth daily. 90 Tab 1    ezetimibe (ZETIA) 10 mg tablet Take 1 Tab by mouth daily. 90 Tab 3    citalopram (CELEXA) 20 mg tablet TAKE 1 TABLET BY MOUTH EVERY DAY. WEAN AS DIRECTED 90 Tab 0    rosuvastatin (CRESTOR) 40 mg tablet Take 1 Tab by mouth daily. 90 Tab 4    albuterol (PROVENTIL HFA, VENTOLIN HFA, PROAIR HFA) 90 mcg/actuation inhaler Take 2 Puffs by inhalation every four (4) hours as needed for Wheezing. 3 Inhaler 1    [employer] 14 DAY SENSOR kit 1 Units by Does Not Apply route every fourteen (14) days.  1 Kit 5    CIALIS 5 mg tablet Take 5 mg by mouth daily.  tamsulosin (FLOMAX) 0.4 mg capsule Take 0.4 mg by mouth daily.  fluticasone (FLONASE) 50 mcg/actuation nasal spray 2 Sprays by Both Nostrils route daily. Indications: ALLERGIC RHINITIS 1 Bottle 0    OMEGA-3 FATTY ACIDS/FISH OIL (FISH OIL EXTRA STRENGTH PO) Take 1,400 mg by mouth daily (after breakfast).  ketoconazole (NIZORAL) 2 % topical cream Apply  to affected area as needed for Skin Irritation.  desonide (TRIDESILON) 0.05 % topical lotion as needed. 1    cetirizine (ZYRTEC) 10 mg tablet Take 1 Tab by mouth daily. 0    aspirin (ASPIRIN) 325 mg tablet take 325 mg by mouth daily.  M-VIT PO take 1 Tab by mouth daily.  VOLTAREN 1 % gel every six (6) hours.   1     Allergies   Allergen Reactions    Cefdinir Rash    Codeine Nausea Only

## 2020-01-07 NOTE — PROGRESS NOTES
Mendez Pierre is a 77 y.o. male    Chief Complaint   Patient presents with    Diabetes     follow up        1. Have you been to the ER, urgent care clinic since your last visit? Hospitalized since your last visit?12/2019 Patient first cough , congestion cold  M  2. Have you seen or consulted any other health care providers outside of the [address] [place] [address] since your last visit? Include any pap smears or colon screening. No      Visit Vitals  /66 (BP 1 Location: Right arm, BP Patient Position: Sitting)   Pulse (!) 57   Temp 98 °F (36.7 °C) (Oral)   Resp 18   Ht 5' 8\" (1.727 m)   Wt 272 lb 12.8 oz (123.7 kg)   SpO2 95%   BMI 41.48 kg/m²           There are no preventive care reminders to display for this patient. Medication Reconciliation completed, changes noted.   Please  Update medication list.

## 2020-01-08 ENCOUNTER — TELEPHONE (OUTPATIENT)
Dept: FAMILY MEDICINE CLINIC | Age: 70
End: 2020-01-08

## 2020-01-08 DIAGNOSIS — E11.21 TYPE 2 DIABETES WITH NEPHROPATHY (HCC): ICD-10-CM

## 2020-01-08 DIAGNOSIS — R80.9 TYPE 2 DIABETES MELLITUS WITH MICROALBUMINURIA, WITH LONG-TERM CURRENT USE OF INSULIN (HCC): ICD-10-CM

## 2020-01-08 DIAGNOSIS — E11.29 TYPE 2 DIABETES MELLITUS WITH MICROALBUMINURIA, WITH LONG-TERM CURRENT USE OF INSULIN (HCC): ICD-10-CM

## 2020-01-08 DIAGNOSIS — Z79.4 TYPE 2 DIABETES MELLITUS WITH MICROALBUMINURIA, WITH LONG-TERM CURRENT USE OF INSULIN (HCC): ICD-10-CM

## 2020-01-08 NOTE — PROGRESS NOTES
LAST VISIT: 7/25/19      ICD-10-CM ICD-9-CM   1. Coronary artery disease involving native coronary artery of native heart without angina pectoris I25.10 414.01   2. Essential hypertension with goal blood pressure less than 130/80 I10 401.9   3. Dyslipidemia E78.5 272.4   4. Type 2 diabetes mellitus with microalbuminuria, with long-term current use of insulin ([facility]) E11.29 250.40    R80.9 791.0    Z79.4 V58.67   5. LEONA (obstructive sleep apnea) G47.33 327.23   6. Morbid obesity ([facility]) E66.01 278.01            Kenia Pearson is a 77 y.o. male with diabetes, hypercholesterolemia, CAD, LEONA, and HTN last seen by me 6 months ago    Cardiac risk factors: diabetes mellitus, male gender, hypertension  I have personally obtained the history from the patient. HISTORY OF PRESENTING ILLNESS     Kenia Pearson reports he is feeling well overall with no interval cardiac concerns. He says he has not been exercising. His last A1c is down from 7.9 to 7.2%. He has changed his dosing of slow acting insulin. He admits to not drinking enough water. The patient denies exertional chest pain, orthopnea, palpitations, syncope, presyncope or fatigue.          ACTIVE PROBLEM LIST     Patient Active Problem List    Diagnosis Date Noted    CKD (chronic kidney disease) stage 3, GFR 30-59 ml/min ([facility] 01/07/2020    Type 2 diabetes with nephropathy ([address] 10/30/2019    Severe obesity ([facility] 06/09/2019    Dysthymia 09/26/2018    Obesity, Class II, BMI 35-39.9 06/18/2018    Type 2 diabetes mellitus with microalbuminuria, with long-term current use of insulin ([address] 03/12/2018    Pure hypercholesterolemia 05/04/2017    Mild intermittent asthma without complication 81/18/6992    Advance care planning 01/24/2017    Essential hypertension with goal blood pressure less than 130/80 09/28/2016    Diabetes mellitus type 2, controlled ([address] 09/28/2016    S/P excision of lipoma 05/25/2016    S/P excision of skin lesion, follow-up exam 05/25/2016    LEONA (obstructive sleep apnea) 07/16/2010    CAD (coronary artery disease) 12/01/2008    Hypogonadism male 12/01/2008    AR (allergic rhinitis) 12/01/2008    ED (erectile dysfunction) 12/01/2008           PAST MEDICAL HISTORY     Past Medical History:   Diagnosis Date    AR (allergic rhinitis) 12/01/2008    sees allergist for shots    Arthritis     Blind right eye     BPH (benign prostatic hyperplasia)     Dr. Dillon Leyva CAD (coronary artery disease)     CABG - 3 vessel    Depression     DM type 2 (diabetes mellitus, type 2) ([facility]     27 years    Essential hypertension with goal blood pressure less than 130/80 9/28/2016    Hearing loss     Hypercholesteremia     Hypogonadism male 12/1/2008    Mild intermittent asthma without complication 35/63/2734    sees allergy    LEONA on CPAP 07/16/2010    Dr. Marcela Padron    Severe obesity (BMI 35.0-39.9) 6/18/2018    Type 2 diabetes with nephropathy ([facility] 3/12/2018           PAST SURGICAL HISTORY     Past Surgical History:   Procedure Laterality Date    CABG, ARTERY-VEIN, THREE  2005    [place]    HX CIRCUMCISION  2016    HX CYST REMOVAL  5/12/2016    neck and chest    HX HEENT  2012    right eye prosthesis    HX ORTHOPAEDIC      right shoulder surgery, rotator cuff repair    HX RETINAL DETACHMENT REPAIR  1984    R Blindness    HX RHINOPLASTY      septoplasty          ALLERGIES     Allergies   Allergen Reactions    Cefdinir Rash    Codeine Nausea Only          FAMILY HISTORY     Family History   Adopted: Yes   Family history unknown: Yes    negative for cardiac disease       SOCIAL HISTORY     Social History     Socioeconomic History    Marital status: SINGLE     Spouse name: Not on file    Number of children: Not on file    Years of education: Not on file    Highest education level: Associate degree: academic program   Occupational History     Comment: Technology     Comment: [employer]   Social Needs    Financial resource strain: Not hard at all    Food insecurity:     Worry: Never true     Inability: Never true    Transportation needs:     Medical: No     Non-medical: No   Tobacco Use    Smoking status: Passive Smoke Exposure - Never Smoker    Smokeless tobacco: Never Used   Substance and Sexual Activity    Alcohol use: Yes     Alcohol/week: 5.0 standard drinks     Types: 6 Cans of beer per week     Comment: 1 beer or so per day    Drug use: No     Comment: occasionally smoked marijuana in the past    Sexual activity: Not Currently     Partners: Male   Lifestyle    Physical activity:     Days per week: 2 days     Minutes per session: 40 min    Stress: To some extent   Relationships    Social connections:     Talks on phone: Three times a week     Gets together: Twice a week     Attends Buddhism service: Never     Active member of club or organization: Yes     Attends meetings of clubs or organizations: More than 4 times per year     Relationship status: Living with partner         MEDICATIONS     Current Outpatient Medications   Medication Sig    testosterone undecanoate (AVEED IM) by IntraMUSCular route.  semaglutide (OZEMPIC) 0.25 mg/0.2 mL (2 mg/1.5 mL) sub-q pen 0.25mg subQ injection weekly x 4 weeks, then increase to 0.5mg subQ weekly    metFORMIN (GLUCOPHAGE) 1,000 mg tablet Take 1 Tab by mouth two (2) times daily (with meals).  pramipexole (MIRAPEX) 0.125 mg tablet Take 1 Tab by mouth nightly. Take once daily about 2 to 3 hours prior to bedtime    montelukast (SINGULAIR) 10 mg tablet TAKE 1 TABLET DAILY    clomiPHENE (CLOMID) 50 mg tablet Take 50 mg by mouth daily.     insulin glargine U-300 conc (TOUJEO SOLOSTAR U-300 INSULIN) 300 unit/mL (1.5 mL) inpn pen INJECT 40 UNITS EVERY MORNING AND 20 UNITS EVERY EVENING    insulin aspart U-100 (NOVOLOG FLEXPEN U-100 INSULIN) 100 unit/mL (3 mL) inpn If Glucose: 150-199: Take 5 units 200-249: Take 15 units 250-299: Take 20 units 300+: take 25 units    metoprolol tartrate (LOPRESSOR) 25 mg tablet TAKE 1 TABLET TWICE A DAY    BREO ELLIPTA 200-25 mcg/dose inhaler INHALE 1 PUFF PO QD    Insulin Needles, Disposable, 31 gauge x 5/16\" ndle 5x daily with insulin. IDDMII. E11.21    lisinopril (PRINIVIL, ZESTRIL) 10 mg tablet Take 1 Tab by mouth two (2) times a day.  chlorthalidone (HYGROTEN) 25 mg tablet Take 1 Tab by mouth daily.  ezetimibe (ZETIA) 10 mg tablet Take 1 Tab by mouth daily.  citalopram (CELEXA) 20 mg tablet TAKE 1 TABLET BY MOUTH EVERY DAY. WEAN AS DIRECTED    rosuvastatin (CRESTOR) 40 mg tablet Take 1 Tab by mouth daily.  albuterol (PROVENTIL HFA, VENTOLIN HFA, PROAIR HFA) 90 mcg/actuation inhaler Take 2 Puffs by inhalation every four (4) hours as needed for Wheezing.  FREESTYLE SILVINO 14 DAY SENSOR kit 1 Units by Does Not Apply route every fourteen (14) days.  CIALIS 5 mg tablet Take 5 mg by mouth daily.  tamsulosin (FLOMAX) 0.4 mg capsule Take 0.4 mg by mouth daily.  fluticasone (FLONASE) 50 mcg/actuation nasal spray 2 Sprays by Both Nostrils route daily. Indications: ALLERGIC RHINITIS    OMEGA-3 FATTY ACIDS/FISH OIL (FISH OIL EXTRA STRENGTH PO) Take 1,400 mg by mouth daily (after breakfast).  ketoconazole (NIZORAL) 2 % topical cream Apply  to affected area as needed for Skin Irritation.  VOLTAREN 1 % gel as needed.  desonide (TRIDESILON) 0.05 % topical lotion as needed.  cetirizine (ZYRTEC) 10 mg tablet Take 1 Tab by mouth daily.  aspirin (ASPIRIN) 325 mg tablet take 325 mg by mouth daily.  M-VIT PO take 1 Tab by mouth daily. No current facility-administered medications for this visit. I have reviewed the nurses notes, vitals, problem list, allergy list, medical history, family, social history and medications. REVIEW OF SYMPTOMS      General: Pt denies excessive weight gain or loss.  Pt is able to conduct ADL's  HEENT: Denies blurred vision, headaches, hearing loss, epistaxis and difficulty swallowing. Respiratory: Denies cough or stridor. +SOB, wheezing  Cardiovascular: Denies, palpitations. >1+ edema  Gastrointestinal: Denies poor appetite, indigestion, abdominal pain or blood in stool  Genitourinary: Denies hematuria, dysuria, increased urinary frequency  Musculoskeletal: Denies joint pain or swelling from muscles or joints  Neurologic: Denies tremor, paresthesias, headache, or sensory motor disturbance  Psychiatric: Denies confusion, insomnia, depression  Integumentray: Denies rash, itching or ulcers. Hematologic: Denies easy bruising, bleeding     PHYSICAL EXAMINATION      Visit Vitals  /52 (BP 1 Location: Left arm, BP Patient Position: Sitting)   Pulse 65   Ht 5' 8\" (1.727 m)   Wt 272 lb (123.4 kg)   SpO2 94%   BMI 41.36 kg/m²         General: Well developed, in no acute distress. HEENT: No jaundice, oral mucosa moist, no oral ulcers  Neck: Supple, no stiffness, no lymphadenopathy, supple  Heart:  RRR  Respiratory: Clear bilaterally x 4, no wheezing or rales  Abdomen:   Soft, non-tender, bowel sounds are active. Extremities:  No  normal cap refill, no cyanosis. +trace pedal edema;discolorization from hemosiderin. Musculoskeletal: No clubbing, no deformities  Neuro: A&Ox3, speech clear, gait stable, cooperative, no focal neurologic deficits  Skin: Skin color is normal. No rashes or lesions. Non diaphoretic, moist.        EKG: Sinus brachycardia with first degree AV block of 216 seconds. DIAGNOSTIC DATA     1. Stress Test  NM- 3/17/15- no ischemia, EF 65%  NM- 5/29/19-· Abnormal stress myocardial perfusion with very small area of mild perfusion defect of the mid anterior wall and mid infeiror wall with SDS 2 which is reversible. This suggest very small area of focal ischemia. Abnormal septal motion consistent with prior cardiac surgery. Normal wall motion with normal LV function with LVEF 67%. · Excellent functional capacity.   · Compared to prior study of 3/17/2015, there is minimal change. The small anterior mid wall stress perfusion defect may be new    2. LE Venous Doppler  6/30/18- no DVT isaak    3. Lipids  3/26/19- , HDL 51, ,   7/3/19- , HDL 43, LDL 85,   1/3/20- TC 91, HDL 35, LDL 28.4,     4. Carotid Doppler  5/31/19- 0-9% R/L    5. Echo  5/29/19- EF 61-65%         LABORATORY DATA            Lab Results   Component Value Date/Time    WBC 7.2 01/03/2020 08:23 AM    HGB 12.5 01/03/2020 08:23 AM    HCT 41.5 01/03/2020 08:23 AM    PLATELET 437 (L) 87/91/3275 08:23 AM    MCV 97.0 01/03/2020 08:23 AM      Lab Results   Component Value Date/Time    Sodium 141 01/03/2020 08:23 AM    Potassium 5.2 (H) 01/03/2020 08:23 AM    Chloride 108 01/03/2020 08:23 AM    CO2 29 01/03/2020 08:23 AM    Anion gap 4 (L) 01/03/2020 08:23 AM    Glucose 123 (H) 01/03/2020 08:23 AM    BUN 28 (H) 01/03/2020 08:23 AM    Creatinine 1.71 (H) 01/03/2020 08:23 AM    BUN/Creatinine ratio 16 01/03/2020 08:23 AM    GFR est AA 48 (L) 01/03/2020 08:23 AM    GFR est non-AA 40 (L) 01/03/2020 08:23 AM    Calcium 9.7 01/03/2020 08:23 AM    Bilirubin, total 0.6 01/03/2020 08:23 AM    AST (SGOT) 22 01/03/2020 08:23 AM    Alk. phosphatase 41 (L) 01/03/2020 08:23 AM    Protein, total 7.0 01/03/2020 08:23 AM    Albumin 3.9 01/03/2020 08:23 AM    Globulin 3.1 01/03/2020 08:23 AM    A-G Ratio 1.3 01/03/2020 08:23 AM    ALT (SGPT) 30 01/03/2020 08:23 AM           ASSESSMENT/RECOMMENDATIONS:.        1. CAD, S/p CABG 2005. - He seems to be doing well. - He does not have an exercise program. I encouraged weight loss and exercising to reduce disease progression.  - His last stress test was May 2019 with small defect, unchanged from previous. 1. HTN  - BP is under good control, no adjustments to antihypertensives    2. Dyslipidemia  - LDL is amazing at below 50. I would not change any medications. There are no studies that show LDL under 50 to not be beneficial.     3. DM  - On insulin.    - A1c goal should be 6.0  - Carotids were normal, 0-9% bilaterally. 4. LEONA  - Continue wearing C-PAP machine, 12 mercury. Return in 6 months or PRN      No orders of the defined types were placed in this encounter. Follow-up and Dispositions  ·   Return in about 6 months (around 7/9/2020). I have discussed the diagnosis with  Shameka Zimmerman and the intended plan as seen in the above orders. Questions were answered concerning future plans. I have discussed medication side effects and warnings with the patient as well. Thank you,  Davian Moon MD for involving me in the care of  Shameka Zimmerman. Please do not hesitate to contact me for further questions/concerns. Written by Anjali Brantley, as dictated by Sinan Guerra MD.       Chantell Price. Radha Paige MD, [facility]    Patient Care Team:  Davian Moon MD as PCP - General (Family Practice)  Davian Moon MD as PCP - [facility] Empaneled Provider  Ashanti Jansen MD as Surgeon (Surgery)  Ezra Westbrook MD (Urology)    [place] [address]      [phone] / [phone] Fax

## 2020-01-08 NOTE — TELEPHONE ENCOUNTER
----- Message from Dustin Randolph LPN sent at 5/8/2686 12:15 PM EST -----  Regarding: FW: Visit Follow-Up Question  Contact: [phone]    ----- Message -----  From: Srini Montero  Sent: 1/7/2020  12:59 PM EST  To: [facility] Nurse  Subject: Visit Luiz Hodge office advises that before they can schedule an appointment they need the following:    3 most recent LABS  3 most recent OFFICE NOTES    their fax # is [phone]    thanks

## 2020-01-09 ENCOUNTER — OFFICE VISIT (OUTPATIENT)
Dept: CARDIOLOGY CLINIC | Age: 70
End: 2020-01-09

## 2020-01-09 ENCOUNTER — TELEPHONE (OUTPATIENT)
Dept: FAMILY MEDICINE CLINIC | Age: 70
End: 2020-01-09

## 2020-01-09 VITALS
OXYGEN SATURATION: 94 % | HEIGHT: 68 IN | DIASTOLIC BLOOD PRESSURE: 52 MMHG | SYSTOLIC BLOOD PRESSURE: 136 MMHG | WEIGHT: 272 LBS | BODY MASS INDEX: 41.22 KG/M2 | HEART RATE: 65 BPM

## 2020-01-09 DIAGNOSIS — E66.01 MORBID OBESITY (HCC): ICD-10-CM

## 2020-01-09 DIAGNOSIS — E11.29 TYPE 2 DIABETES MELLITUS WITH MICROALBUMINURIA, WITH LONG-TERM CURRENT USE OF INSULIN (HCC): ICD-10-CM

## 2020-01-09 DIAGNOSIS — I25.10 CORONARY ARTERY DISEASE INVOLVING NATIVE CORONARY ARTERY OF NATIVE HEART WITHOUT ANGINA PECTORIS: Primary | ICD-10-CM

## 2020-01-09 DIAGNOSIS — Z79.4 TYPE 2 DIABETES MELLITUS WITH MICROALBUMINURIA, WITH LONG-TERM CURRENT USE OF INSULIN (HCC): ICD-10-CM

## 2020-01-09 DIAGNOSIS — E78.5 DYSLIPIDEMIA: ICD-10-CM

## 2020-01-09 DIAGNOSIS — R80.9 TYPE 2 DIABETES MELLITUS WITH MICROALBUMINURIA, WITH LONG-TERM CURRENT USE OF INSULIN (HCC): ICD-10-CM

## 2020-01-09 DIAGNOSIS — G47.33 OSA (OBSTRUCTIVE SLEEP APNEA): ICD-10-CM

## 2020-01-09 DIAGNOSIS — I10 ESSENTIAL HYPERTENSION WITH GOAL BLOOD PRESSURE LESS THAN 130/80: ICD-10-CM

## 2020-01-09 NOTE — PROGRESS NOTES
Macy Perry is a 77 y.o. male    Chief Complaint   Patient presents with    Cholesterol Problem    Hypertension    Coronary Artery Disease       Chest pain No    SOB patient states some SOB/wheezing. Dizziness No    Swelling patient states some swelling in his leg    Refills No    Visit Vitals  /52 (BP 1 Location: Left arm, BP Patient Position: Sitting)   Pulse 65   Ht 5' 8\" (1.727 m)   Wt 272 lb (123.4 kg)   SpO2 94%   BMI 41.36 kg/m²       1. Have you been to the ER, urgent care clinic since your last visit? Hospitalized since your last visit? [facility] in Dec 2019    2. Have you seen or consulted any other health care providers outside of the [address] [place] [address] since your last visit? Include any pap smears or colon screening.   No

## 2020-01-09 NOTE — TELEPHONE ENCOUNTER
Medical records, demographic sheet, & insurance card faxed to Dr Yessenia Lira ([address] [place] [address]) office for them to review & set up appt for patient. .. They will call me with an appt date & time. ... Patient Informed. ...

## 2020-01-09 NOTE — LETTER
1/9/20 Patient: Willian Knee YOB: 1950 Date of Visit: 1/9/2020 Nai Hendrix MD 
[address] [place] [id number] VIA In Basket Dear Nai Hendrix MD, Thank you for referring Mr. Romain Reynolds to [facility] for evaluation. My notes for this consultation are attached. If you have questions, please do not hesitate to call me. I look forward to following your patient along with you.  
 
 
Sincerely, 
 
Verna Fritz MD

## 2020-01-09 NOTE — TELEPHONE ENCOUNTER
Dr Jay Mitchell:    Brenda Davenport with this patient & he stated that the med you ordered from [facility] should be for a 90 day supply instead of 30 days. ... Please call patient to inform him that this has been done. ...

## 2020-01-10 RX ORDER — INSULIN ASPART 100 [IU]/ML
INJECTION, SOLUTION INTRAVENOUS; SUBCUTANEOUS
Qty: 36 PEN | Refills: 1 | Status: SHIPPED | OUTPATIENT
Start: 2020-01-10 | End: 2020-02-11 | Stop reason: CLARIF

## 2020-01-10 RX ORDER — METFORMIN HYDROCHLORIDE 1000 MG/1
1000 TABLET ORAL 2 TIMES DAILY WITH MEALS
Qty: 180 TAB | Refills: 3 | Status: SHIPPED | OUTPATIENT
Start: 2020-01-10 | End: 2020-12-28 | Stop reason: SDUPTHER

## 2020-01-14 ENCOUNTER — TELEPHONE (OUTPATIENT)
Dept: FAMILY MEDICINE CLINIC | Age: 70
End: 2020-01-14

## 2020-01-14 NOTE — TELEPHONE ENCOUNTER
Patient calling with appt details    appt 2/4/20 at 1:00pm with below doctor      You have been referred to:  [address] [facility]  [facility] [address] [address]  Phone: [phone]  Fax: [phone]

## 2020-01-15 NOTE — TELEPHONE ENCOUNTER
I called and spoke with patient and he stated that the medication supply was corrected last Friday. No further assistance needed.

## 2020-02-05 ENCOUNTER — OFFICE VISIT (OUTPATIENT)
Dept: FAMILY MEDICINE CLINIC | Age: 70
End: 2020-02-05

## 2020-02-05 VITALS
WEIGHT: 278 LBS | RESPIRATION RATE: 16 BRPM | HEART RATE: 62 BPM | BODY MASS INDEX: 42.13 KG/M2 | SYSTOLIC BLOOD PRESSURE: 157 MMHG | DIASTOLIC BLOOD PRESSURE: 68 MMHG | TEMPERATURE: 97.9 F | OXYGEN SATURATION: 99 % | HEIGHT: 68 IN

## 2020-02-05 DIAGNOSIS — E11.21 TYPE 2 DIABETES WITH NEPHROPATHY (HCC): Primary | ICD-10-CM

## 2020-02-05 DIAGNOSIS — I10 ESSENTIAL HYPERTENSION WITH GOAL BLOOD PRESSURE LESS THAN 130/80: ICD-10-CM

## 2020-02-05 DIAGNOSIS — N18.30 CKD (CHRONIC KIDNEY DISEASE) STAGE 3, GFR 30-59 ML/MIN (HCC): ICD-10-CM

## 2020-02-05 RX ORDER — BUMETANIDE 1 MG/1
TABLET ORAL
COMMUNITY
Start: 2020-02-04 | End: 2020-10-30 | Stop reason: SDUPTHER

## 2020-02-05 NOTE — PATIENT INSTRUCTIONS
Diabetes and Alcohol: Care Instructions  Your Care Instructions    People who have diabetes need to be more careful with alcohol. Before you drink, consider a few things: Is your diabetes well controlled? Do you know how drinking alcohol can affect you? Do you have high blood pressure, nerve damage, or eye problems from your diabetes? If you take insulin or another medicine for diabetes, drinking alcohol may cause low blood sugar. This could cause dangerous low blood sugar levels. Too much alcohol can also affect your ability to know your blood sugar is low and to treat it. Drinking alcohol can make you lightheaded at first and drowsy as you drink more, both of which may be similar to the symptoms of low blood sugar. Drinking a lot of alcohol over a long period of time can damage your liver (cirrhosis). If this happens, your body may lose its natural response to protect itself from low blood sugar. If you are controlling your diabetes and do not have other health issues, it may be okay to have a drink once in a while. Learning how alcohol affects your body can help you make the right choices. Follow-up care is a key part of your treatment and safety. Be sure to make and go to all appointments, and call your doctor if you are having problems. It's also a good idea to know your test results and keep a list of the medicines you take. How can you care for yourself at home? If you drink  · Work with your doctor or other diabetes expert to find what is best for you. Make sure you know whether it is safe to drink if you are taking insulin or another medicine for diabetes. · In general, limit alcohol to 1 drink a day with a meal if you are a woman. If you are a man, limit alcohol to 2 drinks a day with a meal. The following is considered a standard drink:  ? One 12-ounce bottle of beer or wine cooler  ? One 5-ounce glass of wine  ?  One mixed drink with 1.5 ounces of 80-proof hard liquor, such as gin, whiskey, or rum  · Choose alcoholic drinks wisely. With hard alcohol, use sugar-free mixers, such as diet tonic, water, or club soda. Pick drinks that have less alcohol, including light beer or dry wine. Or add club soda to wine to dilute it. Also remember that most alcoholic drinks have a lot of calories. · When you drink, check your blood sugar before you go to bed. Have a snack before bed so your blood sugar does not drop while you sleep. When not to drink  · Never drink on an empty stomach. If you do drink alcohol, drink it only with a meal or snack. Having as little as 2 drinks on an empty stomach could lead to low blood sugar. · Do not drink alcohol if you have problems recognizing the signs of low blood sugar until they become severe. · Do not drink alcohol after you exercise. The exercise itself lowers blood sugar. · Do not drink if you have nerve damage. Drinking can make it worse and increase the pain, numbness, and other symptoms. · Do not drink if you have high blood pressure. · Do not drink if you have diabetic eye disease. · Do not drink if you have high triglycerides, a type of fat in your blood. Drinking can raise triglycerides. · Do not drink if you are trying to lose weight. Alcohol provides empty calories that do not give you any nutrients. · Do not drink and drive. The effects of alcohol are greater if you have low blood sugar. When should you call for help? Call 911 anytime you think you may need emergency care. For example, call if:    · You passed out (lost consciousness).     · You are confused or cannot think clearly.     · Your blood sugar is very high or very low.    Watch closely for changes in your health, and be sure to contact your doctor if:    · Your blood sugar stays outside the level your doctor set for you.     · You have any problems. Where can you learn more? Go to http://ana maria-anne marie.info/.   Enter T236 in the search box to learn more about \"Diabetes and Alcohol: Care Instructions. \"  Current as of: April 16, 2019  Content Version: 12.2  © 3046-7415 [employer]. Care instructions adapted under license by [employer] (which disclaims liability or warranty for this information). If you have questions about a medical condition or this instruction, always ask your healthcare professional. [place] [address] any warranty or liability for your use of this information.

## 2020-02-05 NOTE — PROGRESS NOTES
Willian Benson  77 y.o. male  1950  Pieter Puentes  [id number]   [address]: Progress Note  Nai Hendrix MD       Encounter Date: 2/5/2020    Chief Complaint   Patient presents with    Follow-up     History of Present Illness   Willian Benson is a 77 y.o. male who presents to clinic today for follow-up on diabetes. Recently changes to Ozempic and Metformin due to concerns for worsening renal function. States blood sugars have ranged from . States that when he has a big meal or drinks alcohol that his sugars go up. He will then give himself Novolog for high sugars and then if they don't come down after a few hours he gives himself more, sometimes resulting in lows. Denies abdominal pain, nausea or vomiting. Review of Systems   Review of Systems   Constitutional: Negative for chills, diaphoresis and fever. Gastrointestinal: Negative for abdominal pain, nausea and vomiting. Genitourinary: Negative for frequency. Endo/Heme/Allergies: Negative for polydipsia. Vitals/Objective:     Vitals:    02/05/20 0804   BP: 157/68   Pulse: 62   Resp: 16   Temp: 97.9 °F (36.6 °C)   TempSrc: Oral   SpO2: 99%   Weight: 278 lb (126.1 kg)   Height: 5' 8\" (1.727 m)     Body mass index is 42.27 kg/m². General: Patient alert and oriented and in NAD  Heart: Regular rate and rhythm, No murmurs, rubs or gallops. 2+ peripheral pulses  Lungs: Clear to auscultation bilaterally, no wheezing, rales or rhonchi  Abd: +BS, non-tender, non-distended    Assessment and Plan:   1. Type 2 diabetes with nephropathy (HCC)  Due to increase ozempic next week. Continue to monitor blood glucose. Provided information on the use of alcohol in patients with diabetes. Also encouraged to give more time before giving additional novolog. 2. CKD (chronic kidney disease) stage 3, GFR 30-59 ml/min (HCC)  Recently seen by nephrology. Started on Bumex. Had labs checked. 3. HTN: Just started Bumex. Will monitor at home daily. SBP goal <130-140    I have discussed the diagnosis with the patient and the intended plan as seen in the above orders. he has expressed understanding. The patient has received an after-visit summary and questions were answered concerning future plans. I have discussed medication side effects and warnings with the patient as well. Follow-up and Dispositions  ·   Return in about 6 weeks (around 3/18/2020) for Diabetes Follow-up. Electronically Signed: Ranjana Rodarte MD     History   Patients past medical, surgical and family histories were reviewed and updated.     Past Medical History:   Diagnosis Date    AR (allergic rhinitis) 12/01/2008    sees allergist for shots    Arthritis     Blind right eye     BPH (benign prostatic hyperplasia)     Dr. Michele Ford CAD (coronary artery disease)     CABG - 3 vessel    Depression     DM type 2 (diabetes mellitus, type 2) ([address]     30 years    Essential hypertension with goal blood pressure less than 130/80 9/28/2016    Hearing loss     Hypercholesteremia     Hypogonadism male 12/1/2008    Mild intermittent asthma without complication 45/98/8987    sees allergy    LEONA on CPAP 07/16/2010    Dr. Eligio Turcios    Severe obesity (BMI 35.0-39.9) 6/18/2018    Type 2 diabetes with nephropathy ([address] 3/12/2018     Past Surgical History:   Procedure Laterality Date    CABG, ARTERY-VEIN, THREE  2005    Del Cid    HX CIRCUMCISION  2016    HX CYST REMOVAL  5/12/2016    neck and chest    HX HEENT  2012    right eye prosthesis    HX ORTHOPAEDIC      right shoulder surgery, rotator cuff repair    HX RETINAL DETACHMENT REPAIR  1984    R Blindness    HX RHINOPLASTY      septoplasty     Family History   Adopted: Yes   Family history unknown: Yes     Social History     Socioeconomic History    Marital status: SINGLE     Spouse name: Not on file    Number of children: Not on file    Years of education: Not on file    Highest education level: Associate degree: academic program   Occupational History     Comment: Technology     Comment: Hotel   Social Needs    Financial resource strain: Not hard at all   [place] insecurity:     Worry: Never true     Inability: Never true   EatWith needs:     Medical: No     Non-medical: No   Tobacco Use    Smoking status: Passive Smoke Exposure - Never Smoker    Smokeless tobacco: Never Used   Substance and Sexual Activity    Alcohol use: Yes     Alcohol/week: 5.0 standard drinks     Types: 6 Cans of beer per week     Comment: 1 beer or so per day    Drug use: No     Comment: occasionally smoked marijuana in the past    Sexual activity: Not Currently     Partners: Male   Lifestyle    Physical activity:     Days per week: 2 days     Minutes per session: 40 min    Stress: To some extent   Relationships    Social connections:     Talks on phone: Three times a week     Gets together: Twice a week     Attends Jain service: Never     Active member of club or organization: Yes     Attends meetings of clubs or organizations: More than 4 times per year     Relationship status: Living with partner    Intimate partner violence:     Fear of current or ex partner: No     Emotionally abused: No     Physically abused: No     Forced sexual activity: No   Other Topics Concern    Not on file   Social History Narrative    Not on file            Current Medications/Allergies     Current Outpatient Medications   Medication Sig Dispense Refill    insulin aspart U-100 (NOVOLOG FLEXPEN U-100 INSULIN) 100 unit/mL (3 mL) inpn If Glucose: 150-199: Take 5 units 200-249: Take 15 units 250-299: Take 20 units 300+: take 25 units 36 Pen 1    metFORMIN (GLUCOPHAGE) 1,000 mg tablet Take 1 Tab by mouth two (2) times daily (with meals).  180 Tab 3    semaglutide (OZEMPIC) 0.25 mg/0.2 mL (2 mg/1.5 mL) sub-q pen 0.25mg subQ injection weekly x 4 weeks, then increase to 0.5mg subQ weekly 3 Box 0    testosterone undecanoate (AVEED IM) by IntraMUSCular route.  pramipexole (MIRAPEX) 0.125 mg tablet Take 1 Tab by mouth nightly. Take once daily about 2 to 3 hours prior to bedtime 90 Tab 0    montelukast (SINGULAIR) 10 mg tablet TAKE 1 TABLET DAILY 90 Tab 4    clomiPHENE (CLOMID) 50 mg tablet Take 50 mg by mouth daily.  insulin glargine U-300 conc (TOUJEO SOLOSTAR U-300 INSULIN) 300 unit/mL (1.5 mL) inpn pen INJECT 40 UNITS EVERY MORNING AND 20 UNITS EVERY EVENING 18 Pen 5    metoprolol tartrate (LOPRESSOR) 25 mg tablet TAKE 1 TABLET TWICE A  Tab 0    BREO ELLIPTA 200-25 mcg/dose inhaler INHALE 1 PUFF PO QD 3 Each 1    Insulin Needles, Disposable, 31 gauge x 5/16\" ndle 5x daily with insulin. IDDMII. E11.21 1 Package 11    lisinopril (PRINIVIL, ZESTRIL) 10 mg tablet Take 1 Tab by mouth two (2) times a day. 180 Tab 3    ezetimibe (ZETIA) 10 mg tablet Take 1 Tab by mouth daily. 90 Tab 3    citalopram (CELEXA) 20 mg tablet TAKE 1 TABLET BY MOUTH EVERY DAY. WEAN AS DIRECTED 90 Tab 0    rosuvastatin (CRESTOR) 40 mg tablet Take 1 Tab by mouth daily. 90 Tab 4    albuterol (PROVENTIL HFA, VENTOLIN HFA, PROAIR HFA) 90 mcg/actuation inhaler Take 2 Puffs by inhalation every four (4) hours as needed for Wheezing. 3 Inhaler 1    FREESTYLE SILVINO 14 DAY SENSOR kit 1 Units by Does Not Apply route every fourteen (14) days. 1 Kit 5    CIALIS 5 mg tablet Take 5 mg by mouth daily.  tamsulosin (FLOMAX) 0.4 mg capsule Take 0.4 mg by mouth daily.  fluticasone (FLONASE) 50 mcg/actuation nasal spray 2 Sprays by Both Nostrils route daily. Indications: ALLERGIC RHINITIS 1 Bottle 0    ketoconazole (NIZORAL) 2 % topical cream Apply  to affected area as needed for Skin Irritation.  desonide (TRIDESILON) 0.05 % topical lotion as needed. 1    cetirizine (ZYRTEC) 10 mg tablet Take 1 Tab by mouth daily. 0    aspirin (ASPIRIN) 325 mg tablet take 325 mg by mouth daily.        bumetanide (BUMEX) 1 mg tablet       chlorthalidone (HYGROTEN) 25 mg tablet Take 1 Tab by mouth daily. 90 Tab 1    OMEGA-3 FATTY ACIDS/FISH OIL (FISH OIL EXTRA STRENGTH PO) Take 1,400 mg by mouth daily (after breakfast).  VOLTAREN 1 % gel as needed. 1    M-VIT PO take 1 Tab by mouth daily.         Allergies   Allergen Reactions    Cefdinir Rash    Codeine Nausea Only

## 2020-02-05 NOTE — PROGRESS NOTES
Chief Complaint   Patient presents with    Follow-up     1. Have you been to the ER, urgent care clinic since your last visit? Hospitalized since your last visit? No    2. Have you seen or consulted any other health care providers outside of the [address] [place] [address] since your last visit? Include any pap smears or colon screening.  No      avg sugars--as low as 80---highest it goes up is 200

## 2020-02-07 ENCOUNTER — PATIENT MESSAGE (OUTPATIENT)
Dept: FAMILY MEDICINE CLINIC | Age: 70
End: 2020-02-07

## 2020-02-07 DIAGNOSIS — E11.21 TYPE 2 DIABETES WITH NEPHROPATHY (HCC): Primary | ICD-10-CM

## 2020-02-11 RX ORDER — INSULIN LISPRO 100 [IU]/ML
INJECTION, SOLUTION INTRAVENOUS; SUBCUTANEOUS
Qty: 18 PEN | Refills: 1 | Status: SHIPPED | OUTPATIENT
Start: 2020-02-11 | End: 2020-02-21 | Stop reason: CLARIF

## 2020-02-11 NOTE — TELEPHONE ENCOUNTER
From: Tami George  To: Sindy Kilpatrick MD  Sent: 2/7/2020 12:20 PM EST  Subject: Prescription Question    The Novolog was going to cost me $ 1,000 because of moving from one stage of coverage to another. Four boxes of the Humalog is only $ 50 so can you send a script in for that.     Humalog quick pen U100 3ml 5 per package 100 unit over ML     four boxes should be sufficient for a 90 day supply    Thanks

## 2020-02-17 ENCOUNTER — HOSPITAL ENCOUNTER (OUTPATIENT)
Dept: ULTRASOUND IMAGING | Age: 70
Discharge: HOME | End: 2020-02-17
Attending: INTERNAL MEDICINE
Payer: MEDICARE

## 2020-02-17 DIAGNOSIS — R60.0 LOCALIZED EDEMA: ICD-10-CM

## 2020-02-17 DIAGNOSIS — I10 ESSENTIAL HYPERTENSION, MALIGNANT: ICD-10-CM

## 2020-02-17 DIAGNOSIS — N18.30 CHRONIC KIDNEY DISEASE, STAGE III (MODERATE) (HCC): ICD-10-CM

## 2020-02-17 DIAGNOSIS — E11.21 DIABETIC GLOMERULOPATHY (HCC): ICD-10-CM

## 2020-02-17 PROCEDURE — 76770 US EXAM ABDO BACK WALL COMP: CPT

## 2020-02-18 DIAGNOSIS — G25.81 RESTLESS LEG: ICD-10-CM

## 2020-02-19 ENCOUNTER — PATIENT MESSAGE (OUTPATIENT)
Dept: FAMILY MEDICINE CLINIC | Age: 70
End: 2020-02-19

## 2020-02-19 DIAGNOSIS — E11.21 TYPE 2 DIABETES WITH NEPHROPATHY (HCC): ICD-10-CM

## 2020-02-19 RX ORDER — PRAMIPEXOLE DIHYDROCHLORIDE 0.12 MG/1
TABLET ORAL
Qty: 90 TAB | Refills: 4 | Status: SHIPPED | OUTPATIENT
Start: 2020-02-19 | End: 2020-12-28 | Stop reason: ALTCHOICE

## 2020-02-21 RX ORDER — INSULIN LISPRO 100 [IU]/ML
INJECTION, SOLUTION INTRAVENOUS; SUBCUTANEOUS
Qty: 18 PEN | Refills: 3 | Status: SHIPPED | OUTPATIENT
Start: 2020-02-21 | End: 2020-02-23 | Stop reason: SDUPTHER

## 2020-02-21 NOTE — TELEPHONE ENCOUNTER
From: Annika Bruno  To: Rick Parker MD  Sent: 2/19/2020 10:09 PM EST  Subject: Prescription Question    I sent this several days ago and never received a reply I am down to about 2.5 weeks on my supply of fast acting insulin. Please advice what the plan is.    thanks     \"[facility] say my plan doesn't cover home delivery of the insulin that was ordered     \"We can't complete your order. Hello AUGUSTUS,   On 02/11/2020, you placed an order that we can't complete. Please find more information about your medication below. Prescriptions for [facility]   INSULIN LISPRO(BRAND)[id number]   Prescription #: [id number]     We can't fill your prescription because your prescription plan does not cover home delivery of this medicine. You can ask your doctor to prescribe an alternative medicine that your plan covers. \"     I thought the order was going to be for Humalog.  \"

## 2020-02-23 RX ORDER — INSULIN LISPRO 100 [IU]/ML
INJECTION, SOLUTION INTRAVENOUS; SUBCUTANEOUS
Qty: 18 PEN | Refills: 3 | Status: SHIPPED | OUTPATIENT
Start: 2020-02-23 | End: 2020-10-30 | Stop reason: ALTCHOICE

## 2020-02-23 NOTE — PROGRESS NOTES
Your cholesterol numbers are not at goal. To provide you with the best heart health the LDL should be under 100 if you have no heart disease or history of diabetes. If you have a history of diabetes or heart disease the LDL goal should be less than 70. I think we should get you the injectable form of cholesterol lowering medication. I will have my nurse, Jorge Shaw, work on this. Also your kidney function has gone down over the last year. You may need to reduce your Bumex to every other day. Also I would like for you to get a BMP done in one week which looks at kidney function. Hope all is well.

## 2020-02-24 ENCOUNTER — TELEPHONE (OUTPATIENT)
Dept: FAMILY MEDICINE CLINIC | Age: 70
End: 2020-02-24

## 2020-03-05 ENCOUNTER — TELEPHONE (OUTPATIENT)
Dept: FAMILY MEDICINE CLINIC | Age: 70
End: 2020-03-05

## 2020-03-05 NOTE — TELEPHONE ENCOUNTER
[facility] supply calling for status of order for diabetic supplies that was faxed here 2/29/20.     Acct# [de-identified]

## 2020-03-11 ENCOUNTER — PATIENT MESSAGE (OUTPATIENT)
Dept: FAMILY MEDICINE CLINIC | Age: 70
End: 2020-03-11

## 2020-03-11 DIAGNOSIS — E11.21 TYPE 2 DIABETES WITH NEPHROPATHY (HCC): Primary | ICD-10-CM

## 2020-03-11 NOTE — TELEPHONE ENCOUNTER
From: Shayla Valle  To: Irena Phelps MD  Sent: 3/11/2020 1:45 PM EDT  Subject: Non-Urgent Medical Question    Do you want to order any labs to be completed prior to my March 18th appt.

## 2020-03-11 NOTE — TELEPHONE ENCOUNTER
I called and spoke with [employer] and they are processing the [address]. I called and left a message for patient as well letting him know that the medication is in process. If he has any further questions to call [facility] at [phone], or to call us here at the clinic.

## 2020-03-12 ENCOUNTER — HOSPITAL ENCOUNTER (OUTPATIENT)
Dept: LAB | Age: 70
Discharge: HOME | End: 2020-03-12

## 2020-03-12 ENCOUNTER — LAB ONLY (OUTPATIENT)
Dept: FAMILY MEDICINE CLINIC | Age: 70
End: 2020-03-12

## 2020-03-12 DIAGNOSIS — E11.21 TYPE 2 DIABETES WITH NEPHROPATHY (HCC): ICD-10-CM

## 2020-03-12 LAB
ANION GAP SERPL CALC-SCNC: 7 MMOL/L (ref 5–15)
BUN SERPL-MCNC: 27 MG/DL (ref 6–20)
BUN/CREAT SERPL: 19 (ref 12–20)
CALCIUM SERPL-MCNC: 9 MG/DL (ref 8.5–10.1)
CHLORIDE SERPL-SCNC: 101 MMOL/L (ref 97–108)
CO2 SERPL-SCNC: 28 MMOL/L (ref 21–32)
CREAT SERPL-MCNC: 1.45 MG/DL (ref 0.7–1.3)
EST. AVERAGE GLUCOSE BLD GHB EST-MCNC: 157 MG/DL
GLUCOSE SERPL-MCNC: 300 MG/DL (ref 65–100)
HBA1C MFR BLD: 7.1 % (ref 4–5.6)
POTASSIUM SERPL-SCNC: 4.5 MMOL/L (ref 3.5–5.1)
SODIUM SERPL-SCNC: 136 MMOL/L (ref 136–145)

## 2020-03-18 ENCOUNTER — OFFICE VISIT (OUTPATIENT)
Dept: FAMILY MEDICINE CLINIC | Age: 70
End: 2020-03-18

## 2020-03-18 VITALS
HEIGHT: 68 IN | RESPIRATION RATE: 16 BRPM | TEMPERATURE: 98 F | DIASTOLIC BLOOD PRESSURE: 68 MMHG | OXYGEN SATURATION: 95 % | BODY MASS INDEX: 41.68 KG/M2 | SYSTOLIC BLOOD PRESSURE: 137 MMHG | HEART RATE: 65 BPM | WEIGHT: 275 LBS

## 2020-03-18 DIAGNOSIS — I10 ESSENTIAL HYPERTENSION WITH GOAL BLOOD PRESSURE LESS THAN 130/80: ICD-10-CM

## 2020-03-18 DIAGNOSIS — E11.21 TYPE 2 DIABETES WITH NEPHROPATHY (HCC): Primary | ICD-10-CM

## 2020-03-18 DIAGNOSIS — N18.30 CKD (CHRONIC KIDNEY DISEASE) STAGE 3, GFR 30-59 ML/MIN (HCC): ICD-10-CM

## 2020-03-18 NOTE — PROGRESS NOTES
Maged Banks  77 y.o. male  1950  [address]  [id number]   [address]: Progress Note  Dante Aviles MD       Encounter Date: 3/18/2020    Chief Complaint   Patient presents with    Follow-up     diabetes    Blood Pressure Check     History of Present Illness   Maged Banks is a 77 y.o. male who presents to clinic today for follow-up on diabetes and BP. [address] [facility] labs drawn on 3/12. We reviewed the results. Presents with Multiple Myeloma labs (see scanned) and BP log. Average BPs 130-150s/70-80s with HR in 60-70s. Lab Results   Component Value Date/Time    Sodium 136 03/12/2020 03:19 PM    Potassium 4.5 03/12/2020 03:19 PM    Chloride 101 03/12/2020 03:19 PM    CO2 28 03/12/2020 03:19 PM    Anion gap 7 03/12/2020 03:19 PM    Glucose 300 (H) 03/12/2020 03:19 PM    BUN 27 (H) 03/12/2020 03:19 PM    Creatinine 1.45 (H) 03/12/2020 03:19 PM    BUN/Creatinine ratio 19 03/12/2020 03:19 PM    GFR est AA 59 (L) 03/12/2020 03:19 PM    GFR est non-AA 48 (L) 03/12/2020 03:19 PM    Calcium 9.0 03/12/2020 03:19 PM     Lab Results   Component Value Date/Time    Hemoglobin A1c 7.1 (H) 03/12/2020 03:19 PM    Hemoglobin A1c (POC) 7.2 01/07/2020 11:05 AM           Review of Systems   Review of Systems   Constitutional: Negative. HENT: Negative. Respiratory: Negative. Cardiovascular: Negative for chest pain and palpitations. Gastrointestinal: Negative. Vitals/Objective:     Vitals:    03/18/20 0813   BP: 137/68   Pulse: 65   Resp: 16   Temp: 98 °F (36.7 °C)   TempSrc: Oral   SpO2: 95%   Weight: 275 lb (124.7 kg)   Height: 5' 8\" (1.727 m)     Body mass index is 41.81 kg/m². General: Patient alert and oriented and in NAD  Heart: Regular rate and rhythm, No murmurs, rubs or gallops.   2+ peripheral pulses  Lungs: Clear to auscultation bilaterally, no wheezing, rales or rhonchi  Abd: +BS, non-tender, non-distended  Ext: No edema  Psych: Appropriate mood and affect      Assessment and Plan:   1. Type 2 diabetes with nephropathy ([address]  2. CKD (chronic kidney disease) stage 3, GFR 30-59 ml/min (HCC)  No significant change in A1c with recent changes in DM regimen. Some improvement in creatinine. Will continue current meds and monitor renal function. Will likely need to consider d/c of metformin for renal function worsens. Key Antihyperglycemic Medications             HUMALOG KWIKPEN INSULIN 100 unit/mL kwikpen (Taking) If Glucose: 150-199: Take 5 units 200-249: Take 15 units 250-299: Take 20 units 300+: take 25 units    metFORMIN (GLUCOPHAGE) 1,000 mg tablet (Taking) Take 1 Tab by mouth two (2) times daily (with meals). semaglutide (OZEMPIC) 0.25 mg/0.2 mL (2 mg/1.5 mL) sub-q pen (Taking/Discontinued) 0.25mg subQ injection weekly x 4 weeks, then increase to 0.5mg subQ weekly    insulin glargine U-300 conc (TOUJEO SOLOSTAR U-300 INSULIN) 300 unit/mL (1.5 mL) inpn pen (Taking) INJECT 40 UNITS EVERY MORNING AND 20 UNITS EVERY EVENING            3. Essential hypertension with goal blood pressure less than 130/80  Will have patient track blood pressures and let us know what they are at home. Had stopped HCTZ per cardiology recommendation given renal function decline.   - [facility] BP FLOWSHEET      I have discussed the diagnosis with the patient and the intended plan as seen in the above orders. he has expressed understanding. The patient has received an after-visit summary and questions were answered concerning future plans. I have discussed medication side effects and warnings with the patient as well. Follow-up and Dispositions  ·   Return in about 3 months (around 6/18/2020). Electronically Signed: Jaye Duggan MD     History   Patients past medical, surgical and family histories were reviewed and updated.     Past Medical History:   Diagnosis Date    AR (allergic rhinitis) 12/01/2008    sees allergist for shots    Arthritis     Blind right eye     BPH (benign prostatic hyperplasia)     Dr. Marianna Munroe CAD (coronary artery disease)     CABG - 3 vessel    Depression     DM type 2 (diabetes mellitus, type 2) ([facility]     30 years    Essential hypertension with goal blood pressure less than 130/80 9/28/2016    Hearing loss     Hypercholesteremia     Hypogonadism male 12/1/2008    Mild intermittent asthma without complication 25/41/1847    sees allergy    LEONA on CPAP 07/16/2010    Dr. Maryam Zhou    Severe obesity (BMI 35.0-39.9) 6/18/2018    Type 2 diabetes with nephropathy ([facility] 3/12/2018     Past Surgical History:   Procedure Laterality Date    CABG, ARTERY-VEIN, THREE  2005    Del Cid    HX CIRCUMCISION  2016    HX CYST REMOVAL  5/12/2016    neck and chest    HX HEENT  2012    right eye prosthesis    HX ORTHOPAEDIC      right shoulder surgery, rotator cuff repair    HX RETINAL DETACHMENT REPAIR  1984    R Blindness    HX RHINOPLASTY      septoplasty     Family History   Adopted: Yes   Family history unknown: Yes     Social History     Socioeconomic History    Marital status: SINGLE     Spouse name: Not on file    Number of children: Not on file    Years of education: Not on file    Highest education level: Associate degree: academic program   Occupational History     Comment: Technology     Comment: Minded   Social Needs    Financial resource strain: Not hard at all   [place] insecurity     Worry: Never true     Inability: Never true    Transportation needs     Medical: No     Non-medical: No   Tobacco Use    Smoking status: Passive Smoke Exposure - Never Smoker    Smokeless tobacco: Never Used   Substance and Sexual Activity    Alcohol use:  Yes     Alcohol/week: 5.0 standard drinks     Types: 6 Cans of beer per week     Comment: 1 beer or so per day    Drug use: No     Comment: occasionally smoked marijuana in the past    Sexual activity: Not Currently     Partners: Male   Lifestyle    Physical activity Days per week: 2 days     Minutes per session: 40 min    Stress: To some extent   Relationships    Social connections     Talks on phone: Three times a week     Gets together: Twice a week     Attends Lutheran service: Never     Active member of club or organization: Yes     Attends meetings of clubs or organizations: More than 4 times per year     Relationship status: Living with partner    Intimate partner violence     Fear of current or ex partner: No     Emotionally abused: No     Physically abused: No     Forced sexual activity: No   Other Topics Concern    Not on file   Social History Narrative    Not on file            Current Medications/Allergies     Current Outpatient Medications   Medication Sig Dispense Refill    HUMALOG KWIKPEN INSULIN 100 unit/mL kwikpen If Glucose: 150-199: Take 5 units 200-249: Take 15 units 250-299: Take 20 units 300+: take 25 units 18 Pen 3    pramipexole (MIRAPEX) 0.125 mg tablet TAKE 1 TABLET NIGHTLY ABOUT 2 TO 3 HOURS PRIOR TO BEDTIME 90 Tab 4    metoprolol tartrate (LOPRESSOR) 25 mg tablet TAKE 1 TABLET TWICE A  Tab 3    metFORMIN (GLUCOPHAGE) 1,000 mg tablet Take 1 Tab by mouth two (2) times daily (with meals). 180 Tab 3    semaglutide (OZEMPIC) 0.25 mg/0.2 mL (2 mg/1.5 mL) sub-q pen 0.25mg subQ injection weekly x 4 weeks, then increase to 0.5mg subQ weekly 3 Box 0    montelukast (SINGULAIR) 10 mg tablet TAKE 1 TABLET DAILY 90 Tab 4    insulin glargine U-300 conc (TOUJEO SOLOSTAR U-300 INSULIN) 300 unit/mL (1.5 mL) inpn pen INJECT 40 UNITS EVERY MORNING AND 20 UNITS EVERY EVENING 18 Pen 5    BREO ELLIPTA 200-25 mcg/dose inhaler INHALE 1 PUFF PO QD 3 Each 1    Insulin Needles, Disposable, 31 gauge x 5/16\" ndle 5x daily with insulin. IDDMII. E11.21 1 Package 11    lisinopril (PRINIVIL, ZESTRIL) 10 mg tablet Take 1 Tab by mouth two (2) times a day. 180 Tab 3    ezetimibe (ZETIA) 10 mg tablet Take 1 Tab by mouth daily.  90 Tab 3    citalopram (CELEXA) 20 mg tablet TAKE 1 TABLET BY MOUTH EVERY DAY. WEAN AS DIRECTED 90 Tab 0    rosuvastatin (CRESTOR) 40 mg tablet Take 1 Tab by mouth daily. 90 Tab 4    albuterol (PROVENTIL HFA, VENTOLIN HFA, PROAIR HFA) 90 mcg/actuation inhaler Take 2 Puffs by inhalation every four (4) hours as needed for Wheezing. 3 Inhaler 1    FREESTYLE SILVINO 14 DAY SENSOR kit 1 Units by Does Not Apply route every fourteen (14) days. 1 Kit 5    fluticasone (FLONASE) 50 mcg/actuation nasal spray 2 Sprays by Both Nostrils route daily. Indications: ALLERGIC RHINITIS 1 Bottle 0    bumetanide (BUMEX) 1 mg tablet       testosterone undecanoate (AVEED IM) by IntraMUSCular route.  clomiPHENE (CLOMID) 50 mg tablet Take 50 mg by mouth daily.  CIALIS 5 mg tablet Take 5 mg by mouth daily.  tamsulosin (FLOMAX) 0.4 mg capsule Take 0.4 mg by mouth daily.  OMEGA-3 FATTY ACIDS/FISH OIL (FISH OIL EXTRA STRENGTH PO) Take 1,400 mg by mouth daily (after breakfast).  ketoconazole (NIZORAL) 2 % topical cream Apply  to affected area as needed for Skin Irritation.  VOLTAREN 1 % gel as needed. 1    desonide (TRIDESILON) 0.05 % topical lotion as needed. 1    cetirizine (ZYRTEC) 10 mg tablet Take 1 Tab by mouth daily. 0    aspirin (ASPIRIN) 325 mg tablet take 325 mg by mouth daily.         Allergies   Allergen Reactions    Cefdinir Rash    Codeine Nausea Only

## 2020-03-18 NOTE — PATIENT INSTRUCTIONS
Learning About Meal Planning for Diabetes Why plan your meals? Meal planning can be a key part of managing diabetes. Planning meals and snacks with the right balance of carbohydrate, protein, and fat can help you keep your blood sugar at the target level you set with your doctor. You don't have to eat special foods. You can eat what your family eats, including sweets once in a while. But you do have to pay attention to how often you eat and how much you eat of certain foods. You may want to work with a dietitian or a certified diabetes educator. He or she can give you tips and meal ideas and can answer your questions about meal planning. This health professional can also help you reach a healthy weight if that is one of your goals. What plan is right for you? Your dietitian or diabetes educator may suggest that you start with the plate format or carbohydrate counting. The plate format The plate format is a simple way to help you manage how you eat. You plan meals by learning how much space each food should take on a plate. Using the plate format helps you spread carbohydrate throughout the day. It can make it easier to keep your blood sugar level within your target range. It also helps you see if you're eating healthy portion sizes. To use the plate format, you put non-starchy vegetables on half your plate. Add meat or meat substitutes on one-quarter of the plate. Put a grain or starchy vegetable (such as brown rice or a potato) on the final quarter of the plate. You can add a small piece of fruit and some low-fat or fat-free milk or yogurt, depending on your carbohydrate goal for each meal. 
Here are some tips for using the plate format: · Make sure that you are not using an oversized plate. A 9-inch plate is best. Many restaurants use larger plates. · Get used to using the plate format at home. Then you can use it when you eat out. · Write down your questions about using the plate format. Talk to your doctor, a dietitian, or a diabetes educator about your concerns. Carbohydrate counting With carbohydrate counting, you plan meals based on the amount of carbohydrate in each food. Carbohydrate raises blood sugar higher and more quickly than any other nutrient. It is found in desserts, breads and cereals, and fruit. It's also found in starchy vegetables such as potatoes and corn, grains such as rice and pasta, and milk and yogurt. Spreading carbohydrate throughout the day helps keep your blood sugar levels within your target range. Your daily amount depends on several things, including your weight, how active you are, which diabetes medicines you take, and what your goals are for your blood sugar levels. A registered dietitian or diabetes educator can help you plan how much carbohydrate to include in each meal and snack. A guideline for your daily amount of carbohydrate is: · 45 to 60 grams at each meal. That's about the same as 3 to 4 carbohydrate servings. · 15 to 20 grams at each snack. That's about the same as 1 carbohydrate serving. The Nutrition Facts label on packaged foods tells you how much carbohydrate is in a serving of the food. First, look at the serving size on the food label. Is that the amount you eat in a serving? All of the nutrition information on a food label is based on that serving size. So if you eat more or less than that, you'll need to adjust the other numbers. Total carbohydrate is the next thing you need to look for on the label. If you count carbohydrate servings, one serving of carbohydrate is 15 grams. For foods that don't come with labels, such as fresh fruits and vegetables, you'll need a guide that lists carbohydrate in these foods. Ask your doctor, dietitian, or diabetes educator about books or other nutrition guides you can use.  
If you take insulin, you need to know how many grams of carbohydrate are in a meal. This lets you know how much rapid-acting insulin to take before you eat. If you use an insulin pump, you get a constant rate of insulin during the day. So the pump must be programmed at meals to give you extra insulin to cover the rise in blood sugar after meals. When you know how much carbohydrate you will eat, you can take the right amount of insulin. Or, if you always use the same amount of insulin, you need to make sure that you eat the same amount of carbohydrate at meals. If you need more help to understand carbohydrate counting and food labels, ask your doctor, dietitian, or diabetes educator. How do you get started with meal planning? Here are some tips to get started: 
· Plan your meals a week at a time. Don't forget to include snacks too. · Use cookbooks or online recipes to plan several main meals. Plan some quick meals for busy nights. You also can double some recipes that freeze well. Then you can save half for other busy nights when you don't have time to cook. · Make sure you have the ingredients you need for your recipes. If you're running low on basic items, put these items on your shopping list too. · List foods that you use to make breakfasts, lunches, and snacks. List plenty of fruits and vegetables. · Post this list on the refrigerator. Add to it as you think of more things you need. · Take the list to the store to do your weekly shopping. Follow-up care is a key part of your treatment and safety. Be sure to make and go to all appointments, and call your doctor if you are having problems. It's also a good idea to know your test results and keep a list of the medicines you take. Where can you learn more? Go to http://ana maria-anne marie.info/ Jinny Cuello in the search box to learn more about \"Learning About Meal Planning for Diabetes. \" Current as of: December 19, 2019Content Version: 12.4 © 8832-3366 Healthwise, Incorporated. Care instructions adapted under license by [employer] (which disclaims liability or warranty for this information). If you have questions about a medical condition or this instruction, always ask your healthcare professional. [address] any warranty or liability for your use of this information. Home Blood Pressure Test: About This Test 
What is it? A home blood pressure test allows you to keep track of your blood pressure at home. Blood pressure is a measure of the force of blood against the walls of your arteries. Blood pressure readings include two numbers, such as 130/80 (say \"130 over 80\"). The first number is the systolic pressure. The second number is the diastolic pressure. Why is this test done? You may do this test at home to: · Find out if you have high blood pressure. · Track your blood pressure if you have high blood pressure. · Track how well medicine is working to reduce high blood pressure. · Check how lifestyle changes, such as weight loss and exercise, are affecting blood pressure. How do you prepare for the test? 
For at least 30 minutes before you take your blood pressure, don't exercise or use caffeine, tobacco, or medicines that raise blood pressure. Take your blood pressure while you feel comfortable and relaxed. Sit quietly with both feet on the floor for at least 5 minutes before the test. 
How is the test done? · Sit with your arm slightly bent and resting on a table so that your upper arm is at the same level as your heart. · Roll up your sleeve or take off your shirt to expose your upper arm. · Wrap the blood pressure cuff around your upper arm so that the lower edge of the cuff is about 1 inch above the bend of your elbow. Proceed with the following steps depending on if you are using an automatic or manual pressure monitor. Automatic blood pressure monitors · Press the on/off button on the automatic monitor and wait until the ready-to-measure \"heart\" symbol appears next to zero in the display window. · Press the start button. The cuff will inflate and deflate by itself. · Your blood pressure numbers will appear on the screen. · Write your numbers in your log book, along with the date and time. Manual blood pressure monitors · Place the earpieces of a stethoscope in your ears, and place the bell of the stethoscope over the artery, just below the cuff. · Close the valve on the rubber inflating bulb. · Squeeze the bulb rapidly with your opposite hand to inflate the cuff until the dial or column of mercury reads about 30 mm Hg higher than your usual systolic pressure. If you do not know your usual pressure, inflate the cuff to 210 mm Hg or until the pulse at your wrist disappears. · Open the pressure valve just slightly by twisting or pressing the valve on the bulb. · As you watch the pressure slowly fall, note the level on the dial at which you first start to hear a pulsing or tapping sound through the stethoscope. This is your systolic blood pressure. · Continue letting the air out slowly. The sounds will become muffled and will finally disappear. Note the pressure when the sounds completely disappear. This is your diastolic blood pressure. Let out all the remaining air. · Write your numbers in your log book, along with the date and time. Follow-up care is a key part of your treatment and safety. Be sure to make and go to all appointments, and call your doctor if you are having problems. It's also a good idea to keep a list of the medicines you take. Where can you learn more? Go to http://ana maria-anne marie.info/ Enter C427 in the search box to learn more about \"Home Blood Pressure Test: About This Test.\" Current as of: December 15, 2019Content Version: 12.4 © 3571-6068 Healthwise, Incorporated.  
Care instructions adapted under license by [employer] (which disclaims liability or warranty for this information). If you have questions about a medical condition or this instruction, always ask your healthcare professional. [address] any warranty or liability for your use of this information.

## 2020-03-19 DIAGNOSIS — Z79.4 TYPE 2 DIABETES MELLITUS WITH MICROALBUMINURIA, WITH LONG-TERM CURRENT USE OF INSULIN (HCC): ICD-10-CM

## 2020-03-19 DIAGNOSIS — E11.29 TYPE 2 DIABETES MELLITUS WITH MICROALBUMINURIA, WITH LONG-TERM CURRENT USE OF INSULIN (HCC): ICD-10-CM

## 2020-03-19 DIAGNOSIS — R80.9 TYPE 2 DIABETES MELLITUS WITH MICROALBUMINURIA, WITH LONG-TERM CURRENT USE OF INSULIN (HCC): ICD-10-CM

## 2020-03-19 RX ORDER — SEMAGLUTIDE 1.34 MG/ML
0.5 INJECTION, SOLUTION SUBCUTANEOUS
Qty: 4.5 ML | Refills: 3 | Status: SHIPPED | OUTPATIENT
Start: 2020-03-19 | End: 2020-06-05

## 2020-03-24 ENCOUNTER — DOCUMENTATION ONLY (OUTPATIENT)
Dept: CARDIOLOGY CLINIC | Age: 70
End: 2020-03-24

## 2020-04-21 NOTE — PROGRESS NOTES
Imelda Barone  77 y.o. male  1950  [id number]  [facility] [address]  [id number]   [address]:    Telemedicine Progress Note  Juan Villalobos MD       Encounter Date and Time: April 22, 2020 at 9:15    Consent:  He and/or the health care decision maker is aware that that he may receive a bill for this telephone service, depending on his insurance coverage, and has provided verbal consent to proceed: Yes    Chief Complaint   Patient presents with    Anxiety    Diabetes    Hand Pain     Saw ortho, possible carpal tunnel     History of Present Illness   Imelda Barone is a 77 y.o. male was evaluated by synchronous (real-time) audio-video technology from home, through the [employer] Patient Portal.    Diabetes Mellitus:  No polyuria, polydipsia, blurry vision, chest pain, dyspnea or claudication. Taking medication compliantly without noted sided effects. Follows diet fairly well. Home glucose monitoring in the range of . He is currently on prednisone for hand pain. Known diabetic complications: nephropathy, impotence and cardiovascular disease  Cardiovascular risk factors: diabetes mellitus, obesity, sedentary life style, male gender, hypertension, stress  Current diabetic medications include   Key Antihyperglycemic Medications             semaglutide (Ozempic) 0.25 mg/0.2 mL (2 mg/1.5 mL) sub-q pen (Taking) 0.5 mg by SubCUTAneous route every seven (7) days for 12 doses. HUMALOG KWIKPEN INSULIN 100 unit/mL kwikpen (Taking) If Glucose: 150-199: Take 5 units 200-249: Take 15 units 250-299: Take 20 units 300+: take 25 units    metFORMIN (GLUCOPHAGE) 1,000 mg tablet (Taking) Take 1 Tab by mouth two (2) times daily (with meals).     insulin glargine U-300 conc (TOUJEO SOLOSTAR U-300 INSULIN) 300 unit/mL (1.5 mL) inpn pen (Taking) INJECT 40 UNITS EVERY MORNING AND 20 UNITS EVERY EVENING         Eye exam current (within one year): yes  Weight trend: stable  Prior visit with dietician: not asked  Current diet: \"healthy\" diet  in general  Current exercise: no regular exercise    Current monitoring regimen: Continuous Glucose Monitor System  Home blood sugar records:  (avg ~150)  Any episodes of hypoglycemia? yes - 60    Is He on ACE inhibitor or angiotensin II receptor blocker? Yes , lisinopril (generic)    Most recent labs =    Lab Results   Component Value Date/Time    Hemoglobin A1c 7.1 (H) 03/12/2020 03:19 PM    Hemoglobin A1c 7.7 (H) 09/30/2019 02:17 PM    Hemoglobin A1c 8.4 (H) 09/28/2016 10:30 AM    Glucose 300 (H) 03/12/2020 03:19 PM    Glucose (POC) 143 (H) 05/12/2016 08:51 AM    Microalbumin/Creat ratio (mg/g creat) 8 09/14/2009 08:50 AM    Microalb/Creat ratio (ug/mg creat.) 15.3 09/30/2019 02:14 PM    Microalbumin,urine random 1.83 09/14/2009 08:50 AM    LDL, calculated 28.4 01/03/2020 08:23 AM    Creatinine 1.45 (H) 03/12/2020 03:19 PM        Last 3 blood pressures =   BP Readings from Last 3 Encounters:   03/18/20 137/68   02/05/20 157/68   01/09/20 136/52         Diabetic Foot and Eye Exam HM Status   Topic Date Due    Diabetic Foot Care  03/26/2020    Eye Exam  08/09/2020       Anxious: Since the recent pandemic, patient has found he is having more worry and generalized anxiety. Had been on Celexa in the past, but is not taking this right now. He is trying to stay connected with friends and family. He is not getting regular physical activity. Right Hand Pain: states he saw [facility] on Monday. Thinks that he may have carpal tunnel. He is seeing Dr. Charla Hook on Friday for evaluation. Was placed on prednisone and gabapentin. Review of Systems   Review of Systems   Genitourinary: Negative for frequency. Musculoskeletal: Positive for joint pain. Endo/Heme/Allergies: Negative for polydipsia. Psychiatric/Behavioral: The patient is nervous/anxious.         Vitals/Objective:     General: alert, cooperative, no distress   Mental  status: mental status: alert, oriented to person, place, and time, normal mood, behavior, speech, dress, motor activity, and thought processes   Resp: resp: normal effort and no respiratory distress   Neuro: neuro: no gross deficits   Skin: skin: no discoloration or lesions of concern on visible areas   Due to this being a TeleHealth evaluation, many elements of the physical examination are unable to be assessed. Assessment and Plan:   1. Type 2 diabetes with nephropathy (HCC)  Patients blood glucose has had wide fluctuations and his currently on steroids for hand pain. His concurrent CKDIIIb/A2 also places patient at higher risk of hypoglycemia . Would recommend continued CGM use to guide insulin dosing and to recognize possible hypoglycemia. Will need face to face every 6 months detailing the medical necesity of daily CGM use. I have completed the order forma and submitted to the medical supplier. 2. Adjustment disorder with anxiety  We discussed treatment options including counseling, restarting celexa and buspar. Patient preference for buspar at this time. Will start today and follow-up in 3-4 weeks. - busPIRone (BUSPAR) 7.5 mg tablet; Take 1 Tab by mouth two (2) times a day. Dispense: 60 Tab; Refill: 1    3. Pain of hand, unspecified laterality  Seeing ortho Friday. May require steroid injection. We discussed the expected course, resolution and complications of the diagnosis(es) in detail. Medication risks, benefits, costs, interactions, and alternatives were discussed as indicated. I advised him to contact the office if his condition worsens, changes or fails to improve as anticipated. He expressed understanding with the diagnosis(es) and plan. Patient understands that this encounter was a temporary measure, and the importance of further follow up and examination was emphasized. Patient verbalized understanding.        Patient informed to follow up: 3-4 weeks  Electronically Signed: July Phan MD    Providers location when delivering service (clinic, hospital, home): clinic    CPT Codes 83866-58176 for Established Patients may apply to this Telehealth Visit. POS code: 18. Modifier GT      Pursuant to the emergency declaration under the [address] [place] [address] waiver authority and the [employer] Act, this Virtual  Visit was conducted, with patient's consent, to reduce the patient's risk of exposure to COVID-19 and provide continuity of care for an established patient. Services were provided through a video synchronous discussion virtually to substitute for in-person clinic visit. History   Patients past medical, surgical and family histories were reviewed and updated. Past Medical History:   Diagnosis Date    AR (allergic rhinitis) 12/01/2008    sees allergist for shots    Arthritis     Blind right eye     BPH (benign prostatic hyperplasia)     Dr. Mike Andres CAD (coronary artery disease)     CABG - 3 vessel    Depression     DM type 2 (diabetes mellitus, type 2) ([facility]     30 years    Essential hypertension with goal blood pressure less than 130/80 9/28/2016    Hearing loss     Hypercholesteremia     Hypogonadism male 12/1/2008    Mild intermittent asthma without complication 18/00/0800    sees allergy    LEONA on CPAP 07/16/2010    Dr. Quynh Paniagua    Severe obesity (BMI 35.0-39.9) 6/18/2018    Type 2 diabetes with nephropathy ([facility] 3/12/2018     Past Surgical History:   Procedure Laterality Date    CABG, ARTERY-VEIN, THREE  2005    [place]    HX CIRCUMCISION  2016    HX CYST REMOVAL  5/12/2016    neck and chest    HX HEENT  2012    right eye prosthesis    HX ORTHOPAEDIC      right shoulder surgery, rotator cuff repair    HX RETINAL DETACHMENT REPAIR  1984    R Blindness    HX RHINOPLASTY      septoplasty     Family History   Adopted: Yes   Family history unknown:  Yes Social History     Socioeconomic History    Marital status: SINGLE     Spouse name: Not on file    Number of children: Not on file    Years of education: Not on file    Highest education level: Associate degree: academic program   Occupational History     Comment: Technology     Comment: [employer]   Social Needs    Financial resource strain: Not hard at all   [place] insecurity     Worry: Never true     Inability: Never true   adaffix needs     Medical: No     Non-medical: No   Tobacco Use    Smoking status: Passive Smoke Exposure - Never Smoker    Smokeless tobacco: Never Used   Substance and Sexual Activity    Alcohol use: Yes     Alcohol/week: 6.0 standard drinks     Types: 6 Cans of beer per week     Comment: 1 beer or so per day    Drug use: No     Comment: occasionally smoked marijuana in the past    Sexual activity: Not Currently     Partners: Male   Lifestyle    Physical activity     Days per week: 2 days     Minutes per session: 40 min    Stress: To some extent   Relationships    Social connections     Talks on phone:  Three times a week     Gets together: Twice a week     Attends Rastafari service: Never     Active member of club or organization: Yes     Attends meetings of clubs or organizations: More than 4 times per year     Relationship status: Living with partner    Intimate partner violence     Fear of current or ex partner: No     Emotionally abused: No     Physically abused: No     Forced sexual activity: No   Other Topics Concern    Not on file   Social History Narrative    Not on file     Patient Active Problem List   Diagnosis Code    CAD (coronary artery disease) I25.10    Hypogonadism male E29.1    AR (allergic rhinitis) J30.9    ED (erectile dysfunction) N52.9    LEONA (obstructive sleep apnea) G47.33    S/P excision of lipoma Z98.890, Z86.018    S/P excision of skin lesion, follow-up exam Z09    Essential hypertension with goal blood pressure less than 130/80 I10  Diabetes mellitus type 2, controlled ([facility] E11.9    Advance care planning Z71.89    Mild intermittent asthma without complication V57.59    Pure hypercholesterolemia E78.00    Type 2 diabetes mellitus with microalbuminuria, with long-term current use of insulin ([facility]) E11.29, R80.9, Z79.4    Obesity, Class II, BMI 35-39.9 E66.9    Dysthymia F34.1    Severe obesity ([facility]) E66.01    Type 2 diabetes with nephropathy ([facility]) E11.21    CKD (chronic kidney disease) stage 3, GFR 30-59 ml/min ([facility]) N18.3          Current Medications/Allergies   Medications and Allergies reviewed:    Current Outpatient Medications   Medication Sig Dispense Refill    gabapentin (NEURONTIN) 300 mg capsule Take 300 mg by mouth three (3) times daily.  busPIRone (BUSPAR) 7.5 mg tablet Take 1 Tab by mouth two (2) times a day. 60 Tab 1    predniSONE (DELTASONE) 5 mg tablet TK 1 T PO TID FOR 3 TO 5 DAYS. AVOID TAKING WITH OTHER NSAIDS      semaglutide (Ozempic) 0.25 mg/0.2 mL (2 mg/1.5 mL) sub-q pen 0.5 mg by SubCUTAneous route every seven (7) days for 12 doses. 4.5 mL 3    HUMALOG KWIKPEN INSULIN 100 unit/mL kwikpen If Glucose: 150-199: Take 5 units 200-249: Take 15 units 250-299: Take 20 units 300+: take 25 units 18 Pen 3    pramipexole (MIRAPEX) 0.125 mg tablet TAKE 1 TABLET NIGHTLY ABOUT 2 TO 3 HOURS PRIOR TO BEDTIME 90 Tab 4    metoprolol tartrate (LOPRESSOR) 25 mg tablet TAKE 1 TABLET TWICE A  Tab 3    bumetanide (BUMEX) 1 mg tablet       metFORMIN (GLUCOPHAGE) 1,000 mg tablet Take 1 Tab by mouth two (2) times daily (with meals). 180 Tab 3    testosterone undecanoate (AVEED IM) by IntraMUSCular route.  insulin glargine U-300 conc (TOUJEO SOLOSTAR U-300 INSULIN) 300 unit/mL (1.5 mL) inpn pen INJECT 40 UNITS EVERY MORNING AND 20 UNITS EVERY EVENING 18 Pen 5    Insulin Needles, Disposable, 31 gauge x 5/16\" ndle 5x daily with insulin.  IDDMII. E11.21 1 Package 11    lisinopril (PRINIVIL, ZESTRIL) 10 mg tablet Take 1 Tab by mouth two (2) times a day. 180 Tab 3    ezetimibe (ZETIA) 10 mg tablet Take 1 Tab by mouth daily. 90 Tab 3    rosuvastatin (CRESTOR) 40 mg tablet Take 1 Tab by mouth daily. 90 Tab 4    albuterol (PROVENTIL HFA, VENTOLIN HFA, PROAIR HFA) 90 mcg/actuation inhaler Take 2 Puffs by inhalation every four (4) hours as needed for Wheezing. 3 Inhaler 1    FREESTYLE SILVINO 14 DAY SENSOR kit 1 Units by Does Not Apply route every fourteen (14) days. 1 Kit 5    CIALIS 5 mg tablet Take 5 mg by mouth daily.  tamsulosin (FLOMAX) 0.4 mg capsule Take 0.4 mg by mouth daily.  fluticasone (FLONASE) 50 mcg/actuation nasal spray 2 Sprays by Both Nostrils route daily. Indications: ALLERGIC RHINITIS 1 Bottle 0    OMEGA-3 FATTY ACIDS/FISH OIL (FISH OIL EXTRA STRENGTH PO) Take 1,400 mg by mouth daily (after breakfast).  ketoconazole (NIZORAL) 2 % topical cream Apply  to affected area as needed for Skin Irritation.  VOLTAREN 1 % gel as needed. 1    desonide (TRIDESILON) 0.05 % topical lotion as needed. 1    cetirizine (ZYRTEC) 10 mg tablet Take 1 Tab by mouth daily. 0    aspirin (ASPIRIN) 325 mg tablet take 325 mg by mouth daily.  montelukast (SINGULAIR) 10 mg tablet TAKE 1 TABLET DAILY 90 Tab 4    clomiPHENE (CLOMID) 50 mg tablet Take 50 mg by mouth daily.  BREO ELLIPTA 200-25 mcg/dose inhaler INHALE 1 PUFF PO QD 3 Each 1    citalopram (CELEXA) 20 mg tablet TAKE 1 TABLET BY MOUTH EVERY DAY.  WEAN AS DIRECTED 90 Tab 0     Allergies   Allergen Reactions    Cefdinir Rash    Codeine Nausea Only

## 2020-04-22 ENCOUNTER — VIRTUAL VISIT (OUTPATIENT)
Dept: FAMILY MEDICINE CLINIC | Age: 70
End: 2020-04-22

## 2020-04-22 DIAGNOSIS — F43.22 ADJUSTMENT DISORDER WITH ANXIETY: ICD-10-CM

## 2020-04-22 DIAGNOSIS — E11.21 TYPE 2 DIABETES WITH NEPHROPATHY (HCC): Primary | ICD-10-CM

## 2020-04-22 DIAGNOSIS — M79.643 PAIN OF HAND, UNSPECIFIED LATERALITY: ICD-10-CM

## 2020-04-22 RX ORDER — GABAPENTIN 300 MG/1
300 CAPSULE ORAL 3 TIMES DAILY
COMMUNITY
End: 2020-12-08 | Stop reason: SDUPTHER

## 2020-04-22 RX ORDER — PREDNISONE 5 MG/1
TABLET ORAL
COMMUNITY
Start: 2020-04-20 | End: 2020-08-10 | Stop reason: ALTCHOICE

## 2020-04-22 RX ORDER — BUSPIRONE HYDROCHLORIDE 7.5 MG/1
7.5 TABLET ORAL 2 TIMES DAILY
Qty: 60 TAB | Refills: 1 | Status: SHIPPED | OUTPATIENT
Start: 2020-04-22 | End: 2020-05-04 | Stop reason: SDUPTHER

## 2020-04-22 NOTE — PATIENT INSTRUCTIONS
Buspirone (By mouth) Buspirone ([id number]) Treats anxiety. Brand Name(s):  
There may be other brand names for this medicine. When This Medicine Should Not Be Used: You should not use this medicine if you have had an allergic reaction to buspirone. How to Use This Medicine:  
Tablet · Your doctor will tell you how much of this medicine to take and how often. Do not take more medicine or take it more often than your doctor tells you to. · You may take this medicine with or without food, but take it the same way each time. · You may need to take this medicine for 1 or 2 weeks before you begin to feel better. If a dose is missed: · If you miss a dose or forget to take your medicine, take it as soon as you can. If it is almost time for your next dose, wait until then to take the medicine and skip the missed dose. · Do not use extra medicine to make up for a missed dose. How to Store and Dispose of This Medicine: · Store the medicine at room temperature, away from heat, moisture, and direct light. · Keep all medicine out of the reach of children and never share your medicine with anyone. Drugs and Foods to Avoid: Ask your doctor or pharmacist before using any other medicine, including over-the-counter medicines, vitamins, and herbal products. · You should not use buspirone when you are also using an MAO inhibitor (such as Eldepryl®, Marplan®, Nardil®, Parnate®). · Do not eat grapefruit, drink grapefruit juice, or drink alcohol while you are using buspirone. · Make sure your doctor knows if you are also using cimetidine (Anand Fuse), dexamethasone (Decadron®), diltiazem (Shell Pickup), erythromycin (Erythro-Tab®), itraconazole (Sporanox®), nefazodone (Serzone®), rifampin (Rifadin®, Rifamate®, Rifater®), verapamil (Kierra Burows), or medicine for seizures (such as Dilantin®, Luminal®, Tegretol®).  
· Make sure your doctor knows if you are using any medicines that make you sleepy (such as sleeping pills, cold and allergy medicine, narcotic pain relievers, or sedatives). Warnings While Using This Medicine: · Make sure your doctor knows if you are pregnant or breastfeeding, or if you have kidney or liver disease. · Do not stop using this medicine suddenly without asking your doctor. You may need to slowly decrease your dose before stopping it completely. · This medicine may make you dizzy or drowsy. Avoid driving, using machines, or doing anything else that could be dangerous if you are not alert. Possible Side Effects While Using This Medicine:  
Call your doctor right away if you notice any of these side effects: 
· Fast or pounding heartbeat · Numbness or tingling feeling · Tremors or shaking If you notice these less serious side effects, talk with your doctor: · Drowsiness or weakness · Dry mouth · Feeling restless or nervous, trouble sleeping · Headache · Nausea, constipation, upset stomach If you notice other side effects that you think are caused by this medicine, tell your doctor. Call your doctor for medical advice about side effects. You may report side effects to FDA at 3-505-FDA-3763 © 2017 [facility] Information is for End User's use only and may not be sold, redistributed or otherwise used for commercial purposes. The above information is an  only. It is not intended as medical advice for individual conditions or treatments. Talk to your doctor, nurse or pharmacist before following any medical regimen to see if it is safe and effective for you. Adjustment Disorder: Care Instructions Your Care Instructions Adjustment disorder means that you have emotional or behavioral problems because of stress. But your response to the stress is far more severe than a normal response. It is severe enough to affect your work or social life and may cause depression and physical pains and problems.  Events that may cause this response can include a divorce, money problems, or starting school or a new job. It might be anything that causes some stress. This disorder is most often a short-term problem. It happens within 3 months of the stressful event or change. If the response lasts longer than 6 months after the event ends, you may have a more serious disorder. Follow-up care is a key part of your treatment and safety. Be sure to make and go to all appointments, and call your doctor if you are having problems. It's also a good idea to know your test results and keep a list of the medicines you take. How can you care for yourself at home? · Go to all counseling sessions. Do not skip any because you are feeling better. · If your doctor prescribed medicines, take them exactly as prescribed. Call your doctor if you think you are having a problem with your medicine. You will get more details on the specific medicines your doctor prescribes. · Discuss the causes of your stress with a good friend or family member. Or you can join a support group for people with similar problems. Talking to others sometimes relieves stress. · Get at least 30 minutes of exercise on most days of the week. Walking is a good choice. You also may want to do other activities, such as running, swimming, cycling, or playing tennis or team sports. Relaxation techniques Do relaxation exercises 10 to 20 minutes a day. You can play soothing, relaxing music while you do them, if you wish. · Tell others in your house that you are going to do your relaxation exercises. Ask them not to disturb you. · Find a comfortable, quiet place. · Lie down on your back, or sit with your back straight. · Focus on your breathing. Make it slow and steady. · Breathe in through your nose. Breathe out through either your nose or mouth. · Breathe deeply, filling up the area between your navel and your rib cage. Breathe so that your belly goes up and down. · Do not hold your breath. · Breathe like this for 5 to 10 minutes. Notice the feeling of calmness throughout your whole body. As you continue to breathe slowly and deeply, relax by doing these next steps for another 5 to 10 minutes: · Tighten and relax each muscle group in your body. Start at your toes, and work your way up to your head. · Imagine your muscle groups relaxing and getting heavy. · Empty your mind of all thoughts. · Let yourself relax more and more deeply. · Be aware of the state of calmness that surrounds you. · When your relaxation time is over, you can bring yourself back to alertness by moving your fingers and toes. Then move your hands and feet. And then move your entire body. Sometimes people fall asleep during relaxation. But they most often wake up soon. · Always give yourself time to return to full alertness before you drive a car. Wait to do anything that might cause an accident if you are not fully alert. Never play a relaxation tape while you drive a car. When should you call for help? Call 911 anytime you think you may need emergency care. For example, call if: 
  · You feel you cannot stop from hurting yourself or someone else. Keep the numbers for these national suicide hotlines: 2-089-963-TALK (2-488.426.8431) and 7-031-NRKOJBT (9-334.767.1415). If you or someone you know talks about suicide or feeling hopeless, get help right away.  
 Watch closely for changes in your health, and be sure to contact your doctor if: 
  · You have new anxiety, or your anxiety gets worse.  
  · You have been feeling sad, depressed, or hopeless or have lost interest in things that you usually enjoy.  
  · You do not get better as expected. Where can you learn more? Go to http://ana maria-anne marie.info/ Enter 0688 [id number] 05 65 in the search box to learn more about \"Adjustment Disorder: Care Instructions. \" Current as of: December 15, 2019Content Version: 12.4 © 6912-2897 [employer]. Care instructions adapted under license by [employer] (which disclaims liability or warranty for this information). If you have questions about a medical condition or this instruction, always ask your healthcare professional. [address] any warranty or liability for your use of this information. Learning About Generalized Anxiety Disorder What is generalized anxiety disorder? We all worry. It's a normal part of life. But when you have generalized anxiety disorder, you worry about lots of things and have a hard time stopping your worry. This worry or anxiety interferes with your relationships, work, and life. What causes it? The cause is not known. But it may be passed down through families. What are the symptoms? You may feel anxious or worry most days about things like work, relationships, health, or money. You may worry about things that are unlikely to happen. You find it hard to stop or control the worry. Because you worry a lot and try hard to stop worrying, you may feel restless, tired, tense, or cranky. You may also find it hard to think or sleep. And you may have headaches or an upset stomach. How is it diagnosed? Your doctor will ask about your health and how often you worry or feel anxious. He or she may ask about other symptoms, like whether you: · Feel restless. · Feel tired. · Have a hard time thinking or feel that your mind goes blank. · Feel cranky. · Have tense muscles. · Have sleep problems. A physical exam and tests can help make sure that your symptoms aren't caused by a different condition, such as a thyroid problem. How is it treated? Counseling and medicine can both work to treat anxiety. The two are often used along with lifestyle changes. Cognitive-behavioral therapy (CBT) is a type of counseling that's used to help treat anxiety.  In CBT, you learn how to notice and replace thoughts that make you feel worried. It also can help you learn how to relax when you worry. Medicines can help. These medicines are often also used for depression. Selective serotonin reuptake inhibitors (SSRIs) are often tried first. But there are other medicines that your doctor may use. You may need to try a few medicines to find one that works well. Many people feel better by getting regular exercise, eating healthy meals, and getting good sleep. Mindfulnessfocusing on things that happen in the present momentalso can help reduce your anxiety. What can you expect when you have it? Having anxiety can be upsetting. Some people might feel less worried and stressed after a couple of months of treatment. But for other people, it might take longer to feel better. Reaching out to people for help is important. Try not to isolate yourself. Let your family and friends help you. Find someone you can trust and confide in. Talk to that person. When you know what anxiety isand how you can get help for ityou can start to learn new ways of thinking. This can help you cope and work through your anxiety. Follow-up care is a key part of your treatment and safety. Be sure to make and go to all appointments, and call your doctor if you are having problems. It's also a good idea to know your test results and keep a list of the medicines you take. Where can you learn more? Go to http://ana maria-anne marie.info/ Enter G110 in the search box to learn more about \"Learning About Generalized Anxiety Disorder. \" Current as of: May 28, 2019Content Version: 12.4 © 3563-5616 [employer]. Care instructions adapted under license by [employer] (which disclaims liability or warranty for this information).  If you have questions about a medical condition or this instruction, always ask your healthcare professional. Stephanie Nina disclaims any warranty or liability for your use of this information.

## 2020-04-30 ENCOUNTER — PATIENT MESSAGE (OUTPATIENT)
Dept: FAMILY MEDICINE CLINIC | Age: 70
End: 2020-04-30

## 2020-04-30 DIAGNOSIS — F43.22 ADJUSTMENT DISORDER WITH ANXIETY: ICD-10-CM

## 2020-05-04 DIAGNOSIS — F43.22 ADJUSTMENT DISORDER WITH ANXIETY: ICD-10-CM

## 2020-05-04 RX ORDER — BUSPIRONE HYDROCHLORIDE 7.5 MG/1
7.5 TABLET ORAL 2 TIMES DAILY
Qty: 180 TAB | Refills: 1 | Status: SHIPPED | OUTPATIENT
Start: 2020-05-04 | End: 2020-05-29 | Stop reason: DRUGHIGH

## 2020-05-04 RX ORDER — BUSPIRONE HYDROCHLORIDE 7.5 MG/1
7.5 TABLET ORAL 2 TIMES DAILY
Qty: 60 TAB | Refills: 1 | OUTPATIENT
Start: 2020-05-04

## 2020-05-04 NOTE — TELEPHONE ENCOUNTER
From: Catherine Ramírez  To: Keon Garcia MD  Sent: 4/30/2020 7:45 AM EDT  Subject: Prescription Question    I reordered my [facility] to move this from [facility] to home delivery 90 day supply. They reached out for a refill but have not received it. \"We can't send your medication until we hear from your doctor. You recently placed an order with us. We requested a new prescription from your doctor and we can't send your medication until we hear from them. Prescriptions for AUGUSTUS  BUSPIRONE HCL TABS  Rx #: [id number]  Prescriber: Dr. Arnie Gooden   If you'd like to help, please call Dr. Arnie Gooden and ask them to send us a new prescription. You can find more information about recent orders in your account. If your doctor has already responded and approved your prescription, please disregard this email. \"

## 2020-05-20 ENCOUNTER — TELEPHONE (OUTPATIENT)
Dept: FAMILY MEDICINE CLINIC | Age: 70
End: 2020-05-20

## 2020-05-20 NOTE — TELEPHONE ENCOUNTER
----- Message from Jocelyn Porter sent at 5/20/2020  2:40 PM EDT -----  Regarding: Dr. Nona Jung would like a call back regarding getting clinical notes for pt for services 9/30/19 and 2/25/20 Contact is [phone] ext [phone]

## 2020-05-20 NOTE — TELEPHONE ENCOUNTER
I called to let them know that [facility] is our source for medical records, the request was scanned into the chart by them on 05/06/2020. There number is [phone] to inquire a status update.

## 2020-05-27 ENCOUNTER — VIRTUAL VISIT (OUTPATIENT)
Dept: FAMILY MEDICINE CLINIC | Age: 70
End: 2020-05-27

## 2020-05-27 DIAGNOSIS — Z79.899 ENCOUNTER FOR LONG-TERM (CURRENT) USE OF MEDICATIONS: ICD-10-CM

## 2020-05-27 DIAGNOSIS — Z01.83 ENCOUNTER FOR BLOOD TYPING: ICD-10-CM

## 2020-05-27 DIAGNOSIS — F43.22 ADJUSTMENT DISORDER WITH ANXIETY: Primary | ICD-10-CM

## 2020-05-27 DIAGNOSIS — E11.21 TYPE 2 DIABETES WITH NEPHROPATHY (HCC): ICD-10-CM

## 2020-05-27 NOTE — TELEPHONE ENCOUNTER
Dr. Gucci Patrick   Received: Today   Message Contents   Navi [address]   Phone Number: [phone]             Caller's first and last name: [address]   Reason for call: Shantell Woods sent fax requesting clinical notes for 9/30/2019 and 2/5/20. Fax notes to [phone]. Callback required yes/no and why: Yes, to inform. Best contact number(s): [phone] ext.  [phone]   Details to clarify the request: N/A

## 2020-05-27 NOTE — PROGRESS NOTES
Alirio Ortez  77 y.o. male  1950  [address] [facility] [address]  [id number]   [address]:    Telemedicine Progress Note  July Dominique MD       Encounter Date and Time: May 27, 2020 at 9:24 AM    Consent: Alirio Ortez, who was seen by synchronous (real-time) audio-video technology, and/or his healthcare decision maker, is aware that this patient-initiated, Telehealth encounter on 5/27/2020 is a billable service, with coverage as determined by his insurance carrier. He is aware that he may receive a bill and has provided verbal consent to proceed: Yes. Chief Complaint   Patient presents with    Anxiety     History of Present Illness   Alirio Ortez is a 77 y.o. male was evaluated by synchronous (real-time) audio-video technology from home, through a secure patient portal.    We are following up from our appointment on 4/22. At that time we had started Buspar for adjustment disorder with anxiety. Since starting this h notes that he has had improvement in his symptoms. He will still have some days with worse anxiety than others, but overall improving. He does not feel his current level of anxiety warrants and increase in his dose. Denies side-effects from the medication. Other:  Recent follow-up with Dr. Lia Jean (nephro). Overall he was \"happy with everything. \"  Mr Germain Winters had sent lab results from that encounter and these have been reviewed (see media). Followed up with ortho and had cortisone injection. This has provided significant improvement in his hand pain. Diabetes: Still having swings in blood glucose, usually associated with his evening meals. He does recognize some foods that increase his sugars, but hasn't made large adjustments to his diet. Review of Systems   Review of Systems   Psychiatric/Behavioral: The patient is nervous/anxious (Improving).         Vitals/Objective:     General: alert, cooperative, no distress   Mental status: mental status: alert, oriented to person, place, and time, normal mood, behavior, speech, dress, motor activity, and thought processes   Resp: resp: normal effort and no respiratory distress   Neuro: neuro: no gross deficits   Skin: skin: no discoloration or lesions of concern on visible areas   Due to this being a TeleHealth evaluation, many elements of the physical examination are unable to be assessed. Assessment and Plan:     1. Adjustment disorder with anxiety  Doing well on Buspar. Will not make adjustments today. Reviewed importance of exercise and discussed ways to safely return to some social activities while still protecting himself from [place]    2. Type 2 diabetes with nephropathy (HCC)  Still variable throughout the day and likely related to dietary choices. Plan on checking labs in August with inperson visit. - METABOLIC PANEL, BASIC; Future  - HEMOGLOBIN A1C WITH EAG; Future  - CBC W/O DIFF; Future  - MICROALBUMIN, UR, RAND W/ MICROALB/CREAT RATIO; Future    3. Encounter for long-term (current) use of medications  Will evaluate for nutrient deficits given long term use of statins, SSRIs, Metformin  - VITAMIN B12 & FOLATE; Future  - VITAMIN D, 25 HYDROXY; Future    4. Encounter for blood typing  Patient requesting blood typing. To be drawn at next lab draw.  - TYPE & SCREEN; Future     We discussed the expected course, resolution and complications of the diagnosis(es) in detail. Medication risks, benefits, costs, interactions, and alternatives were discussed as indicated. I advised him to contact the office if his condition worsens, changes or fails to improve as anticipated. He expressed understanding with the diagnosis(es) and plan. Patient understands that this encounter was a temporary measure, and the importance of further follow up and examination was emphasized. Patient verbalized understanding. Patient informed to follow up: 3 months.     Electronically Signed: Natalie Banerjee MD      Mehran Maher is a 77 y.o. male who was evaluated by an audio-video encounter for concerns as above. Patient identification was verified prior to start of the visit. A caregiver was present when appropriate. Due to this being a TeleHealth encounter (During OOVMN-43 public health emergency), evaluation of the following organ systems was limited: Vitals/Constitutional/EENT/Resp/CV/GI//MS/Neuro/Skin/Heme-Lymph-Imm. Pursuant to the emergency declaration under the [facility], [facility] waiver authority and the [employer] Act, this Virtual Visit was conducted, with patient's (and/or legal guardian's) consent, to reduce the patient's risk of exposure to COVID-19 and provide necessary medical care. Services were provided through a synchronous discussion virtually to substitute for in-person clinic visit. I was in the office. The patient was at home. ADDENDUM:  Clarification of documentation: Patient is currently using his CGM ([employer]). He has DM with frequent swings in blood glucose. In addition, his use allows him to recognize responses to certain foods and to adjust his diet accordingly. Mr. Mike Dasilva will benefit from continued use of his CGM. Lab Results   Component Value Date/Time    Hemoglobin A1c 7.1 (H) 03/12/2020 03:19 PM    Hemoglobin A1c (POC) 7.2 01/07/2020 11:05 AM     Natalie Banerjee MD  6/10/2020 2:22 PM       History   Patients past medical, surgical and family histories were reviewed and updated.       Past Medical History:   Diagnosis Date    AR (allergic rhinitis) 12/01/2008    sees allergist for shots    Arthritis     Blind right eye     BPH (benign prostatic hyperplasia)     Dr. Sweta Cooley CAD (coronary artery disease)     CABG - 3 vessel    Depression     DM type 2 (diabetes mellitus, type 2) ([facility] [address]     30 years    Essential hypertension with goal blood pressure less than 130/80 9/28/2016    Hearing loss     Hypercholesteremia     Hypogonadism male 12/1/2008    Mild intermittent asthma without complication 59/00/9597    sees allergy    LEONA on CPAP 07/16/2010    Dr. Bird Pérez    Severe obesity (BMI 35.0-39.9) 6/18/2018    Type 2 diabetes with nephropathy ([facility] [address] 3/12/2018     Past Surgical History:   Procedure Laterality Date    CABG, ARTERY-VEIN, THREE  2005    [place]    HX CIRCUMCISION  2016    HX CYST REMOVAL  5/12/2016    neck and chest    HX HEENT  2012    right eye prosthesis    HX ORTHOPAEDIC      right shoulder surgery, rotator cuff repair    HX RETINAL DETACHMENT REPAIR  1984    R Blindness    HX RHINOPLASTY      septoplasty     Family History   Adopted: Yes   Family history unknown: Yes     Social History     Socioeconomic History    Marital status: SINGLE     Spouse name: Not on file    Number of children: Not on file    Years of education: Not on file    Highest education level: Associate degree: academic program   Occupational History     Comment: Technology     Comment: "[employer]"   Social Needs    Financial resource strain: Not hard at all   [employer] insecurity     Worry: Never true     Inability: Never true    Transportation needs     Medical: No     Non-medical: No   Tobacco Use    Smoking status: Passive Smoke Exposure - Never Smoker    Smokeless tobacco: Never Used   Substance and Sexual Activity    Alcohol use: Yes     Alcohol/week: 6.0 standard drinks     Types: 6 Cans of beer per week     Comment: 1 beer or so per day    Drug use: No     Comment: occasionally smoked marijuana in the past    Sexual activity: Not Currently     Partners: Male   Lifestyle    Physical activity     Days per week: 2 days     Minutes per session: 40 min    Stress: To some extent   Relationships    Social connections     Talks on phone:  Three times a week     Gets together: Twice a week     Attends Anabaptism service: Never     Active member of club or organization: Yes     Attends meetings of clubs or organizations: More than 4 times per year     Relationship status: Living with partner    Intimate partner violence     Fear of current or ex partner: No     Emotionally abused: No     Physically abused: No     Forced sexual activity: No   Other Topics Concern    Not on file   Social History Narrative    Not on file     Patient Active Problem List   Diagnosis Code    CAD (coronary artery disease) I25.10    Hypogonadism male E29.1    AR (allergic rhinitis) J30.9    ED (erectile dysfunction) N52.9    LEONA (obstructive sleep apnea) G47.33    S/P excision of lipoma Z98.890, Z86.018    S/P excision of skin lesion, follow-up exam Z09    Essential hypertension with goal blood pressure less than 130/80 I10    Diabetes mellitus type 2, controlled ([facility] [address] E11.9    Advance care planning Z71.89    Mild intermittent asthma without complication S38.21    Pure hypercholesterolemia E78.00    Type 2 diabetes mellitus with microalbuminuria, with long-term current use of insulin ([facility]) E11.29, R80.9, Z79.4    Obesity, Class II, BMI 35-39.9 E66.9    Dysthymia F34.1    Severe obesity ([facility]) E66.01    Type 2 diabetes with nephropathy ([facility]) E11.21    CKD (chronic kidney disease) stage 3, GFR 30-59 ml/min ([facility]) N18.3          Current Medications/Allergies   Medications and Allergies reviewed:    Current Outpatient Medications   Medication Sig Dispense Refill    busPIRone (BUSPAR) 7.5 mg tablet Take 1 Tab by mouth two (2) times a day. 180 Tab 1    gabapentin (NEURONTIN) 300 mg capsule Take 300 mg by mouth three (3) times daily.  predniSONE (DELTASONE) 5 mg tablet TK 1 T PO TID FOR 3 TO 5 DAYS. AVOID TAKING WITH OTHER NSAIDS      semaglutide (Ozempic) 0.25 mg/0.2 mL (2 mg/1.5 mL) sub-q pen 0.5 mg by SubCUTAneous route every seven (7) days for 12 doses.  4.5 mL 3    HUMALOG KWIKPEN INSULIN 100 unit/mL kwikpen If Glucose: 150-199: Take 5 units 200-249: Take 15 units 250-299: Take 20 units 300+: take 25 units 18 Pen 3    pramipexole (MIRAPEX) 0.125 mg tablet TAKE 1 TABLET NIGHTLY ABOUT 2 TO 3 HOURS PRIOR TO BEDTIME 90 Tab 4    metoprolol tartrate (LOPRESSOR) 25 mg tablet TAKE 1 TABLET TWICE A  Tab 3    bumetanide (BUMEX) 1 mg tablet       metFORMIN (GLUCOPHAGE) 1,000 mg tablet Take 1 Tab by mouth two (2) times daily (with meals). 180 Tab 3    testosterone undecanoate (AVEED IM) by IntraMUSCular route.  montelukast (SINGULAIR) 10 mg tablet TAKE 1 TABLET DAILY 90 Tab 4    clomiPHENE (CLOMID) 50 mg tablet Take 50 mg by mouth daily.  insulin glargine U-300 conc (TOUJEO SOLOSTAR U-300 INSULIN) 300 unit/mL (1.5 mL) inpn pen INJECT 40 UNITS EVERY MORNING AND 20 UNITS EVERY EVENING 18 Pen 5    BREO ELLIPTA 200-25 mcg/dose inhaler INHALE 1 PUFF PO QD 3 Each 1    Insulin Needles, Disposable, 31 gauge x 5/16\" ndle 5x daily with insulin. IDDMII. E11.21 1 Package 11    lisinopril (PRINIVIL, ZESTRIL) 10 mg tablet Take 1 Tab by mouth two (2) times a day. 180 Tab 3    ezetimibe (ZETIA) 10 mg tablet Take 1 Tab by mouth daily. 90 Tab 3    citalopram (CELEXA) 20 mg tablet TAKE 1 TABLET BY MOUTH EVERY DAY. WEAN AS DIRECTED 90 Tab 0    rosuvastatin (CRESTOR) 40 mg tablet Take 1 Tab by mouth daily. 90 Tab 4    albuterol (PROVENTIL HFA, VENTOLIN HFA, PROAIR HFA) 90 mcg/actuation inhaler Take 2 Puffs by inhalation every four (4) hours as needed for Wheezing. 3 Inhaler 1    FREESTYLE SILVINO 14 DAY SENSOR kit 1 Units by Does Not Apply route every fourteen (14) days. 1 Kit 5    CIALIS 5 mg tablet Take 5 mg by mouth daily.  tamsulosin (FLOMAX) 0.4 mg capsule Take 0.4 mg by mouth daily.  fluticasone (FLONASE) 50 mcg/actuation nasal spray 2 Sprays by Both Nostrils route daily. Indications: ALLERGIC RHINITIS 1 Bottle 0    OMEGA-3 FATTY ACIDS/FISH OIL (FISH OIL EXTRA STRENGTH PO) Take 1,400 mg by mouth daily (after breakfast).       ketoconazole (NIZORAL) 2 % topical cream Apply  to affected area as needed for Skin Irritation.  VOLTAREN 1 % gel as needed. 1    desonide (TRIDESILON) 0.05 % topical lotion as needed. 1    cetirizine (ZYRTEC) 10 mg tablet Take 1 Tab by mouth daily. 0    aspirin (ASPIRIN) 325 mg tablet take 325 mg by mouth daily.         Allergies   Allergen Reactions    Cefdinir Rash    Codeine Nausea Only

## 2020-05-27 NOTE — PATIENT INSTRUCTIONS
Adjustment Disorder: Care Instructions Your Care Instructions Adjustment disorder means that you have emotional or behavioral problems because of stress. But your response to the stress is far more severe than a normal response. It is severe enough to affect your work or social life and may cause depression and physical pains and problems. Events that may cause this response can include a divorce, money problems, or starting school or a new job. It might be anything that causes some stress. This disorder is most often a short-term problem. It happens within 3 months of the stressful event or change. If the response lasts longer than 6 months after the event ends, you may have a more serious disorder. Follow-up care is a key part of your treatment and safety. Be sure to make and go to all appointments, and call your doctor if you are having problems. It's also a good idea to know your test results and keep a list of the medicines you take. How can you care for yourself at home? · Go to all counseling sessions. Do not skip any because you are feeling better. · If your doctor prescribed medicines, take them exactly as prescribed. Call your doctor if you think you are having a problem with your medicine. You will get more details on the specific medicines your doctor prescribes. · Discuss the causes of your stress with a good friend or family member. Or you can join a support group for people with similar problems. Talking to others sometimes relieves stress. · Get at least 30 minutes of exercise on most days of the week. Walking is a good choice. You also may want to do other activities, such as running, swimming, cycling, or playing tennis or team sports. Relaxation techniques Do relaxation exercises 10 to 20 minutes a day. You can play soothing, relaxing music while you do them, if you wish.  
· Tell others in your house that you are going to do your relaxation exercises. Ask them not to disturb you. · Find a comfortable, quiet place. · Lie down on your back, or sit with your back straight. · Focus on your breathing. Make it slow and steady. · Breathe in through your nose. Breathe out through either your nose or mouth. · Breathe deeply, filling up the area between your navel and your rib cage. Breathe so that your belly goes up and down. · Do not hold your breath. · Breathe like this for 5 to 10 minutes. Notice the feeling of calmness throughout your whole body. As you continue to breathe slowly and deeply, relax by doing these next steps for another 5 to 10 minutes: · Tighten and relax each muscle group in your body. Start at your toes, and work your way up to your head. · Imagine your muscle groups relaxing and getting heavy. · Empty your mind of all thoughts. · Let yourself relax more and more deeply. · Be aware of the state of calmness that surrounds you. · When your relaxation time is over, you can bring yourself back to alertness by moving your fingers and toes. Then move your hands and feet. And then move your entire body. Sometimes people fall asleep during relaxation. But they most often wake up soon. · Always give yourself time to return to full alertness before you drive a car. Wait to do anything that might cause an accident if you are not fully alert. Never play a relaxation tape while you drive a car. When should you call for help? Call 911 anytime you think you may need emergency care. For example, call if: 
  · You feel you cannot stop from hurting yourself or someone else. Keep the numbers for these national suicide hotlines: 6-371-797-TALK (3-999.503.9784) and 5-142-INKVGWU (2-591.355.1589). If you or someone you know talks about suicide or feeling hopeless, get help right away.  
 Watch closely for changes in your health, and be sure to contact your doctor if: 
  · You have new anxiety, or your anxiety gets worse.   · You have been feeling sad, depressed, or hopeless or have lost interest in things that you usually enjoy.  
  · You do not get better as expected. Where can you learn more? Go to http://ana maria-anne marie.info/ Enter [phone] 65 in the search box to learn more about \"Adjustment Disorder: Care Instructions. \" Current as of: December 15, 2019Content Version: 12.4 © 6409-0716 [employer]os[employer]. Care instructions adapted under license by [employer] (which disclaims liability or warranty for this information). If you have questions about a medical condition or this instruction, always ask your healthcare professional. [place] [address] any warranty or liability for your use of this information. Learning About Being High-Risk for COVID-19 Who is at high risk? COVID-19 causes a mild illness in many people who have it. But certain things may increase your risk for more serious illness. These include: · Being age 72 or older. · Smoking. · Living in a long-term care facility. · Having ongoing serious health issues. Some examples are: 
? Chronic lung disease or asthma. ? Heart problems. ? A weakened immune system. ? Cancer treatment. ? Diabetes. This is not a complete list. If you have a chronic health problem, ask your doctor if you should take extra precautions during the outbreak. If you are pregnant, it's safest to consider yourself at higher risk. It's not known yet whether COVID-19 is dangerous for you or your baby. But pregnancy increases the risk for serious illness from viruses similar to COVID-19. How do you stay safe? · Stay home. ? Stay home as much as you can. This may be the easiest way to avoid exposure, as long as no one else in your household has the virus. ? If there are a lot of COVID-19 cases in your community, do not leave your home except to seek medical care. ? Limit visitors right now.  It's especially important to avoid contact with anyone who is sick or who might have been exposed. Remember that people may have been exposed without knowing it or having any symptoms. ? Have enough food, medicines, and other supplies on hand so that you don't have to go out. Try some of these options if you don't have what you need. ? Use delivery and takeout services for groceries and meals. ? Have a healthy family member, friend, or neighbor shop for you. ? Ask your doctor for extra prescription medicine. ? Routinely clean and disinfect high-touch surfaces. These include countertops, faucets, door handles, doorknobs, and phones. ? Do not travel. · Wash your hands often and well. ? Wash your hands often, especially after you cough or sneeze. Use soap and water, and scrub for at least 20 seconds. If soap and water aren't available, use an alcohol-based hand . · Be extra careful if you have to go out. ? Avoid crowds and crowded places. Try to keep 6 feet of space between yourself and others. ? Don't use public transportation, ride-shares, or taxis unless you have no choice. ? Try not to touch things that many other people have touched. Door handles, elevator buttons, shopping cart handles, and handrails on escalators get a lot of touches. ? Carry tissues or paper towels with you. If you must touch something, you'll be able to protect your hands. ? Don't shake hands with anyone. Try a friendly wave instead. ? Don't touch your face, and wash your hands often. ? Wash your hands again as soon as you get home. · Know when to call your doctor. ? Call a doctor if you have symptoms of COVID-19 (fever, cough, shortness of breath). If you are told to get testing or care and must go out, wear a cloth face cover. Current as of: April 15, 2020               Content Version: 12.4 © 9334-1154 [employer].   
Care instructions adapted under license by your healthcare professional. If you have questions about a medical condition or this instruction, always ask your healthcare professional. [place] [address] any warranty or liability for your use of this information.

## 2020-05-29 ENCOUNTER — PATIENT MESSAGE (OUTPATIENT)
Dept: FAMILY MEDICINE CLINIC | Age: 70
End: 2020-05-29

## 2020-05-29 DIAGNOSIS — F43.22 ADJUSTMENT DISORDER WITH ANXIETY: ICD-10-CM

## 2020-05-29 RX ORDER — BUSPIRONE HYDROCHLORIDE 7.5 MG/1
7.5 TABLET ORAL 3 TIMES DAILY
Qty: 270 TAB | Refills: 1 | Status: SHIPPED | OUTPATIENT
Start: 2020-05-29 | End: 2020-12-28 | Stop reason: ALTCHOICE

## 2020-05-29 NOTE — TELEPHONE ENCOUNTER
From: Ron Medley  To: Reed Gutierrez MD  Sent: 5/29/2020 3:38 PM EDT  Subject: Prescription Question    After our call the other day I have been thinking about my current dosage of Buspirone 7.5 MG twice a day. I would like to increase this to whatever the next level would be. Could you please send a 90 day script to [facility]. Also I forgot to mention the dermatologist put me on a two week treatment of Flurouracil cream 5% for the places on my face.     Thanks     Amira Walsh (Gene) Alexis Oden

## 2020-06-04 RX ORDER — ROSUVASTATIN CALCIUM 40 MG/1
TABLET, COATED ORAL
Qty: 90 TAB | Refills: 3 | Status: SHIPPED | OUTPATIENT
Start: 2020-06-04 | End: 2020-07-23 | Stop reason: DRUGHIGH

## 2020-06-09 NOTE — TELEPHONE ENCOUNTER
Patient called to say [facility] has canceled his meter order as they have not received what they need. Says he wants the meter now to be ordered through local pharmacy.     [phone]

## 2020-06-10 ENCOUNTER — TELEPHONE (OUTPATIENT)
Dept: FAMILY MEDICINE CLINIC | Age: 70
End: 2020-06-10

## 2020-06-10 NOTE — TELEPHONE ENCOUNTER
----- Message from Reza Harden sent at 2020  2:35 PM EDT -----  Regarding: Dr. Janeth Cotto: [phone]  Caller's first and last name: n/a  Callback required yes/no and why: yes  Best contact number(s): [phone]  Details to clarify the request: He uses the [employer] 14 day reader to monitor his blood sugar and he would like the clinical note to explain that he uses this device. He would like the office to contact [facility] supply with this information. Their phone number is [phone]. He is almost out of the reader as well.

## 2020-06-11 NOTE — TELEPHONE ENCOUNTER
Kayce Caldwell, would you be able to add a clinical note to say the patient uses and needs this device. Medicare is not paying for it. Let me know if this is possible and then I can call [employer] to get it started.      Thanks,

## 2020-06-17 DIAGNOSIS — E78.5 DYSLIPIDEMIA: Primary | ICD-10-CM

## 2020-06-17 DIAGNOSIS — I25.10 CORONARY ARTERY DISEASE INVOLVING NATIVE CORONARY ARTERY OF NATIVE HEART WITHOUT ANGINA PECTORIS: ICD-10-CM

## 2020-07-13 RX ORDER — EZETIMIBE 10 MG/1
TABLET ORAL
Qty: 90 TAB | Refills: 3 | Status: SHIPPED | OUTPATIENT
Start: 2020-07-13 | End: 2021-06-10

## 2020-07-15 LAB
ALBUMIN SERPL-MCNC: 4.3 G/DL (ref 3.8–4.8)
ALP SERPL-CCNC: 44 IU/L (ref 39–117)
ALT SERPL-CCNC: 28 IU/L (ref 0–44)
AST SERPL-CCNC: 29 IU/L (ref 0–40)
BILIRUB DIRECT SERPL-MCNC: 0.14 MG/DL (ref 0–0.4)
BILIRUB SERPL-MCNC: 0.4 MG/DL (ref 0–1.2)
CHOLEST SERPL-MCNC: 75 MG/DL (ref 100–199)
HDLC SERPL-MCNC: 32 MG/DL
INTERPRETATION, 910389: NORMAL
LDLC SERPL CALC-MCNC: 17 MG/DL (ref 0–99)
PROT SERPL-MCNC: 6.5 G/DL (ref 6–8.5)
TRIGL SERPL-MCNC: 130 MG/DL (ref 0–149)
VLDLC SERPL CALC-MCNC: 26 MG/DL (ref 5–40)

## 2020-07-17 DIAGNOSIS — E78.5 DYSLIPIDEMIA: Primary | ICD-10-CM

## 2020-07-17 DIAGNOSIS — I25.10 CORONARY ARTERY DISEASE INVOLVING NATIVE CORONARY ARTERY OF NATIVE HEART WITHOUT ANGINA PECTORIS: ICD-10-CM

## 2020-07-21 ENCOUNTER — OFFICE VISIT (OUTPATIENT)
Dept: CARDIOLOGY CLINIC | Age: 70
End: 2020-07-21

## 2020-07-21 ENCOUNTER — TELEPHONE (OUTPATIENT)
Dept: CARDIOLOGY CLINIC | Age: 70
End: 2020-07-21

## 2020-07-21 DIAGNOSIS — E78.5 DYSLIPIDEMIA: ICD-10-CM

## 2020-07-21 DIAGNOSIS — E11.29 TYPE 2 DIABETES MELLITUS WITH MICROALBUMINURIA, WITH LONG-TERM CURRENT USE OF INSULIN (HCC): ICD-10-CM

## 2020-07-21 DIAGNOSIS — Z79.4 TYPE 2 DIABETES MELLITUS WITH MICROALBUMINURIA, WITH LONG-TERM CURRENT USE OF INSULIN (HCC): ICD-10-CM

## 2020-07-21 DIAGNOSIS — E66.01 MORBID OBESITY (HCC): ICD-10-CM

## 2020-07-21 DIAGNOSIS — I10 ESSENTIAL HYPERTENSION WITH GOAL BLOOD PRESSURE LESS THAN 130/80: ICD-10-CM

## 2020-07-21 DIAGNOSIS — R80.9 TYPE 2 DIABETES MELLITUS WITH MICROALBUMINURIA, WITH LONG-TERM CURRENT USE OF INSULIN (HCC): ICD-10-CM

## 2020-07-21 DIAGNOSIS — G47.33 OSA (OBSTRUCTIVE SLEEP APNEA): ICD-10-CM

## 2020-07-21 DIAGNOSIS — I25.10 CORONARY ARTERY DISEASE INVOLVING NATIVE CORONARY ARTERY OF NATIVE HEART WITHOUT ANGINA PECTORIS: Primary | ICD-10-CM

## 2020-07-21 DIAGNOSIS — I65.23 BILATERAL CAROTID ARTERY STENOSIS: ICD-10-CM

## 2020-07-21 NOTE — TELEPHONE ENCOUNTER
----- Message from Dee Amaya MD sent at 7/18/2020  2:33 PM EDT -----  Lipids are at goal. No action needed. I would like the cholesterol checked again in 6 mo. Continue to eat healthy and clean.

## 2020-07-21 NOTE — PROGRESS NOTES
ATTENTION:   This medical record was transcribed using an electronic medical records/speech recognition system. Although proofread, it may and can contain electronic, spelling and other errors. Corrections may be executed at a later time. Please feel free to contact us for any clarifications as needed. LAST VISIT: 7/25/19      ICD-10-CM ICD-9-CM   1. Coronary artery disease involving native coronary artery of native heart without angina pectoris  I25.10 414.01   2. Essential hypertension with goal blood pressure less than 130/80  I10 401.9   3. Dyslipidemia  E78.5 272.4   4. Type 2 diabetes mellitus with microalbuminuria, with long-term current use of insulin ([facility])  E11.29 250.40    R80.9 791.0    Z79.4 V58.67   5. LEONA (obstructive sleep apnea)  G47.33 327.23   6. Morbid obesity ([address]  E66.01 278.01   7. Bilateral carotid artery stenosis  I65.23 433.10     433.30            Arvind Moon is a 77 y.o. male with diabetes, hypercholesterolemia, CAD, LEONA, and HTN last seen by me 6 months ago    Cardiac risk factors: diabetes mellitus, male gender, hypertension  I have personally obtained the history from the patient. HISTORY OF PRESENTING ILLNESS     Arvind Moon reports he is feeling well overall with no interval cardiac concerns. He says he has not been exercising. His last A1c is down from 7.9 to 7.2%. He has changed his dosing of slow acting insulin. He admits to not drinking enough water. The patient denies exertional chest pain, orthopnea, palpitations, syncope, presyncope or fatigue.          ACTIVE PROBLEM LIST     Patient Active Problem List    Diagnosis Date Noted    CKD (chronic kidney disease) stage 3, GFR 30-59 ml/min ([address] 01/07/2020    Type 2 diabetes with nephropathy ([address] 10/30/2019    Severe obesity ([address] 06/09/2019    Dysthymia 09/26/2018    Obesity, Class II, BMI 35-39.9 06/18/2018    Type 2 diabetes mellitus with microalbuminuria, with long-term current use of insulin ([facility] 03/12/2018    Pure hypercholesterolemia 05/04/2017    Mild intermittent asthma without complication 38/70/6362    Advance care planning 01/24/2017    Essential hypertension with goal blood pressure less than 130/80 09/28/2016    Diabetes mellitus type 2, controlled ([facility] 09/28/2016    S/P excision of lipoma 05/25/2016    S/P excision of skin lesion, follow-up exam 05/25/2016    LEONA (obstructive sleep apnea) 07/16/2010    CAD (coronary artery disease) 12/01/2008    Hypogonadism male 12/01/2008    AR (allergic rhinitis) 12/01/2008    ED (erectile dysfunction) 12/01/2008           PAST MEDICAL HISTORY     Past Medical History:   Diagnosis Date    AR (allergic rhinitis) 12/01/2008    sees allergist for shots    Arthritis     Blind right eye     BPH (benign prostatic hyperplasia)     Dr. Whitley Fregoso CAD (coronary artery disease)     CABG - 3 vessel    Depression     DM type 2 (diabetes mellitus, type 2) ([facility]     30 years    Essential hypertension with goal blood pressure less than 130/80 9/28/2016    Hearing loss     Hypercholesteremia     Hypogonadism male 12/1/2008    Mild intermittent asthma without complication 54/09/9496    sees allergy    LEONA on CPAP 07/16/2010    Dr. Brian Do    Severe obesity (BMI 35.0-39.9) 6/18/2018    Type 2 diabetes with nephropathy ([facility] 3/12/2018           PAST SURGICAL HISTORY     Past Surgical History:   Procedure Laterality Date    CABG, ARTERY-VEIN, THREE  2005    [place]    HX CIRCUMCISION  2016    HX CYST REMOVAL  5/12/2016    neck and chest    HX HEENT  2012    right eye prosthesis    HX ORTHOPAEDIC      right shoulder surgery, rotator cuff repair    HX RETINAL DETACHMENT REPAIR  1984    R Blindness    HX RHINOPLASTY      septoplasty          ALLERGIES     Allergies   Allergen Reactions    Cefdinir Rash    Codeine Nausea Only          FAMILY HISTORY     Family History   Adopted: Yes   Family history unknown: Yes    negative for cardiac disease       SOCIAL HISTORY     Social History     Socioeconomic History    Marital status: SINGLE     Spouse name: Not on file    Number of children: Not on file    Years of education: Not on file    Highest education level: Associate degree: academic program   Occupational History     Comment: Technology     Comment: [employer]   Social Needs    Financial resource strain: Not hard at all   [address] insecurity     Worry: Never true     Inability: Never true   [employer] needs     Medical: No     Non-medical: No   Tobacco Use    Smoking status: Passive Smoke Exposure - Never Smoker    Smokeless tobacco: Never Used   Substance and Sexual Activity    Alcohol use: Yes     Alcohol/week: 6.0 standard drinks     Types: 6 Cans of beer per week     Comment: 1 beer or so per day    Drug use: No     Comment: occasionally smoked marijuana in the past    Sexual activity: Not Currently     Partners: Male   Lifestyle    Physical activity     Days per week: 2 days     Minutes per session: 40 min    Stress: To some extent   Relationships    Social connections     Talks on phone: Three times a week     Gets together: Twice a week     Attends [employer] service: Never     Active member of club or organization: Yes     Attends meetings of clubs or organizations: More than 4 times per year     Relationship status: Living with partner         MEDICATIONS     Current Outpatient Medications   Medication Sig    ezetimibe (ZETIA) 10 mg tablet TAKE 1 TABLET DAILY    FreeStyle Belkis 14 Day Sensor kit 1 Units by Does Not Apply route every fourteen (14) days. DMII  E11.29; E11.21    rosuvastatin (CRESTOR) 40 mg tablet TAKE 1 TABLET DAILY    busPIRone (BUSPAR) 7.5 mg tablet Take 1 Tab by mouth three (3) times daily.  gabapentin (NEURONTIN) 300 mg capsule Take 300 mg by mouth three (3) times daily.  predniSONE (DELTASONE) 5 mg tablet TK 1 T PO TID FOR 3 TO 5 DAYS.  AVOID TAKING WITH OTHER NSAIDS    HUMALOG KWIKPEN INSULIN 100 unit/mL kwikpen If Glucose: 150-199: Take 5 units 200-249: Take 15 units 250-299: Take 20 units 300+: take 25 units    pramipexole (MIRAPEX) 0.125 mg tablet TAKE 1 TABLET NIGHTLY ABOUT 2 TO 3 HOURS PRIOR TO BEDTIME    metoprolol tartrate (LOPRESSOR) 25 mg tablet TAKE 1 TABLET TWICE A DAY    bumetanide (BUMEX) 1 mg tablet     metFORMIN (GLUCOPHAGE) 1,000 mg tablet Take 1 Tab by mouth two (2) times daily (with meals).  testosterone undecanoate (AVEED IM) by IntraMUSCular route.  montelukast (SINGULAIR) 10 mg tablet TAKE 1 TABLET DAILY    clomiPHENE (CLOMID) 50 mg tablet Take 50 mg by mouth daily.  insulin glargine U-300 conc (TOUJEO SOLOSTAR U-300 INSULIN) 300 unit/mL (1.5 mL) inpn pen INJECT 40 UNITS EVERY MORNING AND 20 UNITS EVERY EVENING    BREO ELLIPTA 200-25 mcg/dose inhaler INHALE 1 PUFF PO QD    Insulin Needles, Disposable, 31 gauge x 5/16\" ndle 5x daily with insulin. IDDMII. E11.21    lisinopril (PRINIVIL, ZESTRIL) 10 mg tablet Take 1 Tab by mouth two (2) times a day.  citalopram (CELEXA) 20 mg tablet TAKE 1 TABLET BY MOUTH EVERY DAY. WEAN AS DIRECTED    albuterol (PROVENTIL HFA, VENTOLIN HFA, PROAIR HFA) 90 mcg/actuation inhaler Take 2 Puffs by inhalation every four (4) hours as needed for Wheezing.  CIALIS 5 mg tablet Take 5 mg by mouth daily.  tamsulosin (FLOMAX) 0.4 mg capsule Take 0.4 mg by mouth daily.  fluticasone (FLONASE) 50 mcg/actuation nasal spray 2 Sprays by Both Nostrils route daily. Indications: ALLERGIC RHINITIS    OMEGA-3 FATTY ACIDS/FISH OIL (FISH OIL EXTRA STRENGTH PO) Take 1,400 mg by mouth daily (after breakfast).  ketoconazole (NIZORAL) 2 % topical cream Apply  to affected area as needed for Skin Irritation.  VOLTAREN 1 % gel as needed.  desonide (TRIDESILON) 0.05 % topical lotion as needed.  cetirizine (ZYRTEC) 10 mg tablet Take 1 Tab by mouth daily.  aspirin (ASPIRIN) 325 mg tablet take 325 mg by mouth daily.       No current facility-administered medications for this visit. I have reviewed the nurses notes, vitals, problem list, allergy list, medical history, family, social history and medications. REVIEW OF SYMPTOMS      General: Pt denies excessive weight gain or loss. Pt is able to conduct ADL's  HEENT: Denies blurred vision, headaches, hearing loss, epistaxis and difficulty swallowing. Respiratory: Denies cough or stridor. +SOB, wheezing  Cardiovascular: Denies, palpitations. >1+ edema  Gastrointestinal: Denies poor appetite, indigestion, abdominal pain or blood in stool  Genitourinary: Denies hematuria, dysuria, increased urinary frequency  Musculoskeletal: Denies joint pain or swelling from muscles or joints  Neurologic: Denies tremor, paresthesias, headache, or sensory motor disturbance  Psychiatric: Denies confusion, insomnia, depression  Integumentray: Denies rash, itching or ulcers. Hematologic: Denies easy bruising, bleeding     PHYSICAL EXAMINATION      There were no vitals taken for this visit. General: Well developed, in no acute distress. HEENT: No jaundice, oral mucosa moist, no oral ulcers  Neck: Supple, no stiffness, no lymphadenopathy, supple  Heart:  RRR  Respiratory: Clear bilaterally x 4, no wheezing or rales  Abdomen:   Soft, non-tender, bowel sounds are active. Extremities:  No  normal cap refill, no cyanosis. +trace pedal edema;discolorization from hemosiderin. Musculoskeletal: No clubbing, no deformities  Neuro: A&Ox3, speech clear, gait stable, cooperative, no focal neurologic deficits  Skin: Skin color is normal. No rashes or lesions. Non diaphoretic, moist.        EKG: Sinus brachycardia with first degree AV block of 216 seconds. DIAGNOSTIC DATA     1. Stress Test  NM- 3/17/15- no ischemia, EF 65%  NM- 5/29/19-· Abnormal stress myocardial perfusion with very small area of mild perfusion defect of the mid anterior wall and mid infeiror wall with SDS 2 which is reversible.  This suggest very small area of focal ischemia. Abnormal septal motion consistent with prior cardiac surgery. Normal wall motion with normal LV function with LVEF 67%. · Excellent functional capacity. · Compared to prior study of 3/17/2015, there is minimal change. The small anterior mid wall stress perfusion defect may be new    2. LE Venous Doppler  6/30/18- no DVT isaak    3. Lipids  3/26/19- , HDL 51, ,   7/3/19- , HDL 43, LDL 85,   1/3/20- TC 91, HDL 35, LDL 28.4,   7/14/20- TC 75, HDL 32, LDL 17,          4. Carotid Doppler  5/31/19- 0-9% R/L    5. Echo  5/29/19- EF 61-65%         LABORATORY DATA            Lab Results   Component Value Date/Time    WBC 7.2 01/03/2020 08:23 AM    HGB 12.5 01/03/2020 08:23 AM    HCT 41.5 01/03/2020 08:23 AM    PLATELET 151 (L) 58/87/4785 08:23 AM    MCV 97.0 01/03/2020 08:23 AM      Lab Results   Component Value Date/Time    Sodium 136 03/12/2020 03:19 PM    Potassium 4.5 03/12/2020 03:19 PM    Chloride 101 03/12/2020 03:19 PM    CO2 28 03/12/2020 03:19 PM    Anion gap 7 03/12/2020 03:19 PM    Glucose 300 (H) 03/12/2020 03:19 PM    BUN 27 (H) 03/12/2020 03:19 PM    Creatinine 1.45 (H) 03/12/2020 03:19 PM    BUN/Creatinine ratio 19 03/12/2020 03:19 PM    GFR est AA 59 (L) 03/12/2020 03:19 PM    GFR est non-AA 48 (L) 03/12/2020 03:19 PM    Calcium 9.0 03/12/2020 03:19 PM    Bilirubin, total 0.4 07/14/2020 09:11 AM    Alk. phosphatase 44 07/14/2020 09:11 AM    Protein, total 6.5 07/14/2020 09:11 AM    Albumin 4.3 07/14/2020 09:11 AM    Globulin 3.1 01/03/2020 08:23 AM    A-G Ratio 1.3 01/03/2020 08:23 AM    ALT (SGPT) 28 07/14/2020 09:11 AM           ASSESSMENT/RECOMMENDATIONS:.        1. CAD, S/p CABG 2005. - He seems to be doing well. - He does not have an exercise program. I encouraged weight loss and exercising to reduce disease progression.  - His last stress test was May 2019 with small defect, unchanged from previous.     2. HTN  - BP is under good control, no adjustments to antihypertensives    3. Dyslipidemia  - LDL is amazing at below 50. I would not change any medications. There are no studies that show LDL under 50 to not be beneficial.     4. DM  - On insulin. - A1c goal should be 6.0  - Carotids were normal, 0-9% bilaterally. 5. LEONA  - Continue wearing C-PAP machine, 12 mercury. 6. Carotid disease    Return in 6 months or PRN      No orders of the defined types were placed in this encounter. Follow-up and Dispositions  ·   Return in about 6 months (around 1/21/2021). I have discussed the diagnosis with  Bruna Ramirez and the intended plan as seen in the above orders. Questions were answered concerning future plans. I have discussed medication side effects and warnings with the patient as well. Thank you,  Herman Randall MD for involving me in the care of  Bruna Ramirez. Please do not hesitate to contact me for further questions/concerns. Maged Mcnair MD, [facility] [place]    Patient Care Team:  Herman Randall MD as PCP - General (Family Medicine)  Herman Randall MD as PCP - [facility]  Mariely Benito MD as Surgeon (Surgery)  Ramona Pradhan MD (Urology)    [address], [address] [place] [address]     [address]      [phone] / [phone] Fax

## 2020-07-23 ENCOUNTER — OFFICE VISIT (OUTPATIENT)
Dept: CARDIOLOGY CLINIC | Age: 70
End: 2020-07-23

## 2020-07-23 VITALS
HEIGHT: 68 IN | SYSTOLIC BLOOD PRESSURE: 138 MMHG | OXYGEN SATURATION: 96 % | DIASTOLIC BLOOD PRESSURE: 60 MMHG | HEART RATE: 64 BPM | WEIGHT: 267 LBS | BODY MASS INDEX: 40.47 KG/M2

## 2020-07-23 DIAGNOSIS — I10 ESSENTIAL HYPERTENSION WITH GOAL BLOOD PRESSURE LESS THAN 130/80: ICD-10-CM

## 2020-07-23 DIAGNOSIS — E78.5 DYSLIPIDEMIA: ICD-10-CM

## 2020-07-23 DIAGNOSIS — E11.21 TYPE 2 DIABETES WITH NEPHROPATHY (HCC): ICD-10-CM

## 2020-07-23 DIAGNOSIS — I65.23 BILATERAL CAROTID ARTERY STENOSIS: ICD-10-CM

## 2020-07-23 DIAGNOSIS — G47.33 OSA (OBSTRUCTIVE SLEEP APNEA): ICD-10-CM

## 2020-07-23 DIAGNOSIS — I25.10 CORONARY ARTERY DISEASE INVOLVING NATIVE CORONARY ARTERY OF NATIVE HEART WITHOUT ANGINA PECTORIS: Primary | ICD-10-CM

## 2020-07-23 RX ORDER — SEMAGLUTIDE 1.34 MG/ML
INJECTION, SOLUTION SUBCUTANEOUS
COMMUNITY
End: 2020-08-10

## 2020-07-23 RX ORDER — ROSUVASTATIN CALCIUM 20 MG/1
20 TABLET, COATED ORAL
Qty: 90 TAB | Refills: 4 | Status: SHIPPED | OUTPATIENT
Start: 2020-07-23 | End: 2020-12-28

## 2020-07-23 NOTE — PROGRESS NOTES
ATTENTION:   This medical record was transcribed using an electronic medical records/speech recognition system. Although proofread, it may and can contain electronic, spelling and other errors. Corrections may be executed at a later time. Please feel free to contact us for any clarifications as needed. LAST VISIT: 7/25/19      ICD-10-CM ICD-9-CM   1. Coronary artery disease involving native coronary artery of native heart without angina pectoris  I25.10 414.01   2. Essential hypertension with goal blood pressure less than 130/80  I10 401.9   3. Dyslipidemia  E78.5 272.4   4. LEONA (obstructive sleep apnea)  G47.33 327.23   5. Bilateral carotid artery stenosis  I65.23 433.10     433.30            Evette Haq is a 77 y.o. male with diabetes, hypercholesterolemia, CAD, LEONA, and HTN last seen by me 6 months ago    Cardiac risk factors: diabetes mellitus, male gender, hypertension  I have personally obtained the history from the patient. HISTORY OF PRESENTING ILLNESS     Evette Haq reports no interval cardiac issues. No chest pain or shortness of breath. He is not exercising regularly and has not lost any weight. He saw his nephrologist recently and is GFR had come up slightly I think it may partially be related to his diuretic use but also because of his underlying diabetes.   I have asked that he take his diuretic Monday Wednesday and Friday and just entering good weight gain    He states that his hemoglobin A1 is around 7           ACTIVE PROBLEM LIST     Patient Active Problem List    Diagnosis Date Noted    CKD (chronic kidney disease) stage 3, GFR 30-59 ml/min ([address] 01/07/2020    Type 2 diabetes with nephropathy ([address] 10/30/2019    Severe obesity ([address] 06/09/2019    Dysthymia 09/26/2018    Obesity, Class II, BMI 35-39.9 06/18/2018    Type 2 diabetes mellitus with microalbuminuria, with long-term current use of insulin ([address] 03/12/2018    Pure hypercholesterolemia 05/04/2017    Mild intermittent asthma without complication 13/24/0173    Advance care planning 01/24/2017    Essential hypertension with goal blood pressure less than 130/80 09/28/2016    Diabetes mellitus type 2, controlled ([facility] 09/28/2016    S/P excision of lipoma 05/25/2016    S/P excision of skin lesion, follow-up exam 05/25/2016    LEONA (obstructive sleep apnea) 07/16/2010    CAD (coronary artery disease) 12/01/2008    Hypogonadism male 12/01/2008    AR (allergic rhinitis) 12/01/2008    ED (erectile dysfunction) 12/01/2008           PAST MEDICAL HISTORY     Past Medical History:   Diagnosis Date    AR (allergic rhinitis) 12/01/2008    sees allergist for shots    Arthritis     Blind right eye     BPH (benign prostatic hyperplasia)     Dr. Mary Arriaga CAD (coronary artery disease)     CABG - 3 vessel    Depression     DM type 2 (diabetes mellitus, type 2) ([facility]     30 years    Essential hypertension with goal blood pressure less than 130/80 9/28/2016    Hearing loss     Hypercholesteremia     Hypogonadism male 12/1/2008    Mild intermittent asthma without complication 18/14/9627    sees allergy    LEONA on CPAP 07/16/2010    Dr. Annika Renae    Severe obesity (BMI 35.0-39.9) 6/18/2018    Type 2 diabetes with nephropathy ([facility] 3/12/2018           PAST SURGICAL HISTORY     Past Surgical History:   Procedure Laterality Date    CABG, ARTERY-VEIN, THREE  2005    [place]    HX CIRCUMCISION  2016    HX CYST REMOVAL  5/12/2016    neck and chest    HX HEENT  2012    right eye prosthesis    HX ORTHOPAEDIC      right shoulder surgery, rotator cuff repair    HX RETINAL DETACHMENT REPAIR  1984    R Blindness    HX RHINOPLASTY      septoplasty          ALLERGIES     Allergies   Allergen Reactions    Cefdinir Rash    Codeine Nausea Only          FAMILY HISTORY     Family History   Adopted: Yes   Family history unknown: Yes    negative for cardiac disease       SOCIAL HISTORY     Social History     Socioeconomic History    Marital status: SINGLE     Spouse name: Not on file    Number of children: Not on file    Years of education: Not on file    Highest education level: Associate degree: academic program   Occupational History     Comment: Technology     Comment: [employer]   Social Needs    Financial resource strain: Not hard at all   [place] insecurity     Worry: Never true     Inability: Never true   [place] needs     Medical: No     Non-medical: No   Tobacco Use    Smoking status: Passive Smoke Exposure - Never Smoker    Smokeless tobacco: Never Used   Substance and Sexual Activity    Alcohol use: Yes     Alcohol/week: 6.0 standard drinks     Types: 6 Cans of beer per week     Comment: 1 beer or so per day    Drug use: No     Comment: occasionally smoked marijuana in the past    Sexual activity: Not Currently     Partners: Male   Lifestyle    Physical activity     Days per week: 2 days     Minutes per session: 40 min    Stress: To some extent   Relationships    Social connections     Talks on phone: Three times a week     Gets together: Twice a week     Attends Mosque service: Never     Active member of club or organization: Yes     Attends meetings of clubs or organizations: More than 4 times per year     Relationship status: Living with partner         MEDICATIONS     Current Outpatient Medications   Medication Sig    semaglutide (Ozempic) 0.25 mg/0.2 mL (2 mg/1.5 mL) sub-q pen by SubCUTAneous route every seven (7) days.  ezetimibe (ZETIA) 10 mg tablet TAKE 1 TABLET DAILY    rosuvastatin (CRESTOR) 40 mg tablet TAKE 1 TABLET DAILY    busPIRone (BUSPAR) 7.5 mg tablet Take 1 Tab by mouth three (3) times daily.  gabapentin (NEURONTIN) 300 mg capsule Take 300 mg by mouth three (3) times daily.     HUMALOG KWIKPEN INSULIN 100 unit/mL kwikpen If Glucose: 150-199: Take 5 units 200-249: Take 15 units 250-299: Take 20 units 300+: take 25 units    pramipexole (MIRAPEX) 0.125 mg tablet TAKE 1 TABLET NIGHTLY ABOUT 2 TO 3 HOURS PRIOR TO BEDTIME    metoprolol tartrate (LOPRESSOR) 25 mg tablet TAKE 1 TABLET TWICE A DAY    bumetanide (BUMEX) 1 mg tablet     metFORMIN (GLUCOPHAGE) 1,000 mg tablet Take 1 Tab by mouth two (2) times daily (with meals).  testosterone undecanoate (AVEED IM) by IntraMUSCular route. Every 10 weeks    insulin glargine U-300 conc (TOUJEO SOLOSTAR U-300 INSULIN) 300 unit/mL (1.5 mL) inpn pen INJECT 40 UNITS EVERY MORNING AND 20 UNITS EVERY EVENING    lisinopril (PRINIVIL, ZESTRIL) 10 mg tablet Take 1 Tab by mouth two (2) times a day.  albuterol (PROVENTIL HFA, VENTOLIN HFA, PROAIR HFA) 90 mcg/actuation inhaler Take 2 Puffs by inhalation every four (4) hours as needed for Wheezing.  CIALIS 5 mg tablet Take 5 mg by mouth daily.  tamsulosin (FLOMAX) 0.4 mg capsule Take 0.4 mg by mouth daily.  fluticasone (FLONASE) 50 mcg/actuation nasal spray 2 Sprays by Both Nostrils route daily. Indications: ALLERGIC RHINITIS    OMEGA-3 FATTY ACIDS/FISH OIL (FISH OIL EXTRA STRENGTH PO) Take 1,400 mg by mouth daily (after breakfast).  ketoconazole (NIZORAL) 2 % topical cream Apply  to affected area as needed for Skin Irritation.  VOLTAREN 1 % gel 2 g as needed.  cetirizine (ZYRTEC) 10 mg tablet Take 1 Tab by mouth daily.  aspirin (ASPIRIN) 325 mg tablet take 325 mg by mouth daily.  FreeStyle Belkis 14 Day Sensor kit 1 Units by Does Not Apply route every fourteen (14) days. DMII  E11.29; E11.21    predniSONE (DELTASONE) 5 mg tablet TK 1 T PO TID FOR 3 TO 5 DAYS. AVOID TAKING WITH OTHER NSAIDS    montelukast (SINGULAIR) 10 mg tablet TAKE 1 TABLET DAILY    clomiPHENE (CLOMID) 50 mg tablet Take 50 mg by mouth daily.  BREO ELLIPTA 200-25 mcg/dose inhaler INHALE 1 PUFF PO QD    Insulin Needles, Disposable, 31 gauge x 5/16\" ndle 5x daily with insulin. IDDMII. E11.21    citalopram (CELEXA) 20 mg tablet TAKE 1 TABLET BY MOUTH EVERY DAY.  WEAN AS DIRECTED    desonide (TRIDESILON) 0.05 % topical lotion as needed. No current facility-administered medications for this visit. I have reviewed the nurses notes, vitals, problem list, allergy list, medical history, family, social history and medications. REVIEW OF SYMPTOMS      General: Pt denies excessive weight gain or loss. Pt is able to conduct ADL's  HEENT: Denies blurred vision, headaches, hearing loss, epistaxis and difficulty swallowing. Respiratory: Denies cough or stridor. +SOB, wheezing  Cardiovascular: Denies, palpitations. >1+ edema  Gastrointestinal: Denies poor appetite, indigestion, abdominal pain or blood in stool  Genitourinary: Denies hematuria, dysuria, increased urinary frequency  Musculoskeletal: Denies joint pain or swelling from muscles or joints  Neurologic: Denies tremor, paresthesias, headache, or sensory motor disturbance  Psychiatric: Denies confusion, insomnia, depression  Integumentray: Denies rash, itching or ulcers. Hematologic: Denies easy bruising, bleeding     PHYSICAL EXAMINATION      Visit Vitals  /60 (BP 1 Location: Left arm, BP Patient Position: Sitting)   Pulse 64   Ht 5' 8\" (1.727 m)   Wt 267 lb (121.1 kg)   SpO2 96%   BMI 40.60 kg/m²         General: Well developed, in no acute distress. HEENT: No jaundice, oral mucosa moist, no oral ulcers  Neck: Supple, no stiffness, no lymphadenopathy, supple  Heart:  RRR  Respiratory: Clear bilaterally x 4, no wheezing or rales  Abdomen:  Protuberant   Extremities:  No  normal cap refill, no cyanosis. no pedal edema;discolorization from hemosiderin. Musculoskeletal: No clubbing, no deformities  Neuro: A&Ox3, speech clear, gait stable, cooperative, no focal neurologic deficits  Skin: Skin color is normal. No rashes or lesions. Non diaphoretic, moist.        EKG: Sinus brachycardia with first degree AV block of 216 seconds. DIAGNOSTIC DATA     1.  Stress Test  NM- 3/17/15- no ischemia, EF 65%  NM- 5/29/19-· Abnormal stress myocardial perfusion with very small area of mild perfusion defect of the mid anterior wall and mid infeiror wall with SDS 2 which is reversible. This suggest very small area of focal ischemia. Abnormal septal motion consistent with prior cardiac surgery. Normal wall motion with normal LV function with LVEF 67%. · Excellent functional capacity. · Compared to prior study of 3/17/2015, there is minimal change. The small anterior mid wall stress perfusion defect may be new    2. LE Venous Doppler  6/30/18- no DVT isaak    3. Lipids  3/26/19- , HDL 51, ,   7/3/19- , HDL 43, LDL 85,   1/3/20- TC 91, HDL 35, LDL 28.4,   7/14/20- TC 75, HDL 32, LDL 17,          4. Carotid Doppler  5/31/19- 0-9% R/L    5. Echo  5/29/19- EF 61-65%         LABORATORY DATA            Lab Results   Component Value Date/Time    WBC 7.2 01/03/2020 08:23 AM    HGB 12.5 01/03/2020 08:23 AM    HCT 41.5 01/03/2020 08:23 AM    PLATELET 436 (L) 03/37/5341 08:23 AM    MCV 97.0 01/03/2020 08:23 AM      Lab Results   Component Value Date/Time    Sodium 136 03/12/2020 03:19 PM    Potassium 4.5 03/12/2020 03:19 PM    Chloride 101 03/12/2020 03:19 PM    CO2 28 03/12/2020 03:19 PM    Anion gap 7 03/12/2020 03:19 PM    Glucose 300 (H) 03/12/2020 03:19 PM    BUN 27 (H) 03/12/2020 03:19 PM    Creatinine 1.45 (H) 03/12/2020 03:19 PM    BUN/Creatinine ratio 19 03/12/2020 03:19 PM    GFR est AA 59 (L) 03/12/2020 03:19 PM    GFR est non-AA 48 (L) 03/12/2020 03:19 PM    Calcium 9.0 03/12/2020 03:19 PM    Bilirubin, total 0.4 07/14/2020 09:11 AM    Alk. phosphatase 44 07/14/2020 09:11 AM    Protein, total 6.5 07/14/2020 09:11 AM    Albumin 4.3 07/14/2020 09:11 AM    Globulin 3.1 01/03/2020 08:23 AM    A-G Ratio 1.3 01/03/2020 08:23 AM    ALT (SGPT) 28 07/14/2020 09:11 AM           ASSESSMENT/RECOMMENDATIONS:.        1. CAD, S/p CABG 2005. -Biggest issue is to work on risk factor modification.   If he has any further chest discomfort I would favor going forward cath him  - He does not have an exercise program. I encouraged weight loss and exercising to reduce disease progression.  - His last stress test was May 2019 with small defect, unchanged from previous. 2. HTN  - BP is under good control, no adjustments to antihypertensives  -Continue to work on low-sodium diet    3. Dyslipidemia  - LDL was 17 but his HDL came down his triglycerides are okay so I do not think he needs to separate but he was asking about that. I will reduce his Crestor in half and recheck his labs in 2 months hopefully his HDL will come up somewhat I told him that at some point exercise in order raises    4. DM  - On insulin. - A1c goal should be 6.0  - Carotids were normal, 0-9% bilaterally. 5. LEONA  -Compliant with CPAP. 6. Carotid disease    Return in 6 months or PRN      Orders Placed This Encounter    semaglutide (Ozempic) 0.25 mg/0.2 mL (2 mg/1.5 mL) sub-q pen     Sig: by SubCUTAneous route every seven (7) days. Follow-up and Dispositions  ·   Return in about 6 months (around 1/23/2021). I have discussed the diagnosis with  Hemanth Rehman and the intended plan as seen in the above orders. Questions were answered concerning future plans. I have discussed medication side effects and warnings with the patient as well. Thank you,  Ismael Sykes MD for involving me in the care of  Hemanth Rehman. Please do not hesitate to contact me for further questions/concerns. Maged Garces MD, [facility] [place]    Patient Care Team:  Ismael Sykes MD as PCP - General (Family Medicine)  Ismael Sykes MD as PCP - [facility]  Marcela Hendrix MD as Surgeon (Surgery)  Leandra Mackenzie MD (Urology)    [address], [address] [place] [address] [address]      [phone] / [phone] Fax

## 2020-07-23 NOTE — PROGRESS NOTES
Visit Vitals  /60 (BP 1 Location: Left arm, BP Patient Position: Sitting)   Pulse 64   Ht 5' 8\" (1.727 m)   Wt 267 lb (121.1 kg)   SpO2 96%   BMI 40.60 kg/m²         Chest pain: no  Shortness of breath: no  Edema: yes  Palpitations: no  Dizziness: no    Salt: adds to food    Discuss kidneys, diuretic and fish oil    New diagnosis/Surgeries: no    ER/Hospitalizations: no

## 2020-07-24 ENCOUNTER — TELEPHONE (OUTPATIENT)
Dept: FAMILY MEDICINE CLINIC | Age: 70
End: 2020-07-24

## 2020-07-31 ENCOUNTER — TELEPHONE (OUTPATIENT)
Dept: FAMILY MEDICINE CLINIC | Age: 70
End: 2020-07-31

## 2020-07-31 NOTE — TELEPHONE ENCOUNTER
----- Message from Theresa Cavanaugh sent at 7/29/2020  9:46 AM EDT -----  Regarding: Dr Davidson Flank: [phone]  General Message/Vendor Calls    Caller's first and last name:Guerrero Anand      Reason for call:pt is requesting lab work order before appt on August 3,2020      Callback required yes/no and why:yes      Best contact number(s):[phone]      Details to clarify the request:      Theresa Cavanaugh

## 2020-07-31 NOTE — TELEPHONE ENCOUNTER
Patient said he has sent multiple my chart messages. He wants a PSA test ordered. I did speak to him regarding the appointment of 8/3 provider cxl of Dr. Conchis Talbot per Sonja Arango. Said he would still like to have this test done before the rescheduled appointment of 8/10. He wants to speak with the nurse.

## 2020-08-03 ENCOUNTER — LAB ONLY (OUTPATIENT)
Dept: FAMILY MEDICINE CLINIC | Age: 70
End: 2020-08-03

## 2020-08-03 ENCOUNTER — HOSPITAL ENCOUNTER (OUTPATIENT)
Dept: LAB | Age: 70
Discharge: HOME | End: 2020-08-03

## 2020-08-03 DIAGNOSIS — Z01.83 ENCOUNTER FOR BLOOD TYPING: ICD-10-CM

## 2020-08-03 DIAGNOSIS — E11.21 TYPE 2 DIABETES WITH NEPHROPATHY (HCC): ICD-10-CM

## 2020-08-03 DIAGNOSIS — Z79.899 ENCOUNTER FOR LONG-TERM (CURRENT) USE OF MEDICATIONS: ICD-10-CM

## 2020-08-03 LAB
25(OH)D3 SERPL-MCNC: 45.1 NG/ML (ref 30–100)
ABO + RH BLD: NORMAL
ANION GAP SERPL CALC-SCNC: 8 MMOL/L (ref 5–15)
BLOOD BANK CMNT PATIENT-IMP: NORMAL
BLOOD GROUP ANTIBODIES SERPL: NORMAL
BUN SERPL-MCNC: 18 MG/DL (ref 6–20)
BUN/CREAT SERPL: 13 (ref 12–20)
CALCIUM SERPL-MCNC: 9.5 MG/DL (ref 8.5–10.1)
CHLORIDE SERPL-SCNC: 104 MMOL/L (ref 97–108)
CO2 SERPL-SCNC: 26 MMOL/L (ref 21–32)
COMMENT, HOLDF: NORMAL
CREAT SERPL-MCNC: 1.36 MG/DL (ref 0.7–1.3)
CREAT UR-MCNC: 39.3 MG/DL
ERYTHROCYTE [DISTWIDTH] IN BLOOD BY AUTOMATED COUNT: 13.6 % (ref 11.5–14.5)
EST. AVERAGE GLUCOSE BLD GHB EST-MCNC: 174 MG/DL
FOLATE SERPL-MCNC: 24.5 NG/ML (ref 5–21)
GLUCOSE SERPL-MCNC: 169 MG/DL (ref 65–100)
HBA1C MFR BLD: 7.7 % (ref 4–5.6)
HCT VFR BLD AUTO: 43.7 % (ref 36.6–50.3)
HGB BLD-MCNC: 13.8 G/DL (ref 12.1–17)
MCH RBC QN AUTO: 28 PG (ref 26–34)
MCHC RBC AUTO-ENTMCNC: 31.6 G/DL (ref 30–36.5)
MCV RBC AUTO: 88.8 FL (ref 80–99)
MICROALBUMIN UR-MCNC: 4.31 MG/DL
MICROALBUMIN/CREAT UR-RTO: 110 MG/G (ref 0–30)
NRBC # BLD: 0 K/UL (ref 0–0.01)
NRBC BLD-RTO: 0 PER 100 WBC
PLATELET # BLD AUTO: 148 K/UL (ref 150–400)
PMV BLD AUTO: 10.7 FL (ref 8.9–12.9)
POTASSIUM SERPL-SCNC: 4.1 MMOL/L (ref 3.5–5.1)
RBC # BLD AUTO: 4.92 M/UL (ref 4.1–5.7)
SAMPLES BEING HELD,HOLD: NORMAL
SODIUM SERPL-SCNC: 138 MMOL/L (ref 136–145)
SPECIMEN EXP DATE BLD: NORMAL
VIT B12 SERPL-MCNC: 309 PG/ML (ref 193–986)
WBC # BLD AUTO: 6.7 K/UL (ref 4.1–11.1)

## 2020-08-04 NOTE — PROGRESS NOTES
Mr. Pro Malik,    Your A1c is slightly elevated since last check. In addition, you Vitamin B12 levels are low. We can talk about what to do next at your upcoming appointment. See you soon.     Sincerely,  Kaz Wills MD

## 2020-08-10 ENCOUNTER — OFFICE VISIT (OUTPATIENT)
Dept: FAMILY MEDICINE CLINIC | Age: 70
End: 2020-08-10
Payer: MEDICARE

## 2020-08-10 VITALS
SYSTOLIC BLOOD PRESSURE: 146 MMHG | TEMPERATURE: 98.2 F | WEIGHT: 270.8 LBS | OXYGEN SATURATION: 97 % | DIASTOLIC BLOOD PRESSURE: 64 MMHG | BODY MASS INDEX: 41.04 KG/M2 | HEART RATE: 65 BPM | RESPIRATION RATE: 17 BRPM | HEIGHT: 68 IN

## 2020-08-10 DIAGNOSIS — E11.21 TYPE 2 DIABETES WITH NEPHROPATHY (HCC): Primary | ICD-10-CM

## 2020-08-10 PROBLEM — G57.50 TARSAL TUNNEL SYNDROME: Status: ACTIVE | Noted: 2020-08-10

## 2020-08-10 PROBLEM — Z67.10 BLOOD TYPE A+: Status: ACTIVE | Noted: 2020-08-10

## 2020-08-10 PROBLEM — M76.829 TIBIALIS TENDINITIS: Status: ACTIVE | Noted: 2020-08-10

## 2020-08-10 PROBLEM — M19.079 PRIMARY LOCALIZED OSTEOARTHROSIS OF ANKLE AND FOOT: Status: ACTIVE | Noted: 2020-08-10

## 2020-08-10 PROBLEM — M21.70 LEG LENGTH INEQUALITY: Status: ACTIVE | Noted: 2020-08-10

## 2020-08-10 PROBLEM — M76.60 ACHILLES BURSITIS: Status: ACTIVE | Noted: 2020-08-10

## 2020-08-10 PROCEDURE — G8417 CALC BMI ABV UP PARAM F/U: HCPCS | Performed by: FAMILY MEDICINE

## 2020-08-10 PROCEDURE — G9717 DOC PT DX DEP/BP F/U NT REQ: HCPCS | Performed by: FAMILY MEDICINE

## 2020-08-10 PROCEDURE — G8754 DIAS BP LESS 90: HCPCS | Performed by: FAMILY MEDICINE

## 2020-08-10 PROCEDURE — G8753 SYS BP > OR = 140: HCPCS | Performed by: FAMILY MEDICINE

## 2020-08-10 PROCEDURE — 3017F COLORECTAL CA SCREEN DOC REV: CPT | Performed by: FAMILY MEDICINE

## 2020-08-10 PROCEDURE — G0463 HOSPITAL OUTPT CLINIC VISIT: HCPCS | Performed by: FAMILY MEDICINE

## 2020-08-10 PROCEDURE — 2022F DILAT RTA XM EVC RTNOPTHY: CPT | Performed by: FAMILY MEDICINE

## 2020-08-10 PROCEDURE — 99214 OFFICE O/P EST MOD 30 MIN: CPT | Performed by: FAMILY MEDICINE

## 2020-08-10 PROCEDURE — G8536 NO DOC ELDER MAL SCRN: HCPCS | Performed by: FAMILY MEDICINE

## 2020-08-10 PROCEDURE — 3051F HG A1C>EQUAL 7.0%<8.0%: CPT | Performed by: FAMILY MEDICINE

## 2020-08-10 PROCEDURE — 1101F PT FALLS ASSESS-DOCD LE1/YR: CPT | Performed by: FAMILY MEDICINE

## 2020-08-10 PROCEDURE — G8427 DOCREV CUR MEDS BY ELIG CLIN: HCPCS | Performed by: FAMILY MEDICINE

## 2020-08-10 NOTE — PROGRESS NOTES
Chief Complaint   Patient presents with    Follow-up     diabetes     1. Have you been to the ER, urgent care clinic since your last visit? Hospitalized since your last visit? No    2. Have you seen or consulted any other health care providers outside of the [address] [place] [address] since your last visit? Include any pap smears or colon screening.  No    avg sugar--150-730

## 2020-08-10 NOTE — PROGRESS NOTES
Fortino Acuña  77 y.o. male  1950  Pieter Puentes  [id number]   [address]: Progress Note  Mello Camargo MD       Encounter Date: 8/10/2020    Chief Complaint   Patient presents with    Follow-up     diabetes     History of Present Illness   Fortino Acuña is a 77 y.o. male who presents to clinic today for diabetes follow-up. HM:  eye exam and glaucoma screen 7/1020  Colonoscopy 8/7/2020  Diabetic foot care 7/24/20     Diabetes Mellitus:  No polyuria, polydipsia, blurry vision, chest pain, dyspnea or claudication. No foot burning, numbness or pain. Taking medication compliantly without noted sided effects. Follows diet fairly well. Home glucose monitoring in the range of 150-170    Known diabetic complications: nephropathy and cardiovascular disease  Cardiovascular risk factors: dyslipidemia, diabetes mellitus, obesity, sedentary life style, male gender, hypertension  Current diabetic medications include   Key Antihyperglycemic Medications             HUMALOG KWIKPEN INSULIN 100 unit/mL kwikpen (Taking) If Glucose: 150-199: Take 5 units 200-249: Take 15 units 250-299: Take 20 units 300+: take 25 units    metFORMIN (GLUCOPHAGE) 1,000 mg tablet (Taking) Take 1 Tab by mouth two (2) times daily (with meals). insulin glargine U-300 conc (TOUJEO SOLOSTAR U-300 INSULIN) 300 unit/mL (1.5 mL) inpn pen (Taking) INJECT 40 UNITS EVERY MORNING AND 20 UNITS EVERY EVENING        Eye exam current (within one year): yes  Weight trend: fluctuating a bit  Prior visit with dietician: unknown  Current diet: \"healthy\" diet  in general  Current exercise: no regular exercise    Current monitoring regimen: home blood tests - daily  Any episodes of hypoglycemia? no    Is He on ACE inhibitor or angiotensin II receptor blocker?    Yes   lisinopril (generic)      Most recent labs =    Lab Results   Component Value Date/Time    Hemoglobin A1c 7.7 (H) 08/03/2020 11:20 AM Hemoglobin A1c 7.1 (H) 03/12/2020 03:19 PM    Hemoglobin A1c 7.7 (H) 09/30/2019 02:17 PM    Glucose 169 (H) 08/03/2020 11:25 AM    Glucose (POC) 143 (H) 05/12/2016 08:51 AM    Microalbumin/Creat ratio (mg/g creat) 110 (H) 08/03/2020 12:02 PM    Microalbumin,urine random 4.31 08/03/2020 12:02 PM    LDL, calculated 17 07/14/2020 09:11 AM    Creatinine 1.36 (H) 08/03/2020 11:25 AM        Last 3 blood pressures =   BP Readings from Last 3 Encounters:   08/10/20 146/64   07/23/20 138/60   03/18/20 137/68         Diabetic Foot and Eye Exam HM Status   Topic Date Due    Diabetic Foot Care  03/26/2020    Eye Exam  08/09/2020         Review of Systems   Review of Systems   Constitutional: Negative for chills and fever. HENT: Negative for congestion and sore throat. Eyes: Negative for blurred vision and double vision. Respiratory: Negative for cough, shortness of breath and wheezing. Cardiovascular: Negative for chest pain and palpitations. Gastrointestinal: Negative for abdominal pain, constipation, diarrhea and nausea. Genitourinary: Negative for dysuria and hematuria. Musculoskeletal: Negative for back pain, falls and myalgias. Skin: Negative for rash. Neurological: Negative for dizziness, tremors and headaches. Psychiatric/Behavioral: Negative for depression. The patient is not nervous/anxious. All other systems reviewed and are negative. Vitals/Objective:     Vitals:    08/10/20 1103   BP: 146/64   Pulse: 65   Resp: 17   Temp: 98.2 °F (36.8 °C)   TempSrc: Oral   SpO2: 97%   Weight: 270 lb 12.8 oz (122.8 kg)   Height: 5' 8\" (1.727 m)     Body mass index is 41.17 kg/m². General: Patient alert and oriented and in NAD  Heart: Regular rate and rhythm, No murmurs, rubs or gallops.   2+ peripheral pulses  Lungs: Clear to auscultation bilaterally, no wheezing, rales or rhonchi  Skin: No rashes or lesions noted on exposed skin,  Psych: Appropriate mood and affect         Diabetic foot exam performed by Jordan Hernandez MD     Measurement  Response   Monofilament  R - 6/6  L - 6/6   Pulse DP R - present  L - present   Pulse TP R - present  L - present   Structural deformity R - None  L - None   Skin Integrity / Deformity R - None  L - None             Assessment and Plan:   1. Type 2 diabetes with nephropathy (HCC)  A1c slightly up due to dietary choices since start of COVID. Also slightly less active. F2F for Continuous Glucose Monitor. He continues to use this daily. He has had some lows 80s and has had some highs 250 (20-25U). He uses the realtime feedback to help aid in dietary choies and adjust his insulin as needed. He has been getting supplies through [employer]. Patient has rededicated himself to improved dietary choices and will continue to monitor sugars theough CGM. If they remain elevated he will let us know, otherwise we will plan on follow-up in ~3 months.   -  DIABETES FOOT EXAM      I have discussed the diagnosis with the patient and the intended plan as seen in the above orders. he has expressed understanding. The patient has received an after-visit summary and questions were answered concerning future plans. I have discussed medication side effects and warnings with the patient as well. Follow-up and Dispositions  ·   Return in about 3 months (around 11/10/2020). Electronically Signed: Jordan Hernandez MD     History   Patients past medical, surgical and family histories were reviewed and updated.     Past Medical History:   Diagnosis Date    AR (allergic rhinitis) 12/01/2008    sees allergist for shots    Arthritis     Blind right eye     BPH (benign prostatic hyperplasia)     Dr. Estela Mccord CAD (coronary artery disease)     CABG - 3 vessel    Depression     DM type 2 (diabetes mellitus, type 2) ([facility] [address]     30 years    Essential hypertension with goal blood pressure less than 130/80 9/28/2016    Hearing loss     Hypercholesteremia     Hypogonadism male 12/1/2008    Mild intermittent asthma without complication 66/30/7974    sees allergy    LEONA on CPAP 07/16/2010    Dr. Monica Wharton    Severe obesity (BMI 35.0-39.9) 6/18/2018    Type 2 diabetes with nephropathy ([facility] [address] 3/12/2018     Past Surgical History:   Procedure Laterality Date    CABG, ARTERY-VEIN, THREE  2005    [place]    HX CIRCUMCISION  2016    HX CYST REMOVAL  5/12/2016    neck and chest    HX HEENT  2012    right eye prosthesis    HX ORTHOPAEDIC      right shoulder surgery, rotator cuff repair    HX RETINAL DETACHMENT REPAIR  1984    R Blindness    HX RHINOPLASTY      septoplasty     Family History   Adopted: Yes   Family history unknown: Yes     Social History     Socioeconomic History    Marital status: SINGLE     Spouse name: Not on file    Number of children: Not on file    Years of education: Not on file    Highest education level: Associate degree: academic program   Occupational History     Comment: Technology     Comment: [email or web address]   Social Needs    Financial resource strain: Not hard at all   Glio insecurity     Worry: Never true     Inability: Never true    Transportation needs     Medical: No     Non-medical: No   Tobacco Use    Smoking status: Passive Smoke Exposure - Never Smoker    Smokeless tobacco: Never Used   Substance and Sexual Activity    Alcohol use: Yes     Alcohol/week: 6.0 standard drinks     Types: 6 Cans of beer per week     Comment: 1 beer or so per day    Drug use: No     Comment: occasionally smoked marijuana in the past    Sexual activity: Not Currently     Partners: Male   Lifestyle    Physical activity     Days per week: 2 days     Minutes per session: 40 min    Stress: To some extent   Relationships    Social connections     Talks on phone:  Three times a week     Gets together: Twice a week     Attends Jew service: Never     Active member of club or organization: Yes     Attends meetings of clubs or organizations: More than 4 times per year     Relationship status: Living with partner    Intimate partner violence     Fear of current or ex partner: No     Emotionally abused: No     Physically abused: No     Forced sexual activity: No   Other Topics Concern    Not on file   Social History Narrative    Not on file            Current Medications/Allergies     Current Outpatient Medications   Medication Sig Dispense Refill    rosuvastatin (CRESTOR) 20 mg tablet Take 1 Tab by mouth nightly. 90 Tab 4    ezetimibe (ZETIA) 10 mg tablet TAKE 1 TABLET DAILY 90 Tab 3    FreeStyle Belkis 14 Day Sensor kit 1 Units by Does Not Apply route every fourteen (14) days. DMII  E11.29; E11.21 1 Kit 5    busPIRone (BUSPAR) 7.5 mg tablet Take 1 Tab by mouth three (3) times daily. 270 Tab 1    gabapentin (NEURONTIN) 300 mg capsule Take 300 mg by mouth three (3) times daily.  HUMALOG KWIKPEN INSULIN 100 unit/mL kwikpen If Glucose: 150-199: Take 5 units 200-249: Take 15 units 250-299: Take 20 units 300+: take 25 units 18 Pen 3    pramipexole (MIRAPEX) 0.125 mg tablet TAKE 1 TABLET NIGHTLY ABOUT 2 TO 3 HOURS PRIOR TO BEDTIME 90 Tab 4    metoprolol tartrate (LOPRESSOR) 25 mg tablet TAKE 1 TABLET TWICE A  Tab 3    bumetanide (BUMEX) 1 mg tablet       metFORMIN (GLUCOPHAGE) 1,000 mg tablet Take 1 Tab by mouth two (2) times daily (with meals). 180 Tab 3    testosterone undecanoate (AVEED IM) by IntraMUSCular route. Every 10 weeks      insulin glargine U-300 conc (TOUJEO SOLOSTAR U-300 INSULIN) 300 unit/mL (1.5 mL) inpn pen INJECT 40 UNITS EVERY MORNING AND 20 UNITS EVERY EVENING 18 Pen 5    Insulin Needles, Disposable, 31 gauge x 5/16\" ndle 5x daily with insulin. IDDMII. E11.21 1 Package 11    lisinopril (PRINIVIL, ZESTRIL) 10 mg tablet Take 1 Tab by mouth two (2) times a day. 180 Tab 3    citalopram (CELEXA) 20 mg tablet TAKE 1 TABLET BY MOUTH EVERY DAY.  WEAN AS DIRECTED 90 Tab 0    albuterol (PROVENTIL HFA, VENTOLIN HFA, PROAIR HFA) 90 mcg/actuation inhaler Take 2 Puffs by inhalation every four (4) hours as needed for Wheezing. 3 Inhaler 1    fluticasone (FLONASE) 50 mcg/actuation nasal spray 2 Sprays by Both Nostrils route daily. Indications: ALLERGIC RHINITIS 1 Bottle 0    OMEGA-3 FATTY ACIDS/FISH OIL (FISH OIL EXTRA STRENGTH PO) Take 1,400 mg by mouth daily (after breakfast).  ketoconazole (NIZORAL) 2 % topical cream Apply  to affected area as needed for Skin Irritation.  VOLTAREN 1 % gel 2 g as needed. 1    desonide (TRIDESILON) 0.05 % topical lotion as needed. 1    aspirin (ASPIRIN) 325 mg tablet take 325 mg by mouth daily.  semaglutide (Ozempic) 0.25 mg/0.2 mL (2 mg/1.5 mL) sub-q pen by SubCUTAneous route every seven (7) days.  predniSONE (DELTASONE) 5 mg tablet TK 1 T PO TID FOR 3 TO 5 DAYS. AVOID TAKING WITH OTHER NSAIDS      CIALIS 5 mg tablet Take 5 mg by mouth daily.  tamsulosin (FLOMAX) 0.4 mg capsule Take 0.4 mg by mouth daily.  cetirizine (ZYRTEC) 10 mg tablet Take 1 Tab by mouth daily.   0     Allergies   Allergen Reactions    Cefdinir Rash    Codeine Nausea Only

## 2020-08-10 NOTE — Clinical Note
Can you help me get records to [facility] so he can get his continuous glucose meter? Thanks! Let me know if you have any questions.

## 2020-08-10 NOTE — PATIENT INSTRUCTIONS

## 2020-08-17 ENCOUNTER — PATIENT MESSAGE (OUTPATIENT)
Dept: FAMILY MEDICINE CLINIC | Age: 70
End: 2020-08-17

## 2020-08-17 DIAGNOSIS — E11.21 TYPE 2 DIABETES WITH NEPHROPATHY (HCC): Primary | ICD-10-CM

## 2020-08-27 RX ORDER — LISINOPRIL 10 MG/1
TABLET ORAL
Qty: 180 TAB | Refills: 3 | Status: SHIPPED | OUTPATIENT
Start: 2020-08-27 | End: 2021-07-23

## 2020-09-05 ENCOUNTER — PATIENT MESSAGE (OUTPATIENT)
Dept: FAMILY MEDICINE CLINIC | Age: 70
End: 2020-09-05

## 2020-09-09 NOTE — TELEPHONE ENCOUNTER
From: Sandoval Drake  To: Juan Clark MD  Sent: 9/5/2020 11:36 AM EDT  Subject: Prescription Question    Archie filled the [facility] but it was only a 30 day supply so I had them but it back.

## 2020-09-15 ENCOUNTER — PATIENT MESSAGE (OUTPATIENT)
Dept: FAMILY MEDICINE CLINIC | Age: 70
End: 2020-09-15

## 2020-09-15 DIAGNOSIS — E11.21 TYPE 2 DIABETES WITH NEPHROPATHY (HCC): ICD-10-CM

## 2020-10-14 LAB
ALBUMIN SERPL-MCNC: 4.4 G/DL (ref 3.8–4.8)
ALP SERPL-CCNC: 54 IU/L (ref 39–117)
ALT SERPL-CCNC: 27 IU/L (ref 0–44)
AST SERPL-CCNC: 28 IU/L (ref 0–40)
BILIRUB DIRECT SERPL-MCNC: 0.15 MG/DL (ref 0–0.4)
BILIRUB SERPL-MCNC: 0.4 MG/DL (ref 0–1.2)
CHOLEST SERPL-MCNC: 87 MG/DL (ref 100–199)
HDLC SERPL-MCNC: 36 MG/DL
INTERPRETATION, 910389: NORMAL
LDLC SERPL CALC-MCNC: 14 MG/DL (ref 0–99)
PROT SERPL-MCNC: 6.4 G/DL (ref 6–8.5)
TRIGL SERPL-MCNC: 253 MG/DL (ref 0–149)
VLDLC SERPL CALC-MCNC: 37 MG/DL (ref 5–40)

## 2020-10-14 NOTE — PROGRESS NOTES
Lipids are at goal.Your triglycerides are up and I would reduce carbohydrate intake. No action needed. I would like the cholesterol checked again in 6 mo. Continue to eat healthy and clean.

## 2020-10-16 DIAGNOSIS — E78.5 DYSLIPIDEMIA: Primary | ICD-10-CM

## 2020-10-30 ENCOUNTER — OFFICE VISIT (OUTPATIENT)
Dept: INTERNAL MEDICINE CLINIC | Age: 70
End: 2020-10-30
Payer: MEDICARE

## 2020-10-30 VITALS
RESPIRATION RATE: 13 BRPM | TEMPERATURE: 98.4 F | SYSTOLIC BLOOD PRESSURE: 136 MMHG | OXYGEN SATURATION: 97 % | DIASTOLIC BLOOD PRESSURE: 64 MMHG | WEIGHT: 271.8 LBS | HEIGHT: 69 IN | HEART RATE: 66 BPM | BODY MASS INDEX: 40.26 KG/M2

## 2020-10-30 DIAGNOSIS — J30.89 SEASONAL ALLERGIC RHINITIS DUE TO OTHER ALLERGIC TRIGGER: Chronic | ICD-10-CM

## 2020-10-30 DIAGNOSIS — I10 ESSENTIAL HYPERTENSION WITH GOAL BLOOD PRESSURE LESS THAN 130/80: ICD-10-CM

## 2020-10-30 DIAGNOSIS — E78.00 HYPERCHOLESTEREMIA: ICD-10-CM

## 2020-10-30 DIAGNOSIS — E11.29 TYPE 2 DIABETES MELLITUS WITH MICROALBUMINURIA, WITH LONG-TERM CURRENT USE OF INSULIN (HCC): Primary | ICD-10-CM

## 2020-10-30 DIAGNOSIS — N18.30 STAGE 3 CHRONIC KIDNEY DISEASE, UNSPECIFIED WHETHER STAGE 3A OR 3B CKD (HCC): ICD-10-CM

## 2020-10-30 DIAGNOSIS — R80.9 TYPE 2 DIABETES MELLITUS WITH MICROALBUMINURIA, WITH LONG-TERM CURRENT USE OF INSULIN (HCC): Primary | ICD-10-CM

## 2020-10-30 DIAGNOSIS — F34.1 DYSTHYMIA: ICD-10-CM

## 2020-10-30 DIAGNOSIS — E11.21 TYPE 2 DIABETES WITH NEPHROPATHY (HCC): ICD-10-CM

## 2020-10-30 DIAGNOSIS — Z79.4 TYPE 2 DIABETES MELLITUS WITH MICROALBUMINURIA, WITH LONG-TERM CURRENT USE OF INSULIN (HCC): Primary | ICD-10-CM

## 2020-10-30 LAB — HBA1C MFR BLD HPLC: 7.6 %

## 2020-10-30 PROCEDURE — 3017F COLORECTAL CA SCREEN DOC REV: CPT | Performed by: INTERNAL MEDICINE

## 2020-10-30 PROCEDURE — 1101F PT FALLS ASSESS-DOCD LE1/YR: CPT | Performed by: INTERNAL MEDICINE

## 2020-10-30 PROCEDURE — 2022F DILAT RTA XM EVC RTNOPTHY: CPT | Performed by: INTERNAL MEDICINE

## 2020-10-30 PROCEDURE — G9717 DOC PT DX DEP/BP F/U NT REQ: HCPCS | Performed by: INTERNAL MEDICINE

## 2020-10-30 PROCEDURE — G8752 SYS BP LESS 140: HCPCS | Performed by: INTERNAL MEDICINE

## 2020-10-30 PROCEDURE — G8536 NO DOC ELDER MAL SCRN: HCPCS | Performed by: INTERNAL MEDICINE

## 2020-10-30 PROCEDURE — G8417 CALC BMI ABV UP PARAM F/U: HCPCS | Performed by: INTERNAL MEDICINE

## 2020-10-30 PROCEDURE — G0463 HOSPITAL OUTPT CLINIC VISIT: HCPCS | Performed by: INTERNAL MEDICINE

## 2020-10-30 PROCEDURE — 83036 HEMOGLOBIN GLYCOSYLATED A1C: CPT | Performed by: INTERNAL MEDICINE

## 2020-10-30 PROCEDURE — G8754 DIAS BP LESS 90: HCPCS | Performed by: INTERNAL MEDICINE

## 2020-10-30 PROCEDURE — G8427 DOCREV CUR MEDS BY ELIG CLIN: HCPCS | Performed by: INTERNAL MEDICINE

## 2020-10-30 PROCEDURE — 99214 OFFICE O/P EST MOD 30 MIN: CPT | Performed by: INTERNAL MEDICINE

## 2020-10-30 RX ORDER — MULTIVIT WITH MINERALS/HERBS
1 TABLET ORAL DAILY
COMMUNITY

## 2020-10-30 RX ORDER — PYRIDOXINE HCL (VITAMIN B6) 100 MG
200 TABLET ORAL DAILY
COMMUNITY

## 2020-10-30 RX ORDER — CHOLECALCIFEROL (VITAMIN D3) 50 MCG
CAPSULE ORAL
COMMUNITY
End: 2022-02-17

## 2020-10-30 RX ORDER — ALBUTEROL SULFATE 90 UG/1
2 AEROSOL, METERED RESPIRATORY (INHALATION)
Qty: 3 INHALER | Refills: 4 | Status: SHIPPED | COMMUNITY
Start: 2020-10-30 | End: 2022-07-30 | Stop reason: SDUPTHER

## 2020-10-30 RX ORDER — INSULIN ASPART 100 [IU]/ML
INJECTION, SOLUTION INTRAVENOUS; SUBCUTANEOUS
Qty: 33 ADJUSTABLE DOSE PRE-FILLED PEN SYRINGE | Refills: 5
Start: 2020-10-30 | End: 2020-11-05 | Stop reason: SDUPTHER

## 2020-10-30 RX ORDER — CHOLECALCIFEROL (VITAMIN D3) 125 MCG
CAPSULE ORAL
COMMUNITY

## 2020-10-30 RX ORDER — INSULIN ASPART 100 [IU]/ML
INJECTION, SOLUTION INTRAVENOUS; SUBCUTANEOUS
COMMUNITY
Start: 2020-08-24 | End: 2020-10-30

## 2020-10-30 RX ORDER — CHOLECALCIFEROL (VITAMIN D3) 125 MCG
200 CAPSULE ORAL DAILY
COMMUNITY

## 2020-10-30 RX ORDER — ESCITALOPRAM OXALATE 5 MG/1
5 TABLET ORAL DAILY
Qty: 30 TAB | Refills: 0 | Status: SHIPPED | OUTPATIENT
Start: 2020-10-30 | End: 2020-11-29 | Stop reason: SDUPTHER

## 2020-10-30 RX ORDER — BUMETANIDE 1 MG/1
1 TABLET ORAL
Qty: 30 TAB | Refills: 4
Start: 2020-10-30 | End: 2021-04-26 | Stop reason: SDUPTHER

## 2020-10-30 RX ORDER — AZELASTINE 1 MG/ML
1 SPRAY, METERED NASAL 2 TIMES DAILY
Qty: 3 BOTTLE | Refills: 3 | Status: SHIPPED | COMMUNITY
Start: 2020-10-30 | End: 2021-11-22

## 2020-10-30 RX ORDER — ASCORBIC ACID 500 MG
500 TABLET ORAL DAILY
COMMUNITY

## 2020-10-30 RX ORDER — LANOLIN ALCOHOL/MO/W.PET/CERES
400 CREAM (GRAM) TOPICAL DAILY
COMMUNITY

## 2020-10-30 NOTE — LETTER
10/30/2020 1:39 PM 
 
Mr. Dolroes Cruz [id number] [address] To whom it may concern: 
 
Please send the most recent medical records for Dolores Cruz, date of birth 1950 for continuity of care. Please fax to [phone] Sincerely, 
 
 
Roverto Smith MD

## 2020-10-30 NOTE — PROGRESS NOTES
HISTORY OF PRESENT ILLNESS    Chief Complaint   Patient presents with    Establish Care    Depression    Medication Evaluation       \"Gene\" presents to re-establish care at [facility]. Was a patient of Dr. Hai Casper for 20 years. 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 Was an . He lives w partner Daryl Hill whom he describes as \"alcoholic and bipolar. \"  Recent care has been by Dr. Alvin Nava. Reports depression feelings. He has been treated for dysthymia and anxiety in the past and was taking citalopram 20 mg over the years. He stopped this earlier this year because he did not feel like it was helping. Has more recently been placed on buspirone which he says helps with anxiety a little bit but he is feeling kind of low in mood and energy some of the time and asks if he can take something for depression again. Denies any suicidal or homicidal ideation. Stress regarding his partner. He says that he feels low energy half the time and then the other half the time he sometimes feels little bit high in energy. That said, he has never been truly manic or unable to sleep. He has never been diagnosed with bipolar disorder but his partner has. Diabetes Mellitus follow-up  Last hemoglobin a1c   Lab Results   Component Value Date/Time    Hemoglobin A1c 7.7 (H) 08/03/2020 11:20 AM    Hemoglobin A1c (POC) 7.6 10/30/2020 11:06 AM   He is taking Metformin, Toujeo 40 in the morning and 20 in the evening and is using a mealtime sliding scale NovoLog. He has a FreeStyle meter and is monitoring things very closely. No recent hypoglycemia.  medication compliance: compliant all of the time. Diabetic diet compliance: compliant most of the time. Home glucose monitoring: fasting values range not reviewed in detail today. .     Hypoglycemic episodes:  None.       Hypertension  Hypertension ROS: taking medications as instructed, no medication side effects noted, no TIA's, no chest pain on exertion, no dyspnea on exertion, no swelling of ankles     reports that he is a non-smoker but has been exposed to tobacco smoke. He has been exposed to 0.00 packs per day for the past 10.00 years. He has never used smokeless tobacco.    reports current alcohol use of about 6.0 standard drinks of alcohol per week. BP Readings from Last 2 Encounters:   10/30/20 136/64   08/10/20 146/64     Chronic kidney disease. He is followed closely by nephrology. He has some lab orders which will be done in the next week or so. Reports chronic allergic rhinitis. Has been using Flonase at times which increases his blood sugars and he asked about other options. He is albuterol as needed for reactive airways and asthma. Review of Systems   All other systems reviewed and are negative, except as noted in HPI    Past Medical and Surgical History   has a past medical history of Anxiety, AR (allergic rhinitis) (12/01/2008), Arthritis, Blind right eye, BPH (benign prostatic hyperplasia), CAD (coronary artery disease), CKD (chronic kidney disease) stage 3, GFR 30-59 ml/min, DM type 2 (diabetes mellitus, type 2) ([facility] [address] Dysthymia, Encounter for diabetic foot exam ([facility] [address] Essential hypertension with goal blood pressure less than 130/80 (9/28/2016), Hearing loss, Hypercholesteremia, Hypogonadism male (12/01/2008), Mild intermittent asthma without complication (96/42/1252), LEONA on CPAP (07/16/2010), Severe obesity (BMI 35.0-39.9) (06/18/2018), Trigger finger, and Type 2 diabetes with nephropathy ([facility] [address] (3/12/2018). has a past surgical history that includes hx rhinoplasty; hx retinal detachment repair (1984); hx heent (2012); pr cabg, artery-vein, three (2005); hx orthopaedic; hx cyst removal (5/12/2016); and hx circumcision (2016). reports that he is a non-smoker but has been exposed to tobacco smoke. He has been exposed to 0.00 packs per day for the past 10.00 years.  He has never used smokeless tobacco. He reports current alcohol use of about 6.0 standard drinks of alcohol per week. He reports that he does not use drugs. He was adopted. Family history is unknown by patient. Physical Exam   Nursing note and vitals reviewed. Blood pressure 136/64, pulse 66, temperature 98.4 °F (36.9 °C), temperature source Oral, resp. rate 13, height 5' 9\" (1.753 m), weight 271 lb 12.8 oz (123.3 kg), SpO2 97 %. Constitutional:  No distress. Eyes: Conjunctivae are normal.   Ears:  Hearing grossly intact  Cardiovascular: Normal rate. regular rhythm, no murmurs or gallops  No edema  Pulmonary/Chest: Effort normal.   CTAB  Musculoskeletal: moves all 4 extremities   Neurological: Alert and oriented to person, place, and time. Skin: No rash noted. Psychiatric: Normal mood and affect. Behavior is normal.     ASSESSMENT and PLAN  Diagnoses and all orders for this visit:    1. Type 2 diabetes mellitus with microalbuminuria, with long-term current use of insulin ([facility])  -     AMB POC HEMOGLOBIN A1C  -     REFERRAL TO DIETITIAN  2. Type 2 diabetes with nephropathy ([facility])  -     NovoLOG Flexpen U-100 Insulin 100 unit/mL (3 mL) inpn; Take 10-30 units three times daily with meals per sliding scale  -     REFERRAL TO DIETITIAN  A1c today is relatively stable. Not review glucoses in great detail. His sliding scale insulin regimen might be a little bit complicated and may be more reactive than proactive, but he has been doing this for a long time and I did not adjust it today. He asks for a dietitian and I think that will be a great idea. Follow-up in 2 to 3 months. Review of meter then. 3. Dysthymia  Controlled dysthymia. Anxiety is borderline controlled as well. It is possible that he does have some cyclothymia or bipolar 2 disorder. May consider medications to address this. I am reluctant to give him a high dose of an SSRI right away due to some risk of hypomania or marley so we will try Lexapro.   He tolerated Celexa in the past but may not have helped ideally. Continue buspirone as needed for anxiety. Side effects discussed. Follow-up in 1 to 2 months. -     escitalopram oxalate (LEXAPRO) 5 mg tablet; Take 1 Tab by mouth daily. Indications: anxiousness associated with depression    4. Stage 3 chronic kidney disease, unspecified whether stage 3a or 3b CKD  No edema on exam today. Following up with nephrology. -     bumetanide (BUMEX) 1 mg tablet; Take 1 Tab by mouth every Monday, Wednesday, Friday.  -     REFERRAL TO DIETITIAN    5. Essential hypertension with goal blood pressure less than 130/80  Controlled on current medication regimen    6. Seasonal allergic rhinitis due to other allergic trigger  Will change Flonase to Astelin which will not affect his blood sugars as much. -     albuterol (PROVENTIL HFA, VENTOLIN HFA, PROAIR HFA) 90 mcg/actuation inhaler; Take 2 Puffs by inhalation every four (4) hours as needed for Wheezing.  -     azelastine (ASTELIN) 137 mcg (0.1 %) nasal spray; 1 [place]ran by Both Nostrils route two (2) times a day. Use in each nostril as directed    7. Hypercholesteremia  LDL is very low. Managed by cardiology on lower dose Crestor 20 mg and Zetia 10 mg      There are no Patient Instructions on file for this visit.    lab results and schedule of future lab studies reviewed with patient  reviewed medications and side effects in detail    Return to clinic for further evaluation if new symptoms develop        Current Outpatient Medications   Medication Sig    co-enzyme Q-10 (Co Q-10) 100 mg capsule Take 200 mg by mouth daily.  pyridoxine, vitamin B6, (Vitamin B-6) 100 mg tablet Take 200 mg by mouth daily.  b complex vitamins tablet Take 1 Tab by mouth daily.  ascorbic acid, vitamin C, (Vitamin C) 500 mg tablet Take 1,000 mg by mouth.  cholecalciferol, vitamin D3, (Vitamin D3) 50 mcg (2,000 unit) tab Take  by mouth. With vitamin K2.    magnesium oxide (MAG-OX) 400 mg tablet Take 400 mg by mouth daily.     B.infantis-B.ani-B.long-B.bifi (Probiotic 4X) 10-15 mg TbEC Take  by mouth.  bumetanide (BUMEX) 1 mg tablet Take 1 Tab by mouth every Monday, Wednesday, Friday.  escitalopram oxalate (LEXAPRO) 5 mg tablet Take 1 Tab by mouth daily. Indications: anxiousness associated with depression    NovoLOG Flexpen U-100 Insulin 100 unit/mL (3 mL) inpn Take 10-30 units three times daily with meals per sliding scale    albuterol (PROVENTIL HFA, VENTOLIN HFA, PROAIR HFA) 90 mcg/actuation inhaler Take 2 Puffs by inhalation every four (4) hours as needed for Wheezing.  azelastine (ASTELIN) 137 mcg (0.1 %) nasal spray 1 [place] by Both Nostrils route two (2) times a day. Use in each nostril as directed    semaglutide (OZEMPIC) 1 mg/dose (2 mg/1.5 mL) sub-q pen 1 mg by SubCUTAneous route every seven (7) days.  lisinopriL (PRINIVIL, ZESTRIL) 10 mg tablet TAKE 1 TABLET TWICE A DAY    rosuvastatin (CRESTOR) 20 mg tablet Take 1 Tab by mouth nightly.  ezetimibe (ZETIA) 10 mg tablet TAKE 1 TABLET DAILY    FreeStyle Belkis 14 Day Sensor kit 1 Units by Does Not Apply route every fourteen (14) days. DMII  E11.29; E11.21    busPIRone (BUSPAR) 7.5 mg tablet Take 1 Tab by mouth three (3) times daily.  gabapentin (NEURONTIN) 300 mg capsule Take 300 mg by mouth three (3) times daily.  pramipexole (MIRAPEX) 0.125 mg tablet TAKE 1 TABLET NIGHTLY ABOUT 2 TO 3 HOURS PRIOR TO BEDTIME    metoprolol tartrate (LOPRESSOR) 25 mg tablet TAKE 1 TABLET TWICE A DAY    metFORMIN (GLUCOPHAGE) 1,000 mg tablet Take 1 Tab by mouth two (2) times daily (with meals).  testosterone undecanoate (AVEED IM) by IntraMUSCular route. Every 10 weeks    insulin glargine U-300 conc (TOUJEO SOLOSTAR U-300 INSULIN) 300 unit/mL (1.5 mL) inpn pen INJECT 40 UNITS EVERY MORNING AND 20 UNITS EVERY EVENING    Insulin Needles, Disposable, 31 gauge x 5/16\" ndle 5x daily with insulin.  IDDMII. E11.21    OMEGA-3 FATTY ACIDS/FISH OIL (FISH OIL EXTRA STRENGTH PO) Take 1,400 mg by mouth daily (after breakfast).  ketoconazole (NIZORAL) 2 % topical cream Apply  to affected area as needed for Skin Irritation.  VOLTAREN 1 % gel 2 g as needed.  desonide (TRIDESILON) 0.05 % topical lotion as needed.  aspirin (ASPIRIN) 325 mg tablet take 325 mg by mouth daily. No current facility-administered medications for this visit.

## 2020-11-05 DIAGNOSIS — E11.21 TYPE 2 DIABETES WITH NEPHROPATHY (HCC): ICD-10-CM

## 2020-11-05 RX ORDER — INSULIN ASPART 100 [IU]/ML
INJECTION, SOLUTION INTRAVENOUS; SUBCUTANEOUS
Qty: 33 ADJUSTABLE DOSE PRE-FILLED PEN SYRINGE | Refills: 5 | Status: SHIPPED | OUTPATIENT
Start: 2020-11-05 | End: 2021-12-26

## 2020-11-06 ENCOUNTER — TELEPHONE (OUTPATIENT)
Dept: INTERNAL MEDICINE CLINIC | Age: 70
End: 2020-11-06

## 2020-11-06 NOTE — TELEPHONE ENCOUNTER
Per PCP, medical record request faxed to [facility] for a copy of patient's last eye exam. Fax result report showed a successful fax transmission.

## 2020-11-29 DIAGNOSIS — F34.1 DYSTHYMIA: ICD-10-CM

## 2020-11-29 RX ORDER — ESCITALOPRAM OXALATE 5 MG/1
5 TABLET ORAL DAILY
Qty: 30 TAB | Refills: 0 | Status: SHIPPED | OUTPATIENT
Start: 2020-11-29 | End: 2020-12-21

## 2020-12-15 ENCOUNTER — PATIENT MESSAGE (OUTPATIENT)
Dept: INTERNAL MEDICINE CLINIC | Age: 70
End: 2020-12-15

## 2020-12-15 DIAGNOSIS — E11.21 TYPE 2 DIABETES WITH NEPHROPATHY (HCC): ICD-10-CM

## 2020-12-15 RX ORDER — INSULIN GLARGINE 300 U/ML
INJECTION, SOLUTION SUBCUTANEOUS
Qty: 18 PEN | Refills: 5 | Status: SHIPPED | OUTPATIENT
Start: 2020-12-15 | End: 2021-12-26

## 2020-12-15 NOTE — TELEPHONE ENCOUNTER
From: Ruthy Gave  To: Vineet Damon MD  Sent: 12/15/2020 10:59 AM EST  Subject: Prescription Question    I am in need of a 90 script for my slow acting insulin to be sent to [facility]. Toujeo Solostar Pen 1.5 ml 3 per package. 4 packages for a 90 day supply.     Thanks     Judi Carver

## 2020-12-17 ENCOUNTER — PATIENT MESSAGE (OUTPATIENT)
Dept: INTERNAL MEDICINE CLINIC | Age: 70
End: 2020-12-17

## 2020-12-17 DIAGNOSIS — R55 POSTURAL DIZZINESS WITH PRESYNCOPE: ICD-10-CM

## 2020-12-17 DIAGNOSIS — R42 POSTURAL DIZZINESS WITH PRESYNCOPE: ICD-10-CM

## 2020-12-17 RX ORDER — METOPROLOL TARTRATE 25 MG/1
TABLET, FILM COATED ORAL
Qty: 180 TAB | Refills: 3 | Status: SHIPPED | OUTPATIENT
Start: 2020-12-17 | End: 2021-11-15

## 2020-12-17 NOTE — TELEPHONE ENCOUNTER
From: Marcos Kelly  To: Nitish Arce MD  Sent: 12/17/2020 9:34 AM EST  Subject: Prescription Question    I need a 90 day refill sent to [facility] for the following. I take two per day (180).     Thanks     METOPROLOL TARTRATE 25 MG TAB

## 2020-12-21 DIAGNOSIS — F34.1 DYSTHYMIA: ICD-10-CM

## 2020-12-21 RX ORDER — ESCITALOPRAM OXALATE 10 MG/1
10 TABLET ORAL DAILY
Qty: 30 TAB | Refills: 0 | Status: SHIPPED | OUTPATIENT
Start: 2020-12-21 | End: 2021-01-24

## 2020-12-23 ENCOUNTER — TELEPHONE (OUTPATIENT)
Dept: FAMILY MEDICINE CLINIC | Age: 70
End: 2020-12-23

## 2020-12-23 NOTE — TELEPHONE ENCOUNTER
----- Message from Rocío Busby sent at 12/22/2020  2:38 PM EST -----  Regarding: Dr. Sara Garcia Message/Vendor Calls    Caller's first and last name: May from [facility] medical supply      Reason for call: callback       Callback required yes/no and why: yes       Best contact number(s): [phone]      Details to clarify the request: Would like to confirm if the patient is seen at the office.       Rocío Busby

## 2020-12-28 ENCOUNTER — OFFICE VISIT (OUTPATIENT)
Dept: INTERNAL MEDICINE CLINIC | Age: 70
End: 2020-12-28
Payer: MEDICARE

## 2020-12-28 VITALS
BODY MASS INDEX: 38.63 KG/M2 | TEMPERATURE: 98.4 F | RESPIRATION RATE: 13 BRPM | HEART RATE: 68 BPM | WEIGHT: 260.8 LBS | SYSTOLIC BLOOD PRESSURE: 128 MMHG | HEIGHT: 69 IN | DIASTOLIC BLOOD PRESSURE: 72 MMHG | OXYGEN SATURATION: 95 %

## 2020-12-28 DIAGNOSIS — R80.9 TYPE 2 DIABETES MELLITUS WITH MICROALBUMINURIA, WITH LONG-TERM CURRENT USE OF INSULIN (HCC): ICD-10-CM

## 2020-12-28 DIAGNOSIS — E78.00 HYPERCHOLESTEREMIA: ICD-10-CM

## 2020-12-28 DIAGNOSIS — I25.10 CORONARY ARTERY DISEASE INVOLVING NATIVE CORONARY ARTERY OF NATIVE HEART WITHOUT ANGINA PECTORIS: ICD-10-CM

## 2020-12-28 DIAGNOSIS — N18.30 STAGE 3 CHRONIC KIDNEY DISEASE, UNSPECIFIED WHETHER STAGE 3A OR 3B CKD (HCC): ICD-10-CM

## 2020-12-28 DIAGNOSIS — E11.21 TYPE 2 DIABETES WITH NEPHROPATHY (HCC): ICD-10-CM

## 2020-12-28 DIAGNOSIS — Z79.4 TYPE 2 DIABETES MELLITUS WITH MICROALBUMINURIA, WITH LONG-TERM CURRENT USE OF INSULIN (HCC): ICD-10-CM

## 2020-12-28 DIAGNOSIS — E11.29 TYPE 2 DIABETES MELLITUS WITH MICROALBUMINURIA, WITH LONG-TERM CURRENT USE OF INSULIN (HCC): ICD-10-CM

## 2020-12-28 DIAGNOSIS — E53.8 B12 DEFICIENCY: ICD-10-CM

## 2020-12-28 DIAGNOSIS — Z00.00 MEDICARE ANNUAL WELLNESS VISIT, SUBSEQUENT: Primary | ICD-10-CM

## 2020-12-28 DIAGNOSIS — F34.1 DYSTHYMIA: ICD-10-CM

## 2020-12-28 DIAGNOSIS — M79.10 MYALGIA: ICD-10-CM

## 2020-12-28 PROCEDURE — G0463 HOSPITAL OUTPT CLINIC VISIT: HCPCS | Performed by: INTERNAL MEDICINE

## 2020-12-28 PROCEDURE — G9717 DOC PT DX DEP/BP F/U NT REQ: HCPCS | Performed by: INTERNAL MEDICINE

## 2020-12-28 PROCEDURE — G8754 DIAS BP LESS 90: HCPCS | Performed by: INTERNAL MEDICINE

## 2020-12-28 PROCEDURE — 1101F PT FALLS ASSESS-DOCD LE1/YR: CPT | Performed by: INTERNAL MEDICINE

## 2020-12-28 PROCEDURE — 3017F COLORECTAL CA SCREEN DOC REV: CPT | Performed by: INTERNAL MEDICINE

## 2020-12-28 PROCEDURE — G0439 PPPS, SUBSEQ VISIT: HCPCS | Performed by: INTERNAL MEDICINE

## 2020-12-28 PROCEDURE — G8417 CALC BMI ABV UP PARAM F/U: HCPCS | Performed by: INTERNAL MEDICINE

## 2020-12-28 PROCEDURE — 2022F DILAT RTA XM EVC RTNOPTHY: CPT | Performed by: INTERNAL MEDICINE

## 2020-12-28 PROCEDURE — G8536 NO DOC ELDER MAL SCRN: HCPCS | Performed by: INTERNAL MEDICINE

## 2020-12-28 PROCEDURE — G8427 DOCREV CUR MEDS BY ELIG CLIN: HCPCS | Performed by: INTERNAL MEDICINE

## 2020-12-28 PROCEDURE — 99214 OFFICE O/P EST MOD 30 MIN: CPT | Performed by: INTERNAL MEDICINE

## 2020-12-28 PROCEDURE — G8752 SYS BP LESS 140: HCPCS | Performed by: INTERNAL MEDICINE

## 2020-12-28 RX ORDER — ROSUVASTATIN CALCIUM 20 MG/1
TABLET, COATED ORAL
Qty: 90 TAB | Refills: 1
Start: 2020-12-28 | End: 2021-02-05 | Stop reason: SDUPTHER

## 2020-12-28 RX ORDER — METFORMIN HYDROCHLORIDE 1000 MG/1
1000 TABLET ORAL 2 TIMES DAILY WITH MEALS
Qty: 180 TAB | Refills: 3 | Status: SHIPPED | OUTPATIENT
Start: 2020-12-28 | End: 2021-02-25 | Stop reason: SDUPTHER

## 2020-12-28 RX ORDER — ASPIRIN 81 MG/1
81 TABLET ORAL DAILY
Qty: 90 TAB | Refills: 3
Start: 2020-12-28

## 2020-12-28 NOTE — PROGRESS NOTES
This is a Subsequent Medicare Annual Wellness Visit providing Personalized Prevention Plan Services (PPPS) (Performed 12 months after initial AWV and PPPS )    I have reviewed the patient's medical history in detail and updated the computerized patient record. Dick is doing fairly well. Started Lexapro 5 mg at the end of October for anxiety and dysthymia. This was increased 1 week ago per his request.  Overall feels that anxiety is starting to improve and mood is fairly good. Has been having some issues with sleep recently but unclear if related to medication or chronic stresses. Lost weight intentionally. Wt Readings from Last 3 Encounters:   12/28/20 260 lb 12.8 oz (118.3 kg)   10/30/20 271 lb 12.8 oz (123.3 kg)   08/10/20 270 lb 12.8 oz (122.8 kg)       Diabetes Mellitus follow-up  Last hemoglobin a1c   Lab Results   Component Value Date/Time    Hemoglobin A1c 7.7 (H) 08/03/2020 11:20 AM    Hemoglobin A1c (POC) 7.6 10/30/2020 11:06 AM     No components found for: POCA1C, HBA1C POC  medication compliance: compliant all of the time. Diabetic diet compliance: compliant most of the time. Home glucose monitoring: fasting values range  70-250s. patient is using a continuous glucose monitor Deanna Lynch)  Hypoglycemic episodes:  Occasionally at night 2x per week. Says he takes too much insulin at betime    Further diabetic ROS: no polyuria or polydipsia, no chest pain, dyspnea or TIA's, no numbness, tingling or pain in extremities. Hypertension/CAD  Hypertension ROS: taking medications as instructed, no medication side effects noted, no TIA's, no chest pain on exertion, no dyspnea on exertion, no swelling of ankles     reports that he is a non-smoker but has been exposed to tobacco smoke. He has been exposed to 0.00 packs per day for the past 10.00 years. He has never used smokeless tobacco.    reports current alcohol use of about 6.0 standard drinks of alcohol per week.    BP Readings from Last 2 Encounters:   12/28/20 128/72   10/30/20 136/64     Hyperlipidemia  Currently he takes Crestor 20 mg  Reports ongoing myalgias of his legs. No TIA's, no chest pain on exertion, no dyspnea on exertion, no swelling of ankles. Lab Results   Component Value Date/Time    Cholesterol, total 87 (L) 10/13/2020 09:11 AM    HDL Cholesterol 36 (L) 10/13/2020 09:11 AM    LDL, calculated 14 10/13/2020 09:11 AM    LDL, calculated 17 07/14/2020 09:11 AM    VLDL, calculated 37 10/13/2020 09:11 AM    VLDL, calculated 26 07/14/2020 09:11 AM    Triglyceride 253 (H) 10/13/2020 09:11 AM    CHOL/HDL Ratio 2.6 01/03/2020 08:23 AM           History     Past Medical History:   Diagnosis Date    Achilles bursitis 8/10/2020    Anxiety     AR (allergic rhinitis) 12/01/2008    Dr. Araceli Granado. gets  shots    Arthritis     Blind right eye     BPH (benign prostatic hyperplasia)     Dr. Ammy Chow CAD (coronary artery disease)     Dr. Valerie Hill.  s/p CABG - 3 vessel    CKD (chronic kidney disease) stage 3, GFR 30-59 ml/min     Dr Lazaro Anis 2020    DM type 2 (diabetes mellitus, type 2) ([facility] [address]     30 years    Dysthymia     Encounter for diabetic [facility] exam [facility])     sees Dr. Kaylie Carcamo hypertension with goal blood pressure less than 130/80 9/28/2016    Hearing loss     hearing aids.  Hypercholesteremia     Hypogonadism male 12/01/2008    Dr. Maxine Guo.  started Aveed 2020    Mild intermittent asthma without complication 17/50/8778    sees allergy Dr. Flakita Grey LEONA on CPAP 07/16/2010    Dr. Tom Pacheco    Phimosis 5/26/2016    Severe obesity (BMI 35.0-39.9) 06/18/2018    Tarsal tunnel syndrome 8/10/2020    Trigger finger     injections done summer 2020. Dr. Mariza Dumont.     Type 2 diabetes with nephropathy ([facility] [address] 3/12/2018       Past Surgical History:   Procedure Laterality Date    CABG, ARTERY-VEIN, THREE  2005    [place]    HX CIRCUMCISION  2016    HX CYST REMOVAL  5/12/2016    neck and chest    HX HEENT  2012    right eye prosthesis    HX ORTHOPAEDIC      right shoulder surgery, rotator cuff repair    HX RETINAL DETACHMENT REPAIR  1984    R Blindness    HX RHINOPLASTY      septoplasty       Current Outpatient Medications   Medication Sig    aspirin delayed-release 81 mg tablet Take 1 Tab by mouth daily.  escitalopram oxalate (LEXAPRO) 10 mg tablet Take 1 Tab by mouth daily. Indications: anxiousness associated with depression    metoprolol tartrate (LOPRESSOR) 25 mg tablet TAKE 1 TABLET TWICE A DAY    insulin glargine U-300 conc (Toujeo SoloStar U-300 Insulin) 300 unit/mL (1.5 mL) inpn pen INJECT 40 UNITS EVERY MORNING AND 20 UNITS EVERY EVENING    gabapentin (NEURONTIN) 300 mg capsule Take 1 Cap by mouth three (3) times daily. Max Daily Amount: 900 mg.  semaglutide (OZEMPIC) 1 mg/dose (2 mg/1.5 mL) sub-q pen 1 mg by SubCUTAneous route every seven (7) days.  NovoLOG Flexpen U-100 Insulin 100 unit/mL (3 mL) inpn Take 10-30 units three times daily with meals per sliding scale    co-enzyme Q-10 (Co Q-10) 100 mg capsule Take 200 mg by mouth daily.  pyridoxine, vitamin B6, (Vitamin B-6) 100 mg tablet Take 200 mg by mouth daily.  b complex vitamins tablet Take 1 Tab by mouth daily.  ascorbic acid, vitamin C, (Vitamin C) 500 mg tablet Take 1,000 mg by mouth.  cholecalciferol, vitamin D3, (Vitamin D3) 50 mcg (2,000 unit) tab Take  by mouth. With vitamin K2.    magnesium oxide (MAG-OX) 400 mg tablet Take 400 mg by mouth daily.  B.infantis-B.ani-B.long-B.bifi (Probiotic 4X) 10-15 mg TbEC Take  by mouth.  bumetanide (BUMEX) 1 mg tablet Take 1 Tab by mouth every Monday, Wednesday, Friday.  albuterol (PROVENTIL HFA, VENTOLIN HFA, PROAIR HFA) 90 mcg/actuation inhaler Take 2 Puffs by inhalation every four (4) hours as needed for Wheezing.  azelastine (ASTELIN) 137 mcg (0.1 %) nasal spray 1 [place] by Both Nostrils route two (2) times a day.  Use in each nostril as directed    lisinopriL (Moonie December) 10 mg tablet TAKE 1 TABLET TWICE A DAY    rosuvastatin (CRESTOR) 20 mg tablet Take 1 Tab by mouth nightly.  ezetimibe (ZETIA) 10 mg tablet TAKE 1 TABLET DAILY    FreeStyle Eblkis 14 Day Sensor kit 1 Units by Does Not Apply route every fourteen (14) days. DMII  E11.29; E11.21    metFORMIN (GLUCOPHAGE) 1,000 mg tablet Take 1 Tab by mouth two (2) times daily (with meals).  testosterone undecanoate (AVEED IM) by IntraMUSCular route. Every 10 weeks    Insulin Needles, Disposable, 31 gauge x 5/16\" ndle 5x daily with insulin. IDDMII. E11.21    OMEGA-3 FATTY ACIDS/FISH OIL (FISH OIL EXTRA STRENGTH PO) Take 1,400 mg by mouth daily (after breakfast).  ketoconazole (NIZORAL) 2 % topical cream Apply  to affected area as needed for Skin Irritation.  VOLTAREN 1 % gel 2 g as needed.  desonide (TRIDESILON) 0.05 % topical lotion as needed. No current facility-administered medications for this visit. Allergies   Allergen Reactions    Cefdinir Rash    Codeine Nausea Only       Family History   Adopted: Yes   Family history unknown: Yes        reports that he is a non-smoker but has been exposed to tobacco smoke. He has been exposed to 0.00 packs per day for the past 10.00 years. He has never used smokeless tobacco.   reports current alcohol use of about 6.0 standard drinks of alcohol per week. Depression Risk Factor Screening:       Alcohol Risk Factor Screening: On any occasion during the past 3 months, have you had more than 3 drinks containing alcohol? No    Do you average more than 14 drinks per week? No      Functional Ability and Level of Safety:     Hearing Loss   mild    Activities of Daily Living   Self-care. Requires assistance with: no ADLs    Fall Risk     Fall Risk Assessment, last 12 mths 10/30/2020   Able to walk? Yes   Fall in past 12 months? No   Number of falls in past 12 months -   Fall with injury?  -         Abuse Screen   Patient is not abused    Review of Systems   A comprehensive review of systems was negative except for that written in the HPI. Physical Examination     Evaluation of Cognitive Function:  Mood/affect:  neutral, happy  Appearance: age appropriate  Family member/caregiver input: none    Blood pressure 128/72, pulse 68, temperature 98.4 °F (36.9 °C), temperature source Oral, resp. rate 13, height 5' 9\" (1.753 m), weight 260 lb 12.8 oz (118.3 kg), SpO2 95 %. General appearance: alert, cooperative, no distress, appears stated age  Neck: supple, symmetrical, trachea midline, no adenopathy, thyroid: not enlarged, symmetric, no tenderness/mass/nodules, no carotid bruit and no JVD  Lungs: clear to auscultation bilaterally  Heart: regular rate and rhythm, S1, S2 normal, no murmur, click, rub or gallop  Extremities: extremities normal, atraumatic, no cyanosis or edema    Patient Care Team:  Daija Rose MD as PCP - General (Internal Medicine)  Daija Rose MD as PCP - [address] [place] [address] Provider  Iban Delgadillo MD as Surgeon (Surgery)  Alvenia Apley, MD (Urology)  Millie Hernandez MD (Cardiology)  Nahun June MD (Allergy)  Gregorio Stevens DPM (Podiatry)  Nupur Mcmillan MD (Gastroenterology)  Mode Vines (Ophthalmology)      Advice/Referrals/Counseling   Education and counseling provided. See below for specific orders    Assessment/Plan   Diagnoses and all orders for this visit:    1. Medicare annual wellness visit, subsequent  We will try to get his diabetic eye exam report from July 2020.    2. Type 2 diabetes with nephropathy (HCC)  Controlled on current regimen. Continue current medications as written in chart. Likely borderline controlled. He has lost some weight which he was congratulated for. He did bring in his meter next time. Repeat A1c in 1 month. -     aspirin delayed-release 81 mg tablet; Take 1 Tab by mouth daily.  -     HEMOGLOBIN A1C WITH EAG; Future  -     LIPID PANEL; Future    3.  Type 2 diabetes mellitus with microalbuminuria, with long-term current use of insulin (HCC)  Controlled on current regimen. Continue current medications as written in chart.  -     metFORMIN (GLUCOPHAGE) 1,000 mg tablet; Take 1 Tab by mouth two (2) times daily (with meals). 4. Stage 3 chronic kidney disease, unspecified whether stage 3a or 3b CKD  Likely stable. Recently had labs drawn by nephrology. We will try to get those results. 5. Dysthymia  Feels he is doing fairly well with Lexapro 10 mg. Discussed option of increasing to 15 or 20 mg but will hold off for now. No longer taking buspirone. 6. Myalgia  Reports some ongoing myalgias. Suspect this is Crestor related. Will decrease to 20 mg every other day since he prefers not to cut the pill in half. -     CK; Future    7. Coronary artery disease involving native coronary artery of native heart without angina pectoris  Asymptomatic. Follow-up with cardiology.  -     LIPID PANEL; Future    8. B12 deficiency  Taking B12 supplement. -     VITAMIN B12; Future    9. Hypercholesteremia  Very low LDL on Crestor 20. However, does have elevated triglycerides and CAD. Agree with maximally tolerated statin dose but he is having some myalgias so we will decrease his dose as below. Will confer with Dr. Marquez Hernandez.  Repeat labs in 1 month  -     rosuvastatin (CRESTOR) 20 mg tablet; Take 1 pill every other day (decreased 12/28/20)    . Potential medication side effects were discussed with the patient; let me know if any occur. Return for yearly Annual Wellness Visits        Addendum 3/31/21  Patient has been using a continuous glucose monitor daily, Freestyle Belkis. This is required to maintain adequate monitoring and treatment of his diabetes.

## 2021-01-24 DIAGNOSIS — F34.1 DYSTHYMIA: ICD-10-CM

## 2021-01-24 RX ORDER — ESCITALOPRAM OXALATE 10 MG/1
TABLET ORAL
Qty: 30 TAB | Refills: 0 | Status: SHIPPED | OUTPATIENT
Start: 2021-01-24 | End: 2021-02-06 | Stop reason: SDUPTHER

## 2021-01-28 ENCOUNTER — HOSPITAL ENCOUNTER (OUTPATIENT)
Dept: LAB | Age: 71
Discharge: HOME | End: 2021-01-28
Payer: MEDICARE

## 2021-01-28 PROCEDURE — 82550 ASSAY OF CK (CPK): CPT

## 2021-01-28 PROCEDURE — 36415 COLL VENOUS BLD VENIPUNCTURE: CPT

## 2021-01-28 PROCEDURE — 80061 LIPID PANEL: CPT

## 2021-01-28 PROCEDURE — 82607 VITAMIN B-12: CPT

## 2021-01-28 PROCEDURE — 83036 HEMOGLOBIN GLYCOSYLATED A1C: CPT

## 2021-01-29 LAB
CHOLEST SERPL-MCNC: 95 MG/DL (ref 100–199)
CK SERPL-CCNC: 90 U/L (ref 41–331)
EST. AVERAGE GLUCOSE BLD GHB EST-MCNC: 163 MG/DL
HBA1C MFR BLD: 7.3 % (ref 4.8–5.6)
HDLC SERPL-MCNC: 37 MG/DL
INTERPRETATION, 910389: NORMAL
LDLC SERPL CALC-MCNC: 36 MG/DL (ref 0–99)
Lab: NORMAL
TRIGL SERPL-MCNC: 120 MG/DL (ref 0–149)
VIT B12 SERPL-MCNC: 512 PG/ML (ref 232–1245)
VLDLC SERPL CALC-MCNC: 22 MG/DL (ref 5–40)

## 2021-02-01 RX ORDER — DAPAGLIFLOZIN 5 MG/1
5 TABLET, FILM COATED ORAL DAILY
Qty: 30 TAB | Refills: 4 | Status: SHIPPED | OUTPATIENT
Start: 2021-02-01 | End: 2021-02-02 | Stop reason: SDUPTHER

## 2021-02-02 ENCOUNTER — OFFICE VISIT (OUTPATIENT)
Dept: CARDIOLOGY CLINIC | Age: 71
End: 2021-02-02
Payer: MEDICARE

## 2021-02-02 VITALS
SYSTOLIC BLOOD PRESSURE: 130 MMHG | DIASTOLIC BLOOD PRESSURE: 64 MMHG | WEIGHT: 267.6 LBS | BODY MASS INDEX: 39.63 KG/M2 | HEIGHT: 69 IN | HEART RATE: 68 BPM | OXYGEN SATURATION: 97 %

## 2021-02-02 DIAGNOSIS — I25.10 CORONARY ARTERY DISEASE INVOLVING NATIVE CORONARY ARTERY OF NATIVE HEART WITHOUT ANGINA PECTORIS: Primary | ICD-10-CM

## 2021-02-02 DIAGNOSIS — E78.00 HYPERCHOLESTEREMIA: ICD-10-CM

## 2021-02-02 DIAGNOSIS — I10 ESSENTIAL HYPERTENSION WITH GOAL BLOOD PRESSURE LESS THAN 130/80: ICD-10-CM

## 2021-02-02 PROCEDURE — G8752 SYS BP LESS 140: HCPCS | Performed by: SPECIALIST

## 2021-02-02 PROCEDURE — G8754 DIAS BP LESS 90: HCPCS | Performed by: SPECIALIST

## 2021-02-02 PROCEDURE — G8417 CALC BMI ABV UP PARAM F/U: HCPCS | Performed by: SPECIALIST

## 2021-02-02 PROCEDURE — G9717 DOC PT DX DEP/BP F/U NT REQ: HCPCS | Performed by: SPECIALIST

## 2021-02-02 PROCEDURE — 93010 ELECTROCARDIOGRAM REPORT: CPT | Performed by: SPECIALIST

## 2021-02-02 PROCEDURE — G8536 NO DOC ELDER MAL SCRN: HCPCS | Performed by: SPECIALIST

## 2021-02-02 PROCEDURE — G0463 HOSPITAL OUTPT CLINIC VISIT: HCPCS | Performed by: SPECIALIST

## 2021-02-02 PROCEDURE — G8427 DOCREV CUR MEDS BY ELIG CLIN: HCPCS | Performed by: SPECIALIST

## 2021-02-02 PROCEDURE — 3017F COLORECTAL CA SCREEN DOC REV: CPT | Performed by: SPECIALIST

## 2021-02-02 PROCEDURE — 99214 OFFICE O/P EST MOD 30 MIN: CPT | Performed by: SPECIALIST

## 2021-02-02 PROCEDURE — 93005 ELECTROCARDIOGRAM TRACING: CPT | Performed by: SPECIALIST

## 2021-02-02 PROCEDURE — 1101F PT FALLS ASSESS-DOCD LE1/YR: CPT | Performed by: SPECIALIST

## 2021-02-02 RX ORDER — DAPAGLIFLOZIN 5 MG/1
5 TABLET, FILM COATED ORAL DAILY
Qty: 90 TAB | Refills: 1 | Status: SHIPPED | COMMUNITY
Start: 2021-02-02 | End: 2021-07-12

## 2021-02-02 NOTE — PROGRESS NOTES
Room 2  Visit Vitals  /64 (BP 1 Location: Left upper arm, BP Patient Position: Sitting)   Pulse 68   Ht 5' 9\" (1.753 m)   Wt 267 lb 9.6 oz (121.4 kg)   SpO2 97%   BMI 39.52 kg/m²         Chest pain: no  Shortness of breath: sometimes, mild asthma  Edema: no  Palpitations: no  Dizziness: no    New diagnosis/Surgeries: no    ER/Hospitalizations: no    Refills: no

## 2021-02-02 NOTE — PROGRESS NOTES
ATTENTION:   This medical record was transcribed using an electronic medical records/speech recognition system. Although proofread, it may and can contain electronic, spelling and other errors. Corrections may be executed at a later time. Please feel free to contact us for any clarifications as needed. LAST VISIT: 7/25/19      ICD-10-CM ICD-9-CM   1. Hypercholesteremia  E78.00 272.0   2. Coronary artery disease involving native coronary artery of native heart without angina pectoris  I25.10 414.01   3. Essential hypertension with goal blood pressure less than 130/80  I10 401.9            Vandana Do is a 72 y.o. male with diabetes, hypercholesterolemia, CAD, LEONA, and HTN last seen by me 6 months ago    Cardiac risk factors: diabetes mellitus, male gender, hypertension  I have personally obtained the history from the patient. HISTORY OF PRESENTING ILLNESS     Vandana Do overall seems to be doing well with no interval cardiac issues. He is pretty much sedentary and we talked about the importance of exercise. He has no chest pain or shortness of breath but he does have chronic muscle aching in his lower extremities and sometimes he when he walks he gets better actually.   Not sure if he has any lower back issues but he says he is never had a work-up for this but has seen a chiropractor in the past.           ACTIVE PROBLEM LIST     Patient Active Problem List    Diagnosis Date Noted    Trigger finger     Arthritis     Blind right eye     BPH (benign prostatic hyperplasia)     Anxiety     Hearing loss     Hypercholesteremia     Leg length inequality 08/10/2020    Primary localized osteoarthrosis of ankle and foot 08/10/2020    Tarsal tunnel syndrome 08/10/2020    Blood type A+ 08/10/2020    CKD (chronic kidney disease) stage 3, GFR 30-59 ml/min 01/07/2020    Type 2 diabetes with nephropathy ([address] 10/30/2019    Severe obesity ([address] 06/09/2019    Dysthymia 09/26/2018    Type 2 diabetes mellitus with microalbuminuria, with long-term current use of insulin ([facility] 03/12/2018    Pure hypercholesterolemia 05/04/2017    Mild intermittent asthma without complication 10/95/2555    Advance care planning 01/24/2017    Essential hypertension with goal blood pressure less than 130/80 09/28/2016    S/P excision of lipoma 05/25/2016    S/P excision of skin lesion, follow-up exam 05/25/2016    LEONA on CPAP 07/16/2010    CAD (coronary artery disease) 12/01/2008    Hypogonadism male 12/01/2008    AR (allergic rhinitis) 12/01/2008    ED (erectile dysfunction) 12/01/2008           PAST MEDICAL HISTORY     Past Medical History:   Diagnosis Date    Achilles bursitis 8/10/2020    Anxiety     AR (allergic rhinitis) 12/01/2008    Dr. Marquez Claros. gets  shots    Arthritis     Blind right eye     BPH (benign prostatic hyperplasia)     Dr. Sharon Thomas CAD (coronary artery disease)     Dr. Syeda Castellanos.  s/p CABG - 3 vessel    CKD (chronic kidney disease) stage 3, GFR 30-59 ml/min     Dr Natacha Soni 2020    DM type 2 (diabetes mellitus, type 2) ([facility] [address]     30 years    Dysthymia     Encounter for diabetic foot exam [facility])     sees Dr. Rachell Thomas hypertension with goal blood pressure less than 130/80 9/28/2016    Hearing loss     hearing aids.  Hypercholesteremia     Hypogonadism male 12/01/2008    Dr. Lance Whitfield.  started Aveed 2020    Mild intermittent asthma without complication 30/55/5945    sees allergy Dr. Yani Sears LEONA on CPAP 07/16/2010    Dr. Lennox Duhamel    Phimosis 5/26/2016    Severe obesity (BMI 35.0-39.9) 06/18/2018    Tarsal tunnel syndrome 8/10/2020    Trigger finger     injections done summer 2020. Dr. Amanda White.     Type 2 diabetes with nephropathy ([facility] [address] 3/12/2018           PAST SURGICAL HISTORY     Past Surgical History:   Procedure Laterality Date    HX CIRCUMCISION  2016    HX CYST REMOVAL  5/12/2016    neck and chest    HX HEENT  2012    right eye prosthesis    HX ORTHOPAEDIC right shoulder surgery, rotator cuff repair    HX RETINAL DETACHMENT REPAIR  1984    R Blindness    HX RHINOPLASTY      septoplasty    CT CABG, ARTERY-VEIN, THREE  2005    [place]          ALLERGIES     Allergies   Allergen Reactions    Cefdinir Rash    Codeine Nausea Only          FAMILY HISTORY     Family History   Adopted: Yes   Family history unknown: Yes    negative for cardiac disease       SOCIAL HISTORY     Social History     Socioeconomic History    Marital status: SINGLE     Spouse name: Amelia Miles Number of children: 0    Years of education: Not on file    Highest education level: Associate degree: academic program   Occupational History     Comment: Technology     Comment: [employer]   Social Needs    Financial resource strain: Not hard at all   ROSTR insecurity     Worry: Never true     Inability: Never true   [employer] needs     Medical: No     Non-medical: No   Tobacco Use    Smoking status: Passive Smoke Exposure - Never Smoker    Smokeless tobacco: Never Used   Substance and Sexual Activity    Alcohol use: Yes     Alcohol/week: 6.0 standard drinks     Types: 6 Cans of beer per week     Comment: 1 beer or so per day    Drug use: No     Comment: occasionally smoked marijuana in the past    Sexual activity: Not Currently     Partners: Male   Lifestyle    Physical activity     Days per week: 2 days     Minutes per session: 40 min    Stress: To some extent   Relationships    Social connections     Talks on phone: Three times a week     Gets together: Twice a week     Attends Synagogue service: Never     Active member of club or organization: Yes     Attends meetings of clubs or organizations: More than 4 times per year     Relationship status: Living with partner         MEDICATIONS     Current Outpatient Medications   Medication Sig    Farxiga 5 mg tab tablet Take 1 Tab by mouth daily.     escitalopram oxalate (LEXAPRO) 10 mg tablet TAKE 1 TABLET BY MOUTH DAILY    aspirin delayed-release 81 mg tablet Take 1 Tab by mouth daily.  rosuvastatin (CRESTOR) 20 mg tablet Take 1 pill every other day (decreased 12/28/20)    metFORMIN (GLUCOPHAGE) 1,000 mg tablet Take 1 Tab by mouth two (2) times daily (with meals).  metoprolol tartrate (LOPRESSOR) 25 mg tablet TAKE 1 TABLET TWICE A DAY    insulin glargine U-300 conc (Toujeo SoloStar U-300 Insulin) 300 unit/mL (1.5 mL) inpn pen INJECT 40 UNITS EVERY MORNING AND 20 UNITS EVERY EVENING    gabapentin (NEURONTIN) 300 mg capsule Take 1 Cap by mouth three (3) times daily. Max Daily Amount: 900 mg.  semaglutide (OZEMPIC) 1 mg/dose (2 mg/1.5 mL) sub-q pen 1 mg by SubCUTAneous route every seven (7) days.  NovoLOG Flexpen U-100 Insulin 100 unit/mL (3 mL) inpn Take 10-30 units three times daily with meals per sliding scale    co-enzyme Q-10 (Co Q-10) 100 mg capsule Take 200 mg by mouth daily.  pyridoxine, vitamin B6, (Vitamin B-6) 100 mg tablet Take 200 mg by mouth daily.  b complex vitamins tablet Take 1 Tab by mouth daily.  ascorbic acid, vitamin C, (Vitamin C) 500 mg tablet Take 1,000 mg by mouth.  cholecalciferol, vitamin D3, (Vitamin D3) 50 mcg (2,000 unit) tab Take  by mouth. With vitamin K2.    magnesium oxide (MAG-OX) 400 mg tablet Take 400 mg by mouth daily.  B.infantis-B.ani-B.long-B.bifi (Probiotic 4X) 10-15 mg TbEC Take  by mouth.  bumetanide (BUMEX) 1 mg tablet Take 1 Tab by mouth every Monday, Wednesday, Friday.  albuterol (PROVENTIL HFA, VENTOLIN HFA, PROAIR HFA) 90 mcg/actuation inhaler Take 2 Puffs by inhalation every four (4) hours as needed for Wheezing.  azelastine (ASTELIN) 137 mcg (0.1 %) nasal spray 1 [place] by Both Nostrils route two (2) times a day.  Use in each nostril as directed    lisinopriL (PRINIVIL, ZESTRIL) 10 mg tablet TAKE 1 TABLET TWICE A DAY    ezetimibe (ZETIA) 10 mg tablet TAKE 1 TABLET DAILY    FreeStmalcolm Tamayo 14 Day Sensor kit 1 Units by Does Not Apply route every fourteen (14) days. DMII  E11.29; E11.21    testosterone undecanoate (AVEED IM) by IntraMUSCular route. Every 10 weeks    Insulin Needles, Disposable, 31 gauge x 5/16\" ndle 5x daily with insulin. IDDMII. E11.21    OMEGA-3 FATTY ACIDS/FISH OIL (FISH OIL EXTRA STRENGTH PO) Take 1,400 mg by mouth daily (after breakfast).  ketoconazole (NIZORAL) 2 % topical cream Apply  to affected area as needed for Skin Irritation.  VOLTAREN 1 % gel 2 g as needed.  desonide (TRIDESILON) 0.05 % topical lotion as needed. No current facility-administered medications for this visit. I have reviewed the nurses notes, vitals, problem list, allergy list, medical history, family, social history and medications. REVIEW OF SYMPTOMS      General: Pt denies excessive weight gain or loss. Pt is able to conduct ADL's  HEENT: Denies blurred vision, headaches, hearing loss, epistaxis and difficulty swallowing. Respiratory: Denies cough or stridor. +SOB, wheezing  Cardiovascular: Denies, palpitations. >1+ edema  Gastrointestinal: Denies poor appetite, indigestion, abdominal pain or blood in stool  Genitourinary: Denies hematuria, dysuria, increased urinary frequency  Musculoskeletal: Denies joint pain or swelling from muscles or joints  Neurologic: Denies tremor, paresthesias, headache, or sensory motor disturbance  Psychiatric: Denies confusion, insomnia, depression  Integumentray: Denies rash, itching or ulcers. Hematologic: Denies easy bruising, bleeding     PHYSICAL EXAMINATION      There were no vitals taken for this visit. General: Well developed, in no acute distress. HEENT: No jaundice, oral mucosa moist, no oral ulcers  Neck: Supple, no stiffness, no lymphadenopathy, supple  Heart:  RRR  Respiratory: Clear bilaterally x 4, no wheezing or rales  Abdomen:  Protuberant   Extremities:  No  normal cap refill, no cyanosis. no pedal edema;discolorization from hemosiderin.   Musculoskeletal: No clubbing, no deformities  Neuro: A&Ox3, speech clear, gait stable, cooperative, no focal neurologic deficits  Skin: Skin color is normal. No rashes or lesions. Non diaphoretic, moist.        EKG: Sinus brachycardia with first degree AV block of 216 seconds. DIAGNOSTIC DATA     1. Stress Test   NM- 3/17/15- no ischemia, EF 65%   NM- 5/29/19-· Abnormal stress myocardial perfusion with very small area of mild perfusion defect of the mid anterior wall and mid infeiror wall with SDS 2 which is reversible. This suggest very small area of focal ischemia. Abnormal septal motion consistent with prior cardiac surgery. Normal wall motion with normal LV function with LVEF 67%. · Excellent functional capacity. · Compared to prior study of 3/17/2015, there is minimal change. The small anterior mid wall stress perfusion defect may be new     2. LE Venous Doppler   6/30/18- no DVT isaak     3. Lipids   3/26/19- , HDL 51, ,    7/3/19- , HDL 43, LDL 85,    1/3/20- TC 91, HDL 35, LDL 28.4,    7/14/20- TC 75, HDL 32, LDL 17,    1/28/21- TC 95, HDL 37, LDL 36,              4.  Carotid Doppler   5/31/19- 0-9% R/L     5. Echo   5/29/19- EF 61-65%         LABORATORY DATA            Lab Results   Component Value Date/Time    WBC 6.7 08/03/2020 11:25 AM    HGB 13.8 08/03/2020 11:25 AM    HCT 43.7 08/03/2020 11:25 AM    PLATELET 268 (L) 50/83/2449 11:25 AM    MCV 88.8 08/03/2020 11:25 AM      Lab Results   Component Value Date/Time    Sodium 138 08/03/2020 11:25 AM    Potassium 4.1 08/03/2020 11:25 AM    Chloride 104 08/03/2020 11:25 AM    CO2 26 08/03/2020 11:25 AM    Anion gap 8 08/03/2020 11:25 AM    Glucose 169 (H) 08/03/2020 11:25 AM    BUN 18 08/03/2020 11:25 AM    Creatinine 1.36 (H) 08/03/2020 11:25 AM    BUN/Creatinine ratio 13 08/03/2020 11:25 AM    GFR est AA >60 08/03/2020 11:25 AM    GFR est non-AA 52 (L) 08/03/2020 11:25 AM    Calcium 9.5 08/03/2020 11:25 AM    Bilirubin, total 0.4 10/13/2020 09:11 AM    Alk. phosphatase 54 10/13/2020 09:11 AM    Protein, total 6.4 10/13/2020 09:11 AM    Albumin 4.4 10/13/2020 09:11 AM    Globulin 3.1 01/03/2020 08:23 AM    A-G Ratio 1.3 01/03/2020 08:23 AM    ALT (SGPT) 27 10/13/2020 09:11 AM           ASSESSMENT/RECOMMENDATIONS:.        1. CAD, S/p CABG 2005.   -Needs more aggressively at risk factor modification  -No chest discomfort or shortness of breath but I have a low threshold for going for with cardiac catheterization    2. HTN  - BP is at goal on current medical regimen    3. Dyslipidemia  - LDL is at goal despite decreasing his cholesterol medicine  -He has persistent muscle aching his lower extremities    4. DM  - On insulin. - A1c goal should be 7.3    5. LEONA  -Compliant with CPAP. 6. Carotid disease  - Carotids were normal, 0-9% bilaterally. 7. Leg pain   -Muscle pain but CPKs been negative question if he has any lower back pain  -walking improves the pain. -will do JAMEEL    Return in 6 months or PRN      No orders of the defined types were placed in this encounter. I have discussed the diagnosis with  Benita Black and the intended plan as seen in the above orders. Questions were answered concerning future plans. I have discussed medication side effects and warnings with the patient as well. Thank you,  Carmelita Hartley MD for involving me in the care of  Benita Black. Please do not hesitate to contact me for further questions/concerns. Maged Rothman MD, [facility]    Patient Care Team:  Carmelita Hartley MD as PCP - General (Internal Medicine)  Carmelita Hartley MD as PCP - [facility] Empaneled Provider  Reed West., MD as Surgeon (Surgery)  Vanessa Damian MD (Urology)  Ijeoma Martinez MD (Cardiology)  Humberto Sandifer, MD (Allergy)  Lori Pereyra., DPM (Podiatry)  Latonya Aguilar MD (Gastroenterology)  Jaskaran Dorantes (Ophthalmology)    [address] [facility], [address] [place] [address] Drive      [phone] / [phone] Fax

## 2021-02-05 DIAGNOSIS — E78.00 HYPERCHOLESTEREMIA: ICD-10-CM

## 2021-02-05 RX ORDER — ROSUVASTATIN CALCIUM 20 MG/1
TABLET, COATED ORAL
Qty: 90 TAB | Refills: 1 | Status: SHIPPED | OUTPATIENT
Start: 2021-02-05 | End: 2021-02-10 | Stop reason: DRUGHIGH

## 2021-02-06 DIAGNOSIS — F34.1 DYSTHYMIA: ICD-10-CM

## 2021-02-06 RX ORDER — ESCITALOPRAM OXALATE 10 MG/1
10 TABLET ORAL DAILY
Qty: 90 TAB | Refills: 1 | Status: SHIPPED | COMMUNITY
Start: 2021-02-06 | End: 2021-07-19

## 2021-02-10 RX ORDER — ROSUVASTATIN CALCIUM 10 MG/1
10 TABLET, COATED ORAL
COMMUNITY
End: 2021-02-10 | Stop reason: SDUPTHER

## 2021-02-10 RX ORDER — ROSUVASTATIN CALCIUM 10 MG/1
10 TABLET, COATED ORAL
Qty: 90 TAB | Refills: 3 | Status: SHIPPED | OUTPATIENT
Start: 2021-02-10 | End: 2021-12-27

## 2021-02-12 ENCOUNTER — ANCILLARY PROCEDURE (OUTPATIENT)
Dept: CARDIOLOGY CLINIC | Age: 71
End: 2021-02-12
Payer: MEDICARE

## 2021-02-12 VITALS — HEIGHT: 69 IN | WEIGHT: 267 LBS | BODY MASS INDEX: 39.55 KG/M2

## 2021-02-12 DIAGNOSIS — E78.00 HYPERCHOLESTEREMIA: ICD-10-CM

## 2021-02-12 DIAGNOSIS — M79.604 LEG PAIN, BILATERAL: ICD-10-CM

## 2021-02-12 DIAGNOSIS — I10 ESSENTIAL HYPERTENSION WITH GOAL BLOOD PRESSURE LESS THAN 130/80: ICD-10-CM

## 2021-02-12 DIAGNOSIS — M79.605 LEG PAIN, BILATERAL: ICD-10-CM

## 2021-02-12 DIAGNOSIS — I25.10 CORONARY ARTERY DISEASE INVOLVING NATIVE CORONARY ARTERY OF NATIVE HEART WITHOUT ANGINA PECTORIS: ICD-10-CM

## 2021-02-12 PROCEDURE — 93922 UPR/L XTREMITY ART 2 LEVELS: CPT | Performed by: SPECIALIST

## 2021-02-15 LAB
IMMEDIATE ARM BP: 142 MMHG
IMMEDIATE LEFT ABI: 1.32
IMMEDIATE LEFT TIBIAL: 188 MMHG
IMMEDIATE RIGHT ABI: 1.35
IMMEDIATE RIGHT TIBIAL: 192 MMHG
LEFT ABI: 1.38
LEFT ANTERIOR TIBIAL: 167 MMHG
LEFT ARM BP: 131 MMHG
LEFT POSTERIOR TIBIAL: 181 MMHG
RIGHT ABI: 1.26
RIGHT ANTERIOR TIBIAL: 157 MMHG
RIGHT ARM BP: 131 MMHG
RIGHT POSTERIOR TIBIAL: 165 MMHG

## 2021-02-16 NOTE — PROGRESS NOTES
The arterial studies of you leg arteries reveal no blockages and this is great news. I hope all is well.

## 2021-02-25 DIAGNOSIS — R80.9 TYPE 2 DIABETES MELLITUS WITH MICROALBUMINURIA, WITH LONG-TERM CURRENT USE OF INSULIN (HCC): ICD-10-CM

## 2021-02-25 DIAGNOSIS — Z79.4 TYPE 2 DIABETES MELLITUS WITH MICROALBUMINURIA, WITH LONG-TERM CURRENT USE OF INSULIN (HCC): ICD-10-CM

## 2021-02-25 DIAGNOSIS — E11.29 TYPE 2 DIABETES MELLITUS WITH MICROALBUMINURIA, WITH LONG-TERM CURRENT USE OF INSULIN (HCC): ICD-10-CM

## 2021-02-25 RX ORDER — METFORMIN HYDROCHLORIDE 1000 MG/1
1000 TABLET ORAL 2 TIMES DAILY WITH MEALS
Qty: 180 TAB | Refills: 3 | Status: SHIPPED | OUTPATIENT
Start: 2021-02-25 | End: 2022-02-28

## 2021-03-18 ENCOUNTER — TELEPHONE (OUTPATIENT)
Dept: INTERNAL MEDICINE CLINIC | Age: 71
End: 2021-03-18

## 2021-03-18 NOTE — TELEPHONE ENCOUNTER
----- Message from Sally Pham sent at 3/18/2021  3:16 PM EDT -----  Regarding: Dr. Shannan Chavez: [phone]  General Message/Vendor Calls    Caller's first and last name: Braydon Maker. Reason for call: Needs to verify pt is active at practice. Pt is ordering diabetic censor. Callback required yes/no and why: Yes, need to verify pt. Best contact number(s): [phone] ext [phone]      Details to clarify the request: N/a.       Sally Pham

## 2021-03-30 ENCOUNTER — OFFICE VISIT (OUTPATIENT)
Dept: INTERNAL MEDICINE CLINIC | Age: 71
End: 2021-03-30
Payer: MEDICARE

## 2021-03-30 VITALS
HEART RATE: 57 BPM | HEIGHT: 69 IN | DIASTOLIC BLOOD PRESSURE: 73 MMHG | RESPIRATION RATE: 13 BRPM | OXYGEN SATURATION: 97 % | WEIGHT: 250 LBS | SYSTOLIC BLOOD PRESSURE: 131 MMHG | BODY MASS INDEX: 37.03 KG/M2

## 2021-03-30 DIAGNOSIS — E11.29 TYPE 2 DIABETES MELLITUS WITH MICROALBUMINURIA, WITH LONG-TERM CURRENT USE OF INSULIN (HCC): Primary | ICD-10-CM

## 2021-03-30 DIAGNOSIS — Z79.4 TYPE 2 DIABETES MELLITUS WITH MICROALBUMINURIA, WITH LONG-TERM CURRENT USE OF INSULIN (HCC): Primary | ICD-10-CM

## 2021-03-30 DIAGNOSIS — R80.9 TYPE 2 DIABETES MELLITUS WITH MICROALBUMINURIA, WITH LONG-TERM CURRENT USE OF INSULIN (HCC): Primary | ICD-10-CM

## 2021-03-30 DIAGNOSIS — N18.30 STAGE 3 CHRONIC KIDNEY DISEASE, UNSPECIFIED WHETHER STAGE 3A OR 3B CKD (HCC): ICD-10-CM

## 2021-03-30 DIAGNOSIS — M79.604 RIGHT LEG PAIN: ICD-10-CM

## 2021-03-30 DIAGNOSIS — E11.21 TYPE 2 DIABETES WITH NEPHROPATHY (HCC): ICD-10-CM

## 2021-03-30 PROBLEM — E66.01 SEVERE OBESITY (HCC): Status: RESOLVED | Noted: 2019-06-09 | Resolved: 2021-03-30

## 2021-03-30 LAB
25(OH)D3 SERPL-MCNC: 50.1 NG/ML (ref 30–100)
ALBUMIN SERPL-MCNC: 3.8 G/DL (ref 3.5–5)
ALBUMIN/GLOB SERPL: 1.2 {RATIO} (ref 1.1–2.2)
ALP SERPL-CCNC: 60 U/L (ref 45–117)
ALT SERPL-CCNC: 32 U/L (ref 12–78)
ANION GAP SERPL CALC-SCNC: 4 MMOL/L (ref 5–15)
AST SERPL-CCNC: 18 U/L (ref 15–37)
BILIRUB SERPL-MCNC: 0.5 MG/DL (ref 0.2–1)
BUN SERPL-MCNC: 20 MG/DL (ref 6–20)
BUN/CREAT SERPL: 18 (ref 12–20)
CALCIUM SERPL-MCNC: 9 MG/DL (ref 8.5–10.1)
CHLORIDE SERPL-SCNC: 104 MMOL/L (ref 97–108)
CO2 SERPL-SCNC: 28 MMOL/L (ref 21–32)
CREAT SERPL-MCNC: 1.12 MG/DL (ref 0.7–1.3)
GLOBULIN SER CALC-MCNC: 3.2 G/DL (ref 2–4)
GLUCOSE SERPL-MCNC: 94 MG/DL (ref 65–100)
HBA1C MFR BLD HPLC: 6.8 %
POTASSIUM SERPL-SCNC: 4.7 MMOL/L (ref 3.5–5.1)
PROT SERPL-MCNC: 7 G/DL (ref 6.4–8.2)
SODIUM SERPL-SCNC: 136 MMOL/L (ref 136–145)

## 2021-03-30 PROCEDURE — G8417 CALC BMI ABV UP PARAM F/U: HCPCS | Performed by: INTERNAL MEDICINE

## 2021-03-30 PROCEDURE — G9717 DOC PT DX DEP/BP F/U NT REQ: HCPCS | Performed by: INTERNAL MEDICINE

## 2021-03-30 PROCEDURE — 3017F COLORECTAL CA SCREEN DOC REV: CPT | Performed by: INTERNAL MEDICINE

## 2021-03-30 PROCEDURE — G8754 DIAS BP LESS 90: HCPCS | Performed by: INTERNAL MEDICINE

## 2021-03-30 PROCEDURE — G8536 NO DOC ELDER MAL SCRN: HCPCS | Performed by: INTERNAL MEDICINE

## 2021-03-30 PROCEDURE — 3044F HG A1C LEVEL LT 7.0%: CPT | Performed by: INTERNAL MEDICINE

## 2021-03-30 PROCEDURE — 2022F DILAT RTA XM EVC RTNOPTHY: CPT | Performed by: INTERNAL MEDICINE

## 2021-03-30 PROCEDURE — 1101F PT FALLS ASSESS-DOCD LE1/YR: CPT | Performed by: INTERNAL MEDICINE

## 2021-03-30 PROCEDURE — G8427 DOCREV CUR MEDS BY ELIG CLIN: HCPCS | Performed by: INTERNAL MEDICINE

## 2021-03-30 PROCEDURE — G8752 SYS BP LESS 140: HCPCS | Performed by: INTERNAL MEDICINE

## 2021-03-30 PROCEDURE — 83036 HEMOGLOBIN GLYCOSYLATED A1C: CPT | Performed by: INTERNAL MEDICINE

## 2021-03-30 PROCEDURE — 99213 OFFICE O/P EST LOW 20 MIN: CPT | Performed by: INTERNAL MEDICINE

## 2021-03-30 PROCEDURE — G0463 HOSPITAL OUTPT CLINIC VISIT: HCPCS | Performed by: INTERNAL MEDICINE

## 2021-03-30 NOTE — PROGRESS NOTES
HISTORY OF PRESENT ILLNESS    Chief Complaint   Patient presents with    Diabetes     f/u    Leg Pain     Right - comes and goes - couple months. Presents for follow-up    Leg pain is ongoing. He is not taking crestor for 2 weeks now. Right >  Left. Below knee. JAMEEL neg per Dr. Lewis Blanco.    Prostate MRI showed some abnormal lesions. Biopsy scheduled 4/15/21. PSA > 6 now. Lab Results   Component Value Date/Time    Prostate Specific Ag 3.3 09/26/2018 12:00 AM    Prostate Specific Ag 3.2 01/24/2017 09:30 AM    Prostate Specific Ag 2.3 08/19/2013 10:09 AM    Prostate Specific Ag 1.7 01/16/2009    Prostate Specific Ag 1.3 07/01/2008     Diabetes Mellitus follow-up  Last hemoglobin a1c   Lab Results   Component Value Date/Time    Hemoglobin A1c 7.3 (H) 01/28/2021 10:34 AM    Hemoglobin A1c (POC) 6.8 03/30/2021 08:50 AM   medication compliance: compliant all of the time. Diabetic diet compliance: compliant most of the time. Taking insulin 40 units AM, 20 units PM.  Not needing Novalog often  On Verdene Nicks now. Taking ozempic  7 day avg 118, 30 day avg 121  Home glucose monitoring:   He is using his continuous glucose monitor 8-10 times per day. Monitoring glucoses. fasting values range    Hypoglycemic episodes: No episodes in the past 3 months, but was prone to hypoglycemia prior to medication adjustments and dietary adjustments. Further diabetic ROS: no polyuria or polydipsia, no chest pain, dyspnea or TIA's, no numbness, tingling or pain in extremities.      Wt Readings from Last 3 Encounters:   03/30/21 250 lb (113.4 kg)   02/12/21 267 lb (121.1 kg)   02/02/21 267 lb 9.6 oz (121.4 kg)       Review of Systems   All other systems reviewed and are negative, except as noted in HPI    Past Medical and Surgical History   has a past medical history of Achilles bursitis (8/10/2020), Anxiety, AR (allergic rhinitis) (12/01/2008), Arthritis, Blind right eye, BPH (benign prostatic hyperplasia), CAD (coronary artery disease), CKD (chronic kidney disease) stage 3, GFR 30-59 ml/min, DM type 2 (diabetes mellitus, type 2) ([facility] Dysthymia, Encounter for diabetic foot exam ([facility] [facility] Essential hypertension with goal blood pressure less than 130/80 (9/28/2016), Hearing loss, Hypercholesteremia, Hypogonadism male (12/01/2008), Mild intermittent asthma without complication (15/07/7286), LEONA on CPAP (07/16/2010), Phimosis (5/26/2016), Severe obesity (BMI 35.0-39.9) (06/18/2018), Tarsal tunnel syndrome (8/10/2020), Trigger finger, and Type 2 diabetes with nephropathy ([facility] (3/12/2018). has a past surgical history that includes hx rhinoplasty; hx retinal detachment repair (1984); hx heent (2012); pr cabg, artery-vein, three (2005); hx orthopaedic; hx cyst removal (5/12/2016); and hx circumcision (2016). reports that he is a non-smoker but has been exposed to tobacco smoke. He has been exposed to 0.00 packs per day for the past 10.00 years. He has never used smokeless tobacco. He reports current alcohol use of about 6.0 standard drinks of alcohol per week. He reports previous drug use. He was adopted. Family history is unknown by patient. Physical Exam   Nursing note and vitals reviewed. Blood pressure 131/73, pulse (!) 57, resp. rate 13, height 5' 9\" (1.753 m), weight 250 lb (113.4 kg), SpO2 97 %. Constitutional:  No distress. Eyes: Conjunctivae are normal.   Ears:  Hearing grossly intact  Cardiovascular: Normal rate. regular rhythm, no murmurs or gallops  No edema  Pulmonary/Chest: Effort normal.   CTAB  Musculoskeletal: moves all 4 extremities   Neurological: Alert and oriented to person, place, and time. Skin: No visible rash noted. Psychiatric: Normal mood and affect. Behavior is normal.     ASSESSMENT and PLAN  Diagnoses and all orders for this visit:    1.  Type 2 diabetes mellitus with microalbuminuria, with long-term current use of insulin ([facility]  Improving with addition of Verdene Nicks and he rarely needs mealtime insulin. Continue current medications. Check metabolic panel. Renal function is also being monitored by nephrology. He continues insulin therapy a twice daily, Rossy Coyle and Ozempic. Needs to monitor glucoses multiple times per day and use as needed mealtime insulin. He continues to require the use of a continuous glucose monitor to better control his diabetes, reduce episodes of hypoglycemia and improve his overall outcomes from diabetes. -     AMB POC HEMOGLOBIN B9I  -     METABOLIC PANEL, COMPREHENSIVE; Future    2. Stage 3 chronic kidney disease, unspecified whether stage 3a or 3b CKD  -     VITAMIN D, 25 HYDROXY; Future    3. Type 2 diabetes with nephropathy (HCC)  -     METABOLIC PANEL, COMPREHENSIVE; Future    4. Right leg pain   Ongoing right lower extremity pain. Evaluate for mononeuropathy. This is not a vascular issue. Consider MRI of the lumbar spine. -     EMG LIMITED; Future        There are no Patient Instructions on file for this visit. reviewed medications and side effects in detail    Return to clinic for further evaluation if new symptoms develop        Current Outpatient Medications   Medication Sig    metFORMIN (GLUCOPHAGE) 1,000 mg tablet Take 1 Tab by mouth two (2) times daily (with meals).  Insulin Needles, Disposable, 31 gauge x 5/16\" ndle 5x daily with insulin. IDDMII. E11.21    rosuvastatin (Crestor) 10 mg tablet Take 1 Tab by mouth nightly.  escitalopram oxalate (LEXAPRO) 10 mg tablet Take 1 Tab by mouth daily.  Farxiga 5 mg tab tablet Take 1 Tab by mouth daily.  aspirin delayed-release 81 mg tablet Take 1 Tab by mouth daily.  metoprolol tartrate (LOPRESSOR) 25 mg tablet TAKE 1 TABLET TWICE A DAY    insulin glargine U-300 conc (Toujeo SoloStar U-300 Insulin) 300 unit/mL (1.5 mL) inpn pen INJECT 40 UNITS EVERY MORNING AND 20 UNITS EVERY EVENING    gabapentin (NEURONTIN) 300 mg capsule Take 1 Cap by mouth three (3) times daily. Max Daily Amount: 900 mg. (Patient taking differently: Take 300 mg by mouth as needed.)    semaglutide (OZEMPIC) 1 mg/dose (2 mg/1.5 mL) sub-q pen 1 mg by SubCUTAneous route every seven (7) days.  NovoLOG Flexpen U-100 Insulin 100 unit/mL (3 mL) inpn Take 10-30 units three times daily with meals per sliding scale    co-enzyme Q-10 (Co Q-10) 100 mg capsule Take 200 mg by mouth daily.  pyridoxine, vitamin B6, (Vitamin B-6) 100 mg tablet Take 200 mg by mouth daily.  b complex vitamins tablet Take 1 Tab by mouth daily.  ascorbic acid, vitamin C, (Vitamin C) 500 mg tablet Take 1,000 mg by mouth.  cholecalciferol, vitamin D3, (Vitamin D3) 50 mcg (2,000 unit) tab Take  by mouth. With vitamin K2.    magnesium oxide (MAG-OX) 400 mg tablet Take 400 mg by mouth two (2) times a day.  B.infantis-B.ani-B.long-B.bifi (Probiotic 4X) 10-15 mg TbEC Take  by mouth.  bumetanide (BUMEX) 1 mg tablet Take 1 Tab by mouth every Monday, Wednesday, Friday.  albuterol (PROVENTIL HFA, VENTOLIN HFA, PROAIR HFA) 90 mcg/actuation inhaler Take 2 Puffs by inhalation every four (4) hours as needed for Wheezing.  azelastine (ASTELIN) 137 mcg (0.1 %) nasal spray 1 [place] by Both Nostrils route two (2) times a day. Use in each nostril as directed    lisinopriL (PRINIVIL, ZESTRIL) 10 mg tablet TAKE 1 TABLET TWICE A DAY    ezetimibe (ZETIA) 10 mg tablet TAKE 1 TABLET DAILY    FreeStyle [place] 14 Day Sensor kit 1 Units by Does Not Apply route every fourteen (14) days. DMII  E11.29; E11.21    OMEGA-3 FATTY ACIDS/FISH OIL (FISH OIL EXTRA STRENGTH PO) Take 1,400 mg by mouth daily (after breakfast).  ketoconazole (NIZORAL) 2 % topical cream Apply  to affected area as needed for Skin Irritation.  VOLTAREN 1 % gel 2 g as needed.  desonide (TRIDESILON) 0.05 % topical lotion as needed.  testosterone undecanoate (AVEED IM) by IntraMUSCular route. Every 10 weeks     No current facility-administered medications for this visit.

## 2021-03-31 ENCOUNTER — TELEPHONE (OUTPATIENT)
Dept: INTERNAL MEDICINE CLINIC | Age: 71
End: 2021-03-31

## 2021-03-31 NOTE — TELEPHONE ENCOUNTER
----- Message from Lisa Burgos sent at 3/30/2021  4:28 PM EDT -----  Regarding: /telephone  Contact: [phone]  General Message/Vendor Calls    Caller's first and last name:Fausto with [facility]      Reason for call: She would like an update on the addendum regarding the [facility] Diabetic Sensor.        Callback required yes/no and why: Yes with an update      Best contact number(s): [phone] ext [phone]      Details to clarify the request: ref # [id number]      Lisa Burgos 49 y/o single [place] American female, domiciled alone, disabled since January, 2 adult children, history of Depressive Disorder, no prior psychiatric hospitalizations, took Zoloft 200mg daily for depression ~10 years ago, no suicide attempts, no violence, no legal issues, no drugs, no ETOH, no DTs, PMH of rash of unknown origin, BIB EMS for evaluation of rash and psychiatric consult requested for suicidal statement.  In ED, patient is acutely depressed, passively suicidal, feeling hopeless & helpless. She has multiple stressors and unable to cope; unable to function. She is tearful, dysphoric and will benefit from a medical hospitalization for further workup of skin rash, as this is one of her primary stressors (pain, fear of never recovering).   While on the inpatient unit, recommend 1:1 given pt's suicidality and marked emotionality.  Pt will be seen tomorrow by CL

## 2021-03-31 NOTE — TELEPHONE ENCOUNTER
Returned call. They are requesting an addendum to the note submitted to them (note date 12/28/20) advising that patient is using a continuous glucose monitor Cherelle Turcios).  Once addendum to 12/28/20 note has been created, we will fax it to [phone]

## 2021-04-02 ENCOUNTER — TELEPHONE (OUTPATIENT)
Dept: INTERNAL MEDICINE CLINIC | Age: 71
End: 2021-04-02

## 2021-04-02 NOTE — TELEPHONE ENCOUNTER
----- Message from Glenn Donaldson sent at 4/2/2021 12:11 PM EDT -----  Regarding: /telephone  Contact: [phone]  General Message/Vendor Calls    Caller's first and last name: Ele Romero with  [facility] [place] [facility]      Reason for call: He is waiting for the diabetic supplies to be dispensed. The notes that they received are incomplete and does not have the ordering providers actual signature. The notes can be signed and re-faxed to [phone].       Callback required yes/no and why: Yes      Best contact number(s): [phone] ext [phone]      Details to clarify the request: N/A      Glenn Donaldson

## 2021-04-30 ENCOUNTER — TELEPHONE (OUTPATIENT)
Dept: INTERNAL MEDICINE CLINIC | Age: 71
End: 2021-04-30

## 2021-04-30 NOTE — TELEPHONE ENCOUNTER
Isabell Ray from [facility] called wanting to know all appointments patient had for diabetic reasons going back as far as 6 months from 4/7/21. PSR informed him of dates patient was seen and representative stated he would fax over request for medical notes from these appointments with Dr. SANCHES [facility].

## 2021-05-03 ENCOUNTER — OFFICE VISIT (OUTPATIENT)
Dept: NEUROLOGY | Age: 71
End: 2021-05-03
Payer: MEDICARE

## 2021-05-03 DIAGNOSIS — M54.16 CHRONIC RIGHT-SIDED LUMBAR RADICULOPATHY: Primary | ICD-10-CM

## 2021-05-03 PROCEDURE — 95886 MUSC TEST DONE W/N TEST COMP: CPT | Performed by: PSYCHIATRY & NEUROLOGY

## 2021-05-03 PROCEDURE — 95909 NRV CNDJ TST 5-6 STUDIES: CPT | Performed by: PSYCHIATRY & NEUROLOGY

## 2021-05-12 DIAGNOSIS — M54.16 RIGHT LUMBAR RADICULOPATHY: ICD-10-CM

## 2021-05-12 DIAGNOSIS — M79.604 RIGHT LEG PAIN: ICD-10-CM

## 2021-05-12 DIAGNOSIS — M54.16 LEFT LUMBAR RADICULOPATHY: Primary | ICD-10-CM

## 2021-05-17 ENCOUNTER — HOSPITAL ENCOUNTER (OUTPATIENT)
Dept: MRI IMAGING | Age: 71
Discharge: HOME | End: 2021-05-17
Attending: INTERNAL MEDICINE
Payer: MEDICARE

## 2021-05-17 DIAGNOSIS — M54.16 RIGHT LUMBAR RADICULOPATHY: ICD-10-CM

## 2021-05-17 DIAGNOSIS — M79.604 RIGHT LEG PAIN: ICD-10-CM

## 2021-05-17 PROCEDURE — 72148 MRI LUMBAR SPINE W/O DYE: CPT

## 2021-05-24 DIAGNOSIS — E11.21 TYPE 2 DIABETES WITH NEPHROPATHY (HCC): ICD-10-CM

## 2021-05-24 RX ORDER — GABAPENTIN 300 MG/1
300 CAPSULE ORAL 3 TIMES DAILY
Qty: 270 CAPSULE | Refills: 1 | Status: SHIPPED | OUTPATIENT
Start: 2021-05-24 | End: 2021-11-10

## 2021-06-10 RX ORDER — EZETIMIBE 10 MG/1
TABLET ORAL
Qty: 90 TABLET | Refills: 1 | Status: SHIPPED | OUTPATIENT
Start: 2021-06-10 | End: 2022-01-18

## 2021-06-24 DIAGNOSIS — E11.29 TYPE 2 DIABETES MELLITUS WITH MICROALBUMINURIA, WITH LONG-TERM CURRENT USE OF INSULIN (HCC): ICD-10-CM

## 2021-06-24 DIAGNOSIS — E11.21 TYPE 2 DIABETES WITH NEPHROPATHY (HCC): ICD-10-CM

## 2021-06-24 DIAGNOSIS — Z79.4 TYPE 2 DIABETES MELLITUS WITH MICROALBUMINURIA, WITH LONG-TERM CURRENT USE OF INSULIN (HCC): ICD-10-CM

## 2021-06-24 DIAGNOSIS — R80.9 TYPE 2 DIABETES MELLITUS WITH MICROALBUMINURIA, WITH LONG-TERM CURRENT USE OF INSULIN (HCC): ICD-10-CM

## 2021-06-24 RX ORDER — FLASH GLUCOSE SENSOR
1 KIT MISCELLANEOUS
Qty: 1 KIT | Refills: 0 | Status: SHIPPED | OUTPATIENT
Start: 2021-06-24 | End: 2021-10-24

## 2021-06-26 RX ORDER — FLASH GLUCOSE SENSOR
KIT MISCELLANEOUS
OUTPATIENT
Start: 2021-06-26

## 2021-07-05 RX ORDER — SEMAGLUTIDE 1.34 MG/ML
INJECTION, SOLUTION SUBCUTANEOUS
Qty: 9 ML | Refills: 3 | Status: SHIPPED | OUTPATIENT
Start: 2021-07-05 | End: 2022-03-28 | Stop reason: SDUPTHER

## 2021-07-12 RX ORDER — DAPAGLIFLOZIN 5 MG/1
TABLET, FILM COATED ORAL
Qty: 90 TABLET | Refills: 3 | Status: SHIPPED | OUTPATIENT
Start: 2021-07-12 | End: 2022-09-26

## 2021-07-19 DIAGNOSIS — F34.1 DYSTHYMIA: ICD-10-CM

## 2021-07-19 RX ORDER — ESCITALOPRAM OXALATE 10 MG/1
TABLET ORAL
Qty: 90 TABLET | Refills: 3 | Status: SHIPPED | OUTPATIENT
Start: 2021-07-19 | End: 2022-07-03 | Stop reason: SDUPTHER

## 2021-07-23 RX ORDER — LISINOPRIL 10 MG/1
TABLET ORAL
Qty: 180 TABLET | Refills: 2 | Status: SHIPPED | OUTPATIENT
Start: 2021-07-23 | End: 2022-03-25

## 2021-07-29 ENCOUNTER — OFFICE VISIT (OUTPATIENT)
Dept: INTERNAL MEDICINE CLINIC | Age: 71
End: 2021-07-29
Payer: MEDICARE

## 2021-07-29 VITALS
HEART RATE: 57 BPM | RESPIRATION RATE: 14 BRPM | BODY MASS INDEX: 35.7 KG/M2 | OXYGEN SATURATION: 98 % | WEIGHT: 241 LBS | TEMPERATURE: 97.7 F | DIASTOLIC BLOOD PRESSURE: 76 MMHG | SYSTOLIC BLOOD PRESSURE: 148 MMHG | HEIGHT: 69 IN

## 2021-07-29 DIAGNOSIS — R39.11 BENIGN PROSTATIC HYPERPLASIA WITH URINARY HESITANCY: ICD-10-CM

## 2021-07-29 DIAGNOSIS — E11.29 TYPE 2 DIABETES MELLITUS WITH MICROALBUMINURIA, WITH LONG-TERM CURRENT USE OF INSULIN (HCC): Primary | ICD-10-CM

## 2021-07-29 DIAGNOSIS — N40.1 BENIGN PROSTATIC HYPERPLASIA WITH URINARY HESITANCY: ICD-10-CM

## 2021-07-29 DIAGNOSIS — N18.30 STAGE 3 CHRONIC KIDNEY DISEASE, UNSPECIFIED WHETHER STAGE 3A OR 3B CKD (HCC): ICD-10-CM

## 2021-07-29 DIAGNOSIS — R05.9 COUGH: ICD-10-CM

## 2021-07-29 DIAGNOSIS — Z79.4 TYPE 2 DIABETES MELLITUS WITH MICROALBUMINURIA, WITH LONG-TERM CURRENT USE OF INSULIN (HCC): Primary | ICD-10-CM

## 2021-07-29 DIAGNOSIS — R80.9 TYPE 2 DIABETES MELLITUS WITH MICROALBUMINURIA, WITH LONG-TERM CURRENT USE OF INSULIN (HCC): Primary | ICD-10-CM

## 2021-07-29 LAB — HBA1C MFR BLD HPLC: 6.8 %

## 2021-07-29 PROCEDURE — 99213 OFFICE O/P EST LOW 20 MIN: CPT | Performed by: INTERNAL MEDICINE

## 2021-07-29 PROCEDURE — G8417 CALC BMI ABV UP PARAM F/U: HCPCS | Performed by: INTERNAL MEDICINE

## 2021-07-29 PROCEDURE — G0463 HOSPITAL OUTPT CLINIC VISIT: HCPCS | Performed by: INTERNAL MEDICINE

## 2021-07-29 PROCEDURE — G8753 SYS BP > OR = 140: HCPCS | Performed by: INTERNAL MEDICINE

## 2021-07-29 PROCEDURE — 1101F PT FALLS ASSESS-DOCD LE1/YR: CPT | Performed by: INTERNAL MEDICINE

## 2021-07-29 PROCEDURE — G8536 NO DOC ELDER MAL SCRN: HCPCS | Performed by: INTERNAL MEDICINE

## 2021-07-29 PROCEDURE — 83036 HEMOGLOBIN GLYCOSYLATED A1C: CPT | Performed by: INTERNAL MEDICINE

## 2021-07-29 PROCEDURE — 2022F DILAT RTA XM EVC RTNOPTHY: CPT | Performed by: INTERNAL MEDICINE

## 2021-07-29 PROCEDURE — 3044F HG A1C LEVEL LT 7.0%: CPT | Performed by: INTERNAL MEDICINE

## 2021-07-29 PROCEDURE — G8754 DIAS BP LESS 90: HCPCS | Performed by: INTERNAL MEDICINE

## 2021-07-29 PROCEDURE — G9717 DOC PT DX DEP/BP F/U NT REQ: HCPCS | Performed by: INTERNAL MEDICINE

## 2021-07-29 PROCEDURE — 3017F COLORECTAL CA SCREEN DOC REV: CPT | Performed by: INTERNAL MEDICINE

## 2021-07-29 PROCEDURE — G8427 DOCREV CUR MEDS BY ELIG CLIN: HCPCS | Performed by: INTERNAL MEDICINE

## 2021-07-29 RX ORDER — BUMETANIDE 1 MG/1
1 TABLET ORAL
Qty: 30 TABLET | Refills: 4
Start: 2021-07-30 | End: 2022-03-21

## 2021-07-29 RX ORDER — DUTASTERIDE 0.5 MG/1
0.5 CAPSULE, LIQUID FILLED ORAL DAILY
Qty: 30 CAPSULE | Refills: 5
Start: 2021-07-29 | End: 2022-06-08 | Stop reason: SDUPTHER

## 2021-07-29 NOTE — PROGRESS NOTES
HISTORY OF PRESENT ILLNESS    Chief Complaint   Patient presents with    Follow-up     has ankle swelling - concered about a cough that comes and goes started a month ago        Presents for follow-up. Losing weight. Taking [place]. Wt Readings from Last 3 Encounters:   07/29/21 241 lb (109.3 kg)   03/30/21 250 lb (113.4 kg)   02/12/21 267 lb (121.1 kg)     Edema is controlled w bumex  1mg twice weekly on mouth every tues and sat. Cough for 1 mo. Mild. No wheezing. Spine injection Dr. Mónica Madrid helped leg pain. Did PT. Seeing Dr. Jose Henao next week about ongoing L shoulder pains. Prostate enlargement. Seeing Dr. Masoud Pierce. MRI showed some abnormal lesions. Biopsy 4/15/21 benign. PSA > 6 now. Taking Dutasteride now. Taking flomax. Diabetes Mellitus follow-up  Last hemoglobin a1c   Lab Results   Component Value Date/Time    Hemoglobin A1c 7.3 (H) 01/28/2021 10:34 AM    Hemoglobin A1c (POC) 6.8 07/29/2021 09:10 AM   medication compliance: compliant all of the time. Diabetic diet compliance: compliant most of the time. Taking insulin 40 units AM, 20 units PM.  Taking Novalog tid w meals as needed based on sliding scale often  On [place] now. Taking ozempic  Using [facility] with great improvement in glucose control.  7 day avg 118, 30 day avg 121  Home glucose monitoring:   He is using his continuous glucose monitor 8-10 times per day. Monitoring glucoses. fasting values range    Hypoglycemic episodes: No episodes in the past 3 months, but was prone to hypoglycemia prior to medication adjustments and dietary adjustments. Further diabetic ROS: no polyuria or polydipsia, no chest pain, dyspnea or TIA's, no numbness, tingling or pain in extremities.      Review of Systems   All other systems reviewed and are negative, except as noted in HPI    Past Medical and Surgical History   has a past medical history of Achilles bursitis (8/10/2020), Anxiety, AR (allergic rhinitis) (12/01/2008), Arthritis, Blind right eye, BPH (benign prostatic hyperplasia), CAD (coronary artery disease), CKD (chronic kidney disease) stage 3, GFR 30-59 ml/min, DM type 2 (diabetes mellitus, type 2) ([facility] [address] Dysthymia, Encounter for diabetic foot exam ([facility] [address] Essential hypertension with goal blood pressure less than 130/80 (9/28/2016), Hearing loss, Hypercholesteremia, Hypogonadism male (12/01/2008), Mild intermittent asthma without complication (71/15/9095), LEONA on CPAP (07/16/2010), Phimosis (5/26/2016), Severe obesity (BMI 35.0-39.9) (06/18/2018), Tarsal tunnel syndrome (8/10/2020), Trigger finger, and Type 2 diabetes with nephropathy ([facility] [address] (3/12/2018). has a past surgical history that includes hx rhinoplasty; hx retinal detachment repair (1984); hx heent (2012); pr cabg, artery-vein, three (2005); hx orthopaedic; hx cyst removal (5/12/2016); and hx circumcision (2016). reports that he is a non-smoker but has been exposed to tobacco smoke. He has been exposed to 0.00 packs per day for the past 10.00 years. He has never used smokeless tobacco. He reports current alcohol use of about 6.0 standard drinks of alcohol per week. He reports previous drug use. He was adopted. Family history is unknown by patient. Physical Exam   Nursing note and vitals reviewed. Blood pressure (!) 148/76, pulse (!) 57, temperature 97.7 °F (36.5 °C), temperature source Temporal, resp. rate 14, height 5' 9\" (1.753 m), weight 241 lb (109.3 kg), SpO2 98 %. Constitutional:  No distress. Eyes: Conjunctivae are normal.   Ears:  Hearing grossly intact  Cardiovascular: Normal rate. regular rhythm, no murmurs or gallops  No edema  Pulmonary/Chest: Effort normal.   CTAB  Musculoskeletal: moves all 4 extremities   Neurological: Alert and oriented to person, place, and time. Skin: No visible rash noted. Psychiatric: Normal mood and affect. Behavior is normal.     Diagnoses and all orders for this visit:    1.  Type 2 diabetes mellitus with microalbuminuria, with long-term current use of insulin ([facility] [address]  This condition is controlled on current medication regimen as written in medication list.  Medications refilled. He continues to benefit from a continuous glucose monitor. He is doing up to 5 insulin injections per day and monitoring glucoses 8 times per day. He had very poor and adequate glycemic control despite adjustments in insulin therapy and compliance. He does have some a.m. hypoglycemia which has been improved. -     AMB POC HEMOGLOBIN A1C    2. Stage 3 chronic kidney disease, unspecified whether stage 3a or 3b CKD (HCC)  Stable. Managed by nephrology  -     bumetanide (BUMEX) 1 mg tablet; Take 1 Tablet by mouth every Monday, Wednesday, Friday. Take 1 Tab twice weekly on mouth every tues and sat. 3. Cough  Mild cough for 1 week. Currently asymptomatic  Lungs clear. Return to clinic for further evaluation if new symptoms develop or if current symptoms worsen or fail to resolve. 4. Benign prostatic hyperplasia with urinary hesitancy  -     dutasteride (AVODART) 0.5 mg capsule; Take 1 Capsule by mouth daily. reviewed medications and side effects in detail    Return to clinic for further evaluation if new symptoms develop        Current Outpatient Medications   Medication Sig    dutasteride (AVODART) 0.5 mg capsule Take 1 Capsule by mouth daily.  bumetanide (BUMEX) 1 mg tablet Take 1 Tablet by mouth every Monday, Wednesday, Friday. Take 1 Tab twice weekly on mouth every tues and sat.  lisinopriL (PRINIVIL, ZESTRIL) 10 mg tablet TAKE 1 TABLET TWICE A DAY    escitalopram oxalate (LEXAPRO) 10 mg tablet TAKE 1 TABLET DAILY    Farxiga 5 mg tab tablet TAKE 1 TABLET DAILY    Ozempic 1 mg/dose (4 mg/3 mL) pnij INJECT 1 MG UNDER THE SKIN EVERY 7 DAYS    FreeStyle Belkis 14 Day Sensor kit 1 Units by Does Not Apply route every fourteen (14) days.     mupirocin (BACTROBAN) 2 % ointment mupirocin 2 % topical ointment  ezetimibe (ZETIA) 10 mg tablet TAKE 1 TABLET DAILY    gabapentin (NEURONTIN) 300 mg capsule Take 1 Capsule by mouth three (3) times daily. Max Daily Amount: 900 mg.    metFORMIN (GLUCOPHAGE) 1,000 mg tablet Take 1 Tab by mouth two (2) times daily (with meals).  Insulin Needles, Disposable, 31 gauge x 5/16\" ndle 5x daily with insulin. IDDMII. E11.21    rosuvastatin (Crestor) 10 mg tablet Take 1 Tab by mouth nightly.  aspirin delayed-release 81 mg tablet Take 1 Tab by mouth daily.  metoprolol tartrate (LOPRESSOR) 25 mg tablet TAKE 1 TABLET TWICE A DAY    insulin glargine U-300 conc (Toujeo SoloStar U-300 Insulin) 300 unit/mL (1.5 mL) inpn pen INJECT 40 UNITS EVERY MORNING AND 20 UNITS EVERY EVENING    semaglutide (OZEMPIC) 1 mg/dose (2 mg/1.5 mL) sub-q pen 1 mg by SubCUTAneous route every seven (7) days.  NovoLOG Flexpen U-100 Insulin 100 unit/mL (3 mL) inpn Take 10-30 units three times daily with meals per sliding scale    co-enzyme Q-10 (Co Q-10) 100 mg capsule Take 200 mg by mouth daily.  pyridoxine, vitamin B6, (Vitamin B-6) 100 mg tablet Take 200 mg by mouth daily.  b complex vitamins tablet Take 1 Tab by mouth daily.  ascorbic acid, vitamin C, (Vitamin C) 500 mg tablet Take 1,000 mg by mouth.  cholecalciferol, vitamin D3, (Vitamin D3) 50 mcg (2,000 unit) tab Take  by mouth. With vitamin K2.    magnesium oxide (MAG-OX) 400 mg tablet Take 400 mg by mouth two (2) times a day.  B.infantis-B.ani-B.long-B.bifi (Probiotic 4X) 10-15 mg TbEC Take  by mouth.  albuterol (PROVENTIL HFA, VENTOLIN HFA, PROAIR HFA) 90 mcg/actuation inhaler Take 2 Puffs by inhalation every four (4) hours as needed for Wheezing.  azelastine (ASTELIN) 137 mcg (0.1 %) nasal spray 1 [place] by Both Nostrils route two (2) times a day. Use in each nostril as directed    FreeStyle Belkis 14 Day Sensor kit 1 Units by Does Not Apply route every fourteen (14) days.  DMII  E11.29; E11.21    OMEGA-3 FATTY ACIDS/FISH OIL (FISH OIL EXTRA STRENGTH PO) Take 1,400 mg by mouth daily (after breakfast).  ketoconazole (NIZORAL) 2 % topical cream Apply  to affected area as needed for Skin Irritation.  VOLTAREN 1 % gel 2 g as needed.  desonide (TRIDESILON) 0.05 % topical lotion as needed. No current facility-administered medications for this visit.

## 2021-07-30 DIAGNOSIS — E78.00 HYPERCHOLESTEREMIA: Primary | ICD-10-CM

## 2021-08-05 LAB
ALBUMIN SERPL-MCNC: 4.3 G/DL (ref 3.8–4.8)
ALP SERPL-CCNC: 56 IU/L (ref 48–121)
ALT SERPL-CCNC: 23 IU/L (ref 0–44)
AST SERPL-CCNC: 23 IU/L (ref 0–40)
BILIRUB DIRECT SERPL-MCNC: 0.15 MG/DL (ref 0–0.4)
BILIRUB SERPL-MCNC: 0.4 MG/DL (ref 0–1.2)
CHOLEST SERPL-MCNC: 101 MG/DL (ref 100–199)
HDLC SERPL-MCNC: 39 MG/DL
LDLC SERPL CALC-MCNC: 45 MG/DL (ref 0–99)
PROT SERPL-MCNC: 6.5 G/DL (ref 6–8.5)
TRIGL SERPL-MCNC: 82 MG/DL (ref 0–149)
VLDLC SERPL CALC-MCNC: 17 MG/DL (ref 5–40)

## 2021-08-09 DIAGNOSIS — E78.00 HYPERCHOLESTEREMIA: Primary | ICD-10-CM

## 2021-08-10 ENCOUNTER — OFFICE VISIT (OUTPATIENT)
Dept: CARDIOLOGY CLINIC | Age: 71
End: 2021-08-10
Payer: MEDICARE

## 2021-08-10 VITALS
DIASTOLIC BLOOD PRESSURE: 62 MMHG | HEART RATE: 63 BPM | OXYGEN SATURATION: 96 % | HEIGHT: 69 IN | SYSTOLIC BLOOD PRESSURE: 144 MMHG | WEIGHT: 241 LBS | BODY MASS INDEX: 35.7 KG/M2

## 2021-08-10 DIAGNOSIS — E78.5 DYSLIPIDEMIA: ICD-10-CM

## 2021-08-10 DIAGNOSIS — I10 ESSENTIAL HYPERTENSION WITH GOAL BLOOD PRESSURE LESS THAN 130/80: ICD-10-CM

## 2021-08-10 DIAGNOSIS — I25.10 CORONARY ARTERY DISEASE INVOLVING NATIVE CORONARY ARTERY OF NATIVE HEART WITHOUT ANGINA PECTORIS: ICD-10-CM

## 2021-08-10 DIAGNOSIS — I65.23 BILATERAL CAROTID ARTERY STENOSIS: ICD-10-CM

## 2021-08-10 DIAGNOSIS — E11.21 TYPE 2 DIABETES WITH NEPHROPATHY (HCC): ICD-10-CM

## 2021-08-10 DIAGNOSIS — E66.01 SEVERE OBESITY (BMI 35.0-35.9 WITH COMORBIDITY) (HCC): Primary | ICD-10-CM

## 2021-08-10 DIAGNOSIS — G47.33 OSA (OBSTRUCTIVE SLEEP APNEA): ICD-10-CM

## 2021-08-10 PROCEDURE — G9717 DOC PT DX DEP/BP F/U NT REQ: HCPCS | Performed by: SPECIALIST

## 2021-08-10 PROCEDURE — 2022F DILAT RTA XM EVC RTNOPTHY: CPT | Performed by: SPECIALIST

## 2021-08-10 PROCEDURE — 99214 OFFICE O/P EST MOD 30 MIN: CPT | Performed by: SPECIALIST

## 2021-08-10 PROCEDURE — G8753 SYS BP > OR = 140: HCPCS | Performed by: SPECIALIST

## 2021-08-10 PROCEDURE — G8427 DOCREV CUR MEDS BY ELIG CLIN: HCPCS | Performed by: SPECIALIST

## 2021-08-10 PROCEDURE — G8417 CALC BMI ABV UP PARAM F/U: HCPCS | Performed by: SPECIALIST

## 2021-08-10 PROCEDURE — 1101F PT FALLS ASSESS-DOCD LE1/YR: CPT | Performed by: SPECIALIST

## 2021-08-10 PROCEDURE — G8754 DIAS BP LESS 90: HCPCS | Performed by: SPECIALIST

## 2021-08-10 PROCEDURE — 3017F COLORECTAL CA SCREEN DOC REV: CPT | Performed by: SPECIALIST

## 2021-08-10 PROCEDURE — G8536 NO DOC ELDER MAL SCRN: HCPCS | Performed by: SPECIALIST

## 2021-08-10 PROCEDURE — 3044F HG A1C LEVEL LT 7.0%: CPT | Performed by: SPECIALIST

## 2021-08-10 PROCEDURE — G0463 HOSPITAL OUTPT CLINIC VISIT: HCPCS | Performed by: SPECIALIST

## 2021-08-10 NOTE — PROGRESS NOTES
CARDIOLOGY OFFICE NOTE    Maged Oro MD, [address], [place], [address] [employer] [place]  Phone [phone]; Fax [phone]  Mobile [phone]   Voice Mail [phone]       ATTENTION:   This medical record was transcribed using an electronic medical records/speech recognition system. Although proofread, it may and can contain electronic, spelling and other errors. Corrections may be executed at a later time. Please feel free to contact us for any clarifications as needed. LAST VISIT: 7/25/19      ICD-10-CM ICD-9-CM   1. Severe obesity (BMI 35.0-35.9 with comorbidity) ([facility])  E66.01 278.01    Z68.35 V85.35   2. Coronary artery disease involving native coronary artery of native heart without angina pectoris  I25.10 414.01   3. Essential hypertension with goal blood pressure less than 130/80  I10 401.9   4. Type 2 diabetes with nephropathy ([facility])  E11.21 250.40     583.81   5. Dyslipidemia  E78.5 272.4   6. LEONA (obstructive sleep apnea)  G47.33 327.23   7. Bilateral carotid artery stenosis  I65.23 433.10     433.30            Shobha Alvarado is a 72 y.o. male with diabetes, hypercholesterolemia, CAD, LEONA, and HTN last seen by me 6 months ago    Cardiac risk factors: diabetes mellitus, male gender, hypertension  I have personally obtained the history from the patient. HISTORY OF PRESENTING ILLNESS     Shobha Alvarado is doing well he is lost a significant weight. He has no chest pain or shortness of breath. He is almost 20 years out from his bypass surgery. All cardiac testing done in the past has been good and he is working on risk factor modification.            ACTIVE PROBLEM LIST     Patient Active Problem List    Diagnosis Date Noted    Trigger finger     Arthritis     Blind right eye     BPH (benign prostatic hyperplasia)     Anxiety     Hearing loss     Hypercholesteremia     Leg length inequality 08/10/2020    Primary localized osteoarthrosis of ankle and foot 08/10/2020    Tarsal tunnel syndrome 08/10/2020    Blood type A+ 08/10/2020    CKD (chronic kidney disease) stage 3, GFR 30-59 ml/min ([facility]) 01/07/2020    Type 2 diabetes [facility]ropathy [facility] 10/30/2019    Dysthymia 09/26/2018    Type 2 diabetes mellitus with microalbuminuria, with long-term current use of insulin ([facility] 03/12/2018    Pure hypercholesterolemia 05/04/2017    Mild intermittent asthma without complication 65/92/7125    Advance care planning 01/24/2017    Essential hypertension with goal blood pressure less than 130/80 09/28/2016    S/P excision of lipoma 05/25/2016    S/P excision of skin lesion, follow-up exam 05/25/2016    LEONA on CPAP 07/16/2010    CAD (coronary artery disease) 12/01/2008    Hypogonadism male 12/01/2008    AR (allergic rhinitis) 12/01/2008    ED (erectile dysfunction) 12/01/2008           PAST MEDICAL HISTORY     Past Medical History:   Diagnosis Date    Achilles bursitis 8/10/2020    Anxiety     AR (allergic rhinitis) 12/01/2008    Dr. Chaya Dickson. gets  shots    Arthritis     Blind right eye     BPH (benign prostatic hyperplasia)     Dr. Ronnie Littlejohn. biopsy 4/15/21 benign    CAD (coronary artery disease)     Dr. Norm Torres.  s/p CABG - 3 vessel    CKD (chronic kidney disease) stage 3, GFR 30-59 ml/min ([facility])     Dr Sally Handley 2020    DM type 2 (diabetes mellitus, type 2) ([facility]     30 years    Dysthymia     Encounter for diabetic foot exam [facility])     sees Dr. Estrella Chahal hypertension with goal blood pressure less than 130/80 9/28/2016    Hearing loss     hearing aids.  Hypercholesteremia     Hypogonadism male 12/01/2008    Dr. Ronnie Littlejohn.  started Aveed 2020    Mild intermittent asthma without complication 70/36/5853    sees allergy Dr. Sera Bhagat LEONA on CPAP 07/16/2010    Dr. Felix Talbot    Phimosis 5/26/2016    Severe obesity (BMI 35.0-39.9) 06/18/2018    Tarsal tunnel syndrome 8/10/2020    Trigger finger     injections done summer 2020. Dr. Kurtis Wheatley.  Type 2 diabetes with nephropathy ([facility] 3/12/2018           PAST SURGICAL HISTORY     Past Surgical History:   Procedure Laterality Date    HX CIRCUMCISION  2016    HX CYST REMOVAL  5/12/2016    neck and chest    HX HEENT  2012    right eye prosthesis    HX ORTHOPAEDIC      right shoulder surgery, rotator cuff repair    HX RETINAL DETACHMENT REPAIR  1984    R Blindness    HX RHINOPLASTY      septoplasty    [place] CABG, ARTERY-VEIN, THREE  2005    [place]          ALLERGIES     Allergies   Allergen Reactions    Cefdinir Rash    Codeine Nausea Only          FAMILY HISTORY     Family History   Adopted: Yes   Family history unknown: Yes    negative for cardiac disease       SOCIAL HISTORY     Social History     Socioeconomic History    Marital status: SINGLE     Spouse name: Hubert Ashton Number of children: 0    Years of education: Not on file    Highest education level: Associate degree: academic program   Occupational History     Comment: Technology     Comment: Hotel   Tobacco Use    Smoking status: Passive Smoke Exposure - Never Smoker    Smokeless tobacco: Never Used   Substance and Sexual Activity    Alcohol use: Yes     Alcohol/week: 6.0 standard drinks     Types: 6 Cans of beer per week     Comment: 1 beer or so per day    Drug use: Not Currently     Comment: occasionally smoked marijuana in the past    Sexual activity: Not Currently     Partners: Male     Social Determinants of Health     Financial Resource Strain:     Difficulty of Paying Living Expenses:    Food Insecurity:     Worried About Running Out of Food in the Last Year:     Ran Out of Food in the Last Year:    Transportation Needs:     Lack of Transportation (Medical):      Lack of Transportation (Non-Medical):    Physical Activity:     Days of Exercise per Week:     Minutes of Exercise per Session:    Stress:     Feeling of Stress :    Social Connections:     Frequency of Communication with Friends and Family:     Frequency of Social Gatherings with Friends and Family:     Attends [employer] Services:     Active Member of Clubs or Organizations:     Attends Club or Organization Meetings:     Marital Status:          MEDICATIONS     Current Outpatient Medications   Medication Sig    dutasteride (AVODART) 0.5 mg capsule Take 1 Capsule by mouth daily.  bumetanide (BUMEX) 1 mg tablet Take 1 Tablet by mouth every Monday, Wednesday, Friday. Take 1 Tab twice weekly on mouth every tues and sat.  lisinopriL (PRINIVIL, ZESTRIL) 10 mg tablet TAKE 1 TABLET TWICE A DAY    escitalopram oxalate (LEXAPRO) 10 mg tablet TAKE 1 TABLET DAILY    Farxiga 5 mg tab tablet TAKE 1 TABLET DAILY    Ozempic 1 mg/dose (4 mg/3 mL) pnij INJECT 1 MG UNDER THE SKIN EVERY 7 DAYS    mupirocin (BACTROBAN) 2 % ointment mupirocin 2 % topical ointment    ezetimibe (ZETIA) 10 mg tablet TAKE 1 TABLET DAILY    gabapentin (NEURONTIN) 300 mg capsule Take 1 Capsule by mouth three (3) times daily. Max Daily Amount: 900 mg.    metFORMIN (GLUCOPHAGE) 1,000 mg tablet Take 1 Tab by mouth two (2) times daily (with meals).  Insulin Needles, Disposable, 31 gauge x 5/16\" ndle 5x daily with insulin. IDDMII. E11.21    rosuvastatin (Crestor) 10 mg tablet Take 1 Tab by mouth nightly.  aspirin delayed-release 81 mg tablet Take 1 Tab by mouth daily.  metoprolol tartrate (LOPRESSOR) 25 mg tablet TAKE 1 TABLET TWICE A DAY    insulin glargine U-300 conc (Toujeo SoloStar U-300 Insulin) 300 unit/mL (1.5 mL) inpn pen INJECT 40 UNITS EVERY MORNING AND 20 UNITS EVERY EVENING    NovoLOG Flexpen U-100 Insulin 100 unit/mL (3 mL) inpn Take 10-30 units three times daily with meals per sliding scale    co-enzyme Q-10 (Co Q-10) 100 mg capsule Take 200 mg by mouth daily.  pyridoxine, vitamin B6, (Vitamin B-6) 100 mg tablet Take 200 mg by mouth daily.  b complex vitamins tablet Take 1 Tab by mouth daily.     ascorbic acid, vitamin C, (Vitamin C) 500 mg tablet Take 1,000 mg by mouth.  cholecalciferol, vitamin D3, (Vitamin D3) 50 mcg (2,000 unit) tab Take  by mouth. With vitamin K2.    magnesium oxide (MAG-OX) 400 mg tablet Take 400 mg by mouth two (2) times a day.  B.infantis-B.ani-B.long-B.bifi (Probiotic 4X) 10-15 mg TbEC Take  by mouth.  albuterol (PROVENTIL HFA, VENTOLIN HFA, PROAIR HFA) 90 mcg/actuation inhaler Take 2 Puffs by inhalation every four (4) hours as needed for Wheezing.  azelastine (ASTELIN) 137 mcg (0.1 %) nasal spray 1 [place] by Both Nostrils route two (2) times a day. Use in each nostril as directed    FreeStyle Belkis 14 Day Sensor kit 1 Units by Does Not Apply route every fourteen (14) days. DMII  E11.29; E11.21    OMEGA-3 FATTY ACIDS/FISH OIL (FISH OIL EXTRA STRENGTH PO) Take 1,400 mg by mouth daily (after breakfast).  ketoconazole (NIZORAL) 2 % topical cream Apply  to affected area as needed for Skin Irritation.  VOLTAREN 1 % gel 2 g as needed.  desonide (TRIDESILON) 0.05 % topical lotion as needed.  FreeStyle Belkis 14 Day Sensor kit 1 Units by Does Not Apply route every fourteen (14) days. (Patient not taking: Reported on 8/10/2021)    semaglutide (OZEMPIC) 1 mg/dose (2 mg/1.5 mL) sub-q pen 1 mg by SubCUTAneous route every seven (7) days. (Patient not taking: Reported on 8/10/2021)     No current facility-administered medications for this visit. I have reviewed the nurses notes, vitals, problem list, allergy list, medical history, family, social history and medications. REVIEW OF SYMPTOMS   Pertinent positive per HPI  General: Pt denies excessive weight gain or loss. Pt is able to conduct ADL's  HEENT: Denies blurred vision, headaches, hearing loss, epistaxis and difficulty swallowing. Respiratory: Denies cough or stridor. +SOB, wheezing  Cardiovascular: Denies, palpitations.  >1+ edema  Gastrointestinal: Denies poor appetite, indigestion, abdominal pain or blood in stool  Genitourinary: Denies hematuria, dysuria, increased urinary frequency  Musculoskeletal: Denies joint pain or swelling from muscles or joints  Neurologic: Denies tremor, paresthesias, headache, or sensory motor disturbance  Psychiatric: Denies confusion, insomnia, depression  Integumentray: Denies rash, itching or ulcers. Hematologic: Denies easy bruising, bleeding     PHYSICAL EXAMINATION      Visit Vitals  BP (!) 144/62 (BP 1 Location: Left upper arm, BP Patient Position: Sitting, BP Cuff Size: Adult)   Pulse 63   Ht 5' 9\" (1.753 m)   Wt 241 lb (109.3 kg)   SpO2 96%   BMI 35.59 kg/m²         General: Well developed, in no acute distress. HEENT: No jaundice, oral mucosa moist, no oral ulcers  Neck: Supple, no stiffness, no lymphadenopathy, supple  Heart:  RRR  Respiratory: Clear bilaterally x 4, no wheezing or rales  Abdomen:  Protuberant   Extremities:  No  normal cap refill, no cyanosis. no pedal edema;discolorization from hemosiderin. Musculoskeletal: No clubbing, no deformities  Neuro: A&Ox3, speech clear, gait stable, cooperative, no focal neurologic deficits  Skin: Skin color is normal. No rashes or lesions. Non diaphoretic, moist.        EKG: Sinus brachycardia with first degree AV block of 216 seconds. DIAGNOSTIC DATA     1. Stress Test   NM- 3/17/15- no ischemia, EF 65%   NM- 5/29/19-· Abnormal stress myocardial perfusion with very small area of mild perfusion defect of the mid anterior wall and mid infeiror wall with SDS 2 which is reversible. This suggest very small area of focal ischemia. Abnormal septal motion consistent with prior cardiac surgery. Normal wall motion with normal LV function with LVEF 67%. · Excellent functional capacity. · Compared to prior study of 3/17/2015, there is minimal change. The small anterior mid wall stress perfusion defect may be new     2. LE Venous Doppler   6/30/18- no DVT isaak     3.  Lipids   3/26/19- , HDL 51, ,    7/3/19- , HDL 43, LDL 85,    1/3/20- TC 91, HDL 35, LDL 28.4,    7/14/20- TC 75, HDL 32, LDL 17,    1/28/21- TC 95, HDL 37, LDL 36,    8/4/21-, HDL 39, LDL 45, TG 82             4. Carotid Doppler   5/31/19- 0-9% R/L     5. Echo   5/29/19- EF 61-65%         LABORATORY DATA            Lab Results   Component Value Date/Time    WBC 6.7 08/03/2020 11:25 AM    HGB 13.8 08/03/2020 11:25 AM    HCT 43.7 08/03/2020 11:25 AM    PLATELET 868 (L) 50/20/3344 11:25 AM    MCV 88.8 08/03/2020 11:25 AM      Lab Results   Component Value Date/Time    Sodium 136 03/30/2021 09:34 AM    Potassium 4.7 03/30/2021 09:34 AM    Chloride 104 03/30/2021 09:34 AM    CO2 28 03/30/2021 09:34 AM    Anion gap 4 (L) 03/30/2021 09:34 AM    Glucose 94 03/30/2021 09:34 AM    BUN 20 03/30/2021 09:34 AM    Creatinine 1.12 03/30/2021 09:34 AM    BUN/Creatinine ratio 18 03/30/2021 09:34 AM    GFR est AA >60 03/30/2021 09:34 AM    GFR est non-AA >60 03/30/2021 09:34 AM    Calcium 9.0 03/30/2021 09:34 AM    Bilirubin, total 0.4 08/04/2021 02:59 PM    Alk. phosphatase 56 08/04/2021 02:59 PM    Protein, total 6.5 08/04/2021 02:59 PM    Albumin 4.3 08/04/2021 02:59 PM    Globulin 3.2 03/30/2021 09:34 AM    A-G Ratio 1.2 03/30/2021 09:34 AM    ALT (SGPT) 23 08/04/2021 02:59 PM           ASSESSMENT/RECOMMENDATIONS:.        1. CAD, S/p CABG 2005. -Working on weight loss and did a great job now just has to work on exercise    2. HTN  - BP borderline elevated today but I am not making adjustments his medicine I would prefer he work on exercise and diet and follow his blood pressures at home. 3. Dyslipidemia  - LDL is excellent continue diet low in red meat return in 6 months with a cholesterol profile    4. DM  - On insulin. - A1c has been in the 6 range now which is better for him    5. LEONA  -Compliant with CPAP. 6. Carotid disease  - Carotids were normal, 0-9% bilaterally.      7. Leg pain   -Muscle pain but CPKs been negative question if he has any lower back pain  -walking improves the pain. -will do JAMEEL    Return in 6 months or PRN      Orders Placed This Encounter    LIPID PANEL     Standing Status:   Future     Standing Expiration Date:   8/10/2022    HEPATIC FUNCTION PANEL     Standing Status:   Future     Standing Expiration Date:   8/10/2022          Follow-up and Dispositions  ·   Return in about 6 months (around 2/10/2022). I have discussed the diagnosis with  Elenor Pill and the intended plan as seen in the above orders. Questions were answered concerning future plans. I have discussed medication side effects and warnings with the patient as well. Thank you,  Rosita Kiser MD for involving me in the care of  Elenor Pill. Please do not hesitate to contact me for further questions/concerns. Maged Banks MD, [facility]    Patient Care Team:  Rosita Kiser MD as PCP - General (Internal Medicine)  Rosita Kiser MD as PCP - [facility]  Brent Bergman MD as Surgeon (Surgery)  Ivor Galeazzi, MD (Urology)  Julien Potts MD (Cardiology)  Mariel Urias MD (Allergy)  Darío El DPM (Podiatry)  Roxy Nageotte, MD (Gastroenterology)  Alistair Jauregui (Ophthalmology)    [address] [facility]      [phone] / [phone] Fax

## 2021-08-10 NOTE — PROGRESS NOTES
Yvonne Ferguson is a 72 y.o. male    Visit Vitals  BP (!) 144/62 (BP 1 Location: Left upper arm, BP Patient Position: Sitting, BP Cuff Size: Adult)   Pulse 63   Ht 5' 9\" (1.753 m)   Wt 241 lb (109.3 kg)   SpO2 96%   BMI 35.59 kg/m²       Chief Complaint   Patient presents with    Coronary Artery Disease    Hypertension    Cholesterol Problem       Chest pain NO  SOB NO  Dizziness NO  Swelling ANKLES  Recent hospital visit NO  Refills NO    HAD COVID VACCINE 2/24/21, 3/24/21 ELODIA

## 2021-10-08 ENCOUNTER — TELEPHONE (OUTPATIENT)
Dept: INTERNAL MEDICINE CLINIC | Age: 71
End: 2021-10-08

## 2021-10-08 NOTE — TELEPHONE ENCOUNTER
Spoke with pt and informed him the Dr Tamanna Iverson is booked until the middle of this month, but gave him the number to wound care at [facility] (same building as our practice), and told him to send pics through his mychart of the wound on his leg. Pt states understanding.

## 2021-10-08 NOTE — TELEPHONE ENCOUNTER
Patient was calling to say that he had spoken to Dr Chu Felix and was told to come in to see him about some brusises he has. Patient was asking for appointment next Monday or Tuesday, Chu Felix is filled - patient did NOT want to see NP at all but is requesting a call back from the nurse ASAP.

## 2021-10-15 ENCOUNTER — HOSPITAL ENCOUNTER (OUTPATIENT)
Dept: WOUND CARE | Age: 71
Discharge: HOME | End: 2021-10-15
Payer: MEDICARE

## 2021-10-15 VITALS
TEMPERATURE: 97.9 F | BODY MASS INDEX: 35.55 KG/M2 | SYSTOLIC BLOOD PRESSURE: 143 MMHG | WEIGHT: 240 LBS | DIASTOLIC BLOOD PRESSURE: 65 MMHG | RESPIRATION RATE: 16 BRPM | HEART RATE: 67 BPM | HEIGHT: 69 IN

## 2021-10-15 PROCEDURE — 99202 OFFICE O/P NEW SF 15 MIN: CPT | Performed by: PODIATRIST

## 2021-10-15 NOTE — DISCHARGE INSTRUCTIONS
Discharge Instructions for  [facility]  [address] [place], [address] [address]  Telephone: [phone]    NAME:  Maged Banks  YOB: 1950     Call us with any wound concerns      PLEASE NOTE: IF YOU ARE UNABLE TO OBTAIN WOUND SUPPLIES, CONTINUE TO USE THE SUPPLIES YOU HAVE AVAILABLE UNTIL YOU ARE ABLE TO [facility]. IT IS MOST IMPORTANT TO KEEP THE WOUND COVERED AT ALL TIMES.      Physician Signature:_______________________  Dr. Marina Goes

## 2021-10-15 NOTE — WOUND CARE
Brett Fragoso DPM - Drucie Sicard K. 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 - Wound appeared all healed. - Advised patient to continue taking diuretics. - Advised patient to elevate legs when possible  - Recommend wearing compression stockings for edema control.  - F/U 1 week. Other Measures     Measures #154 and #155  Fall Management  - Recent falls? 0  - Balance/Gait Assessment Get Up & Go Test  o Seconds taken to stand and walk 3 meters from chair: [address] [employer] Screening and Education sheet explained and given? yes    Measure #226 - Patient Tobacco History   No history        Subjective:  Pt complains of wound to right anterior leg. States he went to his PCP who wrote him some antibiotic ointment and recommend following up at the [address] [place] [address]. Wound scabbed over and flaked off in the meantime. Negative for  for fever, chills, nausea, vomiting, chest pain, shortness of breath. ROS:  Consitutional: no weight loss, night sweats, fatigue / malaise / lethargy. Musculoskeletal: no joint / extremity pain, misalignment, stiffness, decreased ROM, crepitus. Integument: B/L anterior leg discoloration, No pruritis, rashes, lesions. Psychiatric: No depression, anxiety, paranoia      History:  wound care  Allergies   Allergen Reactions    Cefdinir Rash    Codeine Nausea Only     Family History   Adopted: Yes   Family history unknown: Yes      Past Medical History:   Diagnosis Date    Achilles bursitis 8/10/2020    Anxiety     AR (allergic rhinitis) 12/01/2008    Dr. Shellie John   gets  shots  Arthritis     Blind right eye     BPH (benign prostatic hyperplasia)     Dr. Marco De La Garza. biopsy 4/15/21 benign    CAD (coronary artery disease)     Dr. Brett Perkins.  s/p CABG - 3 vessel    CKD (chronic kidney disease) stage 3, GFR 30-59 ml/min ([facility])     Dr Lynsey Gibson 2020    DM type 2 (diabetes mellitus, type 2) ([facility] [address]     30 years    Dysthymia     Encounter for diabetic foot exam [facility])     sees Dr. Pedro Pablo Lund hypertension with goal blood pressure less than 130/80 9/28/2016    Hearing loss     hearing aids.  Hypercholesteremia     Hypogonadism male 12/01/2008    Dr. Marco De La Garza.  started Aveed 2020    Mild intermittent asthma without complication 59/82/7954    sees allergy Dr. Roxanne Bae LEONA on CPAP 07/16/2010    Dr. Raúl Ledesma    Phimosis 5/26/2016    Severe obesity (BMI 35.0-39.9) 06/18/2018    Tarsal tunnel syndrome 8/10/2020    Trigger finger     injections done summer 2020. Dr. Debra Borrero.  Type 2 diabetes mellitus with stage 3 chronic kidney disease, with long-term current use of insulin, unspecified whether stage 3a or 3b CKD ([facility] [address]      Past Surgical History:   Procedure Laterality Date    HX CIRCUMCISION  2016    HX CYST REMOVAL  5/12/2016    neck and chest    HX HEENT  2012    right eye prosthesis    HX ORTHOPAEDIC      right shoulder surgery, rotator cuff repair    HX RETINAL DETACHMENT REPAIR  1984    R Blindness    HX RHINOPLASTY      septoplasty    MS CABG, ARTERY-VEIN, THREE  2005    [place]     Social History     Tobacco Use    Smoking status: Passive Smoke Exposure - Never Smoker    Smokeless tobacco: Never Used   Substance Use Topics    Alcohol use:  Yes     Alcohol/week: 6.0 standard drinks     Types: 6 Cans of beer per week     Comment: 1 beer or so per day       Social History     Substance and Sexual Activity   Alcohol Use Yes    Alcohol/week: 6.0 standard drinks    Types: 6 Cans of beer per week    Comment: 1 beer or so per day     Social History     Substance and Sexual Activity   Drug Use Not Currently    Comment: occasionally smoked marijuana in the past      Social History     Tobacco Use   Smoking Status Passive Smoke Exposure - Never Smoker   Smokeless Tobacco Never Used     Current Outpatient Medications   Medication Sig    dutasteride (AVODART) 0.5 mg capsule Take 1 Capsule by mouth daily.  bumetanide (BUMEX) 1 mg tablet Take 1 Tablet by mouth every Monday, Wednesday, Friday. Take 1 Tab twice weekly on mouth every tues and sat.  lisinopriL (PRINIVIL, ZESTRIL) 10 mg tablet TAKE 1 TABLET TWICE A DAY    escitalopram oxalate (LEXAPRO) 10 mg tablet TAKE 1 TABLET DAILY    Farxiga 5 mg tab tablet TAKE 1 TABLET DAILY    Ozempic 1 mg/dose (4 mg/3 mL) pnij INJECT 1 MG UNDER THE SKIN EVERY 7 DAYS    FreeStyle Belkis 14 Day Sensor kit 1 Units by Does Not Apply route every fourteen (14) days.  mupirocin (BACTROBAN) 2 % ointment mupirocin 2 % topical ointment    ezetimibe (ZETIA) 10 mg tablet TAKE 1 TABLET DAILY    gabapentin (NEURONTIN) 300 mg capsule Take 1 Capsule by mouth three (3) times daily. Max Daily Amount: 900 mg.    metFORMIN (GLUCOPHAGE) 1,000 mg tablet Take 1 Tab by mouth two (2) times daily (with meals).  rosuvastatin (Crestor) 10 mg tablet Take 1 Tab by mouth nightly.  aspirin delayed-release 81 mg tablet Take 1 Tab by mouth daily.  metoprolol tartrate (LOPRESSOR) 25 mg tablet TAKE 1 TABLET TWICE A DAY    insulin glargine U-300 conc (Toujeo SoloStar U-300 Insulin) 300 unit/mL (1.5 mL) inpn pen INJECT 40 UNITS EVERY MORNING AND 20 UNITS EVERY EVENING    co-enzyme Q-10 (Co Q-10) 100 mg capsule Take 200 mg by mouth daily.  pyridoxine, vitamin B6, (Vitamin B-6) 100 mg tablet Take 200 mg by mouth daily.  b complex vitamins tablet Take 1 Tab by mouth daily.  ascorbic acid, vitamin C, (Vitamin C) 500 mg tablet Take 1,000 mg by mouth.  cholecalciferol, vitamin D3, (Vitamin D3) 50 mcg (2,000 unit) tab Take  by mouth.  With vitamin K2.    magnesium oxide (MAG-OX) 400 mg tablet Take 400 mg by mouth two (2) times a day.  B.infantis-B.ani-B.long-B.bifi (Probiotic 4X) 10-15 mg TbEC Take  by mouth.  azelastine (ASTELIN) 137 mcg (0.1 %) nasal spray 1 Slater by Both Nostrils route two (2) times a day. Use in each nostril as directed    FreeStyle Belkis 14 Day Sensor kit 1 Units by Does Not Apply route every fourteen (14) days. DMII  E11.29; E11.21    OMEGA-3 FATTY ACIDS/FISH OIL (FISH OIL EXTRA STRENGTH PO) Take 1,400 mg by mouth daily (after breakfast).  VOLTAREN 1 % gel 2 g as needed.  desonide (TRIDESILON) 0.05 % topical lotion as needed.  Insulin Needles, Disposable, 31 gauge x 5/16\" ndle 5x daily with insulin. IDDMII. E11.21    NovoLOG Flexpen U-100 Insulin 100 unit/mL (3 mL) inpn Take 10-30 units three times daily with meals per sliding scale    albuterol (PROVENTIL HFA, VENTOLIN HFA, PROAIR HFA) 90 mcg/actuation inhaler Take 2 Puffs by inhalation every four (4) hours as needed for Wheezing.  ketoconazole (NIZORAL) 2 % topical cream Apply  to affected area as needed for Skin Irritation. No current facility-administered medications for this encounter. Objective:  Visit Vitals  BP (!) 143/65 (BP 1 Location: Right upper arm, BP Patient Position: At rest)   Pulse 67   Temp 97.9 °F (36.6 °C)   Resp 16   Ht 5' 9\" (1.753 m)   Wt 108.9 kg (240 lb)   BMI 35.44 kg/m²       Vascular:  B/L LE  DP 2/4; PT 2/4  capillary fill time brisk, pitting edema is present, skin temperature is cool, varicosities are present. Dermatological:    B/L anterior leg discoloration from edema       Nails are thickened, elongated, discolored, painful to palpation, 3mm thick, with subungual debris. Skin is dry and scaly, exhibits hemosiderin deposition. There is no maceration of the interspaces of the feet b/l.       Neurological:  DTR are present, protective sensation per 5.07 Morven Rylee monofilament is intact , patient is AAOx3, mood is normal. Epicritic sensation is intact. Orthopedic:  B/L LE are symmetric, ROM of ankle, STJ, 1st MTPJ is limited, MMT 5 out of 5 for B/L LE. Constitutional: Pt is a well developed, elderly thin male    Lymphatics: negative tenderness to palpation of neck/axillary/inguinal nodes.

## 2021-10-20 ENCOUNTER — TELEPHONE (OUTPATIENT)
Dept: INTERNAL MEDICINE CLINIC | Age: 71
End: 2021-10-20

## 2021-10-20 NOTE — TELEPHONE ENCOUNTER
[facility] is calling to request medical notes from 07/29 - y signature required.

## 2021-10-27 ENCOUNTER — TELEPHONE (OUTPATIENT)
Dept: INTERNAL MEDICINE CLINIC | Age: 71
End: 2021-10-27

## 2021-10-27 NOTE — TELEPHONE ENCOUNTER
Bev from [facility] called to state they need the patient's clinical notes from his office visit on 7/29/21 signed and dated by the doctor. Please fax it to [phone]. Jovani Vallecillo stated they had faxed over a request for the notes but have not heard back.

## 2021-11-09 DIAGNOSIS — E11.21 TYPE 2 DIABETES WITH NEPHROPATHY (HCC): ICD-10-CM

## 2021-11-10 RX ORDER — GABAPENTIN 300 MG/1
CAPSULE ORAL
Qty: 270 CAPSULE | Refills: 1 | Status: SHIPPED | OUTPATIENT
Start: 2021-11-10 | End: 2022-03-28

## 2021-11-15 DIAGNOSIS — R42 POSTURAL DIZZINESS WITH PRESYNCOPE: ICD-10-CM

## 2021-11-15 DIAGNOSIS — R55 POSTURAL DIZZINESS WITH PRESYNCOPE: ICD-10-CM

## 2021-11-15 RX ORDER — METOPROLOL TARTRATE 25 MG/1
TABLET, FILM COATED ORAL
Qty: 180 TABLET | Refills: 3 | Status: SHIPPED | OUTPATIENT
Start: 2021-11-15

## 2021-11-29 ENCOUNTER — OFFICE VISIT (OUTPATIENT)
Dept: INTERNAL MEDICINE CLINIC | Age: 71
End: 2021-11-29
Payer: MEDICARE

## 2021-11-29 VITALS
BODY MASS INDEX: 37.23 KG/M2 | RESPIRATION RATE: 14 BRPM | SYSTOLIC BLOOD PRESSURE: 130 MMHG | DIASTOLIC BLOOD PRESSURE: 67 MMHG | TEMPERATURE: 98.3 F | HEIGHT: 69 IN | OXYGEN SATURATION: 96 % | WEIGHT: 251.4 LBS | HEART RATE: 57 BPM

## 2021-11-29 DIAGNOSIS — Z79.4 TYPE 2 DIABETES MELLITUS WITH MICROALBUMINURIA, WITH LONG-TERM CURRENT USE OF INSULIN (HCC): Primary | ICD-10-CM

## 2021-11-29 DIAGNOSIS — R80.9 TYPE 2 DIABETES MELLITUS WITH MICROALBUMINURIA, WITH LONG-TERM CURRENT USE OF INSULIN (HCC): Primary | ICD-10-CM

## 2021-11-29 DIAGNOSIS — E78.00 PURE HYPERCHOLESTEROLEMIA: ICD-10-CM

## 2021-11-29 DIAGNOSIS — N18.30 STAGE 3 CHRONIC KIDNEY DISEASE, UNSPECIFIED WHETHER STAGE 3A OR 3B CKD (HCC): ICD-10-CM

## 2021-11-29 DIAGNOSIS — E11.29 TYPE 2 DIABETES MELLITUS WITH MICROALBUMINURIA, WITH LONG-TERM CURRENT USE OF INSULIN (HCC): Primary | ICD-10-CM

## 2021-11-29 LAB — HBA1C MFR BLD HPLC: 7.2 %

## 2021-11-29 PROCEDURE — G8536 NO DOC ELDER MAL SCRN: HCPCS | Performed by: INTERNAL MEDICINE

## 2021-11-29 PROCEDURE — G0463 HOSPITAL OUTPT CLINIC VISIT: HCPCS | Performed by: INTERNAL MEDICINE

## 2021-11-29 PROCEDURE — G8417 CALC BMI ABV UP PARAM F/U: HCPCS | Performed by: INTERNAL MEDICINE

## 2021-11-29 PROCEDURE — 99214 OFFICE O/P EST MOD 30 MIN: CPT | Performed by: INTERNAL MEDICINE

## 2021-11-29 PROCEDURE — G8427 DOCREV CUR MEDS BY ELIG CLIN: HCPCS | Performed by: INTERNAL MEDICINE

## 2021-11-29 PROCEDURE — 1101F PT FALLS ASSESS-DOCD LE1/YR: CPT | Performed by: INTERNAL MEDICINE

## 2021-11-29 PROCEDURE — G8754 DIAS BP LESS 90: HCPCS | Performed by: INTERNAL MEDICINE

## 2021-11-29 PROCEDURE — G9717 DOC PT DX DEP/BP F/U NT REQ: HCPCS | Performed by: INTERNAL MEDICINE

## 2021-11-29 PROCEDURE — 3017F COLORECTAL CA SCREEN DOC REV: CPT | Performed by: INTERNAL MEDICINE

## 2021-11-29 PROCEDURE — 2022F DILAT RTA XM EVC RTNOPTHY: CPT | Performed by: INTERNAL MEDICINE

## 2021-11-29 PROCEDURE — G8752 SYS BP LESS 140: HCPCS | Performed by: INTERNAL MEDICINE

## 2021-11-29 PROCEDURE — 83036 HEMOGLOBIN GLYCOSYLATED A1C: CPT | Performed by: INTERNAL MEDICINE

## 2021-11-29 NOTE — PROGRESS NOTES
HISTORY OF PRESENT ILLNESS    Chief Complaint   Patient presents with    Labs     Fasting.  Diabetes     4 mo f/u       Presents for follow-up  Seeing nephrology Dr. Tara Mendiola 1/28/22  Seeing Dr. Karl Robin 2/2022 for lipids    Allergy injections going well. Diabetes Mellitus follow-up  Last hemoglobin a1c   Lab Results   Component Value Date/Time    Hemoglobin A1c 7.3 (H) 01/28/2021 10:34 AM    Hemoglobin A1c (POC) 7.2 11/29/2021 08:53 AM     No components found for: POCA1C, HBA1C POC  medication compliance: compliant all of the time. taking Ozempic 1 mg/week, Farxiga 10 mg daily, metformin 1000 mg bid and Toujeo 40, 20 units, rare novoalog. Diabetic diet compliance: compliant most of the time. Home glucose monitoring: fasting values range: did not bring. Hypoglycemic episodes:  None. Further diabetic ROS: no polyuria or polydipsia, no chest pain, dyspnea or TIA's. Hypertension  Hypertension ROS: taking medications as instructed, no medication side effects noted, no TIA's, no chest pain on exertion, no dyspnea on exertion, no swelling of ankles     reports that he is a non-smoker but has been exposed to tobacco smoke. He has been exposed to 0.00 packs per day for the past 10.00 years. He has never used smokeless tobacco.    reports current alcohol use of about 6.0 standard drinks of alcohol per week. BP Readings from Last 2 Encounters:   11/29/21 130/67   10/15/21 (!) 143/65     Hyperlipidemia  Currently he takes crestor 10 mg, Zetia. ROS: taking medications as instructed, no medication side effects noted  No new myalgias, no joint pains, no weakness  No TIA's, no chest pain on exertion, no dyspnea on exertion, no swelling of ankles.    Lab Results   Component Value Date/Time    Cholesterol, total 101 08/04/2021 02:59 PM    HDL Cholesterol 39 (L) 08/04/2021 02:59 PM    LDL, calculated 45 08/04/2021 02:59 PM    LDL, calculated 17 07/14/2020 09:11 AM    VLDL, calculated 17 08/04/2021 02:59 PM VLDL, calculated 26 07/14/2020 09:11 AM    Triglyceride 82 08/04/2021 02:59 PM    CHOL/HDL Ratio 2.6 01/03/2020 08:23 AM       Review of Systems   All other systems reviewed and are negative, except as noted in HPI    Past Medical and Surgical History   has a past medical history of Achilles bursitis (8/10/2020), Anxiety, AR (allergic rhinitis) (12/01/2008), Arthritis, Blind right eye, BPH (benign prostatic hyperplasia), CAD (coronary artery disease), CKD (chronic kidney disease) stage 3, GFR 30-59 ml/min ([address] DM type 2 (diabetes mellitus, type 2) ([address] Dysthymia, Encounter for diabetic foot exam ([facility] [address] Essential hypertension with goal blood pressure less than 130/80 (9/28/2016), Hearing loss, Hypercholesteremia, Hypogonadism male (12/01/2008), Mild intermittent asthma without complication (80/43/8467), LEONA on CPAP (07/16/2010), Phimosis (5/26/2016), Severe obesity (BMI 35.0-39.9) (06/18/2018), Tarsal tunnel syndrome (8/10/2020), Trigger finger, and Type 2 diabetes mellitus with stage 3 chronic kidney disease, with long-term current use of insulin, unspecified whether stage 3a or 3b CKD ([address] has a past surgical history that includes hx rhinoplasty; hx retinal detachment repair (1984); hx heent (2012); pr cabg, artery-vein, three (2005); hx orthopaedic; hx cyst removal (5/12/2016); and hx circumcision (2016). reports that he is a non-smoker but has been exposed to tobacco smoke. He has been exposed to 0.00 packs per day for the past 10.00 years. He has never used smokeless tobacco. He reports current alcohol use of about 6.0 standard drinks of alcohol per week. He reports previous drug use. He was adopted. Family history is unknown by patient. Physical Exam   Nursing note and vitals reviewed. Blood pressure 130/67, pulse (!) 57, temperature 98.3 °F (36.8 °C), temperature source Oral, resp. rate 14, height 5' 9\" (1.753 m), weight 251 lb 6.4 oz (114 kg), SpO2 96 %. Constitutional:  No distress. Eyes: Conjunctivae are normal.   Ears:  Hearing grossly intact  Cardiovascular: Normal rate. regular rhythm, no murmurs or gallops  No edema  Pulmonary/Chest: Effort normal.   CTAB  Musculoskeletal: moves all 4 extremities   Neurological: Alert and oriented to person, place, and time. Skin: No visible rash noted. Psychiatric: Normal mood and affect. Behavior is normal.     Assessment and Plan    Diagnoses and all orders for this visit:    1. Type 2 diabetes mellitus with microalbuminuria, with long-term current use of insulin (HCC)  Remains borderline controlled. Slightly worsening. He states he can improve his diet. Continue current medications and repeat A1c in 3 months. -     AMB POC HEMOGLOBIN A1C  -     MICROALBUMIN, UR, RAND W/ MICROALB/CREAT RATIO; Future    2. Stage 3 chronic kidney disease, unspecified whether stage 3a or 3b CKD ([facility] [address]  Followed by nephrology. Labs will be repeated in January    3. Pure hypercholesterolemia  Well-controlled. Follow-up with cardiology      lab results and schedule of future lab studies reviewed with patient  reviewed medications and side effects in detail    Return to clinic for further evaluation if new symptoms develop        Current Outpatient Medications   Medication Sig    azelastine (ASTELIN) 137 mcg (0.1 %) nasal spray USE 1 SPRAY IN EACH NOSTRIL TWICE A DAY AS DIRECTED    metoprolol tartrate (LOPRESSOR) 25 mg tablet TAKE 1 TABLET TWICE A DAY    gabapentin (NEURONTIN) 300 mg capsule TAKE 1 CAPSULE THREE TIMES A DAY    FreeStyle Belkis 14 Day Sensor kit USE EVERY 14 DAYS    dutasteride (AVODART) 0.5 mg capsule Take 1 Capsule by mouth daily.  bumetanide (BUMEX) 1 mg tablet Take 1 Tablet by mouth every Monday, Wednesday, Friday. Take 1 Tab twice weekly on mouth every tues and sat.     lisinopriL (PRINIVIL, ZESTRIL) 10 mg tablet TAKE 1 TABLET TWICE A DAY    escitalopram oxalate (LEXAPRO) 10 mg tablet TAKE 1 TABLET DAILY    Farxiga 5 mg tab tablet TAKE 1 TABLET DAILY    Ozempic 1 mg/dose (4 mg/3 mL) pnij INJECT 1 MG UNDER THE SKIN EVERY 7 DAYS    mupirocin (BACTROBAN) 2 % ointment mupirocin 2 % topical ointment    ezetimibe (ZETIA) 10 mg tablet TAKE 1 TABLET DAILY    metFORMIN (GLUCOPHAGE) 1,000 mg tablet Take 1 Tab by mouth two (2) times daily (with meals).  Insulin Needles, Disposable, 31 gauge x 5/16\" ndle 5x daily with insulin. IDDMII. E11.21    rosuvastatin (Crestor) 10 mg tablet Take 1 Tab by mouth nightly.  aspirin delayed-release 81 mg tablet Take 1 Tab by mouth daily.  insulin glargine U-300 conc (Toujeo SoloStar U-300 Insulin) 300 unit/mL (1.5 mL) inpn pen INJECT 40 UNITS EVERY MORNING AND 20 UNITS EVERY EVENING    NovoLOG Flexpen U-100 Insulin 100 unit/mL (3 mL) inpn Take 10-30 units three times daily with meals per sliding scale    co-enzyme Q-10 (Co Q-10) 100 mg capsule Take 200 mg by mouth daily.  pyridoxine, vitamin B6, (Vitamin B-6) 100 mg tablet Take 200 mg by mouth daily.  b complex vitamins tablet Take 1 Tab by mouth daily.  ascorbic acid, vitamin C, (Vitamin C) 500 mg tablet Take 1,000 mg by mouth.  cholecalciferol, vitamin D3, (Vitamin D3) 50 mcg (2,000 unit) tab Take  by mouth. With vitamin K2.    magnesium oxide (MAG-OX) 400 mg tablet Take 400 mg by mouth two (2) times a day.  B.infantis-B.ani-B.long-B.bifi (Probiotic 4X) 10-15 mg TbEC Take  by mouth.  albuterol (PROVENTIL HFA, VENTOLIN HFA, PROAIR HFA) 90 mcg/actuation inhaler Take 2 Puffs by inhalation every four (4) hours as needed for Wheezing.  FreeStyle Belkis 14 Day Sensor kit 1 Units by Does Not Apply route every fourteen (14) days. DMII  E11.29; E11.21    OMEGA-3 FATTY ACIDS/FISH OIL (FISH OIL EXTRA STRENGTH PO) Take 1,400 mg by mouth daily (after breakfast).  ketoconazole (NIZORAL) 2 % topical cream Apply  to affected area as needed for Skin Irritation.  VOLTAREN 1 % gel 2 g as needed.     desonide (TRIDESILON) 0.05 % topical lotion as needed. No current facility-administered medications for this visit.

## 2021-11-30 LAB
COMMENT, HOLDF: NORMAL
CREAT UR-MCNC: 87.7 MG/DL
MICROALBUMIN UR-MCNC: 5.16 MG/DL
MICROALBUMIN/CREAT UR-RTO: 59 MG/G (ref 0–30)
SAMPLES BEING HELD,HOLD: NORMAL

## 2021-12-24 DIAGNOSIS — E11.21 TYPE 2 DIABETES WITH NEPHROPATHY (HCC): ICD-10-CM

## 2021-12-26 RX ORDER — INSULIN ASPART 100 [IU]/ML
INJECTION, SOLUTION INTRAVENOUS; SUBCUTANEOUS
Qty: 90 ML | Refills: 2 | Status: SHIPPED | OUTPATIENT
Start: 2021-12-26

## 2021-12-26 RX ORDER — INSULIN GLARGINE 300 U/ML
INJECTION, SOLUTION SUBCUTANEOUS
Qty: 18 ML | Refills: 3 | Status: SHIPPED | OUTPATIENT
Start: 2021-12-26

## 2021-12-27 RX ORDER — ROSUVASTATIN CALCIUM 10 MG/1
TABLET, COATED ORAL
Qty: 90 TABLET | Refills: 1 | Status: SHIPPED | OUTPATIENT
Start: 2021-12-27 | End: 2022-06-06 | Stop reason: SDUPTHER

## 2022-01-18 RX ORDER — EZETIMIBE 10 MG/1
TABLET ORAL
Qty: 90 TABLET | Refills: 1 | Status: SHIPPED | OUTPATIENT
Start: 2022-01-18 | End: 2022-07-03 | Stop reason: SDUPTHER

## 2022-01-26 ENCOUNTER — DOCUMENTATION ONLY (OUTPATIENT)
Dept: INTERNAL MEDICINE CLINIC | Age: 72
End: 2022-01-26

## 2022-01-26 NOTE — PROGRESS NOTES
PA SUBMITTED VIA COVER MY MEDS: Key: [id number] FOR NovoLOG Flexpen U-100 Insulin 100 unit/mL (3 mL) WAITING FOR RESPONSE BACK FROM INSURANCE

## 2022-01-28 ENCOUNTER — DOCUMENTATION ONLY (OUTPATIENT)
Dept: INTERNAL MEDICINE CLINIC | Age: 72
End: 2022-01-28

## 2022-01-28 NOTE — PROGRESS NOTES
PA SUBMITTED VIA COVER MY MEDS: Key: [id number] FOR NovoLOG Flexpen U-100 Insulin 100 unit/mL (3 mL) WAITING FOR RESPONSE BACK FROM INSURANCE         PA approved; Status:Approved; Review Type:Prior Auth; Coverage Start Date:12/28/2021; Coverage End Date:01/28/2023; AVST message sent to pt.

## 2022-01-31 ENCOUNTER — DOCUMENTATION ONLY (OUTPATIENT)
Dept: INTERNAL MEDICINE CLINIC | Age: 72
End: 2022-01-31

## 2022-01-31 NOTE — PROGRESS NOTES
Prior authorization request approval    Novolog flexpen 100/ml (3) Insuln pen has been approved from 12/28/2021 - 01/28/2023

## 2022-02-16 NOTE — PROGRESS NOTES
CARDIOLOGY OFFICE NOTE    Maged Lawson MD, [address], [place], [address] [facility]  Phone [phone]; Fax [phone]  Mobile [phone]   Voice Mail [phone]       ATTENTION:   This medical record was transcribed using an electronic medical records/speech recognition system. Although proofread, it may and can contain electronic, spelling and other errors. Corrections may be executed at a later time. Please feel free to contact us for any clarifications as needed. LAST VISIT: 7/25/19      ICD-10-CM ICD-9-CM   1. Severe obesity (BMI 35.0-35.9 with comorbidity) ([facility])  E66.01 278.01    Z68.35 V85.35   2. Severe obesity (BMI 35.0-39. 9) with comorbidity ([facility]  E66.01 278.01   3. Essential hypertension with goal blood pressure less than 130/80  I10 401.9   4. Type 2 diabetes with nephropathy ([facility])  E11.21 250.40     583.81   5. LEONA (obstructive sleep apnea)  G47.33 327.23   6. Bilateral carotid artery stenosis  I65.23 433.10     433.30   7. Dyslipidemia  E78.5 272.4   8. Leg pain, bilateral  M79.604 729.5    M79.605    9. Coronary artery disease involving native coronary artery of native heart without angina pectoris  I25.10 414.01            Jessie Edward is a 80 y.o. male with diabetes, hypercholesterolemia, CAD, LEONA, and HTN last seen by me 6 months ago    Cardiac risk factors: diabetes mellitus, male gender, hypertension  I have personally obtained the history from the patient. HISTORY OF PRESENTING ILLNESS     Jessie Edward has no interval cardiac complaints today. His weight is stable. Hemoglobin A1c is 7.2 we reports and his last LDL was at goal in the 50s. Overall he is doing great. He is walking on a regular basis.          ACTIVE PROBLEM LIST     Patient Active Problem List    Diagnosis Date Noted    Type 2 diabetes mellitus with stage 3 chronic kidney disease, with long-term current use of insulin, unspecified whether stage 3a or 3b CKD ([facility]  Trigger finger     Arthritis     Blind right eye     BPH (benign prostatic hyperplasia)     Anxiety     Hearing loss     Hypercholesteremia     Leg length inequality 08/10/2020    Primary localized osteoarthrosis of ankle and foot 08/10/2020    Tarsal tunnel syndrome 08/10/2020    Blood type A+ 08/10/2020    CKD (chronic kidney disease) stage 3, GFR 30-59 ml/min ([facility]) 01/07/2020    Type 2 diabetes with nephropathy ([facility] 10/30/2019    Dysthymia 09/26/2018    Type 2 diabetes mellitus with microalbuminuria, with long-term current use of insulin ([facility] 03/12/2018    Pure hypercholesterolemia 05/04/2017    Mild intermittent asthma without complication 25/28/5370    Advance care planning 01/24/2017    Essential hypertension with goal blood pressure less than 130/80 09/28/2016    S/P excision of lipoma 05/25/2016    S/P excision of skin lesion, follow-up exam 05/25/2016    LEONA on CPAP 07/16/2010    CAD (coronary artery disease) 12/01/2008    Hypogonadism male 12/01/2008    AR (allergic rhinitis) 12/01/2008    ED (erectile dysfunction) 12/01/2008           PAST MEDICAL HISTORY     Past Medical History:   Diagnosis Date    Achilles bursitis 8/10/2020    Anxiety     AR (allergic rhinitis) 12/01/2008    Dr. Danielle Hong. gets  shots    Arthritis     Blind right eye     BPH (benign prostatic hyperplasia)     Dr. Shanta Julien. biopsy 4/15/21 benign    CAD (coronary artery disease)     Dr. Anson Barragan.  s/p CABG - 3 vessel    CKD (chronic kidney disease) stage 3, GFR 30-59 ml/min ([facility])     Dr Jones Erps 2020    DM type 2 (diabetes mellitus, type 2) ([facility]     30 years    Dysthymia     Encounter for diabetic foot exam [facility])     sees Dr. Rafael Champion hypertension with goal blood pressure less than 130/80 9/28/2016    Hearing loss     hearing aids.     Hypercholesteremia     Hypogonadism male 12/01/2008    Dr. Shanta Julien.  started Aveed 2020    Mild intermittent asthma without complication 03/03/2017    sees allergy Dr. Nelson Small LEONA on CPAP 07/16/2010    Dr. Travon Rogers    Phimosis 5/26/2016    Severe obesity (BMI 35.0-39.9) 06/18/2018    Tarsal tunnel syndrome 8/10/2020    Trigger finger     injections done summer 2020. Dr. Angelo Webber.  Type 2 diabetes mellitus with stage 3 chronic kidney disease, with long-term current use of insulin, unspecified whether stage 3a or 3b CKD ([address]            PAST SURGICAL HISTORY     Past Surgical History:   Procedure Laterality Date    HX CIRCUMCISION  2016    HX CYST REMOVAL  5/12/2016    neck and chest    HX HEENT  2012    right eye prosthesis    HX ORTHOPAEDIC      right shoulder surgery, rotator cuff repair    HX RETINAL DETACHMENT REPAIR  1984    R Blindness    HX RHINOPLASTY      septoplasty    [facility] CABG, ARTERY-VEIN, THREE  2005    [place]          ALLERGIES     Allergies   Allergen Reactions    Cefdinir Rash    Codeine Nausea Only          FAMILY HISTORY     Family History   Adopted: Yes   Family history unknown: Yes    negative for cardiac disease       SOCIAL HISTORY     Social History     Socioeconomic History    Marital status: SINGLE     Spouse name: Caryle Median Number of children: 0    Highest education level: Associate degree: academic program   Occupational History     Comment: Technology     Comment: Hotel   Tobacco Use    Smoking status: Passive Smoke Exposure - Never Smoker    Smokeless tobacco: Never Used   Substance and Sexual Activity    Alcohol use:  Yes     Alcohol/week: 6.0 standard drinks     Types: 6 Cans of beer per week     Comment: 1 beer or so per day    Drug use: Not Currently     Comment: occasionally smoked marijuana in the past    Sexual activity: Not Currently     Partners: Male         MEDICATIONS     Current Outpatient Medications   Medication Sig    ezetimibe (ZETIA) 10 mg tablet TAKE 1 TABLET DAILY    rosuvastatin (CRESTOR) 10 mg tablet TAKE 1 TABLET NIGHTLY    Toujeo SoloStar U-300 Insulin 300 unit/mL (1.5 mL) inpn pen INJECT 40 UNITS EVERY MORNING AND 20 UNITS EVERY EVENING    NovoLOG Flexpen U-100 Insulin 100 unit/mL (3 mL) inpn INJECT 10 TO 30 UNITS THREE TIMES A DAY WITH MEALS PER SLIDING SCALE    azelastine (ASTELIN) 137 mcg (0.1 %) nasal spray USE 1 SPRAY IN EACH NOSTRIL TWICE A DAY AS DIRECTED    metoprolol tartrate (LOPRESSOR) 25 mg tablet TAKE 1 TABLET TWICE A DAY    gabapentin (NEURONTIN) 300 mg capsule TAKE 1 CAPSULE THREE TIMES A DAY    dutasteride (AVODART) 0.5 mg capsule Take 1 Capsule by mouth daily.  bumetanide (BUMEX) 1 mg tablet Take 1 Tablet by mouth every Monday, Wednesday, Friday. Take 1 Tab twice weekly on mouth every tues and sat. (Patient taking differently: Take 1 mg by mouth. Take 1 Tab twice weekly on mouth every tues and sat.)    lisinopriL (PRINIVIL, ZESTRIL) 10 mg tablet TAKE 1 TABLET TWICE A DAY    escitalopram oxalate (LEXAPRO) 10 mg tablet TAKE 1 TABLET DAILY    Farxiga 5 mg tab tablet TAKE 1 TABLET DAILY    Ozempic 1 mg/dose (4 mg/3 mL) pnij INJECT 1 MG UNDER THE SKIN EVERY 7 DAYS    mupirocin (BACTROBAN) 2 % ointment mupirocin 2 % topical ointment    metFORMIN (GLUCOPHAGE) 1,000 mg tablet Take 1 Tab by mouth two (2) times daily (with meals).  Insulin Needles, Disposable, 31 gauge x 5/16\" ndle 5x daily with insulin. IDDMII. E11.21    aspirin delayed-release 81 mg tablet Take 1 Tab by mouth daily.  co-enzyme Q-10 (Co Q-10) 100 mg capsule Take 200 mg by mouth daily.  pyridoxine, vitamin B6, (Vitamin B-6) 100 mg tablet Take 200 mg by mouth daily.  b complex vitamins tablet Take 1 Tab by mouth daily.  cholecalciferol, vitamin D3, (Vitamin D3) 50 mcg (2,000 unit) tab Take  by mouth. With vitamin K2.    albuterol (PROVENTIL HFA, VENTOLIN HFA, PROAIR HFA) 90 mcg/actuation inhaler Take 2 Puffs by inhalation every four (4) hours as needed for Wheezing.  FreeStyle Belkis 14 Day Sensor kit 1 Units by Does Not Apply route every fourteen (14) days.  DMII  E11.29; E11.21  OMEGA-3 FATTY ACIDS/FISH OIL (FISH OIL EXTRA STRENGTH PO) Take 1,400 mg by mouth daily (after breakfast).  ketoconazole (NIZORAL) 2 % topical cream Apply  to affected area as needed for Skin Irritation.  VOLTAREN 1 % gel 2 g as needed.  desonide (TRIDESILON) 0.05 % topical lotion as needed.  FreeStyle Belkis 14 Day Sensor kit USE EVERY 14 DAYS    ascorbic acid, vitamin C, (Vitamin C) 500 mg tablet Take 500 mg by mouth daily.  magnesium oxide (MAG-OX) 400 mg tablet Take 400 mg by mouth daily.  B.infantis-B.ani-B.long-B.bifi (Probiotic 4X) 10-15 mg TbEC Take  by mouth. No current facility-administered medications for this visit. I have reviewed the nurses notes, vitals, problem list, allergy list, medical history, family, social history and medications. REVIEW OF SYMPTOMS   Pertinent positive per HPI  General: Pt denies excessive weight gain or loss. Pt is able to conduct ADL's  HEENT: Denies blurred vision, headaches, hearing loss, epistaxis and difficulty swallowing. Respiratory: Denies cough or stridor. +SOB, wheezing  Cardiovascular: Denies, palpitations. >1+ edema  Gastrointestinal: Denies poor appetite, indigestion, abdominal pain or blood in stool  Genitourinary: Denies hematuria, dysuria, increased urinary frequency  Musculoskeletal: Denies joint pain or swelling from muscles or joints  Neurologic: Denies tremor, paresthesias, headache, or sensory motor disturbance  Psychiatric: Denies confusion, insomnia, depression  Integumentray: Denies rash, itching or ulcers. Hematologic: Denies easy bruising, bleeding     PHYSICAL EXAMINATION      Visit Vitals  /68 (BP 1 Location: Left upper arm, BP Patient Position: Sitting, BP Cuff Size: Adult)   Pulse 60   Ht 5' 9\" (1.753 m)   Wt 251 lb (113.9 kg)   SpO2 96%   BMI 37.07 kg/m²         General: Well developed, in no acute distress.   HEENT: No jaundice, oral mucosa moist, no oral ulcers  Neck: Supple, no stiffness, no lymphadenopathy, supple  Heart:  RRR  Respiratory: Clear bilaterally x 4, no wheezing or rales  Abdomen:  Protuberant   Extremities:  No  normal cap refill, no cyanosis. no pedal edema;discolorization from hemosiderin. Musculoskeletal: No clubbing, no deformities  Neuro: A&Ox3, speech clear, gait stable, cooperative, no focal neurologic deficits  Skin: Skin color is normal. No rashes or lesions. Non diaphoretic, moist.        EKG: Sinus brachycardia with first degree AV block of 216 seconds. DIAGNOSTIC DATA     1. Stress Test   NM- 3/17/15- no ischemia, EF 65%   NM- 19-· Abnormal stress myocardial perfusion with very small area of mild perfusion defect of the mid anterior wall and mid infeiror wall with SDS 2 which is reversible. This suggest very small area of focal ischemia. Abnormal septal motion consistent with prior cardiac surgery. Normal wall motion with normal LV function with LVEF 67%. · Excellent functional capacity. · Compared to prior study of 3/17/2015, there is minimal change. The small anterior mid wall stress perfusion defect may be new     2. LE Venous Doppler   18- no DVT isaak     3. Lipids   3/26/19- , HDL 51, ,    7/3/19- , HDL 43, LDL 85,    1/3/20- TC 91, HDL 35, LDL 28.4,    20- TC 75, HDL 32, LDL 17,    21- TC 95, HDL 37, LDL 36,    21-, HDL 39, LDL 45, TG 82  2022: , HDL 45, LDL 56, and              4. Carotid Doppler   19- 0-9% R/L     5. Echo   19- EF 61-65%    6. JAMEEL  21-No evidence of significant arterial occlusive disease within the right and left lower extremities. Normal resting JAMEEL's of 1.26 on the right and 1.38 on the left. Normal leg perfusion after exercise bilaterally.       EC2022 sinus bradycardia with first-degree AV block a FL interval 219 ms poor R wave progression left anterior fascicular block   LABORATORY DATA            Lab Results   Component Value Date/Time    WBC 6.7 08/03/2020 11:25 AM    HGB 13.8 08/03/2020 11:25 AM    HCT 43.7 08/03/2020 11:25 AM    PLATELET 399 (L) 04/21/3741 11:25 AM    MCV 88.8 08/03/2020 11:25 AM      Lab Results   Component Value Date/Time    Sodium 136 03/30/2021 09:34 AM    Potassium 4.7 03/30/2021 09:34 AM    Chloride 104 03/30/2021 09:34 AM    CO2 28 03/30/2021 09:34 AM    Anion gap 4 (L) 03/30/2021 09:34 AM    Glucose 94 03/30/2021 09:34 AM    BUN 20 03/30/2021 09:34 AM    Creatinine 1.12 03/30/2021 09:34 AM    BUN/Creatinine ratio 18 03/30/2021 09:34 AM    GFR est AA >60 03/30/2021 09:34 AM    GFR est non-AA >60 03/30/2021 09:34 AM    Calcium 9.0 03/30/2021 09:34 AM    Bilirubin, total 0.4 08/04/2021 02:59 PM    Alk. phosphatase 56 08/04/2021 02:59 PM    Protein, total 6.5 08/04/2021 02:59 PM    Albumin 4.3 08/04/2021 02:59 PM    Globulin 3.2 03/30/2021 09:34 AM    A-G Ratio 1.2 03/30/2021 09:34 AM    ALT (SGPT) 23 08/04/2021 02:59 PM           ASSESSMENT/RECOMMENDATIONS:.        1. CAD, S/p CABG 2005.   -No chest pain and all cardiac tsting was done in 2019  -conitnue risk factor modification and he is doing a good job  -reduce red meat intake. 2. HTN  - BP excellent today on metoprolol and lisinopril    3. Dyslipidemia  - LDL is at goal in the 50s    4. DM  - On insulin. - A1c 7.2    5. LEONA  -Compliant with CPAP. 6. Carotid disease  - Carotids were normal, 0-9% bilaterally.   -We will consider carotids in the future    7. Leg pain   -ABIs were negative    Return in 6 months or PRN      Orders Placed This Encounter    AMB POC EKG ROUTINE W/ 12 LEADS, INTER & REP     Order Specific Question:   Reason for Exam:     Answer:   CAD          Follow-up and Dispositions  ·   Return in about 6 months (around 8/17/2022). I have discussed the diagnosis with  Carie Christian and the intended plan as seen in the above orders. Questions were answered concerning future plans.   I have discussed medication side effects and warnings with the patient as well. Thank you,  July Hopkins MD for involving me in the care of  Cindy Diez. Please do not hesitate to contact me for further questions/concerns. Maged Cardoza MD, [facility]    Patient Care Team:  July Hopkins MD as PCP - General (Internal Medicine)  July Hopkins MD as PCP - [facility] Empaneled Provider  Kaylie Morton MD as Surgeon (Surgery)  Randolph Jordan MD (Urology)  Tera Cummings MD (Cardiology)  Annika Caputo MD (Allergy)  Eliseo Workman, DEAN (Podiatry)  Evans Barrientos MD (Gastroenterology)  Jerri Ruano (Ophthalmology)    [address]      [phone] / [phone] Fax

## 2022-02-17 ENCOUNTER — OFFICE VISIT (OUTPATIENT)
Dept: CARDIOLOGY CLINIC | Age: 72
End: 2022-02-17
Payer: MEDICARE

## 2022-02-17 VITALS
WEIGHT: 251 LBS | BODY MASS INDEX: 37.18 KG/M2 | HEIGHT: 69 IN | DIASTOLIC BLOOD PRESSURE: 68 MMHG | HEART RATE: 60 BPM | OXYGEN SATURATION: 96 % | SYSTOLIC BLOOD PRESSURE: 128 MMHG

## 2022-02-17 DIAGNOSIS — E66.01 SEVERE OBESITY (BMI 35.0-39.9) WITH COMORBIDITY (HCC): ICD-10-CM

## 2022-02-17 DIAGNOSIS — I65.23 BILATERAL CAROTID ARTERY STENOSIS: ICD-10-CM

## 2022-02-17 DIAGNOSIS — E11.21 TYPE 2 DIABETES WITH NEPHROPATHY (HCC): ICD-10-CM

## 2022-02-17 DIAGNOSIS — I10 ESSENTIAL HYPERTENSION WITH GOAL BLOOD PRESSURE LESS THAN 130/80: ICD-10-CM

## 2022-02-17 DIAGNOSIS — I25.10 CORONARY ARTERY DISEASE INVOLVING NATIVE CORONARY ARTERY OF NATIVE HEART WITHOUT ANGINA PECTORIS: ICD-10-CM

## 2022-02-17 DIAGNOSIS — E78.5 DYSLIPIDEMIA: ICD-10-CM

## 2022-02-17 DIAGNOSIS — M79.604 LEG PAIN, BILATERAL: ICD-10-CM

## 2022-02-17 DIAGNOSIS — G47.33 OSA (OBSTRUCTIVE SLEEP APNEA): ICD-10-CM

## 2022-02-17 DIAGNOSIS — M79.605 LEG PAIN, BILATERAL: ICD-10-CM

## 2022-02-17 DIAGNOSIS — E66.01 SEVERE OBESITY (BMI 35.0-35.9 WITH COMORBIDITY) (HCC): Primary | ICD-10-CM

## 2022-02-17 PROCEDURE — G8536 NO DOC ELDER MAL SCRN: HCPCS | Performed by: SPECIALIST

## 2022-02-17 PROCEDURE — G8427 DOCREV CUR MEDS BY ELIG CLIN: HCPCS | Performed by: SPECIALIST

## 2022-02-17 PROCEDURE — 93005 ELECTROCARDIOGRAM TRACING: CPT | Performed by: SPECIALIST

## 2022-02-17 PROCEDURE — G8752 SYS BP LESS 140: HCPCS | Performed by: SPECIALIST

## 2022-02-17 PROCEDURE — G8417 CALC BMI ABV UP PARAM F/U: HCPCS | Performed by: SPECIALIST

## 2022-02-17 PROCEDURE — G0463 HOSPITAL OUTPT CLINIC VISIT: HCPCS | Performed by: SPECIALIST

## 2022-02-17 PROCEDURE — 93010 ELECTROCARDIOGRAM REPORT: CPT | Performed by: SPECIALIST

## 2022-02-17 PROCEDURE — 3046F HEMOGLOBIN A1C LEVEL >9.0%: CPT | Performed by: SPECIALIST

## 2022-02-17 PROCEDURE — 99214 OFFICE O/P EST MOD 30 MIN: CPT | Performed by: SPECIALIST

## 2022-02-17 PROCEDURE — 3017F COLORECTAL CA SCREEN DOC REV: CPT | Performed by: SPECIALIST

## 2022-02-17 PROCEDURE — G8754 DIAS BP LESS 90: HCPCS | Performed by: SPECIALIST

## 2022-02-17 PROCEDURE — G9717 DOC PT DX DEP/BP F/U NT REQ: HCPCS | Performed by: SPECIALIST

## 2022-02-17 PROCEDURE — 2022F DILAT RTA XM EVC RTNOPTHY: CPT | Performed by: SPECIALIST

## 2022-02-17 PROCEDURE — 1101F PT FALLS ASSESS-DOCD LE1/YR: CPT | Performed by: SPECIALIST

## 2022-02-17 NOTE — PROGRESS NOTES
Room 3    Visit Vitals  /68 (BP 1 Location: Left upper arm, BP Patient Position: Sitting, BP Cuff Size: Adult)   Pulse 60   Ht 5' 9\" (1.753 m)   Wt 251 lb (113.9 kg)   SpO2 96%   BMI 37.07 kg/m²         Chest pain: no  Shortness of breath: YES  Edema: no  Palpitations: no  Dizziness: no    New diagnosis/Surgeries: no    ER/Hospitalizations: no    Refills: no

## 2022-02-17 NOTE — LETTER
2/17/2022    Patient: Monroe Stone   YOB: 1950   Date of Visit: 2/17/2022     Maryan Keyes, [address]  Via In Basket    Dear Maryan Keyes MD,      Thank you for referring Mr. Nagi Stokes to [facility] for evaluation. My notes for this consultation are attached. If you have questions, please do not hesitate to call me. I look forward to following your patient along with you.       Sincerely,    Anayeli Mann MD

## 2022-02-28 DIAGNOSIS — R80.9 TYPE 2 DIABETES MELLITUS WITH MICROALBUMINURIA, WITH LONG-TERM CURRENT USE OF INSULIN (HCC): ICD-10-CM

## 2022-02-28 DIAGNOSIS — Z79.4 TYPE 2 DIABETES MELLITUS WITH MICROALBUMINURIA, WITH LONG-TERM CURRENT USE OF INSULIN (HCC): ICD-10-CM

## 2022-02-28 DIAGNOSIS — E11.29 TYPE 2 DIABETES MELLITUS WITH MICROALBUMINURIA, WITH LONG-TERM CURRENT USE OF INSULIN (HCC): ICD-10-CM

## 2022-02-28 RX ORDER — METFORMIN HYDROCHLORIDE 1000 MG/1
TABLET ORAL
Qty: 180 TABLET | Refills: 3 | Status: SHIPPED | OUTPATIENT
Start: 2022-02-28

## 2022-03-17 ENCOUNTER — DOCUMENTATION ONLY (OUTPATIENT)
Dept: INTERNAL MEDICINE CLINIC | Age: 72
End: 2022-03-17

## 2022-03-17 NOTE — PROGRESS NOTES
Patients last PCP PN faxed to [place] as per their request  for his DM supplies/CGM.  Hiwot Varela LPN

## 2022-03-18 PROBLEM — R80.9 TYPE 2 DIABETES MELLITUS WITH MICROALBUMINURIA, WITH LONG-TERM CURRENT USE OF INSULIN (HCC): Status: ACTIVE | Noted: 2018-03-12

## 2022-03-18 PROBLEM — E11.29 TYPE 2 DIABETES MELLITUS WITH MICROALBUMINURIA, WITH LONG-TERM CURRENT USE OF INSULIN (HCC): Status: ACTIVE | Noted: 2018-03-12

## 2022-03-18 PROBLEM — Z79.4 TYPE 2 DIABETES MELLITUS WITH MICROALBUMINURIA, WITH LONG-TERM CURRENT USE OF INSULIN (HCC): Status: ACTIVE | Noted: 2018-03-12

## 2022-03-19 PROBLEM — J45.20 MILD INTERMITTENT ASTHMA WITHOUT COMPLICATION: Status: ACTIVE | Noted: 2017-03-03

## 2022-03-19 PROBLEM — N18.30 CKD (CHRONIC KIDNEY DISEASE) STAGE 3, GFR 30-59 ML/MIN (HCC): Status: ACTIVE | Noted: 2020-01-07

## 2022-03-19 PROBLEM — M21.70 LEG LENGTH INEQUALITY: Status: ACTIVE | Noted: 2020-08-10

## 2022-03-19 PROBLEM — Z71.89 ADVANCE CARE PLANNING: Status: ACTIVE | Noted: 2017-01-24

## 2022-03-19 PROBLEM — G57.50 TARSAL TUNNEL SYNDROME: Status: ACTIVE | Noted: 2020-08-10

## 2022-03-19 PROBLEM — E11.21 TYPE 2 DIABETES WITH NEPHROPATHY (HCC): Status: ACTIVE | Noted: 2019-10-30

## 2022-03-19 PROBLEM — M19.079 PRIMARY LOCALIZED OSTEOARTHROSIS OF ANKLE AND FOOT: Status: ACTIVE | Noted: 2020-08-10

## 2022-03-19 PROBLEM — E78.00 PURE HYPERCHOLESTEROLEMIA: Status: ACTIVE | Noted: 2017-05-04

## 2022-03-20 PROBLEM — Z67.10 BLOOD TYPE A+: Status: ACTIVE | Noted: 2020-08-10

## 2022-03-20 PROBLEM — F34.1 DYSTHYMIA: Status: ACTIVE | Noted: 2018-09-26

## 2022-03-21 DIAGNOSIS — N18.30 STAGE 3 CHRONIC KIDNEY DISEASE, UNSPECIFIED WHETHER STAGE 3A OR 3B CKD (HCC): ICD-10-CM

## 2022-03-21 RX ORDER — BUMETANIDE 1 MG/1
TABLET ORAL
Qty: 30 TABLET | Refills: 4 | Status: SHIPPED | OUTPATIENT
Start: 2022-03-21

## 2022-03-25 RX ORDER — LISINOPRIL 10 MG/1
TABLET ORAL
Qty: 180 TABLET | Refills: 3 | Status: SHIPPED | OUTPATIENT
Start: 2022-03-25

## 2022-03-28 ENCOUNTER — OFFICE VISIT (OUTPATIENT)
Dept: INTERNAL MEDICINE CLINIC | Age: 72
End: 2022-03-28
Payer: MEDICARE

## 2022-03-28 VITALS
BODY MASS INDEX: 36.58 KG/M2 | TEMPERATURE: 97.8 F | HEART RATE: 57 BPM | HEIGHT: 69 IN | OXYGEN SATURATION: 97 % | WEIGHT: 247 LBS | RESPIRATION RATE: 15 BRPM | DIASTOLIC BLOOD PRESSURE: 64 MMHG | SYSTOLIC BLOOD PRESSURE: 122 MMHG

## 2022-03-28 DIAGNOSIS — Z00.00 MEDICARE ANNUAL WELLNESS VISIT, SUBSEQUENT: Primary | ICD-10-CM

## 2022-03-28 DIAGNOSIS — E53.8 B12 DEFICIENCY: ICD-10-CM

## 2022-03-28 DIAGNOSIS — R53.83 FATIGUE, UNSPECIFIED TYPE: ICD-10-CM

## 2022-03-28 DIAGNOSIS — R80.9 TYPE 2 DIABETES MELLITUS WITH MICROALBUMINURIA, WITH LONG-TERM CURRENT USE OF INSULIN (HCC): ICD-10-CM

## 2022-03-28 DIAGNOSIS — N18.30 STAGE 3 CHRONIC KIDNEY DISEASE, UNSPECIFIED WHETHER STAGE 3A OR 3B CKD (HCC): ICD-10-CM

## 2022-03-28 DIAGNOSIS — E11.29 TYPE 2 DIABETES MELLITUS WITH MICROALBUMINURIA, WITH LONG-TERM CURRENT USE OF INSULIN (HCC): ICD-10-CM

## 2022-03-28 DIAGNOSIS — E11.21 TYPE 2 DIABETES WITH NEPHROPATHY (HCC): ICD-10-CM

## 2022-03-28 DIAGNOSIS — Z79.4 TYPE 2 DIABETES MELLITUS WITH MICROALBUMINURIA, WITH LONG-TERM CURRENT USE OF INSULIN (HCC): ICD-10-CM

## 2022-03-28 LAB
HBA1C MFR BLD HPLC: 7.1 %
TSH SERPL DL<=0.05 MIU/L-ACNC: 1.41 UIU/ML (ref 0.36–3.74)
VIT B12 SERPL-MCNC: 686 PG/ML (ref 193–986)

## 2022-03-28 PROCEDURE — 83036 HEMOGLOBIN GLYCOSYLATED A1C: CPT | Performed by: INTERNAL MEDICINE

## 2022-03-28 PROCEDURE — G8417 CALC BMI ABV UP PARAM F/U: HCPCS | Performed by: INTERNAL MEDICINE

## 2022-03-28 PROCEDURE — G0439 PPPS, SUBSEQ VISIT: HCPCS | Performed by: INTERNAL MEDICINE

## 2022-03-28 PROCEDURE — G9717 DOC PT DX DEP/BP F/U NT REQ: HCPCS | Performed by: INTERNAL MEDICINE

## 2022-03-28 PROCEDURE — G8427 DOCREV CUR MEDS BY ELIG CLIN: HCPCS | Performed by: INTERNAL MEDICINE

## 2022-03-28 PROCEDURE — G0463 HOSPITAL OUTPT CLINIC VISIT: HCPCS | Performed by: INTERNAL MEDICINE

## 2022-03-28 PROCEDURE — 2022F DILAT RTA XM EVC RTNOPTHY: CPT | Performed by: INTERNAL MEDICINE

## 2022-03-28 PROCEDURE — G8754 DIAS BP LESS 90: HCPCS | Performed by: INTERNAL MEDICINE

## 2022-03-28 PROCEDURE — G8536 NO DOC ELDER MAL SCRN: HCPCS | Performed by: INTERNAL MEDICINE

## 2022-03-28 PROCEDURE — 3017F COLORECTAL CA SCREEN DOC REV: CPT | Performed by: INTERNAL MEDICINE

## 2022-03-28 PROCEDURE — G8752 SYS BP LESS 140: HCPCS | Performed by: INTERNAL MEDICINE

## 2022-03-28 PROCEDURE — 99213 OFFICE O/P EST LOW 20 MIN: CPT | Performed by: INTERNAL MEDICINE

## 2022-03-28 PROCEDURE — 1101F PT FALLS ASSESS-DOCD LE1/YR: CPT | Performed by: INTERNAL MEDICINE

## 2022-03-28 RX ORDER — SEMAGLUTIDE 1.34 MG/ML
1 INJECTION, SOLUTION SUBCUTANEOUS
Qty: 3 EACH | Refills: 4 | Status: SHIPPED | OUTPATIENT
Start: 2022-03-28 | End: 2022-08-10

## 2022-03-28 RX ORDER — GABAPENTIN 300 MG/1
300 CAPSULE ORAL
Qty: 90 CAPSULE | Refills: 2
Start: 2022-03-28 | End: 2022-04-29

## 2022-03-28 RX ORDER — DAPAGLIFLOZIN 10 MG/1
10 TABLET, FILM COATED ORAL DAILY
Qty: 90 TABLET | Refills: 3 | Status: SHIPPED | OUTPATIENT
Start: 2022-03-28 | End: 2022-03-28 | Stop reason: ALTCHOICE

## 2022-03-28 NOTE — PROGRESS NOTES
This is a Subsequent Medicare Annual Wellness Visit providing Personalized Prevention Plan Services (PPPS) (Performed 12 months after initial AWV and PPPS )    I have reviewed the patient's medical history in detail and updated the computerized patient record. Diabetes Mellitus follow-up  Last hemoglobin a1c   Lab Results   Component Value Date/Time    Hemoglobin A1c 7.3 (H) 01/28/2021 10:34 AM    Hemoglobin A1c (POC) 7.1 03/28/2022 08:47 AM   medication compliance: compliant all of the time. He is taking Farxigo, Ozempic, metformin orgall  Using insulin Toujeo once daily and Novolog insulin 2-3 times day, based on sliding scale results  After deductible, 90 day rx of Farxiga 10 mg, ozempic and Toujeo are all $530, Novolog $2490 . Taking metformin. Diabetic diet compliance: compliant most of the time. Home glucose monitoring: fasting values range with [facility] which he left at home. Checks glucose 7x per day before/after meals and at bedtime. Requires readings to adjust his sliding scale Novolog  Hypoglycemic episodes:   Rarely , due to close monitoring. Further diabetic ROS: no polyuria or polydipsia, no chest pain, dyspnea or TIA's. Seeing cardiology Dr. Lyla Vazquez for CAD and hyperlipidemia. Recent labs from February 11, 2012 showed cholesterol 120, HDL 45, LDL 56, triglycerides 102. Liver enzymes normal.    He is followed by nephrology Dr. Zuleyma Tyler for chronic kidney disease. Had labs in January which are not scanned in but he will send them to me. R knee meniscal tear. Saw Dr. Richie Renee 3/21/22. Doing well. History     Past Medical History:   Diagnosis Date    Achilles bursitis 8/10/2020    Anxiety     AR (allergic rhinitis) 12/01/2008    Dr. Blanco Hamilton.   gets  shots    Arthritis     Blind right eye     BPH (benign prostatic hyperplasia)     Dr. Davidson Poplar. biopsy 4/15/21 benign    CAD (coronary artery disease)     Dr. Lyla Vazquez.  s/p CABG - 3 vessel    CKD (chronic kidney disease) stage 3, GFR 30-59 ml/min (HCC)     Dr Serrano November 2020    DM type 2 (diabetes mellitus, type 2) ([facility] [address]     30 years    Dysthymia     Encounter for diabetic foot exam [facility])     sees Dr. Sonia Graham hypertension with goal blood pressure less than 130/80 9/28/2016    Hearing loss     hearing aids.  Hypercholesteremia     Hypogonadism male 12/01/2008    Dr. Michael Crain.  started Aveed 2020    Mild intermittent asthma without complication 93/46/5553    sees allergy Dr. Slim Arriaga LEONA on CPAP 07/16/2010    Dr. Frederick Vu    Phimosis 5/26/2016    Severe obesity (BMI 35.0-39.9) 06/18/2018    Tarsal tunnel syndrome 8/10/2020    Trigger finger     injections done summer 2020. Dr. Aleks Arias.     Type 2 diabetes mellitus with stage 3 chronic kidney disease, with long-term current use of insulin, unspecified whether stage 3a or 3b CKD ([facility] [address]        Past Surgical History:   Procedure Laterality Date    HX CIRCUMCISION  2016    HX CYST REMOVAL  5/12/2016    neck and chest    HX HEENT  2012    right eye prosthesis    HX ORTHOPAEDIC      right shoulder surgery, rotator cuff repair    HX RETINAL DETACHMENT REPAIR  1984    R Blindness    HX RHINOPLASTY      septoplasty    MN CABG, ARTERY-VEIN, THREE  2005    [place]       Current Outpatient Medications   Medication Sig    lisinopriL (PRINIVIL, ZESTRIL) 10 mg tablet TAKE 1 TABLET TWICE A DAY    bumetanide (BUMEX) 1 mg tablet TAKE 1 TABLET EVERY MONDAY, WEDNESDAY AND FRIDAY    metFORMIN (GLUCOPHAGE) 1,000 mg tablet TAKE 1 TABLET TWICE A DAY WITH MEALS    ezetimibe (ZETIA) 10 mg tablet TAKE 1 TABLET DAILY    rosuvastatin (CRESTOR) 10 mg tablet TAKE 1 TABLET NIGHTLY    Toujeo SoloStar U-300 Insulin 300 unit/mL (1.5 mL) inpn pen INJECT 40 UNITS EVERY MORNING AND 20 UNITS EVERY EVENING    NovoLOG Flexpen U-100 Insulin 100 unit/mL (3 mL) inpn INJECT 10 TO 30 UNITS THREE TIMES A DAY WITH MEALS PER SLIDING SCALE    azelastine (ASTELIN) 137 mcg (0.1 %) nasal spray USE 1 SPRAY IN EACH NOSTRIL TWICE A DAY AS DIRECTED    metoprolol tartrate (LOPRESSOR) 25 mg tablet TAKE 1 TABLET TWICE A DAY    gabapentin (NEURONTIN) 300 mg capsule TAKE 1 CAPSULE THREE TIMES A DAY    dutasteride (AVODART) 0.5 mg capsule Take 1 Capsule by mouth daily.  escitalopram oxalate (LEXAPRO) 10 mg tablet TAKE 1 TABLET DAILY    Farxiga 5 mg tab tablet TAKE 1 TABLET DAILY    Ozempic 1 mg/dose (4 mg/3 mL) pnij INJECT 1 MG UNDER THE SKIN EVERY 7 DAYS    mupirocin (BACTROBAN) 2 % ointment mupirocin 2 % topical ointment    Insulin Needles, Disposable, 31 gauge x 5/16\" ndle 5x daily with insulin. IDDMII. E11.21    aspirin delayed-release 81 mg tablet Take 1 Tab by mouth daily.  co-enzyme Q-10 (Co Q-10) 100 mg capsule Take 200 mg by mouth daily.  pyridoxine, vitamin B6, (Vitamin B-6) 100 mg tablet Take 200 mg by mouth daily.  b complex vitamins tablet Take 1 Tab by mouth daily.  ascorbic acid, vitamin C, (Vitamin C) 500 mg tablet Take 500 mg by mouth daily.  cholecalciferol, vitamin D3, (Vitamin D3) 50 mcg (2,000 unit) tab Take  by mouth. With vitamin K2.    magnesium oxide (MAG-OX) 400 mg tablet Take 400 mg by mouth daily.  albuterol (PROVENTIL HFA, VENTOLIN HFA, PROAIR HFA) 90 mcg/actuation inhaler Take 2 Puffs by inhalation every four (4) hours as needed for Wheezing.  FreeStyle Belkis 14 Day Sensor kit 1 Units by Does Not Apply route every fourteen (14) days. DMII  E11.29; E11.21    OMEGA-3 FATTY ACIDS/FISH OIL (FISH OIL EXTRA STRENGTH PO) Take 1,400 mg by mouth daily (after breakfast).  ketoconazole (NIZORAL) 2 % topical cream Apply  to affected area as needed for Skin Irritation.  VOLTAREN 1 % gel 2 g as needed.  desonide (TRIDESILON) 0.05 % topical lotion as needed. No current facility-administered medications for this visit.        Allergies   Allergen Reactions    Cefdinir Rash    Codeine Nausea Only       Family History   Adopted: Yes   Family history unknown: Yes        reports that he is a non-smoker but has been exposed to tobacco smoke. He has been exposed to 0.00 packs per day for the past 10.00 years. He has never used smokeless tobacco.   reports current alcohol use of about 6.0 standard drinks of alcohol per week. Depression Risk Factor Screening:       Alcohol Risk Factor Screening: On any occasion during the past 3 months, have you had more than 3 drinks containing alcohol? No    Do you average more than 14 drinks per week? No      Functional Ability and Level of Safety:     Hearing Loss   mild    Activities of Daily Living   Self-care. Requires assistance with: no ADLs    Fall Risk     Fall Risk Assessment, last 12 mths 2/17/2022   Able to walk? Yes   Fall in past 12 months? 0   Do you feel unsteady? -   Are you worried about falling -   Number of falls in past 12 months -   Fall with injury? -         Abuse Screen   Patient is not abused    Review of Systems   A comprehensive review of systems was negative except for that written in the HPI. Physical Examination     Evaluation of Cognitive Function:  Mood/affect:  neutral, happy  Appearance: age appropriate  Family member/caregiver input: none    Blood pressure 122/64, pulse (!) 57, temperature 97.8 °F (36.6 °C), temperature source Oral, resp. rate 15, height 5' 9\" (1.753 m), weight 247 lb (112 kg), SpO2 97 %.   General appearance: alert, cooperative, no distress, appears stated age  Neck: supple, symmetrical, trachea midline, no adenopathy, thyroid: not enlarged, symmetric, no tenderness/mass/nodules, no carotid bruit and no JVD  Lungs: clear to auscultation bilaterally  Heart: regular rate and rhythm, S1, S2 normal, no murmur, click, rub or gallop  Extremities: extremities normal, atraumatic, no cyanosis or edema    Patient Care Team:  Merary Clarke MD as PCP - General (Internal Medicine)  Merary Clarke MD as PCP - [facility]  Fariba Mazariegos MD as Surgeon (Surgery)  Anna Rocha MD (Urology)  Shivani Valladares MD (Cardiology)  Janes Peña MD (Allergy)  Johnny Vázquez DPM (Podiatry)  Clarissa Zapata MD (Gastroenterology)  Jt Schulz (Ophthalmology)      Advice/Referrals/Counseling   Education and counseling provided. See below for specific orders    Assessment/Plan       Diagnoses and all orders for this visit:    1. Medicare annual wellness visit, subsequent  ACP reviewed. Primary medical decision maker reviewed with patient and updated in chart. he is otherwise up-to-date or have declined preventative services except for those ordered below. 2. Type 2 diabetes mellitus with microalbuminuria, with long-term current use of insulin ([facility] [address]  This condition is controlled on current medication regimen as written in medication list.  Medications refilled. Complex medical regimen and significant issues with cost of his medications. He would greatly benefit from establishing care with Pharm. D. Could change his Toujeo to a different long-acting insulin. Requires mealtime insulin 2-3 times per day. Could consider changing back to on Humalog for his mealtime insulin or even consider using regular insulin if needed. Requires continuous glucose monitor to monitor his glucoses at least 7 times daily so that he may adjust his sliding scale insulin and limit hypoglycemia. Completed form for [employer] and will fax. May need patient assistance with [employer] and Ozempic. He is considering trying companies in [place] ([facility]) and may cut [place] in half  -     AMB POC HEMOGLOBIN A1C  -     REFERRAL TO PHARMACIST    3. Type 2 diabetes with nephropathy (HCC)   Neuropathic pain is reasonably well controlled with gabapentin only at night.  -     gabapentin (NEURONTIN) 300 mg capsule; Take 1 Capsule by mouth nightly.  Max Daily Amount: 300 mg.    4. Stage 3 chronic kidney disease, unspecified whether stage 3a or 3b CKD ([address]  This condition appears to be stable and is being evaluated and managed by his specialist Dr Carol Pacheco. No acute findings today warrant change in management plan. -     TSH 3RD GENERATION; Future    5. Fatigue, unspecified type  Nonspecific and mild. Check labs. -     TSH 3RD GENERATION; Future  -     VITAMIN B12; Future  -     TESTOSTERONE, TOTAL, ADULT MALE; Future    6. B12 deficiency screening-taking Metformin       VITAMIN B12; Future      . Potential medication side effects were discussed with the patient; let me know if any occur.   Return for yearly Annual Wellness Visits

## 2022-03-29 LAB — TESTOST SERPL-MCNC: 227 NG/DL (ref 264–916)

## 2022-04-05 ENCOUNTER — OFFICE VISIT (OUTPATIENT)
Dept: INTERNAL MEDICINE CLINIC | Age: 72
End: 2022-04-05

## 2022-04-05 DIAGNOSIS — R80.9 TYPE 2 DIABETES MELLITUS WITH MICROALBUMINURIA, WITH LONG-TERM CURRENT USE OF INSULIN (HCC): Primary | ICD-10-CM

## 2022-04-05 DIAGNOSIS — Z79.4 TYPE 2 DIABETES MELLITUS WITH MICROALBUMINURIA, WITH LONG-TERM CURRENT USE OF INSULIN (HCC): Primary | ICD-10-CM

## 2022-04-05 DIAGNOSIS — E11.29 TYPE 2 DIABETES MELLITUS WITH MICROALBUMINURIA, WITH LONG-TERM CURRENT USE OF INSULIN (HCC): Primary | ICD-10-CM

## 2022-04-05 NOTE — PROGRESS NOTES
Patient here to download info from [employer] for insurance. He forgot his reader at home. Reviewed financial info for medications - will need to call insurance to find out why the prices are so high. Patient not eligible for patient assistance based on income. Patient rescheduled for tomorrow to review [place] information and to let me know how much of each medication he currently has left. Gaviota Abarca, PharmD, BCPS, [facility] 83 in place:  Yes   Recommendation Provided To: Patient/Caregiver: 2 via In person   Intervention Detail: Patient Access Assistance/Sample Provided and Scheduled Appointment   Intervention Accepted By: Patient/Caregiver: 2   Time Spent (min): 30

## 2022-04-06 ENCOUNTER — OFFICE VISIT (OUTPATIENT)
Dept: INTERNAL MEDICINE CLINIC | Age: 72
End: 2022-04-06

## 2022-04-06 DIAGNOSIS — Z79.4 TYPE 2 DIABETES MELLITUS WITH STAGE 3 CHRONIC KIDNEY DISEASE, WITH LONG-TERM CURRENT USE OF INSULIN, UNSPECIFIED WHETHER STAGE 3A OR 3B CKD (HCC): Primary | ICD-10-CM

## 2022-04-06 DIAGNOSIS — N18.30 TYPE 2 DIABETES MELLITUS WITH STAGE 3 CHRONIC KIDNEY DISEASE, WITH LONG-TERM CURRENT USE OF INSULIN, UNSPECIFIED WHETHER STAGE 3A OR 3B CKD (HCC): Primary | ICD-10-CM

## 2022-04-06 DIAGNOSIS — E11.22 TYPE 2 DIABETES MELLITUS WITH STAGE 3 CHRONIC KIDNEY DISEASE, WITH LONG-TERM CURRENT USE OF INSULIN, UNSPECIFIED WHETHER STAGE 3A OR 3B CKD (HCC): Primary | ICD-10-CM

## 2022-04-06 NOTE — PROGRESS NOTES
Pharmacist Visit for Diabetes Management & Education    S/O: Dee Alvarado is a 80 y.o. male referred by Dr. Isabella Frederick MD for diabetes management. Patient here to review [place] information. He did bring his reader today. He notes that he has the following left of medications:    Novolog 9 boxes  Toujeo 3 boxes (3 pens in each box)   Ozempic 2 boxes x1 (2 month supply)    He brought financial information for his insurance company so that we can figure out why things are stating that they are going to be costing so much. I'm assuming there is a deductible at play that will change the cost once it's met. Current Diabetes Regimen:  Ozempic 1mg once weekly  Toujeo U300 40 units in the morning and 20 units in the evening  Metformin 1000mg twice daily  Novolog U100 - inject 10 to 30 units 3 times daily depending on blood sugar reading  Farxiga 5mg once daily    ROS:   Patient denies hypoglycemic and hyperglycemic signs/symptoms, chest pain, shortness of breath, neuropathy, vision changes, and any foot changes.     Self-Monitoring Blood Glucose/Continuous Glucose Monitoring:          Data reviewed:  Lab Results   Component Value Date/Time    Hemoglobin A1c 7.3 (H) 01/28/2021 10:34 AM    Hemoglobin A1c 7.7 (H) 08/03/2020 11:20 AM    Hemoglobin A1c 7.1 (H) 03/12/2020 03:19 PM    Hemoglobin A1c (POC) 7.1 03/28/2022 08:47 AM    Hemoglobin A1c (POC) 7.2 11/29/2021 08:53 AM    Hemoglobin A1c (POC) 6.8 07/29/2021 09:10 AM    Glucose 94 03/30/2021 09:34 AM    Glucose (POC) 143 (H) 05/12/2016 08:51 AM    Microalbumin/Creat ratio (mg/g creat) 59 (H) 11/29/2021 07:16 AM    Microalbumin,urine random 5.16 11/29/2021 07:16 AM    LDL, calculated 45 08/04/2021 02:59 PM    LDL, calculated 17 07/14/2020 09:11 AM    Creatinine 1.12 03/30/2021 09:34 AM       Diabetes Health Maintenance:  Last eye exam:      Immunizations:  Immunization History   Administered Date(s) Administered    (RETIRED) Pneumococcal Vaccine (Unspecified Type) 12/01/2005    COVID-19, Moderna Booster, PF, 0.25mL Dose 03/30/2022    COVID-19, Pfizer Purple top, DILUTE for use, 12+ yrs, 30mcg/0.3mL dose 02/24/2021, 03/24/2021, 09/17/2021    H1N1 Influenza Virus Vaccine 01/01/2010    Influenza High Dose Vaccine PF 09/22/2017, 09/18/2019, 09/17/2021    Influenza Vaccine 10/05/2013, 10/02/2014, 10/26/2015, 08/30/2016    Influenza Vaccine (Tri) Adjuvanted (>65 Yrs FLUAD TRI [id number]) 09/26/2018    Influenza Vaccine Split 10/17/2011    Influenza Vaccine Whole 09/12/2009, 10/01/2012    Influenza, Quadrivalent, Adjuvanted (>65 Yrs FLUAD QUAD [id number]) 09/08/2020    Pneumococcal Conjugate (PCV-13) 01/24/2017    Pneumococcal Polysaccharide (PPSV-23) 03/18/2018    Pneumococcal Vaccine (Unspecified Type) 10/01/2012    TD Vaccine 12/01/2007    Td 08/31/2017    Zoster Recombinant 10/19/2018, 10/19/2018, 12/27/2018    Zoster Vaccine, Live 04/22/2011       Statin - ACEI/ARB - ASA  Key CAD CHF Meds             lisinopriL (PRINIVIL, ZESTRIL) 10 mg tablet TAKE 1 TABLET TWICE A DAY    bumetanide (BUMEX) 1 mg tablet TAKE 1 TABLET EVERY MONDAY, WEDNESDAY AND FRIDAY    ezetimibe (ZETIA) 10 mg tablet TAKE 1 TABLET DAILY    rosuvastatin (CRESTOR) 10 mg tablet TAKE 1 TABLET NIGHTLY    metoprolol tartrate (LOPRESSOR) 25 mg tablet TAKE 1 TABLET TWICE A DAY    aspirin delayed-release 81 mg tablet Take 1 Tab by mouth daily. OMEGA-3 FATTY ACIDS/FISH OIL (FISH OIL EXTRA STRENGTH PO) Take 1,400 mg by mouth daily (after breakfast). Assessment/Plan:     1. Diabetes:  a1c near/at goal of <7% per ADA guidelines however CGM showing some variable in glucose values. Patient interested in using a different company for CGM so will send to [employer]. Also advised patient I would follow up with him after calling his insurance company to figure out the cost of medications. Will schedule follow up when I call back with insurance info.        Patient verbalized understanding of the information presented and all of the patients questions were answered. AVS was handed to the patient and information reviewed. Patient advised to call me or Dr. Benji Boyce MD with any additional questions or concerns. Follow-up: TBD  Notification of recommendations will be sent to Dr. Benji Boyce MD for review. Tyler Nelson, PharmD, [facility], [facility] 83 in place:  Yes   Recommendation Provided To: Patient/Caregiver: 2 via In person   Intervention Detail: Patient Access Assistance/Sample Provided and Refill(s) Provided   Intervention Accepted By: Patient/Caregiver: 2   Time Spent (min): 30

## 2022-04-14 ENCOUNTER — PATIENT MESSAGE (OUTPATIENT)
Dept: INTERNAL MEDICINE CLINIC | Age: 72
End: 2022-04-14

## 2022-04-19 ENCOUNTER — TELEPHONE (OUTPATIENT)
Dept: INTERNAL MEDICINE CLINIC | Age: 72
End: 2022-04-19

## 2022-04-19 NOTE — TELEPHONE ENCOUNTER
Called [employer] to gather information about patients plan. Patient has a 480 deductible this year. Deb Mcclain and Allan will all have a $50 copay once this deductible is met. Novolog is not the preferred fast acting insulin, so this cost after the deductible for 6 boxes is 2,010. Tiering exception form can be filled out which may change the price of the Novolog since patient is unable to take Humalog. Will submit that to the insurance company. Called patient to relay information - left message for return call. Collin Lira, PharmD, [facility], [employer] [address] in place:  Yes   Recommendation Provided To: Patient/Caregiver: 1 via Telephone and Other: 1   Intervention Detail: Patient Access Assistance/Sample Provided   Intervention Accepted By: Patient/Caregiver: 1 and Other: 1   Time Spent (min): 30

## 2022-04-19 NOTE — TELEPHONE ENCOUNTER
For Marko Valerio in place:  Yes   Recommendation Provided To: Patient/Caregiver: 1 via Vaibhav Rojas 20 Intervention Detail: Patient Access Assistance/Sample Provided   Intervention Accepted By: Patient/Caregiver: 1   Time Spent (min): 10

## 2022-04-29 DIAGNOSIS — E11.21 TYPE 2 DIABETES WITH NEPHROPATHY (HCC): ICD-10-CM

## 2022-04-29 RX ORDER — GABAPENTIN 300 MG/1
CAPSULE ORAL
Qty: 270 CAPSULE | Refills: 1 | Status: SHIPPED | OUTPATIENT
Start: 2022-04-29

## 2022-05-05 ENCOUNTER — TELEPHONE (OUTPATIENT)
Dept: INTERNAL MEDICINE CLINIC | Age: 72
End: 2022-05-05

## 2022-05-05 NOTE — TELEPHONE ENCOUNTER
----- Message from Amol Angel sent at 5/5/2022 11:58 AM EDT -----  Subject: Message to Provider    QUESTIONS  Information for Provider? JENNIFER FROM [facility] NEEDS THE PT [place]   LOGS ,THEY FAXED REQUEST ON 5-4-2022 NAD NEED TO KNOW IF OFFICE RECEIVED   THEM PLEASE CALL JENNIFER [phone] EXT [phone] AND FAXI [phone]  ---------------------------------------------------------------------------  --------------  CALL BACK INFO  What is the best way for the office to contact you? OK to leave message on   voicemail  Preferred Call Back Phone Number? [phone]  ---------------------------------------------------------------------------  --------------  SCRIPT ANSWERS  Relationship to Patient? Third Party  Third Party Type? Home Health Care? Representative Name?  JENNIFER

## 2022-05-16 RX ORDER — NYSTATIN AND TRIAMCINOLONE ACETONIDE 100000; 1 [USP'U]/G; MG/G
CREAM TOPICAL 2 TIMES DAILY
Qty: 30 G | Refills: 0 | Status: SHIPPED | OUTPATIENT
Start: 2022-05-16

## 2022-05-23 ENCOUNTER — TELEPHONE (OUTPATIENT)
Dept: INTERNAL MEDICINE CLINIC | Age: 72
End: 2022-05-23

## 2022-05-23 NOTE — TELEPHONE ENCOUNTER
Sosa Tolentino   1950    They faxed a request for appt date 2021 - and had it faxed on may 4th 2022. They are looking for blood sugar logs, but said we never sent any over. Their callback number is  [phone].  [phone] and their fax number is [phone]

## 2022-05-23 NOTE — TELEPHONE ENCOUNTER
RTC to [place] [address] at [facility]. No answer on indefinite hold.

## 2022-06-03 ENCOUNTER — TELEPHONE (OUTPATIENT)
Dept: INTERNAL MEDICINE CLINIC | Age: 72
End: 2022-06-03

## 2022-06-03 NOTE — TELEPHONE ENCOUNTER
Per [facility] PharmD, this nurse attempted to fax completed, signed Medicare Patient Assistance Prescription Form to [employer] Patient Assistance Program (fax# [phone]), but all 4 times fax attempted, fax submission error-ed out due to [address] fax number being chronically busy. Nurse will attempt to send the fax again on Monday; however, nurse also dropped a copy of the completed, signed form in the mail to the address listed in the Patient Assistance application: [employer]; Attn: Medicare Reviews; [id number]; Phylicia Lagos, [facility] [employer] [place]. Thank you.

## 2022-06-06 RX ORDER — ROSUVASTATIN CALCIUM 10 MG/1
TABLET, COATED ORAL
Qty: 90 TABLET | Refills: 1 | Status: SHIPPED | OUTPATIENT
Start: 2022-06-06

## 2022-06-06 RX ORDER — ROSUVASTATIN CALCIUM 10 MG/1
TABLET, COATED ORAL
Qty: 90 TABLET | Refills: 1 | Status: SHIPPED | OUTPATIENT
Start: 2022-06-06 | End: 2022-06-06 | Stop reason: SDUPTHER

## 2022-06-08 RX ORDER — ROSUVASTATIN CALCIUM 10 MG/1
TABLET, COATED ORAL
Qty: 90 TABLET | Refills: 3 | OUTPATIENT
Start: 2022-06-08

## 2022-06-28 ENCOUNTER — TELEPHONE (OUTPATIENT)
Dept: INTERNAL MEDICINE CLINIC | Age: 72
End: 2022-06-28

## 2022-06-28 NOTE — TELEPHONE ENCOUNTER
RTC to [place] at [facility]. [phone] no in service.

## 2022-06-28 NOTE — TELEPHONE ENCOUNTER
----- Message from [address] [facility] sent at 6/28/2022  2:01 PM EDT -----  Subject: Message to Provider    QUESTIONS  Information for Provider? Molly Manciniskmyriam from [facility] is wanting make   sure she has all diabetic records on pt from 08/2021 to current. Please   call her back [phone] ext [phone]  ---------------------------------------------------------------------------  --------------  CALL BACK INFO  What is the best way for the office to contact you? Do not leave any   message, patient will call back for answer  Preferred Call Back Phone Number? [phone]  ---------------------------------------------------------------------------  --------------  SCRIPT ANSWERS  Relationship to Patient? Third Party  Third Party Type? Durable Medical Equipment? Representative Name?  Molly Dawson

## 2022-07-03 DIAGNOSIS — F34.1 DYSTHYMIA: ICD-10-CM

## 2022-07-05 RX ORDER — EZETIMIBE 10 MG/1
10 TABLET ORAL DAILY
Qty: 90 TABLET | Refills: 0 | Status: SHIPPED | OUTPATIENT
Start: 2022-07-05 | End: 2022-10-03

## 2022-07-05 RX ORDER — ESCITALOPRAM OXALATE 10 MG/1
10 TABLET ORAL DAILY
Qty: 90 TABLET | Refills: 3 | Status: SHIPPED | OUTPATIENT
Start: 2022-07-05 | End: 2022-07-28

## 2022-07-18 ENCOUNTER — TELEPHONE (OUTPATIENT)
Dept: INTERNAL MEDICINE CLINIC | Age: 72
End: 2022-07-18

## 2022-07-19 ENCOUNTER — OFFICE VISIT (OUTPATIENT)
Dept: INTERNAL MEDICINE CLINIC | Age: 72
End: 2022-07-19
Payer: MEDICARE

## 2022-07-19 VITALS
OXYGEN SATURATION: 97 % | BODY MASS INDEX: 38.36 KG/M2 | WEIGHT: 259 LBS | TEMPERATURE: 97.7 F | HEIGHT: 69 IN | DIASTOLIC BLOOD PRESSURE: 52 MMHG | HEART RATE: 59 BPM | SYSTOLIC BLOOD PRESSURE: 116 MMHG | RESPIRATION RATE: 16 BRPM

## 2022-07-19 DIAGNOSIS — Z79.4 TYPE 2 DIABETES MELLITUS WITH STAGE 3 CHRONIC KIDNEY DISEASE, WITH LONG-TERM CURRENT USE OF INSULIN, UNSPECIFIED WHETHER STAGE 3A OR 3B CKD (HCC): ICD-10-CM

## 2022-07-19 DIAGNOSIS — S91.311A LACERATION OF DORSUM OF RIGHT FOOT: Primary | ICD-10-CM

## 2022-07-19 DIAGNOSIS — E11.22 TYPE 2 DIABETES MELLITUS WITH STAGE 3 CHRONIC KIDNEY DISEASE, WITH LONG-TERM CURRENT USE OF INSULIN, UNSPECIFIED WHETHER STAGE 3A OR 3B CKD (HCC): ICD-10-CM

## 2022-07-19 DIAGNOSIS — N18.30 TYPE 2 DIABETES MELLITUS WITH STAGE 3 CHRONIC KIDNEY DISEASE, WITH LONG-TERM CURRENT USE OF INSULIN, UNSPECIFIED WHETHER STAGE 3A OR 3B CKD (HCC): ICD-10-CM

## 2022-07-19 DIAGNOSIS — L03.115 CELLULITIS OF FOOT, RIGHT: ICD-10-CM

## 2022-07-19 PROCEDURE — G8752 SYS BP LESS 140: HCPCS | Performed by: NURSE PRACTITIONER

## 2022-07-19 PROCEDURE — G8536 NO DOC ELDER MAL SCRN: HCPCS | Performed by: NURSE PRACTITIONER

## 2022-07-19 PROCEDURE — 3017F COLORECTAL CA SCREEN DOC REV: CPT | Performed by: NURSE PRACTITIONER

## 2022-07-19 PROCEDURE — G8754 DIAS BP LESS 90: HCPCS | Performed by: NURSE PRACTITIONER

## 2022-07-19 PROCEDURE — G9717 DOC PT DX DEP/BP F/U NT REQ: HCPCS | Performed by: NURSE PRACTITIONER

## 2022-07-19 PROCEDURE — G8417 CALC BMI ABV UP PARAM F/U: HCPCS | Performed by: NURSE PRACTITIONER

## 2022-07-19 PROCEDURE — G0463 HOSPITAL OUTPT CLINIC VISIT: HCPCS | Performed by: NURSE PRACTITIONER

## 2022-07-19 PROCEDURE — G8427 DOCREV CUR MEDS BY ELIG CLIN: HCPCS | Performed by: NURSE PRACTITIONER

## 2022-07-19 PROCEDURE — 2022F DILAT RTA XM EVC RTNOPTHY: CPT | Performed by: NURSE PRACTITIONER

## 2022-07-19 PROCEDURE — 99213 OFFICE O/P EST LOW 20 MIN: CPT | Performed by: NURSE PRACTITIONER

## 2022-07-19 PROCEDURE — 1101F PT FALLS ASSESS-DOCD LE1/YR: CPT | Performed by: NURSE PRACTITIONER

## 2022-07-19 RX ORDER — DOXYCYCLINE 100 MG/1
100 CAPSULE ORAL 2 TIMES DAILY
Qty: 14 CAPSULE | Refills: 0 | Status: SHIPPED | OUTPATIENT
Start: 2022-07-19 | End: 2022-10-17 | Stop reason: ALTCHOICE

## 2022-07-19 NOTE — PATIENT INSTRUCTIONS

## 2022-07-19 NOTE — PROGRESS NOTES
Nika Olsen (: 1950) is a 80 y.o. male, established patient, here for evaluation of the following chief complaint(s): Foot Pain (dropped a blade on his foot. swollen and red)       ASSESSMENT/PLAN:  Below is the assessment and plan developed based on review of pertinent history, physical exam, labs, studies, and medications. 1. Laceration of dorsum of right foot --edges well approximated. -     doxycycline (VIBRAMYCIN) 100 mg capsule; Take 1 Capsule by mouth two (2) times a day., Normal, Disp-14 Capsule, R-0    2. Cellulitis of foot, right --will cover for secondary cellulitis with doxycycline; advised to contact office if area of redness expands or if he develops any systemic symptoms of fever, chills. -     doxycycline (VIBRAMYCIN) 100 mg capsule; Take 1 Capsule by mouth two (2) times a day., Normal, Disp-14 Capsule, R-0    3. Type 2 diabetes mellitus with stage 3 chronic kidney disease, with long-term current use of insulin, unspecified whether stage 3a or 3b CKD ([facility] [address] --has follow-up visit scheduled with Dr. Jovani Stokes on 22. SUBJECTIVE/OBJECTIVE:  HPI    Patient of Dr. Jovani Stokes who presents with complaints of cut to dorsal aspect of right forefoot. Reports he was placing a  blade in his  when it slipped and struck the top of his foot 2 weeks ago. Was able to stop bleeding after applying pressure for several minutes and appeared to be healing until several days ago when he noted increased edema and redness around site of wound. Has not noted any purulent drainage from wound; denies fever, chills. Has been applying OTC Neosporin antibiotic ointment and keeping wound covered during the daytime. Up-to-date on tetanus vaccine.     Patient Active Problem List   Diagnosis Code    CAD (coronary artery disease) I25.10    Hypogonadism male E29.1    AR (allergic rhinitis) J30.9    ED (erectile dysfunction) N52.9    S/P excision of lipoma Z98.890, Z86.018    S/P excision of skin lesion, follow-up exam Z09    Essential hypertension with goal blood pressure less than 130/80 I10    Advance care planning Z71.89    Mild intermittent asthma without complication B86.31    Pure hypercholesterolemia E78.00    Type 2 diabetes mellitus with microalbuminuria, with long-term current use of insulin ([facility]) E11.29, R80.9, Z79.4    Dysthymia F34.1    Type 2 diabetes with nephropathy ([facility]) E11.21    CKD (chronic kidney disease) stage 3, GFR 30-59 ml/min ([facility]) N18.30    Leg length inequality M21.70    Primary localized osteoarthrosis of ankle and foot M19.079    Tarsal tunnel syndrome G57.50    Blood type A+ Z67.10    Trigger finger M65.30    Arthritis M19.90    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    BPH (benign prostatic hyperplasia) N40.0    Anxiety F41.9    Hearing loss H91.90    Hypercholesteremia E78.00    LEONA on CPAP G47.33, Z99.89    Type 2 diabetes mellitus with stage 3 chronic kidney disease, with long-term current use of insulin, unspecified whether stage 3a or 3b CKD ([facility] [address] E11.22, N18.30, Z79.4     Past Surgical History:   Procedure Laterality Date    HX CIRCUMCISION  2016    HX CYST REMOVAL  5/12/2016    neck and chest    HX HEENT  2012    right eye prosthesis    HX ORTHOPAEDIC      right shoulder surgery, rotator cuff repair    HX RETINAL DETACHMENT REPAIR  1984    R Blindness    HX RHINOPLASTY      septoplasty    [place] CABG, ARTERY-VEIN, THREE  2005    [facility]     Social History     Socioeconomic History    Marital status: SINGLE     Spouse name: [place] Number of children: 0    Years of education: Not on file    Highest education level: Associate degree: academic program   Occupational History     Comment: Technology     Comment: Hotel   Tobacco Use    Smoking status: Passive Smoke Exposure - Never Smoker    Smokeless tobacco: Never Used   Substance and Sexual Activity    Alcohol use:  Yes     Alcohol/week: 6.0 standard drinks     Types: 6 Cans of beer per week Comment: 1 beer or so per day    Drug use: Not Currently     Comment: occasionally smoked marijuana in the past    Sexual activity: Not Currently     Partners: Male   Other Topics Concern    Not on file   Social History Narrative    Not on file     Social Determinants of Health     Financial Resource Strain:     Difficulty of Paying Living Expenses: Not on file   Food Insecurity:     Worried About Running Out of Food in the Last Year: Not on file    Thien of Food in the Last Year: Not on file   Transportation Needs:     Lack of Transportation (Medical): Not on file    Lack of Transportation (Non-Medical): Not on file   Physical Activity:     Days of Exercise per Week: Not on file    Minutes of Exercise per Session: Not on file   Stress:     Feeling of Stress : Not on file   Social Connections:     Frequency of Communication with Friends and Family: Not on file    Frequency of Social Gatherings with Friends and Family: Not on file    Attends Druze Services: Not on file    Active Member of [address] [place] [address] or Organizations: Not on file    Attends Club or Organization Meetings: Not on file    Marital Status: Not on file   Intimate Partner Violence:     Fear of Current or Ex-Partner: Not on file    Emotionally Abused: Not on file    Physically Abused: Not on file    Sexually Abused: Not on file   Housing Stability:     Unable to Pay for Housing in the Last Year: Not on file    Number of [place] in the Last Year: Not on file    Unstable Housing in the Last Year: Not on file     Family History   Adopted: Yes   Family history unknown: Yes     Current Outpatient Medications   Medication Sig    doxycycline (VIBRAMYCIN) 100 mg capsule Take 1 Capsule by mouth two (2) times a day.  ezetimibe (ZETIA) 10 mg tablet Take 1 Tablet by mouth daily.  escitalopram oxalate (LEXAPRO) 10 mg tablet Take 1 Tablet by mouth daily.  dutasteride (AVODART) 0.5 mg capsule Take 1 Capsule by mouth daily.     rosuvastatin (CRESTOR) 10 mg tablet TAKE 1 TABLET NIGHTLY    nystatin-triamcinolone (MYCOLOG II) topical cream Apply  to affected area two (2) times a day.  gabapentin (NEURONTIN) 300 mg capsule TAKE 1 CAPSULE THREE TIMES A DAY    semaglutide (Ozempic) 1 mg/dose (4 mg/3 mL) pnij 1 mg by SubCUTAneous route every seven (7) days.  lisinopriL (PRINIVIL, ZESTRIL) 10 mg tablet TAKE 1 TABLET TWICE A DAY    bumetanide (BUMEX) 1 mg tablet TAKE 1 TABLET EVERY MONDAY, WEDNESDAY AND FRIDAY    metFORMIN (GLUCOPHAGE) 1,000 mg tablet TAKE 1 TABLET TWICE A DAY WITH MEALS    Toujeo SoloStar U-300 Insulin 300 unit/mL (1.5 mL) inpn pen INJECT 40 UNITS EVERY MORNING AND 20 UNITS EVERY EVENING    NovoLOG Flexpen U-100 Insulin 100 unit/mL (3 mL) inpn INJECT 10 TO 30 UNITS THREE TIMES A DAY WITH MEALS PER SLIDING SCALE    azelastine (ASTELIN) 137 mcg (0.1 %) nasal spray USE 1 SPRAY IN EACH NOSTRIL TWICE A DAY AS DIRECTED    metoprolol tartrate (LOPRESSOR) 25 mg tablet TAKE 1 TABLET TWICE A DAY    mupirocin (BACTROBAN) 2 % ointment mupirocin 2 % topical ointment    Insulin Needles, Disposable, 31 gauge x 5/16\" ndle 5x daily with insulin. IDDMII. E11.21    aspirin delayed-release 81 mg tablet Take 1 Tab by mouth daily.  co-enzyme Q-10 (Co Q-10) 100 mg capsule Take 200 mg by mouth daily.  pyridoxine, vitamin B6, (Vitamin B-6) 100 mg tablet Take 200 mg by mouth daily.  b complex vitamins tablet Take 1 Tab by mouth daily.  ascorbic acid, vitamin C, (Vitamin C) 500 mg tablet Take 500 mg by mouth daily.  cholecalciferol, vitamin D3, (Vitamin D3) 50 mcg (2,000 unit) tab Take  by mouth. With vitamin K2.    magnesium oxide (MAG-OX) 400 mg tablet Take 400 mg by mouth daily.  albuterol (PROVENTIL HFA, VENTOLIN HFA, PROAIR HFA) 90 mcg/actuation inhaler Take 2 Puffs by inhalation every four (4) hours as needed for Wheezing.     FreeStyle Belkis 14 Day Sensor kit 1 Units by Does Not Apply route every fourteen (14) days. DMII  E11.29; E11.21    OMEGA-3 FATTY ACIDS/FISH OIL (FISH OIL EXTRA STRENGTH PO) Take 1,400 mg by mouth daily (after breakfast).  ketoconazole (NIZORAL) 2 % topical cream Apply  to affected area as needed for Skin Irritation.  VOLTAREN 1 % gel 2 g as needed.  desonide (TRIDESILON) 0.05 % topical lotion as needed.  Farxiga 5 mg tab tablet TAKE 1 TABLET DAILY (Patient not taking: Reported on 7/19/2022)     No current facility-administered medications for this visit. Allergies   Allergen Reactions    Cefdinir Rash    Codeine Nausea Only     Immunization History   Administered Date(s) Administered    (RETIRED) Pneumococcal Vaccine (Unspecified Type) 12/01/2005    COVID-19, MODERNA Booster BLUE border, (age 18y+), IM, 50mcg/0.25mL 03/30/2022    COVID-19, PFIZER PURPLE top, DILUTE for use, (age 15 y+), IM, 30mcg/0.3mL 02/24/2021, 03/24/2021, 09/17/2021    H1N1 Influenza Virus Vaccine 01/01/2010    Influenza High Dose Vaccine PF 09/22/2017, 09/18/2019, 09/17/2021    Influenza Vaccine 10/05/2013, 10/02/2014, 10/26/2015, 08/30/2016    Influenza Vaccine (Tri) Adjuvanted (>65 Yrs FLUAD TRI [id number]) 09/26/2018    Influenza Vaccine Split 10/17/2011    Influenza Vaccine Whole 09/12/2009, 10/01/2012    Influenza, Quadrivalent, Adjuvanted (>65 Yrs FLUAD QUAD [id number]) 09/08/2020    Pneumococcal Conjugate (PCV-13) 01/24/2017    Pneumococcal Polysaccharide (PPSV-23) 03/18/2018    Pneumococcal Vaccine (Unspecified Type) 10/01/2012    TD Vaccine 12/01/2007    Td 08/31/2017    Zoster Recombinant 10/19/2018, 10/19/2018, 12/27/2018    Zoster Vaccine, Live 04/22/2011         Review of Systems   Constitutional: Negative for chills and fever. Respiratory: Negative for cough and shortness of breath. Cardiovascular: Negative for chest pain. Musculoskeletal: Positive for joint swelling. Skin: Positive for color change and wound. Physical Exam  Vitals and nursing note reviewed. Constitutional:       General: He is not in acute distress. Appearance: Normal appearance. HENT:      Head: Normocephalic and atraumatic. Cardiovascular:      Rate and Rhythm: Normal rate and regular rhythm. Pulmonary:      Effort: Pulmonary effort is normal.      Breath sounds: Normal breath sounds. Musculoskeletal:        Feet:    Feet:      Comments: 2 cm laceration to right forefoot; eschar intact and edges well approximated. Immediate surrounding skin with deep erythema, mild tenderness. No streaking, edema, tenderness noted throughout rest of foot. Skin:     General: Skin is warm. Findings: Lesion present. Neurological:      General: No focal deficit present. Mental Status: He is alert and oriented to person, place, and time. Psychiatric:         Mood and Affect: Mood normal.         Behavior: Behavior normal.               An electronic signature was used to authenticate this note.   -- Alicja May NP

## 2022-07-25 ENCOUNTER — TELEPHONE (OUTPATIENT)
Dept: INTERNAL MEDICINE CLINIC | Age: 72
End: 2022-07-25

## 2022-07-25 NOTE — TELEPHONE ENCOUNTER
RTC to [facility] clinical regarding  requesting information requested that was sent on 7/11. Fax # [phone]. Call back   [phone] ext [phone]. To advise no further information will be provided due to they are no longer the providing medical equipment company. No answer and on Hold for 5 minutes. Hide Additional Notes?: No Detail Level: Simple Include Location In Plan?: Yes

## 2022-07-25 NOTE — TELEPHONE ENCOUNTER
[facility] was calling because they have not received any information on the request that was sent on 7/11. Please fax the clinical notes for the appointment date 10/5/21  to [phone].   Call back   [phone] ext [phone]

## 2022-07-28 ENCOUNTER — OFFICE VISIT (OUTPATIENT)
Dept: INTERNAL MEDICINE CLINIC | Age: 72
End: 2022-07-28
Payer: MEDICARE

## 2022-07-28 VITALS
DIASTOLIC BLOOD PRESSURE: 63 MMHG | HEART RATE: 59 BPM | RESPIRATION RATE: 16 BRPM | BODY MASS INDEX: 37.95 KG/M2 | HEIGHT: 69 IN | WEIGHT: 256.2 LBS | TEMPERATURE: 98.2 F | SYSTOLIC BLOOD PRESSURE: 125 MMHG | OXYGEN SATURATION: 96 %

## 2022-07-28 DIAGNOSIS — E11.29 TYPE 2 DIABETES MELLITUS WITH MICROALBUMINURIA, WITH LONG-TERM CURRENT USE OF INSULIN (HCC): Primary | ICD-10-CM

## 2022-07-28 DIAGNOSIS — I10 ESSENTIAL HYPERTENSION WITH GOAL BLOOD PRESSURE LESS THAN 130/80: ICD-10-CM

## 2022-07-28 DIAGNOSIS — R80.9 TYPE 2 DIABETES MELLITUS WITH MICROALBUMINURIA, WITH LONG-TERM CURRENT USE OF INSULIN (HCC): Primary | ICD-10-CM

## 2022-07-28 DIAGNOSIS — N18.30 STAGE 3 CHRONIC KIDNEY DISEASE, UNSPECIFIED WHETHER STAGE 3A OR 3B CKD (HCC): ICD-10-CM

## 2022-07-28 DIAGNOSIS — E78.00 PURE HYPERCHOLESTEROLEMIA: ICD-10-CM

## 2022-07-28 DIAGNOSIS — F34.1 DYSTHYMIA: ICD-10-CM

## 2022-07-28 DIAGNOSIS — N18.30 TYPE 2 DIABETES MELLITUS WITH STAGE 3 CHRONIC KIDNEY DISEASE, WITH LONG-TERM CURRENT USE OF INSULIN, UNSPECIFIED WHETHER STAGE 3A OR 3B CKD (HCC): ICD-10-CM

## 2022-07-28 DIAGNOSIS — Z79.4 TYPE 2 DIABETES MELLITUS WITH STAGE 3 CHRONIC KIDNEY DISEASE, WITH LONG-TERM CURRENT USE OF INSULIN, UNSPECIFIED WHETHER STAGE 3A OR 3B CKD (HCC): ICD-10-CM

## 2022-07-28 DIAGNOSIS — E11.22 TYPE 2 DIABETES MELLITUS WITH STAGE 3 CHRONIC KIDNEY DISEASE, WITH LONG-TERM CURRENT USE OF INSULIN, UNSPECIFIED WHETHER STAGE 3A OR 3B CKD (HCC): ICD-10-CM

## 2022-07-28 DIAGNOSIS — E29.1 HYPOGONADISM MALE: ICD-10-CM

## 2022-07-28 DIAGNOSIS — Z79.4 TYPE 2 DIABETES MELLITUS WITH MICROALBUMINURIA, WITH LONG-TERM CURRENT USE OF INSULIN (HCC): Primary | ICD-10-CM

## 2022-07-28 LAB — HBA1C MFR BLD HPLC: 6.8 %

## 2022-07-28 PROCEDURE — G0463 HOSPITAL OUTPT CLINIC VISIT: HCPCS | Performed by: INTERNAL MEDICINE

## 2022-07-28 PROCEDURE — G8417 CALC BMI ABV UP PARAM F/U: HCPCS | Performed by: INTERNAL MEDICINE

## 2022-07-28 PROCEDURE — 3044F HG A1C LEVEL LT 7.0%: CPT | Performed by: INTERNAL MEDICINE

## 2022-07-28 PROCEDURE — G8752 SYS BP LESS 140: HCPCS | Performed by: INTERNAL MEDICINE

## 2022-07-28 PROCEDURE — 99214 OFFICE O/P EST MOD 30 MIN: CPT | Performed by: INTERNAL MEDICINE

## 2022-07-28 PROCEDURE — G8754 DIAS BP LESS 90: HCPCS | Performed by: INTERNAL MEDICINE

## 2022-07-28 PROCEDURE — G8427 DOCREV CUR MEDS BY ELIG CLIN: HCPCS | Performed by: INTERNAL MEDICINE

## 2022-07-28 PROCEDURE — G8536 NO DOC ELDER MAL SCRN: HCPCS | Performed by: INTERNAL MEDICINE

## 2022-07-28 PROCEDURE — 1101F PT FALLS ASSESS-DOCD LE1/YR: CPT | Performed by: INTERNAL MEDICINE

## 2022-07-28 PROCEDURE — 2022F DILAT RTA XM EVC RTNOPTHY: CPT | Performed by: INTERNAL MEDICINE

## 2022-07-28 PROCEDURE — G9717 DOC PT DX DEP/BP F/U NT REQ: HCPCS | Performed by: INTERNAL MEDICINE

## 2022-07-28 PROCEDURE — 3017F COLORECTAL CA SCREEN DOC REV: CPT | Performed by: INTERNAL MEDICINE

## 2022-07-28 PROCEDURE — 83036 HEMOGLOBIN GLYCOSYLATED A1C: CPT | Performed by: INTERNAL MEDICINE

## 2022-07-28 RX ORDER — TESTOSTERONE UNDECANOATE 237 MG/1
CAPSULE, LIQUID FILLED ORAL
COMMUNITY
Start: 2022-06-30 | End: 2022-10-17 | Stop reason: ALTCHOICE

## 2022-07-28 RX ORDER — ESCITALOPRAM OXALATE 10 MG/1
15 TABLET ORAL DAILY
Qty: 135 TABLET | Refills: 1
Start: 2022-07-28 | End: 2022-10-17 | Stop reason: ALTCHOICE

## 2022-07-28 NOTE — PROGRESS NOTES
HISTORY OF PRESENT ILLNESS    Chief Complaint   Patient presents with    Diabetes     A1c        Presents for follow-up    R dorsal foot laceration is healing. A  blade fell on it from the . Completed antiobiotics. Denies any significant pain. Hypogonadism  Started Maddison Lopez per Dr. Hickey Knee urology for hypogonadism 2 months ago. Reports testosterone levels are 800, but energy levels are not better. Labs 8/8/22 for T, cbc, psa? Lab Results   Component Value Date/Time    TESTOSTERONE 311 03/30/2010 08:48 AM    TESTOSTERONE,FREE 54 03/30/2010 08:48 AM    TESTOSTERONE, % FREE 1.7 03/30/2010 08:48 AM    Testosterone 227 (L) 03/28/2022 09:45 AM     Depression is borderline controlled. Feels low in energy in times. Taking lexapro 10 mg daily. Other days, he feels excess energy for a while. No hx of Bipolar disorder. Sleep is fair. New CPAP pending. Diabetes Mellitus follow-up  Last hemoglobin a1c   Lab Results   Component Value Date/Time    Hemoglobin A1c 7.3 (H) 01/28/2021 10:34 AM    Hemoglobin A1c (POC) 6.8 07/28/2022 08:16 AM   He is taking Farxiga 5 mg, Ozempic 1 mg weekly, metformin 1000 mg bid  Using insulin Toujeo 40 units in AM, 20 units PM, and Novolog insulin 3 times day, based on sliding scale results. medication compliance: compliant all of the time. Diabetic diet compliance: compliant most of the time. Home glucose monitoring: fasting values range Belkis 2: 30 day avg 166, 90 avg day 164. Hypoglycemic episodes:  1  episode in the past 90 days  Further diabetic ROS: no polyuria or polydipsia, no chest pain, dyspnea or TIA's, no numbness    Hypertension  Hypertension ROS: taking medications as instructed, no medication side effects noted, no TIA's, no chest pain on exertion, no dyspnea on exertion, no swelling of ankles     reports that he is a non-smoker but has been exposed to tobacco smoke.  He has never used smokeless tobacco.    reports current alcohol use of about 6.0 standard drinks per week. BP Readings from Last 2 Encounters:   07/28/22 125/63   07/19/22 (!) 116/52           Review of Systems   All other systems reviewed and are negative, except as noted in HPI    Past Medical and Surgical History   has a past medical history of Achilles bursitis (8/10/2020), Anxiety, AR (allergic rhinitis) (12/01/2008), Arthritis, Blind right eye, BPH (benign prostatic hyperplasia), CAD (coronary artery disease), CKD (chronic kidney disease) stage 3, GFR 30-59 ml/min ([address] DM type 2 (diabetes mellitus, type 2) ([address] Dysthymia, Encounter for diabetic foot exam ([facility] [address] Essential hypertension with goal blood pressure less than 130/80 (9/28/2016), Hearing loss, Hypercholesteremia, Hypogonadism male (12/01/2008), Mild intermittent asthma without complication (83/73/0814), LEONA on CPAP (07/16/2010), Phimosis (5/26/2016), Severe obesity (BMI 35.0-39.9) (06/18/2018), Tarsal tunnel syndrome (8/10/2020), Trigger finger, and Type 2 diabetes mellitus with stage 3 chronic kidney disease, with long-term current use of insulin, unspecified whether stage 3a or 3b CKD ([address] has a past surgical history that includes hx rhinoplasty; hx retinal detachment repair (1984); hx heent (2012); pr cabg, artery-vein, three (2005); hx orthopaedic; hx cyst removal (5/12/2016); and hx circumcision (2016). reports that he is a non-smoker but has been exposed to tobacco smoke. He has never used smokeless tobacco. He reports current alcohol use of about 6.0 standard drinks per week. He reports that he does not currently use drugs. He was adopted. Family history is unknown by patient. Physical Exam   Nursing note and vitals reviewed. Blood pressure 125/63, pulse (!) 59, temperature 98.2 °F (36.8 °C), temperature source Oral, resp. rate 16, height 5' 9\" (1.753 m), weight 256 lb 3.2 oz (116.2 kg), SpO2 96 %. Constitutional:  No distress.     Eyes: Conjunctivae are normal.   Ears:  Hearing grossly intact  Cardiovascular: Normal rate. regular rhythm, no murmurs or gallops  No edema  Pulmonary/Chest: Effort normal.   CTAB  Musculoskeletal: moves all 4 extremities   Neurological: Alert and oriented to person, place, and time. Skin: No visible rash noted. Psychiatric: Normal mood and affect. Behavior is normal.     Assessment and Plan    Diagnoses and all orders for this visit:    1. Type 2 diabetes mellitus with microalbuminuria, with long-term current use of insulin ([facility])  -     AMB POC HEMOGLOBIN A1C  2. Type 2 diabetes mellitus with stage 3 chronic kidney disease, with long-term current use of insulin, unspecified whether stage 3a or 3b CKD ([facility] [address]  This condition is controlled by medication therapy with insulin, Ozempic, metformin, Farxiga. Refilled medications. 3. Hypogonadism male  Marked improvement in testosterone levels on current oral therapy per urology. Counseled that symptomatic improvement may still be delayed and he should continue medication unless directed otherwise. 4. Dysthymia  He does have increased dysthymia, worry, decreased mood. Fatigue is somewhat of an issue, so use caution with medication adjustments. Will increase Lexapro to 15 mg for now. Could consider adding Wellbutrin n the future. -     escitalopram oxalate (LEXAPRO) 10 mg tablet; Take 1.5 Tablets by mouth in the morning. Increased 7/28/22    5. Stage 3 chronic kidney disease, unspecified whether stage 3a or 3b CKD ([facility])  Monitor closely. Remained stable. Seeing nephrology. 6. Pure hypercholesterolemia  Cholesterol has remained well controlled on current medication therapy with Zetia and Crestor    7. Essential hypertension with goal blood pressure less than 130/80  This condition is controlled by medication therapy with Lisinopril, metoprolol. Refilled medications.         lab results and schedule of future lab studies reviewed with patient  reviewed medications and side effects in detail    Return to clinic for further evaluation if new symptoms develop      Current Outpatient Medications   Medication Sig    testosterone undecanoate (Jatenzo) 237 mg cap TAKE ONE CAPSULE BY MOUTH TWICE DAILY WITH BREAKFAST AND DINNER AS DIRECTED    escitalopram oxalate (LEXAPRO) 10 mg tablet Take 1.5 Tablets by mouth in the morning. Increased 7/28/22    doxycycline (VIBRAMYCIN) 100 mg capsule Take 1 Capsule by mouth two (2) times a day.    ezetimibe (ZETIA) 10 mg tablet Take 1 Tablet by mouth daily. dutasteride (AVODART) 0.5 mg capsule Take 1 Capsule by mouth daily. rosuvastatin (CRESTOR) 10 mg tablet TAKE 1 TABLET NIGHTLY    nystatin-triamcinolone (MYCOLOG II) topical cream Apply  to affected area two (2) times a day.    gabapentin (NEURONTIN) 300 mg capsule TAKE 1 CAPSULE THREE TIMES A DAY    semaglutide (Ozempic) 1 mg/dose (4 mg/3 mL) pnij 1 mg by SubCUTAneous route every seven (7) days. lisinopriL (PRINIVIL, ZESTRIL) 10 mg tablet TAKE 1 TABLET TWICE A DAY    bumetanide (BUMEX) 1 mg tablet TAKE 1 TABLET EVERY MONDAY, WEDNESDAY AND FRIDAY    metFORMIN (GLUCOPHAGE) 1,000 mg tablet TAKE 1 TABLET TWICE A DAY WITH MEALS    Toujeo SoloStar U-300 Insulin 300 unit/mL (1.5 mL) inpn pen INJECT 40 UNITS EVERY MORNING AND 20 UNITS EVERY EVENING    NovoLOG Flexpen U-100 Insulin 100 unit/mL (3 mL) inpn INJECT 10 TO 30 UNITS THREE TIMES A DAY WITH MEALS PER SLIDING SCALE    azelastine (ASTELIN) 137 mcg (0.1 %) nasal spray USE 1 SPRAY IN EACH NOSTRIL TWICE A DAY AS DIRECTED    metoprolol tartrate (LOPRESSOR) 25 mg tablet TAKE 1 TABLET TWICE A DAY    Farxiga 5 mg tab tablet TAKE 1 TABLET DAILY    mupirocin (BACTROBAN) 2 % ointment mupirocin 2 % topical ointment    Insulin Needles, Disposable, 31 gauge x 5/16\" ndle 5x daily with insulin. IDDMII. E11.21    aspirin delayed-release 81 mg tablet Take 1 Tab by mouth daily. co-enzyme Q-10 (CO Q-10) 100 mg capsule Take 200 mg by mouth daily.     pyridoxine, vitamin B6, (VITAMIN B-6) 100 mg tablet Take 200 mg by mouth daily. b complex vitamins tablet Take 1 Tab by mouth daily. ascorbic acid, vitamin C, (VITAMIN C) 500 mg tablet Take 500 mg by mouth daily. cholecalciferol, vitamin D3, 50 mcg (2,000 unit) tab Take  by mouth. With vitamin K2.    magnesium oxide (MAG-OX) 400 mg tablet Take 400 mg by mouth daily. albuterol (PROVENTIL HFA, VENTOLIN HFA, PROAIR HFA) 90 mcg/actuation inhaler Take 2 Puffs by inhalation every four (4) hours as needed for Wheezing. Free[employer] 14 Day Sensor kit 1 Units by Does Not Apply route every fourteen (14) days. DMII  E11.29; E11.21    OMEGA-3 FATTY ACIDS/FISH OIL (FISH OIL EXTRA STRENGTH PO) Take 1,400 mg by mouth daily (after breakfast). ketoconazole (NIZORAL) 2 % topical cream Apply  to affected area as needed for Skin Irritation. VOLTAREN 1 % gel 2 g as needed. desonide (TRIDESILON) 0.05 % topical lotion as needed. No current facility-administered medications for this visit.

## 2022-07-30 DIAGNOSIS — J30.89 SEASONAL ALLERGIC RHINITIS DUE TO OTHER ALLERGIC TRIGGER: Chronic | ICD-10-CM

## 2022-07-31 RX ORDER — ALBUTEROL SULFATE 90 UG/1
2 AEROSOL, METERED RESPIRATORY (INHALATION)
Qty: 3 EACH | Refills: 4 | Status: SHIPPED | OUTPATIENT
Start: 2022-07-31

## 2022-08-02 ENCOUNTER — TELEPHONE (OUTPATIENT)
Dept: CARDIOLOGY CLINIC | Age: 72
End: 2022-08-02

## 2022-08-02 ENCOUNTER — TELEPHONE (OUTPATIENT)
Dept: INTERNAL MEDICINE CLINIC | Age: 72
End: 2022-08-02

## 2022-08-02 NOTE — TELEPHONE ENCOUNTER
Louise thorne was calling to see if the clinical notes where sent to [address] medical supplies  the request was sent 7/11 for the appointment that was on. 10/5/2021 They were calling to check on the status of that request  call [phone] ext [phone]

## 2022-08-10 RX ORDER — SEMAGLUTIDE 1.34 MG/ML
INJECTION, SOLUTION SUBCUTANEOUS
Qty: 1 EACH | Refills: 0 | Status: SHIPPED | OUTPATIENT
Start: 2022-08-10 | End: 2022-08-18 | Stop reason: SDUPTHER

## 2022-09-08 ENCOUNTER — DOCUMENTATION ONLY (OUTPATIENT)
Dept: INTERNAL MEDICINE CLINIC | Age: 72
End: 2022-09-08

## 2022-09-26 RX ORDER — DAPAGLIFLOZIN 5 MG/1
TABLET, FILM COATED ORAL
Qty: 90 TABLET | Refills: 3 | Status: SHIPPED | OUTPATIENT
Start: 2022-09-26 | End: 2022-09-27 | Stop reason: SDUPTHER

## 2022-09-27 ENCOUNTER — PATIENT MESSAGE (OUTPATIENT)
Dept: INTERNAL MEDICINE CLINIC | Age: 72
End: 2022-09-27

## 2022-09-27 ENCOUNTER — OFFICE VISIT (OUTPATIENT)
Dept: INTERNAL MEDICINE CLINIC | Age: 72
End: 2022-09-27
Payer: MEDICARE

## 2022-09-27 VITALS
DIASTOLIC BLOOD PRESSURE: 69 MMHG | TEMPERATURE: 99.1 F | BODY MASS INDEX: 38.45 KG/M2 | RESPIRATION RATE: 18 BRPM | OXYGEN SATURATION: 94 % | HEART RATE: 67 BPM | SYSTOLIC BLOOD PRESSURE: 143 MMHG | HEIGHT: 69 IN | WEIGHT: 259.6 LBS

## 2022-09-27 DIAGNOSIS — J40 BRONCHITIS: Primary | ICD-10-CM

## 2022-09-27 PROCEDURE — G8417 CALC BMI ABV UP PARAM F/U: HCPCS | Performed by: INTERNAL MEDICINE

## 2022-09-27 PROCEDURE — G9717 DOC PT DX DEP/BP F/U NT REQ: HCPCS | Performed by: INTERNAL MEDICINE

## 2022-09-27 PROCEDURE — 1101F PT FALLS ASSESS-DOCD LE1/YR: CPT | Performed by: INTERNAL MEDICINE

## 2022-09-27 PROCEDURE — G0463 HOSPITAL OUTPT CLINIC VISIT: HCPCS | Performed by: INTERNAL MEDICINE

## 2022-09-27 PROCEDURE — G8428 CUR MEDS NOT DOCUMENT: HCPCS | Performed by: INTERNAL MEDICINE

## 2022-09-27 PROCEDURE — G8536 NO DOC ELDER MAL SCRN: HCPCS | Performed by: INTERNAL MEDICINE

## 2022-09-27 PROCEDURE — G8754 DIAS BP LESS 90: HCPCS | Performed by: INTERNAL MEDICINE

## 2022-09-27 PROCEDURE — G8753 SYS BP > OR = 140: HCPCS | Performed by: INTERNAL MEDICINE

## 2022-09-27 PROCEDURE — 3017F COLORECTAL CA SCREEN DOC REV: CPT | Performed by: INTERNAL MEDICINE

## 2022-09-27 PROCEDURE — 99213 OFFICE O/P EST LOW 20 MIN: CPT | Performed by: INTERNAL MEDICINE

## 2022-09-27 RX ORDER — DAPAGLIFLOZIN 5 MG/1
TABLET, FILM COATED ORAL
Qty: 90 TABLET | Refills: 3 | Status: SHIPPED | OUTPATIENT
Start: 2022-09-27

## 2022-09-27 RX ORDER — AMOXICILLIN AND CLAVULANATE POTASSIUM 875; 125 MG/1; MG/1
1 TABLET, FILM COATED ORAL EVERY 12 HOURS
Qty: 14 TABLET | Refills: 0 | Status: SHIPPED | OUTPATIENT
Start: 2022-09-27 | End: 2022-10-04

## 2022-09-27 NOTE — PROGRESS NOTES
Note   Chief Complaint   [facility] follow-up    Tammy Arguelles is a 80 y.o. male     1. Bronchitis  -     amoxicillin-clavulanate (AUGMENTIN) 875-125 mg per tablet; Take 1 Tablet by mouth every twelve (12) hours for 7 days. , Normal, Disp-14 Tablet, R-0   Went to [facility] for bronchitis earlier this month  Still having sinus congestion/drainage  Still having a cough  Minimally improved  Gave him a zpack   Still some HA and sinus pressure   Has needed steroids in the past for these symptoms  Glucose average 173 over last 7 days, 160 last 30 days   Recommend Augmentin. If no significant improvement, consider steroids    Benefits, risks, possible drug interactions, and side effects of all new medications were reviewed with the patient. Pt verbalized understanding. Return to clinic: As needed    An electronic signature was used to authenticate this note. Braden Raymond MD  [facility]  9/27/2022    Future Appointments   Date Time Provider Daisy Bryan   10/17/2022  8:20 AM Bob Henson MD [facility] BS AMB   11/30/2022  4:00 PM Alina Bellamy MD [facility] BS AMB        Objective   Vitals:       Visit Vitals  BP (!) 143/69 (BP 1 Location: Left upper arm, BP Patient Position: Sitting)   Pulse 67   Temp 99.1 °F (37.3 °C) (Oral)   Resp 18   Ht 5' 9\" (1.753 m)   Wt 259 lb 9.6 oz (117.8 kg)   SpO2 94%   BMI 38.34 kg/m²        Physical Exam  Constitutional:       Appearance: Normal appearance. He is not ill-appearing. Cardiovascular:      Rate and Rhythm: Normal rate and regular rhythm. Heart sounds: No murmur heard. No friction rub. No gallop. Pulmonary:      Effort: No respiratory distress. Breath sounds: Normal breath sounds. No wheezing, rhonchi or rales. Neurological:      Mental Status: He is alert.         Current Outpatient Medications   Medication Sig    amoxicillin-clavulanate (AUGMENTIN) 875-125 mg per tablet Take 1 Tablet by mouth every twelve (12) hours for 7 days. Farxiga 5 mg tab tablet TAKE 1 TABLET DAILY    Ozempic 1 mg/dose (4 mg/3 mL) pnij 1 mg by SubCUTAneous route every seven (7) days. albuterol (PROVENTIL HFA, VENTOLIN HFA, PROAIR HFA) 90 mcg/actuation inhaler Take 2 Puffs by inhalation every four (4) hours as needed for Wheezing. testosterone undecanoate (Jatenzo) 237 mg cap TAKE ONE CAPSULE BY MOUTH TWICE DAILY WITH BREAKFAST AND DINNER AS DIRECTED    ezetimibe (ZETIA) 10 mg tablet Take 1 Tablet by mouth daily. dutasteride (AVODART) 0.5 mg capsule Take 1 Capsule by mouth daily. rosuvastatin (CRESTOR) 10 mg tablet TAKE 1 TABLET NIGHTLY    nystatin-triamcinolone (MYCOLOG II) topical cream Apply  to affected area two (2) times a day.    gabapentin (NEURONTIN) 300 mg capsule TAKE 1 CAPSULE THREE TIMES A DAY    lisinopriL (PRINIVIL, ZESTRIL) 10 mg tablet TAKE 1 TABLET TWICE A DAY    bumetanide (BUMEX) 1 mg tablet TAKE 1 TABLET EVERY MONDAY, WEDNESDAY AND FRIDAY    metFORMIN (GLUCOPHAGE) 1,000 mg tablet TAKE 1 TABLET TWICE A DAY WITH MEALS    Toujeo SoloStar U-300 Insulin 300 unit/mL (1.5 mL) inpn pen INJECT 40 UNITS EVERY MORNING AND 20 UNITS EVERY EVENING    NovoLOG Flexpen U-100 Insulin 100 unit/mL (3 mL) inpn INJECT 10 TO 30 UNITS THREE TIMES A DAY WITH MEALS PER SLIDING SCALE    azelastine (ASTELIN) 137 mcg (0.1 %) nasal spray USE 1 SPRAY IN EACH NOSTRIL TWICE A DAY AS DIRECTED    metoprolol tartrate (LOPRESSOR) 25 mg tablet TAKE 1 TABLET TWICE A DAY    mupirocin (BACTROBAN) 2 % ointment mupirocin 2 % topical ointment    Insulin Needles, Disposable, 31 gauge x 5/16\" ndle 5x daily with insulin. IDDMII. E11.21    aspirin delayed-release 81 mg tablet Take 1 Tab by mouth daily. co-enzyme Q-10 (CO Q-10) 100 mg capsule Take 200 mg by mouth daily. pyridoxine, vitamin B6, (VITAMIN B-6) 100 mg tablet Take 200 mg by mouth daily. b complex vitamins tablet Take 1 Tab by mouth daily.     ascorbic acid, vitamin C, (VITAMIN C) 500 mg tablet Take 500 mg by mouth daily. cholecalciferol, vitamin D3, 50 mcg (2,000 unit) tab Take  by mouth. With vitamin K2.    magnesium oxide (MAG-OX) 400 mg tablet Take 400 mg by mouth daily. FreeStyle Belkis 14 Day Sensor kit 1 Units by Does Not Apply route every fourteen (14) days. DMII  E11.29; E11.21    OMEGA-3 FATTY ACIDS/FISH OIL (FISH OIL EXTRA STRENGTH PO) Take 1,400 mg by mouth daily (after breakfast). ketoconazole (NIZORAL) 2 % topical cream Apply  to affected area as needed for Skin Irritation. VOLTAREN 1 % gel 2 g as needed. desonide (TRIDESILON) 0.05 % topical lotion as needed. escitalopram oxalate (LEXAPRO) 10 mg tablet Take 1.5 Tablets by mouth in the morning. Increased 7/28/22 (Patient not taking: Reported on 9/27/2022)    doxycycline (VIBRAMYCIN) 100 mg capsule Take 1 Capsule by mouth two (2) times a day. (Patient not taking: Reported on 9/27/2022)     No current facility-administered medications for this visit.

## 2022-10-03 RX ORDER — EZETIMIBE 10 MG/1
TABLET ORAL
Qty: 90 TABLET | Refills: 0 | Status: SHIPPED | OUTPATIENT
Start: 2022-10-03

## 2022-10-17 ENCOUNTER — HOSPITAL ENCOUNTER (OUTPATIENT)
Dept: GENERAL RADIOLOGY | Age: 72
Discharge: HOME | End: 2022-10-17
Attending: INTERNAL MEDICINE
Payer: MEDICARE

## 2022-10-17 ENCOUNTER — OFFICE VISIT (OUTPATIENT)
Dept: INTERNAL MEDICINE CLINIC | Age: 72
End: 2022-10-17
Attending: INTERNAL MEDICINE
Payer: MEDICARE

## 2022-10-17 VITALS
BODY MASS INDEX: 37.15 KG/M2 | HEIGHT: 69 IN | WEIGHT: 250.8 LBS | TEMPERATURE: 98.3 F | RESPIRATION RATE: 16 BRPM | HEART RATE: 59 BPM | DIASTOLIC BLOOD PRESSURE: 63 MMHG | SYSTOLIC BLOOD PRESSURE: 107 MMHG | OXYGEN SATURATION: 96 %

## 2022-10-17 DIAGNOSIS — Z79.4 TYPE 2 DIABETES MELLITUS WITH MICROALBUMINURIA, WITH LONG-TERM CURRENT USE OF INSULIN (HCC): Primary | ICD-10-CM

## 2022-10-17 DIAGNOSIS — N18.31 STAGE 3A CHRONIC KIDNEY DISEASE (HCC): ICD-10-CM

## 2022-10-17 DIAGNOSIS — E29.1 HYPOGONADISM MALE: ICD-10-CM

## 2022-10-17 DIAGNOSIS — R05.9 COUGH, UNSPECIFIED TYPE: ICD-10-CM

## 2022-10-17 DIAGNOSIS — J45.20 MILD INTERMITTENT ASTHMA WITHOUT COMPLICATION: ICD-10-CM

## 2022-10-17 DIAGNOSIS — F34.1 DYSTHYMIA: ICD-10-CM

## 2022-10-17 DIAGNOSIS — R80.9 TYPE 2 DIABETES MELLITUS WITH MICROALBUMINURIA, WITH LONG-TERM CURRENT USE OF INSULIN (HCC): Primary | ICD-10-CM

## 2022-10-17 DIAGNOSIS — E11.29 TYPE 2 DIABETES MELLITUS WITH MICROALBUMINURIA, WITH LONG-TERM CURRENT USE OF INSULIN (HCC): Primary | ICD-10-CM

## 2022-10-17 LAB — HBA1C MFR BLD HPLC: 7.3 %

## 2022-10-17 PROCEDURE — 71046 X-RAY EXAM CHEST 2 VIEWS: CPT

## 2022-10-17 PROCEDURE — G0463 HOSPITAL OUTPT CLINIC VISIT: HCPCS | Performed by: INTERNAL MEDICINE

## 2022-10-17 PROCEDURE — 3017F COLORECTAL CA SCREEN DOC REV: CPT | Performed by: INTERNAL MEDICINE

## 2022-10-17 PROCEDURE — G8536 NO DOC ELDER MAL SCRN: HCPCS | Performed by: INTERNAL MEDICINE

## 2022-10-17 PROCEDURE — 2022F DILAT RTA XM EVC RTNOPTHY: CPT | Performed by: INTERNAL MEDICINE

## 2022-10-17 PROCEDURE — 83036 HEMOGLOBIN GLYCOSYLATED A1C: CPT | Performed by: INTERNAL MEDICINE

## 2022-10-17 PROCEDURE — G8754 DIAS BP LESS 90: HCPCS | Performed by: INTERNAL MEDICINE

## 2022-10-17 PROCEDURE — G9717 DOC PT DX DEP/BP F/U NT REQ: HCPCS | Performed by: INTERNAL MEDICINE

## 2022-10-17 PROCEDURE — 1101F PT FALLS ASSESS-DOCD LE1/YR: CPT | Performed by: INTERNAL MEDICINE

## 2022-10-17 PROCEDURE — G8417 CALC BMI ABV UP PARAM F/U: HCPCS | Performed by: INTERNAL MEDICINE

## 2022-10-17 PROCEDURE — G8427 DOCREV CUR MEDS BY ELIG CLIN: HCPCS | Performed by: INTERNAL MEDICINE

## 2022-10-17 PROCEDURE — 99214 OFFICE O/P EST MOD 30 MIN: CPT | Performed by: INTERNAL MEDICINE

## 2022-10-17 PROCEDURE — G8752 SYS BP LESS 140: HCPCS | Performed by: INTERNAL MEDICINE

## 2022-10-17 RX ORDER — FLUOXETINE HYDROCHLORIDE 20 MG/1
20 CAPSULE ORAL DAILY
Qty: 30 CAPSULE | Refills: 2 | Status: SHIPPED | OUTPATIENT
Start: 2022-10-17

## 2022-10-17 RX ORDER — TESTOSTERONE UNDECANOATE 158 MG/1
2 CAPSULE, LIQUID FILLED ORAL 2 TIMES DAILY
Qty: 120 EACH | Refills: 4
Start: 2022-10-17

## 2022-10-17 NOTE — PROGRESS NOTES
HISTORY OF PRESENT ILLNESS    Chief Complaint   Patient presents with    Diabetes     3 mo f/up    Cough     1 mo        Presents for follow-up    Cough. Hx asthma. Given zpak, then augmentin. Using albuterol w some relief. Reports mild cough persists. No SOB, fever, chills    Diabetes Mellitus follow-up  Last hemoglobin a1c   Lab Results   Component Value Date/Time    Hemoglobin A1c 7.3 (H) 01/28/2021 10:34 AM    Hemoglobin A1c (POC) 7.3 10/17/2022 08:39 AM   medication compliance: compliant all of the time. Taking lantus 20  units HS, then 40 units AM  Rarely using humalog. Taking Ozempic. Diabetic diet compliance: compliant most of the time. Home glucose monitoring: fasting values range none. Hypoglycemic episodes:  None. Further diabetic ROS: no polyuria or polydipsia, no chest pain, dyspnea or TIA's, no numbness, tingling or pain in extremities    Lab 09/01/22 by nephrology  SODIUM, SERUM/PLASMA mmol/L 136   POTASSIUM, SERUM/PLASMA mmol/L 4.8   CHLORIDE, SERUM/PLASMA mmol/L 97   CARBON DIOXIDE CO2), TOTAL, SERUM/PLASMA mmol/L 23   UREA NITROGEN, SERUM/PLASMA (BUN) mg/dL 18   CREATININE, SERUM/PLASMA mg/dL 1.30*   ESTIMATED GLOMERULAR FILTRATION RATE mL/min/1.73 59*   CALCIUM, SERUM/PLASMA mg/dL 9.6   ALBUMIN, SERUM/PLASMA g/dL 4.2   PHOSPHATE, SERUM/PLASMA mg/dL 3.7     PROTEIN/CREATININE, URINE mg/g creat 176     URATE, SERUM/PLASMA mg/dL 6.1     LEUKOCYTES, BLOOD x10E3/uL 6.5   HEMOGLOBIN (HGB) g/dL 14.7   HEMATOCRIT (HCT) % 47.0   PLATELETS, BLOOD A76T3/      Dysthymia  Stopped lexapro due to fatigue and this helped. He would like to consult a so-friendly psychiatrist.   [place]ino [place] mood is moderately low. Hypogonadism. Taking Vinh Camejo per urology.   Unclear      Review of Systems   All other systems reviewed and are negative, except as noted in HPI    Past Medical and Surgical History   has a past medical history of Achilles bursitis (08/10/2020), Anxiety, AR (allergic rhinitis) (12/01/2008), Arthritis, Blind right eye, BPH (benign prostatic hyperplasia), CAD (coronary artery disease), CKD (chronic kidney disease) stage 3, GFR 30-59 ml/min ([facility] [address] DM type 2 (diabetes mellitus, type 2) ([facility] [address] Dysthymia, [facility] exam [facility] Essential hypertension with goal blood pressure less than 130/80 (09/28/2016), Hearing loss, Hypercholesteremia, Hypogonadism male (12/01/2008), Mild intermittent asthma without complication (48/67/4905), LEONA on CPAP (07/16/2010), Phimosis (05/26/2016), Severe obesity (BMI 35.0-39.9) (06/18/2018), Tarsal tunnel syndrome (08/10/2020), Trigger finger, and Type 2 diabetes mellitus with stage 3 chronic kidney disease, with long-term current use of insulin, unspecified whether stage 3a or 3b CKD ([facility] has a past surgical history that includes hx rhinoplasty; hx retinal detachment repair (1984); hx heent (2012); pr cabg, artery-vein, three (2005); hx orthopaedic; hx cyst removal (5/12/2016); and hx circumcision (2016). reports that he has never smoked. He has been exposed to tobacco smoke. He has never used smokeless tobacco. He reports current alcohol use of about 6.0 standard drinks per week. He reports that he does not currently use drugs. He was adopted. Family history is unknown by patient. Physical Exam   Nursing note and vitals reviewed. Blood pressure 107/63, pulse (!) 59, temperature 98.3 °F (36.8 °C), temperature source Oral, resp. rate 16, height 5' 9\" (1.753 m), weight 250 lb 12.8 oz (113.8 kg), SpO2 96 %. Constitutional:  No distress. Eyes: Conjunctivae are normal.   Ears:  Hearing grossly intact  Cardiovascular: Normal rate. regular rhythm, no murmurs or gallops  No edema  Pulmonary/Chest: Effort normal.   CTAB  Musculoskeletal: moves all 4 extremities   Neurological: Alert and oriented to person, place, and time. Skin: No visible rash noted. Psychiatric: Normal mood and affect.  Behavior is normal.     Assessment and Plan    Diagnoses and all orders for this visit:    1. Type 2 diabetes mellitus with microalbuminuria, with long-term current use of insulin (HCC)  Remains borderline controlled. Continue Jazmine Arm and metformin. Continue insulin. Low-carb diet is essential.  Repeat A1c in 3 months. -     AMB POC HEMOGLOBIN A1C    2. Cough, unspecified type  Postviral cough. Chest x-ray today is normal.  Trial of LAMA LABA for bronchospasm and residual inflammation status post viral infection. -     XR CHEST PA LAT; Future    3. Mild intermittent asthma without complication  Poorly controlled with cough variant. Start Willaim Niece. Side effects discussed. Consider other LAMA LABA option  -     mometasone-formoterol (Dulera) 200-5 mcg/actuation HFA inhaler; Take 2 Puffs by inhalation two (2) times a day. Indications: controller medication for asthma    4. Stage 3a chronic kidney disease (HCC)  Mild. Following up with nephrology.  Emily Nobles condition appears to be stable and is being evaluated and managed by his specialist Dr. Adi Winters. No acute findings today warrant change in management plan. 5. Dysthymia  Uncontrolled. Fatigue side effect of Lexapro. Will initiate trial of fluoxetine. Fatigue is a limiting symptom for him. Would benefit from an energizing antidepressant. Given phone number of counselors. -     FLUoxetine (PROzac) 20 mg capsule; Take 1 Capsule by mouth daily. 6. Hypogonadism male  Unclear current status. Following up with urology. On Jatenzo  -     testosterone undecanoate (Jatenzo) 158 mg cap; Take 2 Caplet by mouth two (2) times a day. Max Daily Amount: 4 Caplet.  Indications: abnormal function and activity of the testis    lab results and schedule of future lab studies reviewed with patient  reviewed medications and side effects in detail    Return to clinic for further evaluation if new symptoms develop      Current Outpatient Medications   Medication Sig    mometasone-formoterol [place]) 200-5 mcg/actuation HFA inhaler Take 2 Puffs by inhalation two (2) times a day. Indications: controller medication for asthma    testosterone undecanoate (Jatenzo) 158 mg cap Take 2 Caplet by mouth two (2) times a day. Max Daily Amount: 4 Caplet. Indications: abnormal function and activity of the testis    FLUoxetine (PROzac) 20 mg capsule Take 1 Capsule by mouth daily. ezetimibe (ZETIA) 10 mg tablet TAKE 1 TABLET DAILY    Farxiga 5 mg tab tablet TAKE 1 TABLET DAILY    Ozempic 1 mg/dose (4 mg/3 mL) pnij 1 mg by SubCUTAneous route every seven (7) days. albuterol (PROVENTIL HFA, VENTOLIN HFA, PROAIR HFA) 90 mcg/actuation inhaler Take 2 Puffs by inhalation every four (4) hours as needed for Wheezing. dutasteride (AVODART) 0.5 mg capsule Take 1 Capsule by mouth daily. rosuvastatin (CRESTOR) 10 mg tablet TAKE 1 TABLET NIGHTLY    nystatin-triamcinolone (MYCOLOG II) topical cream Apply  to affected area two (2) times a day.    gabapentin (NEURONTIN) 300 mg capsule TAKE 1 CAPSULE THREE TIMES A DAY    lisinopriL (PRINIVIL, ZESTRIL) 10 mg tablet TAKE 1 TABLET TWICE A DAY    bumetanide (BUMEX) 1 mg tablet TAKE 1 TABLET EVERY MONDAY, WEDNESDAY AND FRIDAY    metFORMIN (GLUCOPHAGE) 1,000 mg tablet TAKE 1 TABLET TWICE A DAY WITH MEALS    Toujeo SoloStar U-300 Insulin 300 unit/mL (1.5 mL) inpn pen INJECT 40 UNITS EVERY MORNING AND 20 UNITS EVERY EVENING    NovoLOG Flexpen U-100 Insulin 100 unit/mL (3 mL) inpn INJECT 10 TO 30 UNITS THREE TIMES A DAY WITH MEALS PER SLIDING SCALE    azelastine (ASTELIN) 137 mcg (0.1 %) nasal spray USE 1 SPRAY IN EACH NOSTRIL TWICE A DAY AS DIRECTED    metoprolol tartrate (LOPRESSOR) 25 mg tablet TAKE 1 TABLET TWICE A DAY    mupirocin (BACTROBAN) 2 % ointment mupirocin 2 % topical ointment    Insulin Needles, Disposable, 31 gauge x 5/16\" ndle 5x daily with insulin. IDDMII. E11.21    aspirin delayed-release 81 mg tablet Take 1 Tab by mouth daily.     co-enzyme Q-10 (CO Q-10) 100 mg capsule Take 200 mg by mouth daily. pyridoxine, vitamin B6, (VITAMIN B-6) 100 mg tablet Take 200 mg by mouth daily. b complex vitamins tablet Take 1 Tab by mouth daily. ascorbic acid, vitamin C, (VITAMIN C) 500 mg tablet Take 500 mg by mouth daily. cholecalciferol, vitamin D3, 50 mcg (2,000 unit) tab Take  by mouth. With vitamin K2.    magnesium oxide (MAG-OX) 400 mg tablet Take 400 mg by mouth daily. FreeStyle Belkis 14 Day Sensor kit 1 Units by Does Not Apply route every fourteen (14) days. DMII  E11.29; E11.21    OMEGA-3 FATTY ACIDS/FISH OIL (FISH OIL EXTRA STRENGTH PO) Take 1,400 mg by mouth daily (after breakfast). ketoconazole (NIZORAL) 2 % topical cream Apply  to affected area as needed for Skin Irritation. VOLTAREN 1 % gel 2 g as needed. desonide (TRIDESILON) 0.05 % topical lotion as needed. No current facility-administered medications for this visit.

## 2022-10-17 NOTE — PROGRESS NOTES
I will have jaja work on this. We will try to get you the PCSK9 inhibitor Repatha. It may take a month or tow to get if approved by your insurance company. Thank you for Emailing me. I hope you are well. Quality 130: Documentation Of Current Medications In The Medical Record: Current Medications Documented Detail Level: Detailed

## 2022-11-10 DIAGNOSIS — R42 POSTURAL DIZZINESS WITH PRESYNCOPE: ICD-10-CM

## 2022-11-10 DIAGNOSIS — R55 POSTURAL DIZZINESS WITH PRESYNCOPE: ICD-10-CM

## 2022-11-10 RX ORDER — METOPROLOL TARTRATE 25 MG/1
TABLET, FILM COATED ORAL
Qty: 180 TABLET | Refills: 3 | Status: SHIPPED | OUTPATIENT
Start: 2022-11-10

## 2022-11-14 DIAGNOSIS — Z79.4 TYPE 2 DIABETES MELLITUS WITH MICROALBUMINURIA, WITH LONG-TERM CURRENT USE OF INSULIN (HCC): ICD-10-CM

## 2022-11-14 DIAGNOSIS — E11.29 TYPE 2 DIABETES MELLITUS WITH MICROALBUMINURIA, WITH LONG-TERM CURRENT USE OF INSULIN (HCC): ICD-10-CM

## 2022-11-14 DIAGNOSIS — R80.9 TYPE 2 DIABETES MELLITUS WITH MICROALBUMINURIA, WITH LONG-TERM CURRENT USE OF INSULIN (HCC): ICD-10-CM

## 2022-11-14 DIAGNOSIS — J45.20 MILD INTERMITTENT ASTHMA WITHOUT COMPLICATION: ICD-10-CM

## 2022-11-14 RX ORDER — SEMAGLUTIDE 1.34 MG/ML
1 INJECTION, SOLUTION SUBCUTANEOUS
Qty: 3 EACH | Refills: 5 | Status: SHIPPED | OUTPATIENT
Start: 2022-11-14

## 2022-11-18 DIAGNOSIS — E11.21 TYPE 2 DIABETES WITH NEPHROPATHY (HCC): ICD-10-CM

## 2022-11-18 RX ORDER — GABAPENTIN 300 MG/1
CAPSULE ORAL
Qty: 270 CAPSULE | Refills: 1 | Status: SHIPPED | OUTPATIENT
Start: 2022-11-18

## 2022-11-26 LAB
ALBUMIN SERPL-MCNC: 4.6 G/DL (ref 3.7–4.7)
ALP SERPL-CCNC: 47 IU/L (ref 44–121)
ALT SERPL-CCNC: 20 IU/L (ref 0–44)
AST SERPL-CCNC: 23 IU/L (ref 0–40)
BILIRUB DIRECT SERPL-MCNC: 0.13 MG/DL (ref 0–0.4)
BILIRUB SERPL-MCNC: 0.3 MG/DL (ref 0–1.2)
CHOLEST SERPL-MCNC: 96 MG/DL (ref 100–199)
HDLC SERPL-MCNC: 34 MG/DL
IMP & REVIEW OF LAB RESULTS: NORMAL
LDLC SERPL CALC-MCNC: 42 MG/DL (ref 0–99)
PROT SERPL-MCNC: 7.1 G/DL (ref 6–8.5)
TRIGL SERPL-MCNC: 107 MG/DL (ref 0–149)
VLDLC SERPL CALC-MCNC: 20 MG/DL (ref 5–40)

## 2022-11-28 DIAGNOSIS — E78.5 DYSLIPIDEMIA: Primary | ICD-10-CM

## 2022-11-30 ENCOUNTER — OFFICE VISIT (OUTPATIENT)
Dept: CARDIOLOGY CLINIC | Age: 72
End: 2022-11-30
Payer: MEDICARE

## 2022-11-30 VITALS
DIASTOLIC BLOOD PRESSURE: 78 MMHG | OXYGEN SATURATION: 92 % | WEIGHT: 242 LBS | BODY MASS INDEX: 35.74 KG/M2 | SYSTOLIC BLOOD PRESSURE: 136 MMHG | HEART RATE: 60 BPM

## 2022-11-30 DIAGNOSIS — I65.23 BILATERAL CAROTID ARTERY STENOSIS: ICD-10-CM

## 2022-11-30 DIAGNOSIS — E78.5 DYSLIPIDEMIA: Primary | ICD-10-CM

## 2022-11-30 DIAGNOSIS — I10 ESSENTIAL HYPERTENSION WITH GOAL BLOOD PRESSURE LESS THAN 130/80: ICD-10-CM

## 2022-11-30 DIAGNOSIS — G47.33 OSA (OBSTRUCTIVE SLEEP APNEA): ICD-10-CM

## 2022-11-30 DIAGNOSIS — E66.01 SEVERE OBESITY (BMI 35.0-35.9 WITH COMORBIDITY) (HCC): ICD-10-CM

## 2022-11-30 DIAGNOSIS — E11.21 TYPE 2 DIABETES WITH NEPHROPATHY (HCC): ICD-10-CM

## 2022-11-30 PROCEDURE — G0463 HOSPITAL OUTPT CLINIC VISIT: HCPCS | Performed by: SPECIALIST

## 2022-11-30 NOTE — PROGRESS NOTES
CARDIOLOGY OFFICE NOTE    Maged Collins MD, [address], [place], [address] [facility]  Phone [phone]; Fax [phone]  Mobile [phone]   Voice Mail [phone]       ATTENTION:   This medical record was transcribed using an electronic medical records/speech recognition system. Although proofread, it may and can contain electronic, spelling and other errors. Corrections may be executed at a later time. Please feel free to contact us for any clarifications as needed. LAST VISIT: 7/25/19      ICD-10-CM ICD-9-CM   1. Dyslipidemia  E78.5 272.4   2. Essential hypertension with goal blood pressure less than 130/80  I10 401.9   3. Type 2 diabetes with nephropathy (HCC)  E11.21 250.40     583.81   4. Bilateral carotid artery stenosis  I65.23 433.10     433.30   5. LEONA (obstructive sleep apnea)  G47.33 327.23   6. Severe obesity (BMI 35.0-35.9 with comorbidity) ([facility])  E66.01 278.01    Z68.35 V85.35            Cha Madrid is a 71 y.o. male with diabetes, hypercholesterolemia, CAD, LEONA, and HTN last seen by me 6 months ago    Cardiac risk factors: diabetes mellitus, male gender, hypertension  I have personally obtained the history from the patient. HISTORY OF PRESENTING ILLNESS     Cha Madrid is a very nice gentleman who seems to be doing well. He has no complaints of chest pain or shortness of breath. He is now about 18 years out from having bypass surgery. His cholesterol is excellent and blood pressure is good as well.                ACTIVE PROBLEM LIST     Patient Active Problem List    Diagnosis Date Noted    Type 2 diabetes mellitus with stage 3 chronic kidney disease, with long-term current use of insulin, unspecified whether stage 3a or 3b CKD (HCC)     Trigger finger     Arthritis     Blind right eye     BPH (benign prostatic hyperplasia)     Anxiety     Hearing loss     Hypercholesteremia     Leg length inequality 08/10/2020    Primary localized osteoarthrosis of ankle and foot 08/10/2020    Tarsal tunnel syndrome 08/10/2020    Blood type A+ 08/10/2020    CKD (chronic kidney disease) stage 3, GFR 30-59 ml/min (HCC) 01/07/2020    Dysthymia 09/26/2018    Type 2 diabetes mellitus with microalbuminuria, with long-term current use of insulin ([facility] 03/12/2018    Pure hypercholesterolemia 05/04/2017    Mild intermittent asthma without complication 40/13/6811    Advance care planning 01/24/2017    Essential hypertension with goal blood pressure less than 130/80 09/28/2016    LEONA on CPAP 07/16/2010    CAD (coronary artery disease) 12/01/2008    Hypogonadism male 12/01/2008    AR (allergic rhinitis) 12/01/2008    ED (erectile dysfunction) 12/01/2008           PAST MEDICAL HISTORY     Past Medical History:   Diagnosis Date    Achilles bursitis 08/10/2020    Anxiety     AR (allergic rhinitis) 12/01/2008    Dr. Hubert Goldberg. gets  shots    Arthritis     Blind right eye     BPH (benign prostatic hyperplasia)     Dr. Georgianna Landau. biopsy 4/15/21 benign    CAD (coronary artery disease)     Dr. Dylon Mota.  s/p CABG - 3 vessel    CKD (chronic kidney disease) stage 3, GFR 30-59 ml/min ([facility])     Dr. Kendal Silva    DM type 2 (diabetes mellitus, type 2) ([facility]     30 years    Dysthymia     Encounter for diabetic foot exam [facility])     sees Dr. Marisa Glaser hypertension with goal blood pressure less than 130/80 09/28/2016    Hearing loss     hearing aids. Hypercholesteremia     Hypogonadism male 12/01/2008    Dr. Nisha Puckett 2022. saw Dr. Georgianna Landau.  started Aveed 2020    Mild intermittent asthma without complication 60/85/1142    sees allergy Dr. Hubert Goldberg    LEONA on CPAP 07/16/2010    Dr. Osbaldo Murdock    Phimosis 05/26/2016    Severe obesity (BMI 35.0-39.9) 06/18/2018    Tarsal tunnel syndrome 08/10/2020    Trigger finger     injections done summer 2020. Dr. Jose Rob.     Type 2 diabetes mellitus with stage 3 chronic kidney disease, with long-term current use of insulin, unspecified whether stage 3a or 3b CKD ([facility]            PAST SURGICAL HISTORY     Past Surgical History:   Procedure Laterality Date    HX CIRCUMCISION  2016    HX CYST REMOVAL  5/12/2016    neck and chest    HX HEENT  2012    right eye prosthesis    HX ORTHOPAEDIC      right shoulder surgery, rotator cuff repair    HX RETINAL DETACHMENT REPAIR  1984    R Blindness    HX RHINOPLASTY      septoplasty    HI CABG, ARTERY-VEIN, THREE  2005    Del Cid          ALLERGIES     Allergies   Allergen Reactions    Cefdinir Rash    Codeine Nausea Only          FAMILY HISTORY     Family History   Adopted: Yes   Family history unknown: Yes    negative for cardiac disease       SOCIAL HISTORY     Social History     Socioeconomic History    Marital status: SINGLE     Spouse name: Esperanza Perry    Number of children: 0    Highest education level: Associate degree: academic program   Occupational History     Comment: Technology     Comment: Hotel   Tobacco Use    Smoking status: Never     Passive exposure: Yes    Smokeless tobacco: Never   Substance and Sexual Activity    Alcohol use: Yes     Alcohol/week: 6.0 standard drinks     Types: 6 Cans of beer per week     Comment: 1 beer or so per day    Drug use: Not Currently     Comment: occasionally smoked marijuana in the past    Sexual activity: Not Currently     Partners: Male         MEDICATIONS     Current Outpatient Medications   Medication Sig    gabapentin (NEURONTIN) 300 mg capsule TAKE 1 CAPSULE THREE TIMES A DAY    mometasone-formoterol (Dulera) 200-5 mcg/actuation HFA inhaler Take 2 Puffs by inhalation two (2) times a day. Indications: controller medication for asthma    Ozempic 1 mg/dose (4 mg/3 mL) pnij 1 mg by SubCUTAneous route every seven (7) days. metoprolol tartrate (LOPRESSOR) 25 mg tablet TAKE 1 TABLET TWICE A DAY    testosterone undecanoate (Jatenzo) 158 mg cap Take 2 Caplet by mouth two (2) times a day. Max Daily Amount: 4 Caplet.  Indications: abnormal function and activity of the testis FLUoxetine (PROzac) 20 mg capsule Take 1 Capsule by mouth daily. ezetimibe (ZETIA) 10 mg tablet TAKE 1 TABLET DAILY    Farxiga 5 mg tab tablet TAKE 1 TABLET DAILY    albuterol (PROVENTIL HFA, VENTOLIN HFA, PROAIR HFA) 90 mcg/actuation inhaler Take 2 Puffs by inhalation every four (4) hours as needed for Wheezing. dutasteride (AVODART) 0.5 mg capsule Take 1 Capsule by mouth daily. rosuvastatin (CRESTOR) 10 mg tablet TAKE 1 TABLET NIGHTLY    nystatin-triamcinolone (MYCOLOG II) topical cream Apply  to affected area two (2) times a day. lisinopriL (PRINIVIL, ZESTRIL) 10 mg tablet TAKE 1 TABLET TWICE A DAY    bumetanide (BUMEX) 1 mg tablet TAKE 1 TABLET EVERY MONDAY, WEDNESDAY AND FRIDAY    metFORMIN (GLUCOPHAGE) 1,000 mg tablet TAKE 1 TABLET TWICE A DAY WITH MEALS    Toujeo SoloStar U-300 Insulin 300 unit/mL (1.5 mL) inpn pen INJECT 40 UNITS EVERY MORNING AND 20 UNITS EVERY EVENING    NovoLOG Flexpen U-100 Insulin 100 unit/mL (3 mL) inpn INJECT 10 TO 30 UNITS THREE TIMES A DAY WITH MEALS PER SLIDING SCALE    azelastine (ASTELIN) 137 mcg (0.1 %) nasal spray USE 1 SPRAY IN EACH NOSTRIL TWICE A DAY AS DIRECTED    mupirocin (BACTROBAN) 2 % ointment mupirocin 2 % topical ointment    Insulin Needles, Disposable, 31 gauge x 5/16\" ndle 5x daily with insulin. IDDMII. E11.21    aspirin delayed-release 81 mg tablet Take 1 Tab by mouth daily. co-enzyme Q-10 (CO Q-10) 100 mg capsule Take 200 mg by mouth daily. pyridoxine, vitamin B6, (VITAMIN B-6) 100 mg tablet Take 200 mg by mouth daily. b complex vitamins tablet Take 1 Tab by mouth daily. ascorbic acid, vitamin C, (VITAMIN C) 500 mg tablet Take 500 mg by mouth daily. cholecalciferol, vitamin D3, 50 mcg (2,000 unit) tab Take  by mouth. With vitamin K2.    magnesium oxide (MAG-OX) 400 mg tablet Take 400 mg by mouth daily. FreeStyle Belkis 14 Day Sensor kit 1 Units by Does Not Apply route every fourteen (14) days.  DMII  E11.29; E11.21    OMEGA-3 FATTY ACIDS/FISH OIL (FISH OIL EXTRA STRENGTH PO) Take 1,400 mg by mouth daily (after breakfast). ketoconazole (NIZORAL) 2 % topical cream Apply  to affected area as needed for Skin Irritation. VOLTAREN 1 % gel 2 g as needed. desonide (TRIDESILON) 0.05 % topical lotion as needed. No current facility-administered medications for this visit. I have reviewed the nurses notes, vitals, problem list, allergy list, medical history, family, social history and medications. REVIEW OF SYMPTOMS   Pertinent positive per HPI  General: Pt denies excessive weight gain or loss. Pt is able to conduct ADL's  HEENT: Denies blurred vision, headaches, hearing loss, epistaxis and difficulty swallowing. Respiratory: Denies cough or stridor. Cardiovascular: Denies, palpitations. Trace edema  Gastrointestinal: Denies poor appetite, indigestion, abdominal pain or blood in stool  Genitourinary: Denies hematuria, dysuria, increased urinary frequency  Musculoskeletal: Denies joint pain or swelling from muscles or joints  Neurologic: Denies tremor, paresthesias, headache, or sensory motor disturbance  Psychiatric: Denies confusion, insomnia, depression  Integumentray: Denies rash, itching or ulcers. Hematologic: Denies easy bruising, bleeding     PHYSICAL EXAMINATION      Visit Vitals  /78 (BP 1 Location: Left upper arm, BP Patient Position: Sitting)   Pulse 60   Wt 242 lb (109.8 kg)   SpO2 92%   BMI 35.74 kg/m²         General: Well developed, in no acute distress. Overweight  HEENT: No jaundice, oral mucosa moist, no oral ulcers  Neck: Supple, no stiffness, no lymphadenopathy, supple  Heart:  RRR  Respiratory: Clear bilaterally x 4, no wheezing or rales  Extremities: normal cap refill, no cyanosis. Musculoskeletal: No clubbing, no deformities  Neuro: A&Ox3, speech clear, gait stable, cooperative, no focal neurologic deficits  Skin: Skin color is normal. No rashes or lesions.  Non diaphoretic, moist.        EKG: Sinus brachycardia with first degree AV block of 216 seconds. DIAGNOSTIC DATA     1. Stress Test   NM- 3/17/15- no ischemia, EF 65%   NM- 19-· Abnormal stress myocardial perfusion with very small area of mild perfusion defect of the mid anterior wall and mid infeiror wall with SDS 2 which is reversible. This suggest very small area of focal ischemia. Abnormal septal motion consistent with prior cardiac surgery. Normal wall motion with normal LV function with LVEF 67%. · Excellent functional capacity. · Compared to prior study of 3/17/2015, there is minimal change. The small anterior mid wall stress perfusion defect may be new     2. LE Venous Doppler   18- no DVT isaak     3. Lipids   3/26/19- , HDL 51, ,    7/3/19- , HDL 43, LDL 85,    1/3/20- TC 91, HDL 35, LDL 28.4,    20- TC 75, HDL 32, LDL 17,    21- TC 95, HDL 37, LDL 36,    21-, HDL 39, LDL 45, TG 82  2022: , HDL 45, LDL 56, and   22- TC 96, HDL 34, LDL 42,            4. Carotid Doppler   19- 0-9% R/L     5. Echo   19- EF 61-65%    6. JAMEEL  21-No evidence of significant arterial occlusive disease within the right and left lower extremities. Normal resting JAMEEL's of 1.26 on the right and 1.38 on the left. Normal leg perfusion after exercise bilaterally.       EC2022 sinus bradycardia with first-degree AV block a NJ interval 219 ms poor R wave progression left anterior fascicular block   LABORATORY DATA         Lab Results   Component Value Date/Time    WBC 6.7 2020 11:25 AM    HGB 13.8 2020 11:25 AM    HCT 43.7 2020 11:25 AM    PLATELET 985 (L)  11:25 AM    MCV 88.8 2020 11:25 AM      Lab Results   Component Value Date/Time    Sodium 136 2021 09:34 AM    Potassium 4.7 2021 09:34 AM    Chloride 104 2021 09:34 AM    CO2 28 2021 09:34 AM    Anion gap 4 (L) 2021 09:34 AM    Glucose 94 03/30/2021 09:34 AM    BUN 20 03/30/2021 09:34 AM    Creatinine 1.12 03/30/2021 09:34 AM    BUN/Creatinine ratio 18 03/30/2021 09:34 AM    GFR est AA >60 03/30/2021 09:34 AM    GFR est non-AA >60 03/30/2021 09:34 AM    Calcium 9.0 03/30/2021 09:34 AM    Bilirubin, total 0.3 11/25/2022 10:03 AM    Alk. phosphatase 47 11/25/2022 10:03 AM    Protein, total 7.1 11/25/2022 10:03 AM    Albumin 4.6 11/25/2022 10:03 AM    Globulin 3.2 03/30/2021 09:34 AM    A-G Ratio 1.2 03/30/2021 09:34 AM    ALT (SGPT) 20 11/25/2022 10:03 AM           ASSESSMENT/RECOMMENDATIONS:.        1. CAD, S/p CABG 2005.   -No chest pain and all cardiac testing was done in 2019  -conitnue risk factor modification and I believe he should work harder exercise and recommended a hand foot bike on [employer]  -reduce red meat intake. 2. HTN  - BP excellent today on metoprolol and lisinopril  - today 136/78      3. Dyslipidemia  - LDL is excellent at 42 on crestor 10 mg and zetia  -continue diet in low  red meat    4. DM  - On insulin. - A1c 7.2    5. LEONA  -Compliant with CPAP. 6. Carotid disease  - Carotids were normal, 0-9% bilaterally.   -We will consider carotids in the future    7. Leg pain   -ABIs were negative    Return in 6 months or PRN      No orders of the defined types were placed in this encounter. Follow-up and Dispositions    Return in about 6 months (around 5/30/2023). I have discussed the diagnosis with  Renetta Allen and the intended plan as seen in the above orders. Questions were answered concerning future plans. I have discussed medication side effects and warnings with the patient as well. Thank you,  Hilda Grant MD for involving me in the care of  Renetta Allen. Please do not hesitate to contact me for further questions/concerns. Maged Loaiza MD, [facility]    Patient Care Team:  Hilda Grant MD as PCP - General (Internal Medicine Physician)  Hilda Grant MD as PCP - [facility]  Nasrin Interiano MD as Surgeon (Surgery Physician)  Godwin Mejia MD (Urology)  Jane Leos MD (Cardiovascular Disease Physician)  Sol Torres MD (Allergy)  Nunu Roque DPM (Podiatry)  Javier Nicholson MD (Gastroenterology)  Kvng Bennett (Ophthalmology)    [address], [address] [place] [address] Drive      [phone] / [phone] Fax

## 2022-11-30 NOTE — PATIENT INSTRUCTIONS

## 2022-11-30 NOTE — PROGRESS NOTES
Charline Dangelo is a 71 y.o. male    Chief Complaint   Patient presents with    Hypertension    Coronary Artery Disease    Cholesterol Problem       Vitals:    11/30/22 1639   BP: 136/78   BP 1 Location: Left upper arm   BP Patient Position: Sitting   Pulse: 60   Weight: 242 lb (109.8 kg)   SpO2: 92%       Chest pain no    SOB no    Dizziness no    Swelling  at times in his feet     Refills no      1. Have you been to the ER, urgent care clinic since your last visit? Hospitalized since your last visit? No    2. Have you seen or consulted any other health care providers outside of the [address] [place] [address] since your last visit? Include any pap smears or colon screening.  No

## 2022-11-30 NOTE — LETTER
11/30/2022    Patient: Elisa Arrieta   YOB: 1950   Date of Visit: 11/30/2022     Moira Ross, [address] [address]  [place]    Dear Moira Ross MD,      Thank you for referring Mr. Velasquez Cervantes to [facility] for evaluation. My notes for this consultation are attached. If you have questions, please do not hesitate to call me. I look forward to following your patient along with you.       Sincerely,    Ugo Delgado MD

## 2022-12-08 RX ORDER — ROSUVASTATIN CALCIUM 10 MG/1
TABLET, COATED ORAL
Qty: 90 TABLET | Refills: 3 | Status: SHIPPED | OUTPATIENT
Start: 2022-12-08

## 2022-12-29 ENCOUNTER — DOCUMENTATION ONLY (OUTPATIENT)
Dept: INTERNAL MEDICINE CLINIC | Age: 72
End: 2022-12-29

## 2022-12-29 NOTE — PROGRESS NOTES
Received approval for patients Novolog Flex Pen 100 units/ML pen injector CaseId:[id number];Status:Approved; Review Type:Prior Auth; Coverage Start Date:11/29/2022; Coverage End Date:12/29/2023;  Celeste Easton LPN

## 2023-01-05 ENCOUNTER — DOCUMENTATION ONLY (OUTPATIENT)
Dept: INTERNAL MEDICINE CLINIC | Age: 73
End: 2023-01-05

## 2023-01-05 NOTE — PROGRESS NOTES
Received approval from [employer] [address] -for patients Novolog Flex Pen  100/ml (3) insulin pen from 11/29/22-12/29/23 Case [id number].  Oswaldo Reynoso LPN

## 2023-01-06 RX ORDER — EZETIMIBE 10 MG/1
10 TABLET ORAL DAILY
Qty: 90 TABLET | Refills: 3 | Status: SHIPPED | OUTPATIENT
Start: 2023-01-06

## 2023-01-17 DIAGNOSIS — F34.1 DYSTHYMIA: ICD-10-CM

## 2023-01-17 RX ORDER — FLUOXETINE HYDROCHLORIDE 20 MG/1
20 CAPSULE ORAL DAILY
Qty: 30 CAPSULE | Refills: 2 | Status: SHIPPED | OUTPATIENT
Start: 2023-01-17

## 2023-01-27 DIAGNOSIS — J30.89 SEASONAL ALLERGIC RHINITIS DUE TO OTHER ALLERGIC TRIGGER: Chronic | ICD-10-CM

## 2023-01-27 RX ORDER — AZELASTINE 1 MG/ML
SPRAY, METERED NASAL
Qty: 3 EACH | Refills: 6 | Status: SHIPPED | OUTPATIENT
Start: 2023-01-27

## 2023-01-30 RX ORDER — NIRMATRELVIR AND RITONAVIR 150-100 MG
2 KIT ORAL EVERY 12 HOURS
Qty: 1 BOX | Refills: 0 | Status: SHIPPED | OUTPATIENT
Start: 2023-01-30 | End: 2023-02-04

## 2023-02-10 ENCOUNTER — OFFICE VISIT (OUTPATIENT)
Dept: INTERNAL MEDICINE CLINIC | Age: 73
End: 2023-02-10
Payer: MEDICARE

## 2023-02-10 VITALS
DIASTOLIC BLOOD PRESSURE: 68 MMHG | HEIGHT: 69 IN | HEART RATE: 59 BPM | SYSTOLIC BLOOD PRESSURE: 131 MMHG | RESPIRATION RATE: 16 BRPM | TEMPERATURE: 98.3 F | BODY MASS INDEX: 35.99 KG/M2 | WEIGHT: 243 LBS

## 2023-02-10 DIAGNOSIS — E11.29 TYPE 2 DIABETES MELLITUS WITH MICROALBUMINURIA, WITH LONG-TERM CURRENT USE OF INSULIN (HCC): Primary | ICD-10-CM

## 2023-02-10 DIAGNOSIS — E29.1 HYPOGONADISM MALE: ICD-10-CM

## 2023-02-10 DIAGNOSIS — Z79.4 TYPE 2 DIABETES MELLITUS WITH MICROALBUMINURIA, WITH LONG-TERM CURRENT USE OF INSULIN (HCC): Primary | ICD-10-CM

## 2023-02-10 DIAGNOSIS — M79.652 PAIN IN BOTH THIGHS: ICD-10-CM

## 2023-02-10 DIAGNOSIS — R80.9 TYPE 2 DIABETES MELLITUS WITH MICROALBUMINURIA, WITH LONG-TERM CURRENT USE OF INSULIN (HCC): Primary | ICD-10-CM

## 2023-02-10 DIAGNOSIS — I10 ESSENTIAL HYPERTENSION WITH GOAL BLOOD PRESSURE LESS THAN 130/80: ICD-10-CM

## 2023-02-10 DIAGNOSIS — E66.01 SEVERE OBESITY (BMI 35.0-39.9) WITH COMORBIDITY (HCC): ICD-10-CM

## 2023-02-10 DIAGNOSIS — M79.651 PAIN IN BOTH THIGHS: ICD-10-CM

## 2023-02-10 DIAGNOSIS — E78.00 HYPERCHOLESTEREMIA: ICD-10-CM

## 2023-02-10 DIAGNOSIS — U07.1 COVID-19: ICD-10-CM

## 2023-02-10 DIAGNOSIS — F34.1 DYSTHYMIA: ICD-10-CM

## 2023-02-10 LAB — HBA1C MFR BLD HPLC: 6.9 %

## 2023-02-10 RX ORDER — FLUOXETINE HYDROCHLORIDE 40 MG/1
40 CAPSULE ORAL DAILY
Qty: 90 CAPSULE | Refills: 1 | Status: SHIPPED | OUTPATIENT
Start: 2023-02-10

## 2023-02-10 NOTE — PROGRESS NOTES
HISTORY OF PRESENT ILLNESS    Chief Complaint   Patient presents with    Diabetes    Hypertension    Labs    Medication Evaluation       Presents for follow-up    He is doing fairly well. Reports some stress caring for his partner with bipolar disorder. Diagnosis COVID-19 January 30. Took Paxil bid. Tolerated well. Currently asymptomatic. Dysthymia. History of anxiety. Taking Prozac 20 mg daily. Feels mildly depressed at times, low energy. A little bit tearful. Denies any medication side effects. Diabetes Mellitus follow-up  Last hemoglobin a1c   Lab Results   Component Value Date/Time    Hemoglobin A1c 7.3 (H) 01/28/2021 10:34 AM    Hemoglobin A1c (POC) 6.9 02/10/2023 09:41 AM   medication compliance: compliant all of the time. Not requiring mealtime insulin. Taking metformin, Farxiga, Toujeo 60 units daily. Ozempic. Diabetic diet compliance: compliant most of the time. Home glucose monitoring: fasting values range none. Hypoglycemic episodes:  None. Further diabetic ROS: no polyuria or polydipsia, no chest pain, dyspnea or TIA's, no numbness, tingling or pain in extremities. Hyperlipidemia  Currently he takes Crestor 10 mg and Zetia. ROS: taking medications as instructed, no medication side effects noted  Reports some myalgias of his thighs and wonders if it is related to the medication. , no joint pains, no weakness  No TIA's, no chest pain on exertion, no dyspnea on exertion, no swelling of ankles.    Lab Results   Component Value Date/Time    Cholesterol, total 96 (L) 11/25/2022 10:03 AM    HDL Cholesterol 34 (L) 11/25/2022 10:03 AM    LDL, calculated 42 11/25/2022 10:03 AM    LDL, calculated 17 07/14/2020 09:11 AM    VLDL, calculated 20 11/25/2022 10:03 AM    VLDL, calculated 26 07/14/2020 09:11 AM    Triglyceride 107 11/25/2022 10:03 AM    CHOL/HDL Ratio 2.6 01/03/2020 08:23 AM             Review of Systems   All other systems reviewed and are negative, except as noted in HPI    Past Medical and Surgical History   has a past medical history of Achilles bursitis (08/10/2020), Anxiety, AR (allergic rhinitis) (12/01/2008), Arthritis, Blind right eye, BPH (benign prostatic hyperplasia), CAD (coronary artery disease), CKD (chronic kidney disease) stage 3, GFR 30-59 ml/min ([facility] DM type 2 (diabetes mellitus, type 2) ([facility] [address] Dysthymia, Encounter for diabetic foot exam ([facility] [address] Essential hypertension with goal blood pressure less than 130/80 (09/28/2016), Hearing loss, Hypercholesteremia, Hypogonadism male (12/01/2008), Mild intermittent asthma without complication (07/23/2709), LEONA on CPAP (07/16/2010), Phimosis (05/26/2016), Severe obesity (BMI 35.0-39.9) (06/18/2018), Tarsal tunnel syndrome (08/10/2020), Trigger finger, and Type 2 diabetes mellitus with stage 3 chronic kidney disease, with long-term current use of insulin, unspecified whether stage 3a or 3b CKD ([facility] [address] has a past surgical history that includes hx rhinoplasty; hx retinal detachment repair (1984); hx heent (2012); pr coronary artery byp w/vein & artery graft 3 vein (2005); hx orthopaedic; hx cyst removal (5/12/2016); and hx circumcision (2016). reports that he has never smoked. He has been exposed to tobacco smoke. He has never used smokeless tobacco. He reports current alcohol use of about 6.0 standard drinks per week. He reports that he does not currently use drugs. He was adopted. Family history is unknown by patient. Physical Exam   Nursing note and vitals reviewed. Blood pressure 131/68, pulse (!) 59, temperature 98.3 °F (36.8 °C), temperature source Oral, resp. rate 16, height 5' 9\" (1.753 m), weight 243 lb (110.2 kg). Constitutional:  No distress. Eyes: Conjunctivae are normal.   Ears:  Hearing grossly intact  Cardiovascular: Normal rate.   regular rhythm, no murmurs or gallops  No edema  Pulmonary/Chest: Effort normal.   CTAB  Musculoskeletal: moves all 4 extremities   Neurological: Alert and oriented to person, place, and time. Skin: No visible rash noted. Psychiatric: Normal mood and affect. Behavior is normal.     Assessment and Plan    Diagnoses and all orders for this visit:    1. Type 2 diabetes mellitus with microalbuminuria, with long-term current use of insulin (HCC)  Well-controlled. Continue current medications, Ozempic, Farxiga, metformin and insulin. -     AMB POC HEMOGLOBIN A1C  -     MICROALBUMIN, UR, RAND W/ MICROALB/CREAT RATIO; Future  -     METABOLIC PANEL, BASIC; Future    2. Hypercholesteremia  Excellent control. Managed by cardiology. Some concern about myalgias related to statin therapy. He does have a history of coronary disease and CABG 18 years ago. I do not think that his leg pains are likely related to statin, but he can stop the statin for 2 weeks and see if it goes away. Retry statin to see if pains come back before giving up on it. Need to consider other options for statins if he cannot tolerate this. 3. Essential hypertension with goal blood pressure less than 130/80  Blood pressure is recently well controlled on current medications lisinopril, metoprolol. CBC W/O DIFF; Future    4. Dysthymia  Borderline control. Increase Prozac to 40 mg. Monitor. Report any side effects  -     FLUoxetine (PROzac) 40 mg capsule; Take 1 Capsule by mouth daily. Increased 2/10/23    5. Hypogonadism male  Following up with urology. Taking Jatenzo. Unclear current control. 6. Severe obesity (BMI 35.0-39. 9) with comorbidity [facility])  The patient is asked to make an attempt to improve diet and exercise patterns to aid in medical management of this problem. 7. Pain in both thighs  As above. Hold for 2 weeks. Check CK. Likely muscle strain. Consider physical therapy. -     CK; Future    8. COVID-19  Asymptomatic at this time.     lab results and schedule of future lab studies reviewed with patient  reviewed medications and side effects in detail    Return to clinic for further evaluation if new symptoms develop        Current Outpatient Medications   Medication Sig    FLUoxetine (PROzac) 40 mg capsule Take 1 Capsule by mouth daily. Increased 2/10/23    azelastine (ASTELIN) 137 mcg (0.1 %) nasal spray USE 1 SPRAY IN EACH NOSTRIL TWICE A DAY AS DIRECTED    ezetimibe (ZETIA) 10 mg tablet Take 1 Tablet by mouth daily. rosuvastatin (CRESTOR) 10 mg tablet TAKE 1 TABLET NIGHTLY    dutasteride (AVODART) 0.5 mg capsule TAKE 1 CAPSULE DAILY    gabapentin (NEURONTIN) 300 mg capsule TAKE 1 CAPSULE THREE TIMES A DAY    mometasone-formoterol (Dulera) 200-5 mcg/actuation HFA inhaler Take 2 Puffs by inhalation two (2) times a day. Indications: controller medication for asthma    Ozempic 1 mg/dose (4 mg/3 mL) pnij 1 mg by SubCUTAneous route every seven (7) days. metoprolol tartrate (LOPRESSOR) 25 mg tablet TAKE 1 TABLET TWICE A DAY    testosterone undecanoate (Jatenzo) 158 mg cap Take 2 Caplet by mouth two (2) times a day. Max Daily Amount: 4 Caplet. Indications: abnormal function and activity of the testis    Farxiga 5 mg tab tablet TAKE 1 TABLET DAILY    albuterol (PROVENTIL HFA, VENTOLIN HFA, PROAIR HFA) 90 mcg/actuation inhaler Take 2 Puffs by inhalation every four (4) hours as needed for Wheezing. nystatin-triamcinolone (MYCOLOG II) topical cream Apply  to affected area two (2) times a day.     lisinopriL (PRINIVIL, ZESTRIL) 10 mg tablet TAKE 1 TABLET TWICE A DAY    bumetanide (BUMEX) 1 mg tablet TAKE 1 TABLET EVERY MONDAY, WEDNESDAY AND FRIDAY    metFORMIN (GLUCOPHAGE) 1,000 mg tablet TAKE 1 TABLET TWICE A DAY WITH MEALS    Toujeo SoloStar U-300 Insulin 300 unit/mL (1.5 mL) inpn pen INJECT 40 UNITS EVERY MORNING AND 20 UNITS EVERY EVENING    NovoLOG Flexpen U-100 Insulin 100 unit/mL (3 mL) inpn INJECT 10 TO 30 UNITS THREE TIMES A DAY WITH MEALS PER SLIDING SCALE    mupirocin (BACTROBAN) 2 % ointment mupirocin 2 % topical ointment    Insulin Needles, Disposable, 31 gauge x 5/16\" ndle 5x daily with insulin. IDDMII. E11.21    aspirin delayed-release 81 mg tablet Take 1 Tab by mouth daily. co-enzyme Q-10 (CO Q-10) 100 mg capsule Take 200 mg by mouth daily. pyridoxine, vitamin B6, (VITAMIN B-6) 100 mg tablet Take 200 mg by mouth daily. b complex vitamins tablet Take 1 Tab by mouth daily. ascorbic acid, vitamin C, (VITAMIN C) 500 mg tablet Take 500 mg by mouth daily. cholecalciferol, vitamin D3, 50 mcg (2,000 unit) tab Take  by mouth. With vitamin K2.    magnesium oxide (MAG-OX) 400 mg tablet Take 400 mg by mouth daily. FreeStyle Belkis 14 Day Sensor kit 1 Units by Does Not Apply route every fourteen (14) days. DMII  E11.29; E11.21    OMEGA-3 FATTY ACIDS/FISH OIL (FISH OIL EXTRA STRENGTH PO) Take 1,400 mg by mouth daily (after breakfast). ketoconazole (NIZORAL) 2 % topical cream Apply  to affected area as needed for Skin Irritation. VOLTAREN 1 % gel 2 g as needed. desonide (TRIDESILON) 0.05 % topical lotion as needed. No current facility-administered medications for this visit.

## 2023-02-11 ENCOUNTER — PATIENT MESSAGE (OUTPATIENT)
Dept: INTERNAL MEDICINE CLINIC | Age: 73
End: 2023-02-11

## 2023-02-11 LAB
ANION GAP SERPL CALC-SCNC: 4 MMOL/L (ref 5–15)
BUN SERPL-MCNC: 26 MG/DL (ref 6–20)
BUN/CREAT SERPL: 17 (ref 12–20)
CALCIUM SERPL-MCNC: 9.6 MG/DL (ref 8.5–10.1)
CHLORIDE SERPL-SCNC: 103 MMOL/L (ref 97–108)
CK SERPL-CCNC: 102 U/L (ref 39–308)
CO2 SERPL-SCNC: 29 MMOL/L (ref 21–32)
CREAT SERPL-MCNC: 1.51 MG/DL (ref 0.7–1.3)
CREAT UR-MCNC: 136 MG/DL
ERYTHROCYTE [DISTWIDTH] IN BLOOD BY AUTOMATED COUNT: 14.9 % (ref 11.5–14.5)
GLUCOSE SERPL-MCNC: 128 MG/DL (ref 65–100)
HCT VFR BLD AUTO: 50.5 % (ref 36.6–50.3)
HGB BLD-MCNC: 15.6 G/DL (ref 12.1–17)
MCH RBC QN AUTO: 26.2 PG (ref 26–34)
MCHC RBC AUTO-ENTMCNC: 30.9 G/DL (ref 30–36.5)
MCV RBC AUTO: 84.7 FL (ref 80–99)
MICROALBUMIN UR-MCNC: 16.6 MG/DL
MICROALBUMIN/CREAT UR-RTO: 122 MG/G (ref 0–30)
NRBC # BLD: 0 K/UL (ref 0–0.01)
NRBC BLD-RTO: 0 PER 100 WBC
PLATELET # BLD AUTO: 178 K/UL (ref 150–400)
PMV BLD AUTO: 10.3 FL (ref 8.9–12.9)
POTASSIUM SERPL-SCNC: 4.5 MMOL/L (ref 3.5–5.1)
RBC # BLD AUTO: 5.96 M/UL (ref 4.1–5.7)
SODIUM SERPL-SCNC: 136 MMOL/L (ref 136–145)
WBC # BLD AUTO: 8.5 K/UL (ref 4.1–11.1)

## 2023-02-13 NOTE — TELEPHONE ENCOUNTER
From: Scout Do  To: Mariely Baer MD  Sent: 2/11/2023 11:03 AM EST  Subject: Question regarding METABOLIC PANEL, BASIC    I see that the RBC is slightly elevated. Could that be lingering effects from the recent Covid infection? I am more concerned about the kidney function drop to 49. Should I run that by the kidney doc. I hope its just a one off because its been fairly stable for awhile but I am on several medications which I believe can effect your kidney namely the Metoprolol.     Thanks

## 2023-02-23 DIAGNOSIS — Z79.4 TYPE 2 DIABETES MELLITUS WITH MICROALBUMINURIA, WITH LONG-TERM CURRENT USE OF INSULIN (HCC): ICD-10-CM

## 2023-02-23 DIAGNOSIS — E11.29 TYPE 2 DIABETES MELLITUS WITH MICROALBUMINURIA, WITH LONG-TERM CURRENT USE OF INSULIN (HCC): ICD-10-CM

## 2023-02-23 DIAGNOSIS — R80.9 TYPE 2 DIABETES MELLITUS WITH MICROALBUMINURIA, WITH LONG-TERM CURRENT USE OF INSULIN (HCC): ICD-10-CM

## 2023-02-23 RX ORDER — METFORMIN HYDROCHLORIDE 1000 MG/1
TABLET ORAL
Qty: 180 TABLET | Refills: 3 | Status: SHIPPED | OUTPATIENT
Start: 2023-02-23

## 2023-03-08 DIAGNOSIS — E11.21 TYPE 2 DIABETES WITH NEPHROPATHY (HCC): ICD-10-CM

## 2023-03-08 RX ORDER — INSULIN GLARGINE 300 U/ML
INJECTION, SOLUTION SUBCUTANEOUS
Qty: 18 ML | Refills: 0 | Status: SHIPPED | OUTPATIENT
Start: 2023-03-08

## 2023-03-15 RX ORDER — LISINOPRIL 10 MG/1
TABLET ORAL
Qty: 180 TABLET | Refills: 1 | Status: SHIPPED | OUTPATIENT
Start: 2023-03-15

## 2023-03-17 ENCOUNTER — OFFICE VISIT (OUTPATIENT)
Dept: INTERNAL MEDICINE CLINIC | Age: 73
End: 2023-03-17

## 2023-03-17 VITALS
SYSTOLIC BLOOD PRESSURE: 121 MMHG | RESPIRATION RATE: 16 BRPM | DIASTOLIC BLOOD PRESSURE: 69 MMHG | WEIGHT: 237 LBS | TEMPERATURE: 97.9 F | OXYGEN SATURATION: 97 % | BODY MASS INDEX: 35.1 KG/M2 | HEART RATE: 64 BPM | HEIGHT: 69 IN

## 2023-03-17 DIAGNOSIS — R10.13 DYSPEPSIA: Primary | ICD-10-CM

## 2023-03-17 DIAGNOSIS — I10 ESSENTIAL HYPERTENSION WITH GOAL BLOOD PRESSURE LESS THAN 130/80: Chronic | ICD-10-CM

## 2023-03-17 RX ORDER — OMEPRAZOLE 40 MG/1
40 CAPSULE, DELAYED RELEASE ORAL DAILY
Qty: 30 CAPSULE | Refills: 2 | Status: SHIPPED | OUTPATIENT
Start: 2023-03-17 | End: 2023-04-16

## 2023-03-17 RX ORDER — SIMETHICONE 80 MG
80 TABLET,CHEWABLE ORAL
Qty: 60 TABLET | Refills: 0 | Status: SHIPPED | OUTPATIENT
Start: 2023-03-17 | End: 2023-04-01

## 2023-03-30 DIAGNOSIS — R10.13 DYSPEPSIA: ICD-10-CM

## 2023-03-30 RX ORDER — OMEPRAZOLE 40 MG/1
40 CAPSULE, DELAYED RELEASE ORAL DAILY
Qty: 30 CAPSULE | Refills: 2 | Status: CANCELLED | OUTPATIENT
Start: 2023-03-30 | End: 2023-04-29

## 2023-04-03 DIAGNOSIS — R80.9 TYPE 2 DIABETES MELLITUS WITH MICROALBUMINURIA, WITH LONG-TERM CURRENT USE OF INSULIN (HCC): ICD-10-CM

## 2023-04-03 DIAGNOSIS — E11.29 TYPE 2 DIABETES MELLITUS WITH MICROALBUMINURIA, WITH LONG-TERM CURRENT USE OF INSULIN (HCC): ICD-10-CM

## 2023-04-03 DIAGNOSIS — Z79.4 TYPE 2 DIABETES MELLITUS WITH MICROALBUMINURIA, WITH LONG-TERM CURRENT USE OF INSULIN (HCC): ICD-10-CM

## 2023-04-03 DIAGNOSIS — E11.21 TYPE 2 DIABETES WITH NEPHROPATHY (HCC): ICD-10-CM

## 2023-04-03 RX ORDER — FLASH GLUCOSE SENSOR
1 KIT MISCELLANEOUS
Qty: 1 KIT | Refills: 0 | Status: SHIPPED | OUTPATIENT
Start: 2023-04-03

## 2023-05-01 DIAGNOSIS — E11.21 TYPE 2 DIABETES WITH NEPHROPATHY (HCC): ICD-10-CM

## 2023-05-01 RX ORDER — GABAPENTIN 300 MG/1
300 CAPSULE ORAL 3 TIMES DAILY
Qty: 270 CAPSULE | Refills: 1 | Status: SHIPPED | OUTPATIENT
Start: 2023-05-01

## 2023-05-01 NOTE — TELEPHONE ENCOUNTER
----- Message from [facility] sent at 5/1/2023  9:22 AM EDT -----  Subject: Refill Request    QUESTIONS  Name of Medication? gabapentin (NEURONTIN) 300 MG capsule  Patient-reported dosage and instructions? 300 mg 3 capsule daily  How many days do you have left? 20  Preferred Pharmacy? [address] phone number (if available)? [phone]  ---------------------------------------------------------------------------  --------------  CALL BACK INFO  What is the best way for the office to contact you? OK to leave message on   voicemail  Preferred Call Back Phone Number? [phone]  ---------------------------------------------------------------------------  --------------  SCRIPT ANSWERS  Relationship to Patient?  Self

## 2023-05-05 ENCOUNTER — OFFICE VISIT (OUTPATIENT)
Dept: INTERNAL MEDICINE CLINIC | Age: 73
End: 2023-05-05

## 2023-05-05 VITALS
WEIGHT: 234.2 LBS | OXYGEN SATURATION: 98 % | RESPIRATION RATE: 15 BRPM | HEIGHT: 69 IN | SYSTOLIC BLOOD PRESSURE: 131 MMHG | TEMPERATURE: 98.2 F | BODY MASS INDEX: 34.69 KG/M2 | HEART RATE: 60 BPM | DIASTOLIC BLOOD PRESSURE: 69 MMHG

## 2023-05-05 DIAGNOSIS — E11.21 TYPE 2 DIABETES WITH NEPHROPATHY (HCC): Primary | ICD-10-CM

## 2023-05-05 DIAGNOSIS — J45.20 MILD INTERMITTENT ASTHMA WITHOUT COMPLICATION: ICD-10-CM

## 2023-05-05 DIAGNOSIS — I10 ESSENTIAL HYPERTENSION WITH GOAL BLOOD PRESSURE LESS THAN 130/80: ICD-10-CM

## 2023-05-05 RX ORDER — MONTELUKAST SODIUM 10 MG/1
10 TABLET ORAL DAILY
Qty: 30 TABLET | Refills: 2 | Status: SHIPPED | OUTPATIENT
Start: 2023-05-05

## 2023-05-05 RX ORDER — DOXYCYCLINE 100 MG/1
100 TABLET ORAL 2 TIMES DAILY
COMMUNITY

## 2023-05-05 RX ORDER — AZITHROMYCIN 250 MG/1
250 TABLET, FILM COATED ORAL SEE ADMIN INSTRUCTIONS
Qty: 6 TABLET | Refills: 0 | Status: SHIPPED | OUTPATIENT
Start: 2023-05-05 | End: 2023-05-10

## 2023-05-08 DIAGNOSIS — N18.31 CHRONIC KIDNEY DISEASE, STAGE 3A (HCC): Primary | ICD-10-CM

## 2023-05-08 RX ORDER — BUMETANIDE 1 MG/1
TABLET ORAL
Qty: 90 TABLET | Refills: 1 | Status: SHIPPED | OUTPATIENT
Start: 2023-05-08

## 2023-05-25 RX ORDER — INSULIN GLARGINE 300 U/ML
INJECTION, SOLUTION SUBCUTANEOUS
Qty: 18 ML | Refills: 3 | Status: SHIPPED | OUTPATIENT
Start: 2023-05-25

## 2023-06-07 ENCOUNTER — OFFICE VISIT (OUTPATIENT)
Age: 73
End: 2023-06-07
Payer: MEDICARE

## 2023-06-07 VITALS
WEIGHT: 228.8 LBS | OXYGEN SATURATION: 97 % | TEMPERATURE: 97.8 F | RESPIRATION RATE: 17 BRPM | DIASTOLIC BLOOD PRESSURE: 66 MMHG | SYSTOLIC BLOOD PRESSURE: 107 MMHG | HEART RATE: 59 BPM | HEIGHT: 69 IN | BODY MASS INDEX: 33.89 KG/M2

## 2023-06-07 DIAGNOSIS — N18.31 CHRONIC KIDNEY DISEASE, STAGE 3A (HCC): ICD-10-CM

## 2023-06-07 DIAGNOSIS — E11.29 TYPE 2 DIABETES MELLITUS WITH MICROALBUMINURIA, WITH LONG-TERM CURRENT USE OF INSULIN (HCC): Primary | ICD-10-CM

## 2023-06-07 DIAGNOSIS — Z79.4 TYPE 2 DIABETES MELLITUS WITH MICROALBUMINURIA, WITH LONG-TERM CURRENT USE OF INSULIN (HCC): Primary | ICD-10-CM

## 2023-06-07 DIAGNOSIS — E78.00 HYPERCHOLESTEREMIA: ICD-10-CM

## 2023-06-07 DIAGNOSIS — R80.9 TYPE 2 DIABETES MELLITUS WITH MICROALBUMINURIA, WITH LONG-TERM CURRENT USE OF INSULIN (HCC): Primary | ICD-10-CM

## 2023-06-07 PROBLEM — M76.60 ACHILLES BURSITIS: Status: ACTIVE | Noted: 2022-10-27

## 2023-06-07 PROBLEM — E10.49 NEUROLOGICAL DISORDER DUE TO TYPE 1 DIABETES MELLITUS (HCC): Status: ACTIVE | Noted: 2022-10-27

## 2023-06-07 PROBLEM — E83.52 HYPERCALCEMIA: Status: ACTIVE | Noted: 2020-05-04

## 2023-06-07 PROBLEM — M76.829 TIBIALIS TENDINITIS: Status: ACTIVE | Noted: 2022-10-27

## 2023-06-07 PROBLEM — Q66.71 CAVUS DEFORMITY OF RIGHT FOOT: Status: ACTIVE | Noted: 2022-10-24

## 2023-06-07 PROBLEM — M79.609 PAIN IN LIMB: Status: ACTIVE | Noted: 2022-10-27

## 2023-06-07 PROBLEM — R60.0 LOCALIZED EDEMA: Status: ACTIVE | Noted: 2020-02-04

## 2023-06-07 PROBLEM — M89.9 DISORDER OF BONE: Status: ACTIVE | Noted: 2022-10-24

## 2023-06-07 PROBLEM — N18.30 STAGE 3 CHRONIC KIDNEY DISEASE (HCC): Status: ACTIVE | Noted: 2020-01-07

## 2023-06-07 LAB — HBA1C MFR BLD: 6.7 %

## 2023-06-07 PROCEDURE — 3046F HEMOGLOBIN A1C LEVEL >9.0%: CPT | Performed by: INTERNAL MEDICINE

## 2023-06-07 PROCEDURE — 3017F COLORECTAL CA SCREEN DOC REV: CPT | Performed by: INTERNAL MEDICINE

## 2023-06-07 PROCEDURE — 3078F DIAST BP <80 MM HG: CPT | Performed by: INTERNAL MEDICINE

## 2023-06-07 PROCEDURE — 2022F DILAT RTA XM EVC RTNOPTHY: CPT | Performed by: INTERNAL MEDICINE

## 2023-06-07 PROCEDURE — 1123F ACP DISCUSS/DSCN MKR DOCD: CPT | Performed by: INTERNAL MEDICINE

## 2023-06-07 PROCEDURE — G8427 DOCREV CUR MEDS BY ELIG CLIN: HCPCS | Performed by: INTERNAL MEDICINE

## 2023-06-07 PROCEDURE — 99213 OFFICE O/P EST LOW 20 MIN: CPT | Performed by: INTERNAL MEDICINE

## 2023-06-07 PROCEDURE — 1036F TOBACCO NON-USER: CPT | Performed by: INTERNAL MEDICINE

## 2023-06-07 PROCEDURE — 3074F SYST BP LT 130 MM HG: CPT | Performed by: INTERNAL MEDICINE

## 2023-06-07 PROCEDURE — 83036 HEMOGLOBIN GLYCOSYLATED A1C: CPT | Performed by: INTERNAL MEDICINE

## 2023-06-07 PROCEDURE — G8417 CALC BMI ABV UP PARAM F/U: HCPCS | Performed by: INTERNAL MEDICINE

## 2023-06-07 RX ORDER — MONTELUKAST SODIUM 10 MG/1
10 TABLET ORAL DAILY
Qty: 90 TABLET | Refills: 1 | Status: SHIPPED | OUTPATIENT
Start: 2023-06-07

## 2023-06-07 RX ORDER — MONTELUKAST SODIUM 10 MG/1
10 TABLET ORAL DAILY
COMMUNITY
Start: 2023-05-05 | End: 2023-06-07 | Stop reason: SDUPTHER

## 2023-06-07 SDOH — HEALTH STABILITY: MENTAL HEALTH: HOW MANY STANDARD DRINKS CONTAINING ALCOHOL DO YOU HAVE ON A TYPICAL DAY?: 1 OR 2

## 2023-06-07 SDOH — HEALTH STABILITY: MENTAL HEALTH: HOW OFTEN DO YOU HAVE A DRINK CONTAINING ALCOHOL?: MONTHLY OR LESS

## 2023-06-07 SDOH — ECONOMIC STABILITY: INCOME INSECURITY: IN THE LAST 12 MONTHS, WAS THERE A TIME WHEN YOU WERE NOT ABLE TO PAY THE MORTGAGE OR RENT ON TIME?: NO

## 2023-06-07 SDOH — ECONOMIC STABILITY: HOUSING INSECURITY: IN THE LAST 12 MONTHS, HOW MANY PLACES HAVE YOU LIVED?: 1

## 2023-06-07 SDOH — HEALTH STABILITY: PHYSICAL HEALTH: ON AVERAGE, HOW MANY MINUTES DO YOU ENGAGE IN EXERCISE AT THIS LEVEL?: 0 MIN

## 2023-06-07 SDOH — SOCIAL STABILITY: SOCIAL NETWORK
IN A TYPICAL WEEK, HOW MANY TIMES DO YOU TALK ON THE PHONE WITH FAMILY, FRIENDS, OR NEIGHBORS?: MORE THAN THREE TIMES A WEEK

## 2023-06-07 SDOH — ECONOMIC STABILITY: HOUSING INSECURITY
IN THE LAST 12 MONTHS, WAS THERE A TIME WHEN YOU DID NOT HAVE A STEADY PLACE TO SLEEP OR SLEPT IN A SHELTER (INCLUDING NOW)?: NO

## 2023-06-07 SDOH — SOCIAL STABILITY: SOCIAL NETWORK: HOW OFTEN DO YOU ATTEND CHURCH OR RELIGIOUS SERVICES?: NEVER

## 2023-06-07 SDOH — SOCIAL STABILITY: SOCIAL NETWORK
DO YOU BELONG TO ANY CLUBS OR ORGANIZATIONS SUCH AS CHURCH GROUPS UNIONS, FRATERNAL OR ATHLETIC GROUPS, OR SCHOOL GROUPS?: YES

## 2023-06-07 SDOH — ECONOMIC STABILITY: TRANSPORTATION INSECURITY
IN THE PAST 12 MONTHS, HAS LACK OF TRANSPORTATION KEPT YOU FROM MEETINGS, WORK, OR FROM GETTING THINGS NEEDED FOR DAILY LIVING?: NO

## 2023-06-07 SDOH — ECONOMIC STABILITY: TRANSPORTATION INSECURITY
IN THE PAST 12 MONTHS, HAS THE LACK OF TRANSPORTATION KEPT YOU FROM MEDICAL APPOINTMENTS OR FROM GETTING MEDICATIONS?: NO

## 2023-06-07 SDOH — SOCIAL STABILITY: SOCIAL NETWORK: HOW OFTEN DO YOU GET TOGETHER WITH FRIENDS OR RELATIVES?: MORE THAN THREE TIMES A WEEK

## 2023-06-07 SDOH — SOCIAL STABILITY: SOCIAL INSECURITY
WITHIN THE LAST YEAR, HAVE YOU BEEN KICKED, HIT, SLAPPED, OR OTHERWISE PHYSICALLY HURT BY YOUR PARTNER OR EX-PARTNER?: NO

## 2023-06-07 SDOH — SOCIAL STABILITY: SOCIAL INSECURITY: WITHIN THE LAST YEAR, HAVE YOU BEEN HUMILIATED OR EMOTIONALLY ABUSED IN OTHER WAYS BY YOUR PARTNER OR EX-PARTNER?: NO

## 2023-06-07 SDOH — SOCIAL STABILITY: SOCIAL INSECURITY: WITHIN THE LAST YEAR, HAVE YOU BEEN AFRAID OF YOUR PARTNER OR EX-PARTNER?: NO

## 2023-06-07 SDOH — ECONOMIC STABILITY: FOOD INSECURITY: WITHIN THE PAST 12 MONTHS, THE FOOD YOU BOUGHT JUST DIDN'T LAST AND YOU DIDN'T HAVE MONEY TO GET MORE.: NEVER TRUE

## 2023-06-07 SDOH — SOCIAL STABILITY: SOCIAL INSECURITY
WITHIN THE LAST YEAR, HAVE TO BEEN RAPED OR FORCED TO HAVE ANY KIND OF SEXUAL ACTIVITY BY YOUR PARTNER OR EX-PARTNER?: NO

## 2023-06-07 SDOH — HEALTH STABILITY: MENTAL HEALTH
STRESS IS WHEN SOMEONE FEELS TENSE, NERVOUS, ANXIOUS, OR CAN'T SLEEP AT NIGHT BECAUSE THEIR MIND IS TROUBLED. HOW STRESSED ARE YOU?: NOT AT ALL

## 2023-06-07 SDOH — HEALTH STABILITY: PHYSICAL HEALTH: ON AVERAGE, HOW MANY DAYS PER WEEK DO YOU ENGAGE IN MODERATE TO STRENUOUS EXERCISE (LIKE A BRISK WALK)?: 0 DAYS

## 2023-06-07 SDOH — ECONOMIC STABILITY: FOOD INSECURITY: WITHIN THE PAST 12 MONTHS, YOU WORRIED THAT YOUR FOOD WOULD RUN OUT BEFORE YOU GOT MONEY TO BUY MORE.: NEVER TRUE

## 2023-06-07 SDOH — SOCIAL STABILITY: SOCIAL NETWORK: ARE YOU MARRIED, WIDOWED, DIVORCED, SEPARATED, NEVER MARRIED, OR LIVING WITH A PARTNER?: NEVER MARRIED

## 2023-06-07 SDOH — ECONOMIC STABILITY: INCOME INSECURITY: HOW HARD IS IT FOR YOU TO PAY FOR THE VERY BASICS LIKE FOOD, HOUSING, MEDICAL CARE, AND HEATING?: NOT HARD AT ALL

## 2023-06-07 SDOH — SOCIAL STABILITY: SOCIAL NETWORK: HOW OFTEN DO YOU ATTENT MEETINGS OF THE CLUB OR ORGANIZATION YOU BELONG TO?: MORE THAN 4 TIMES PER YEAR

## 2023-06-07 NOTE — PROGRESS NOTES
HISTORY OF PRESENT ILLNESS    Chief Complaint   Patient presents with    Diabetes       Presents for follow-up    Returned from 3 week trip to [facility], [facility], [facility]. Urology stopped Traci Cooper  low T 2/23/23 due to increasing PSA. Taking dutasteride w improvement of PSA to 2 range. Does not feel differently since stopping. it   No results found for: PSA, PSADIA    CKD - Seeing nephrology in fall 2023. Hyperlipidemia- seeing cardiology Dr. Aminah Curtis.  Taking Zetia 10 mg daily and Crestor 10 mg daily. .    Diabetes Mellitus follow-up  Hemoglobin A1C, POC   Date Value Ref Range Status   06/07/2023 6.7 % Final    medication compliance: compliant all of the time. Not requiring mealtime insulin. Taking metformin 1000 mg bid, Farxiga 5 mg daily, Toujeo 40 in AM and 20 night. Ozempic 1 mg weekly  Diabetic diet compliance: compliant most of the time. Home glucose monitoring: fasting values range 80-130s. Hypoglycemic episodes:  some 60s overnight  Further diabetic ROS: no polyuria or polydipsia, no chest pain, dyspnea or TIA's. Hypertension  Hypertension ROS: taking medications as instructed, no medication side effects noted, no TIA's, no chest pain on exertion, no dyspnea on exertion, no swelling of ankles     reports that he has never smoked. He has never used smokeless tobacco.    reports current alcohol use of about 6.0 standard drinks per week.    BP Readings from Last 2 Encounters:   06/07/23 107/66   05/05/23 131/69       Review of Systems   All other systems reviewed and are negative, except as noted in HPI    Past Medical and Surgical History   has a past medical history of Achilles bursitis, Anxiety, AR (allergic rhinitis), Arthritis, Blind right eye, BPH (benign prostatic hyperplasia), CAD (coronary artery disease), CKD (chronic kidney disease) stage 3, GFR 30-59 ml/min ([address] DM type 2 (diabetes mellitus, type 2) ([address] Dysthymia, Encounter for diabetic foot exam ([facility] [address] Essential

## 2023-06-09 RX ORDER — DUTASTERIDE 0.5 MG/1
CAPSULE, LIQUID FILLED ORAL
Qty: 90 CAPSULE | Refills: 1 | Status: SHIPPED | OUTPATIENT
Start: 2023-06-09

## 2023-06-09 NOTE — TELEPHONE ENCOUNTER
I called pt, no answer and unable to leave a message due to mailbox not being set up. I was calling to inquire if his Urologist is prescribing/managing this medication.

## 2023-07-10 ENCOUNTER — TELEPHONE (OUTPATIENT)
Age: 73
End: 2023-07-10

## 2023-07-10 NOTE — TELEPHONE ENCOUNTER
Spoke with patient and scheduled for a pre-op for shoulder surgery on 9/16/23. Scheduled for 7/31/23 at 3:20 PM. He will need history of physical, BMP EKG in the last 6 months and an A1C. Grateful for the call.

## 2023-07-10 NOTE — TELEPHONE ENCOUNTER
----- Message from Karla Hope sent at 7/10/2023  8:55 AM EDT -----  Subject: Appointment Request    Reason for Call: Established Patient Appointment needed: Routine Pre-Op    QUESTIONS    Reason for appointment request? No appointments available during search     Additional Information for Provider? patient has shoulder surgery   scheduled and he needs to have a pre-op appointment done before August 2, 2023.  with this appointment he needs to have a history of physical, BMP   EKG in the last 6 months and an A1C. please call the patient back to   schedule this appointment  ---------------------------------------------------------------------------  --------------  [address]  [phone]; OK to leave message on voicemail  ---------------------------------------------------------------------------  --------------  SCRIPT ANSWERS

## 2023-07-12 ENCOUNTER — OFFICE VISIT (OUTPATIENT)
Age: 73
End: 2023-07-12
Payer: MEDICARE

## 2023-07-12 VITALS
HEART RATE: 68 BPM | BODY MASS INDEX: 34.6 KG/M2 | HEIGHT: 69 IN | SYSTOLIC BLOOD PRESSURE: 118 MMHG | OXYGEN SATURATION: 97 % | WEIGHT: 233.6 LBS | DIASTOLIC BLOOD PRESSURE: 60 MMHG

## 2023-07-12 DIAGNOSIS — I10 ESSENTIAL (PRIMARY) HYPERTENSION: ICD-10-CM

## 2023-07-12 DIAGNOSIS — E66.01 MORBID (SEVERE) OBESITY DUE TO EXCESS CALORIES (HCC): Primary | ICD-10-CM

## 2023-07-12 DIAGNOSIS — I25.10 ATHEROSCLEROSIS OF NATIVE CORONARY ARTERY OF NATIVE HEART WITHOUT ANGINA PECTORIS: ICD-10-CM

## 2023-07-12 DIAGNOSIS — E11.21 TYPE 2 DIABETES MELLITUS WITH DIABETIC NEPHROPATHY, UNSPECIFIED WHETHER LONG TERM INSULIN USE (HCC): ICD-10-CM

## 2023-07-12 DIAGNOSIS — G47.33 OBSTRUCTIVE SLEEP APNEA (ADULT) (PEDIATRIC): ICD-10-CM

## 2023-07-12 DIAGNOSIS — E78.5 HYPERLIPIDEMIA, UNSPECIFIED HYPERLIPIDEMIA TYPE: ICD-10-CM

## 2023-07-12 DIAGNOSIS — I65.23 OCCLUSION AND STENOSIS OF BILATERAL CAROTID ARTERIES: ICD-10-CM

## 2023-07-12 LAB
ALBUMIN SERPL-MCNC: 4.4 G/DL (ref 3.8–4.8)
ALP SERPL-CCNC: 51 IU/L (ref 44–121)
ALT SERPL-CCNC: 26 IU/L (ref 0–44)
AST SERPL-CCNC: 27 IU/L (ref 0–40)
BILIRUB DIRECT SERPL-MCNC: 0.1 MG/DL (ref 0–0.4)
BILIRUB SERPL-MCNC: 0.3 MG/DL (ref 0–1.2)
CHOLEST SERPL-MCNC: 118 MG/DL (ref 100–199)
HDLC SERPL-MCNC: 49 MG/DL
LDLC SERPL CALC-MCNC: 51 MG/DL (ref 0–99)
PROT SERPL-MCNC: 6.6 G/DL (ref 6–8.5)
TRIGL SERPL-MCNC: 98 MG/DL (ref 0–149)
VLDLC SERPL CALC-MCNC: 18 MG/DL (ref 5–40)

## 2023-07-12 PROCEDURE — G8417 CALC BMI ABV UP PARAM F/U: HCPCS | Performed by: SPECIALIST

## 2023-07-12 PROCEDURE — 3017F COLORECTAL CA SCREEN DOC REV: CPT | Performed by: SPECIALIST

## 2023-07-12 PROCEDURE — 1123F ACP DISCUSS/DSCN MKR DOCD: CPT | Performed by: SPECIALIST

## 2023-07-12 PROCEDURE — 3078F DIAST BP <80 MM HG: CPT | Performed by: SPECIALIST

## 2023-07-12 PROCEDURE — 3074F SYST BP LT 130 MM HG: CPT | Performed by: SPECIALIST

## 2023-07-12 PROCEDURE — 1036F TOBACCO NON-USER: CPT | Performed by: SPECIALIST

## 2023-07-12 PROCEDURE — 3046F HEMOGLOBIN A1C LEVEL >9.0%: CPT | Performed by: SPECIALIST

## 2023-07-12 PROCEDURE — 99214 OFFICE O/P EST MOD 30 MIN: CPT | Performed by: SPECIALIST

## 2023-07-12 PROCEDURE — 93010 ELECTROCARDIOGRAM REPORT: CPT | Performed by: SPECIALIST

## 2023-07-12 PROCEDURE — 2022F DILAT RTA XM EVC RTNOPTHY: CPT | Performed by: SPECIALIST

## 2023-07-12 PROCEDURE — 93005 ELECTROCARDIOGRAM TRACING: CPT | Performed by: SPECIALIST

## 2023-07-12 PROCEDURE — G8427 DOCREV CUR MEDS BY ELIG CLIN: HCPCS | Performed by: SPECIALIST

## 2023-07-12 NOTE — PROGRESS NOTES
CARDIOLOGY OFFICE NOTE    Jared Ball MD, [facility] [address], [place]  Phone [phone]; Fax [phone]  Mobile [phone]   Voice Mail [phone]    Primary care: Anay Carpenter MD       ATTENTION:   This medical record was transcribed using an electronic medical records/speech recognition system. Although proofread, it may and can contain electronic, spelling and other errors. Corrections may be executed at a later time. Please feel free to contact us for any clarifications as needed. Dorota Wiseman is a 71 y.o. male with  referred for diabetes, hypercholesterolemia, CAD, AMRIAT, cardiac preoperative stratification for noncardiac surgery and HTN last seen by me 6 months ago      Cardiac risk factors: diabetes mellitus, male gender, hypertension   I have personally obtained the history from the patient. HISTORY OF PRESENTING ILLNESS    Ms./Mr. Dorota Wiseman  71 y.o. is doing well with no complaints of chest pain or shortness of breath. He is currently about 19 years out from bypass surgery. He has been motivated and lost about 30 pounds. His blood pressure is good. He is scheduled to have some stress shoulder surgery. He did walk 90 miles when he was in [facility] and had no problems doing this. He even went to [facility] which required multiple steps and had no problems doing this. I reviewed his EKG with him today.      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

## 2023-07-12 NOTE — PROGRESS NOTES
NAME Gerry Cordero         1950      MRN    [id number]      LAST OFFICE APPOINTMENT: 2022     DIAGNOSIS    ICD-10-CM    1. Morbid (severe) obesity due to excess calories (HCC)  E66.01 EKG 12 Lead      2. Essential (primary) hypertension  I10 EKG 12 Lead      3. Type 2 diabetes mellitus with diabetic nephropathy, unspecified whether long term insulin use (HCC)  E11.21 EKG 12 Lead      4. Obstructive sleep apnea (adult) (pediatric)  G47.33 EKG 12 Lead      5. Occlusion and stenosis of bilateral carotid arteries  I65.23 EKG 12 Lead      6. Hyperlipidemia, unspecified hyperlipidemia type  E78.5 EKG 12 Lead      7.  Atherosclerosis of native coronary artery of native heart without angina pectoris  I25.10 EKG 12 Lead          HOME MEDICATION  Current Outpatient Medications   Medication Sig    FARXIGA 5 MG tablet TAKE 1 TABLET DAILY    dutasteride (AVODART) 0.5 MG capsule TAKE 1 CAPSULE DAILY    montelukast (SINGULAIR) 10 MG tablet Take 1 tablet by mouth daily    TOUJEO SOLOSTAR 300 UNIT/ML concentrated injection pen INJECT 40 UNITS EVERY MORNING AND 20 UNITS EVERY EVENING    bumetanide (BUMEX) 1 MG tablet TAKE 1 TABLET EVERY MONDAY, WEDNESDAY AND FRIDAY    albuterol sulfate HFA (PROVENTIL;VENTOLIN;PROAIR) 108 (90 Base) MCG/ACT inhaler Inhale 2 puffs into the lungs every 4 hours as needed    ascorbic acid (VITAMIN C) 500 MG tablet Take by mouth daily    aspirin 81 MG EC tablet Take by mouth daily    azelastine (ASTELIN) 0.1 % nasal spray USE 1 SPRAY IN EACH NOSTRIL TWICE A DAY AS DIRECTED    Cholecalciferol 50 MCG (2000 UT) TABS Take by mouth    coenzyme Q10 100 MG CAPS capsule Take by mouth daily    desonide (DESOWEN) 0.05 % lotion as needed    diclofenac sodium (VOLTAREN) 1 % GEL as needed    ezetimibe (ZETIA) 10 MG tablet Take by mouth daily    FLUoxetine (PROZAC) 40 MG capsule Take by mouth daily    gabapentin (NEURONTIN) 300 MG capsule TAKE 1 CAPSULE THREE TIMES A DAY    insulin aspart (NOVOLOG FLEXPEN) 100

## 2023-07-12 NOTE — PATIENT INSTRUCTIONS

## 2023-07-17 RX ORDER — FLUOXETINE HYDROCHLORIDE 40 MG/1
CAPSULE ORAL
Qty: 90 CAPSULE | Refills: 3 | Status: SHIPPED | OUTPATIENT
Start: 2023-07-17

## 2023-07-31 ENCOUNTER — OFFICE VISIT (OUTPATIENT)
Age: 73
End: 2023-07-31
Payer: MEDICARE

## 2023-07-31 VITALS
WEIGHT: 239.4 LBS | TEMPERATURE: 98.2 F | DIASTOLIC BLOOD PRESSURE: 56 MMHG | HEIGHT: 69 IN | HEART RATE: 64 BPM | BODY MASS INDEX: 35.46 KG/M2 | SYSTOLIC BLOOD PRESSURE: 94 MMHG | RESPIRATION RATE: 14 BRPM | OXYGEN SATURATION: 95 %

## 2023-07-31 DIAGNOSIS — Z79.4 TYPE 2 DIABETES MELLITUS WITH MICROALBUMINURIA, WITH LONG-TERM CURRENT USE OF INSULIN (HCC): ICD-10-CM

## 2023-07-31 DIAGNOSIS — M75.112 NONTRAUMATIC INCOMPLETE TEAR OF LEFT ROTATOR CUFF: Primary | ICD-10-CM

## 2023-07-31 DIAGNOSIS — E11.29 TYPE 2 DIABETES MELLITUS WITH MICROALBUMINURIA, WITH LONG-TERM CURRENT USE OF INSULIN (HCC): ICD-10-CM

## 2023-07-31 DIAGNOSIS — I44.4 LEFT ANTERIOR FASCICULAR BLOCK (LAFB): ICD-10-CM

## 2023-07-31 DIAGNOSIS — M75.102 ROTATOR CUFF TEAR ARTHROPATHY OF LEFT SHOULDER: ICD-10-CM

## 2023-07-31 DIAGNOSIS — R80.9 TYPE 2 DIABETES MELLITUS WITH MICROALBUMINURIA, WITH LONG-TERM CURRENT USE OF INSULIN (HCC): ICD-10-CM

## 2023-07-31 DIAGNOSIS — Z99.89 OSA ON CPAP: ICD-10-CM

## 2023-07-31 DIAGNOSIS — M12.812 ROTATOR CUFF TEAR ARTHROPATHY OF LEFT SHOULDER: ICD-10-CM

## 2023-07-31 DIAGNOSIS — N18.31 CHRONIC KIDNEY DISEASE, STAGE 3A (HCC): ICD-10-CM

## 2023-07-31 DIAGNOSIS — I10 ESSENTIAL HYPERTENSION WITH GOAL BLOOD PRESSURE LESS THAN 130/80: ICD-10-CM

## 2023-07-31 DIAGNOSIS — G47.33 OSA ON CPAP: ICD-10-CM

## 2023-07-31 DIAGNOSIS — I44.0 AV BLOCK, 1ST DEGREE: ICD-10-CM

## 2023-07-31 LAB
ANION GAP SERPL CALC-SCNC: 6 MMOL/L (ref 5–15)
BUN SERPL-MCNC: 20 MG/DL (ref 6–20)
BUN/CREAT SERPL: 19 (ref 12–20)
CALCIUM SERPL-MCNC: 9.5 MG/DL (ref 8.5–10.1)
CHLORIDE SERPL-SCNC: 108 MMOL/L (ref 97–108)
CO2 SERPL-SCNC: 28 MMOL/L (ref 21–32)
CREAT SERPL-MCNC: 1.05 MG/DL (ref 0.7–1.3)
GLUCOSE SERPL-MCNC: 97 MG/DL (ref 65–100)
POTASSIUM SERPL-SCNC: 4.8 MMOL/L (ref 3.5–5.1)
SODIUM SERPL-SCNC: 142 MMOL/L (ref 136–145)

## 2023-07-31 PROCEDURE — 99214 OFFICE O/P EST MOD 30 MIN: CPT | Performed by: INTERNAL MEDICINE

## 2023-07-31 PROCEDURE — 3074F SYST BP LT 130 MM HG: CPT | Performed by: INTERNAL MEDICINE

## 2023-07-31 PROCEDURE — 3046F HEMOGLOBIN A1C LEVEL >9.0%: CPT | Performed by: INTERNAL MEDICINE

## 2023-07-31 PROCEDURE — G8417 CALC BMI ABV UP PARAM F/U: HCPCS | Performed by: INTERNAL MEDICINE

## 2023-07-31 PROCEDURE — 2022F DILAT RTA XM EVC RTNOPTHY: CPT | Performed by: INTERNAL MEDICINE

## 2023-07-31 PROCEDURE — 3017F COLORECTAL CA SCREEN DOC REV: CPT | Performed by: INTERNAL MEDICINE

## 2023-07-31 PROCEDURE — G8428 CUR MEDS NOT DOCUMENT: HCPCS | Performed by: INTERNAL MEDICINE

## 2023-07-31 PROCEDURE — 3078F DIAST BP <80 MM HG: CPT | Performed by: INTERNAL MEDICINE

## 2023-07-31 PROCEDURE — 1036F TOBACCO NON-USER: CPT | Performed by: INTERNAL MEDICINE

## 2023-07-31 PROCEDURE — 1123F ACP DISCUSS/DSCN MKR DOCD: CPT | Performed by: INTERNAL MEDICINE

## 2023-07-31 RX ORDER — INSULIN ASPART 100 [IU]/ML
INJECTION, SOLUTION INTRAVENOUS; SUBCUTANEOUS
Qty: 5 ADJUSTABLE DOSE PRE-FILLED PEN SYRINGE | Refills: 5
Start: 2023-07-31

## 2023-07-31 RX ORDER — BUMETANIDE 1 MG/1
1 TABLET ORAL
Qty: 90 TABLET | Refills: 1
Start: 2023-07-31

## 2023-07-31 ASSESSMENT — PATIENT HEALTH QUESTIONNAIRE - PHQ9
SUM OF ALL RESPONSES TO PHQ QUESTIONS 1-9: 0
SUM OF ALL RESPONSES TO PHQ QUESTIONS 1-9: 0
1. LITTLE INTEREST OR PLEASURE IN DOING THINGS: 0
SUM OF ALL RESPONSES TO PHQ QUESTIONS 1-9: 0
2. FEELING DOWN, DEPRESSED OR HOPELESS: 0
SUM OF ALL RESPONSES TO PHQ9 QUESTIONS 1 & 2: 0
SUM OF ALL RESPONSES TO PHQ QUESTIONS 1-9: 0

## 2023-07-31 NOTE — PROGRESS NOTES
500 MG tablet Take by mouth daily    aspirin 81 MG EC tablet Take by mouth daily    azelastine (ASTELIN) 0.1 % nasal spray USE 1 SPRAY IN EACH NOSTRIL TWICE A DAY AS DIRECTED    Cholecalciferol 50 MCG (2000 UT) TABS Take by mouth    coenzyme Q10 100 MG CAPS capsule Take by mouth daily    desonide (DESOWEN) 0.05 % lotion as needed    diclofenac sodium (VOLTAREN) 1 % GEL as needed    ezetimibe (ZETIA) 10 MG tablet Take by mouth daily    gabapentin (NEURONTIN) 300 MG capsule TAKE 1 CAPSULE THREE TIMES A DAY    ketoconazole (NIZORAL) 2 % cream Apply topically as needed    lisinopril (PRINIVIL;ZESTRIL) 10 MG tablet TAKE 1 TABLET TWICE A DAY    magnesium oxide (MAG-OX) 400 MG tablet Take by mouth daily    metFORMIN (GLUCOPHAGE) 1000 MG tablet TAKE 1 TABLET TWICE A DAY WITH MEALS    metoprolol tartrate (LOPRESSOR) 25 MG tablet TAKE 1 TABLET TWICE A DAY    mometasone-formoterol (DULERA) 200-5 MCG/ACT inhaler Inhale 2 puffs into the lungs 2 times daily    mupirocin (BACTROBAN) 2 % ointment mupirocin 2 % topical ointment    nystatin-triamcinolone (MYCOLOG II) 180809-3.1 UNIT/GM-% cream Apply topically 2 times daily    pyridoxine (B-6) 100 MG tablet Take by mouth daily    rosuvastatin (CRESTOR) 10 MG tablet TAKE 1 TABLET NIGHTLY    Semaglutide, 1 MG/DOSE, (OZEMPIC, 1 MG/DOSE,) 4 MG/3ML SOPN Inject into the skin every 7 days     No current facility-administered medications for this visit. Past Medical History:   Diagnosis Date    Achilles bursitis 08/10/2020    Anxiety     AR (allergic rhinitis) 12/01/2008    Dr. Brigitte Roper.   gets  shots    Arthritis     Blind right eye     BPH (benign prostatic hyperplasia)     Dr. Justin Cervantes. biopsy 4/15/21 benign    CAD (coronary artery disease) 2005    Dr. Miguel Alaniz.  s/p CABG - 3 vessel    CKD (chronic kidney disease) stage 3, GFR 30-59 ml/min ([facility])     Dr. Jill Henderson    DM type 2 (diabetes mellitus, type 2) ([address] [facility])     30 years    Dysthymia     Encounter for diabetic foot exam ([address] [facility])     sees

## 2023-08-01 DIAGNOSIS — E11.21 TYPE 2 DIABETES MELLITUS WITH DIABETIC NEPHROPATHY, UNSPECIFIED WHETHER LONG TERM INSULIN USE (HCC): Primary | ICD-10-CM

## 2023-08-01 LAB
ERYTHROCYTE [DISTWIDTH] IN BLOOD BY AUTOMATED COUNT: 13.2 % (ref 11.5–14.5)
EST. AVERAGE GLUCOSE BLD GHB EST-MCNC: 131 MG/DL
HBA1C MFR BLD: 6.2 % (ref 4–5.6)
HCT VFR BLD AUTO: 42.4 % (ref 36.6–50.3)
HGB BLD-MCNC: 13.7 G/DL (ref 12.1–17)
MCH RBC QN AUTO: 29.7 PG (ref 26–34)
MCHC RBC AUTO-ENTMCNC: 32.3 G/DL (ref 30–36.5)
MCV RBC AUTO: 92 FL (ref 80–99)
NRBC # BLD: 0 K/UL (ref 0–0.01)
NRBC BLD-RTO: 0 PER 100 WBC
PLATELET # BLD AUTO: 165 K/UL (ref 150–400)
PMV BLD AUTO: 10.3 FL (ref 8.9–12.9)
RBC # BLD AUTO: 4.61 M/UL (ref 4.1–5.7)
WBC # BLD AUTO: 8.6 K/UL (ref 4.1–11.1)

## 2023-08-01 RX ORDER — GABAPENTIN 300 MG/1
CAPSULE ORAL
Qty: 270 CAPSULE | Refills: 1 | Status: SHIPPED | OUTPATIENT
Start: 2023-08-01 | End: 2023-08-31

## 2023-09-01 ENCOUNTER — PATIENT MESSAGE (OUTPATIENT)
Age: 73
End: 2023-09-01

## 2023-09-01 DIAGNOSIS — T75.3XXA SEA SICKNESS, INITIAL ENCOUNTER: Primary | ICD-10-CM

## 2023-09-01 RX ORDER — SCOLOPAMINE TRANSDERMAL SYSTEM 1 MG/1
1 PATCH, EXTENDED RELEASE TRANSDERMAL
Qty: 3 PATCH | Refills: 0 | Status: SHIPPED | OUTPATIENT
Start: 2023-09-01

## 2023-09-01 NOTE — TELEPHONE ENCOUNTER
From: Medhat Gilliland  To: Dr. Donovan Endo: 9/1/2023 9:12 AM EDT  Subject: Cruise    I am going on a cruise later this month and wanted to know if I could get a script sent to [facility] for  Scopolamine To help with seasickness.     Thanks

## 2023-09-13 RX ORDER — LISINOPRIL 10 MG/1
TABLET ORAL
Qty: 180 TABLET | Refills: 1 | Status: SHIPPED | OUTPATIENT
Start: 2023-09-13

## 2023-10-25 ENCOUNTER — TELEPHONE (OUTPATIENT)
Age: 73
End: 2023-10-25

## 2023-10-25 NOTE — TELEPHONE ENCOUNTER
Spoke with patient and he went to [facility] and [facility] on 10/18/23 Nausea and Vomiting and diarrhea-it lasted over a week -10 days. He reports his labs look good and was told to follow up with GI. He reports he is feeling better no further N&V and no diarrhea. Now he has developed a cough. He reports he keeping well hydrated. He is vaccinated for COVID, Flu, RSV recently the beginning of the Oct. He denies any fever at this time. He has not home tested for COVID. He reports his Blood Sugars have been good no Hypo or Hyperglycemic episodes. He reports that he went to [facility] 10/22/23 but had to come home not feeling well on 10/24/23. He is asking for an appt and scheduled with SHAY Love for tomorrow 10/26/23 at 3:00 PM. Advised if he should become SOB and overly fatigued to go to Urgent/ER for evaluation. Advised patient to do a home COVID test and wear a mask to his appt tomorrow. He states understanding and grateful for the call. Requested office visit from 10/18/23.

## 2023-10-26 ENCOUNTER — OFFICE VISIT (OUTPATIENT)
Age: 73
End: 2023-10-26
Payer: MEDICARE

## 2023-10-26 VITALS
HEART RATE: 61 BPM | TEMPERATURE: 98.5 F | DIASTOLIC BLOOD PRESSURE: 64 MMHG | BODY MASS INDEX: 33.86 KG/M2 | WEIGHT: 228.6 LBS | HEIGHT: 69 IN | OXYGEN SATURATION: 97 % | RESPIRATION RATE: 16 BRPM | SYSTOLIC BLOOD PRESSURE: 104 MMHG

## 2023-10-26 DIAGNOSIS — K59.1 FUNCTIONAL DIARRHEA: ICD-10-CM

## 2023-10-26 DIAGNOSIS — I10 ESSENTIAL HYPERTENSION WITH GOAL BLOOD PRESSURE LESS THAN 130/80: ICD-10-CM

## 2023-10-26 DIAGNOSIS — J30.9 ALLERGIC RHINITIS, UNSPECIFIED SEASONALITY, UNSPECIFIED TRIGGER: Primary | ICD-10-CM

## 2023-10-26 LAB
INFLUENZA A ANTIGEN, POC: NEGATIVE
INFLUENZA B ANTIGEN, POC: NEGATIVE
LOT EXPIRE DATE: NORMAL
LOT KIT NUMBER: NORMAL
SARS-COV-2 RNA, POC: NEGATIVE
VALID INTERNAL CONTROL: YES
VENDOR AND KIT NAME POC: NORMAL

## 2023-10-26 PROCEDURE — 87428 SARSCOV & INF VIR A&B AG IA: CPT | Performed by: NURSE PRACTITIONER

## 2023-10-26 PROCEDURE — 99214 OFFICE O/P EST MOD 30 MIN: CPT | Performed by: NURSE PRACTITIONER

## 2023-10-26 PROCEDURE — 3074F SYST BP LT 130 MM HG: CPT | Performed by: NURSE PRACTITIONER

## 2023-10-26 PROCEDURE — PBSHW AMB POC SARS-COV-2 AND INFLUENZA A/B: Performed by: NURSE PRACTITIONER

## 2023-10-26 PROCEDURE — 1123F ACP DISCUSS/DSCN MKR DOCD: CPT | Performed by: NURSE PRACTITIONER

## 2023-10-26 PROCEDURE — 3078F DIAST BP <80 MM HG: CPT | Performed by: NURSE PRACTITIONER

## 2023-10-26 RX ORDER — METHYLPREDNISOLONE 4 MG/1
TABLET ORAL
Qty: 1 KIT | Refills: 0 | Status: SHIPPED | OUTPATIENT
Start: 2023-10-26

## 2023-10-26 RX ORDER — BENZONATATE 100 MG/1
100-200 CAPSULE ORAL 3 TIMES DAILY PRN
Qty: 21 CAPSULE | Refills: 0 | Status: SHIPPED | OUTPATIENT
Start: 2023-10-26 | End: 2023-11-02

## 2023-10-26 ASSESSMENT — ENCOUNTER SYMPTOMS
CHEST TIGHTNESS: 0
ABDOMINAL PAIN: 0
RHINORRHEA: 0
CONSTIPATION: 0
RECTAL PAIN: 0
COUGH: 1
EYES NEGATIVE: 1
SINUS PRESSURE: 0
EYE PAIN: 0
SHORTNESS OF BREATH: 0
BACK PAIN: 0
VOMITING: 0
EYE REDNESS: 0
BLOOD IN STOOL: 0
DIARRHEA: 1
SINUS PAIN: 0
NAUSEA: 0

## 2023-10-26 ASSESSMENT — PATIENT HEALTH QUESTIONNAIRE - PHQ9
1. LITTLE INTEREST OR PLEASURE IN DOING THINGS: 0
SUM OF ALL RESPONSES TO PHQ QUESTIONS 1-9: 0
SUM OF ALL RESPONSES TO PHQ9 QUESTIONS 1 & 2: 0
2. FEELING DOWN, DEPRESSED OR HOPELESS: 0

## 2023-10-26 NOTE — PROGRESS NOTES
ill-appearing. HENT:      Head: Normocephalic and atraumatic. Right Ear: Decreased hearing noted. No middle ear effusion. Tympanic membrane is not erythematous, retracted or bulging. Left Ear: Decreased hearing noted. No middle ear effusion. Tympanic membrane is not erythematous, retracted or bulging. Nose: Nose normal. No congestion. Mouth/Throat:      Mouth: Mucous membranes are moist.      Pharynx: Posterior oropharyngeal erythema present. No oropharyngeal exudate. Cardiovascular:      Rate and Rhythm: Normal rate and regular rhythm. Pulses: Normal pulses. Heart sounds: Normal heart sounds. No murmur heard. No friction rub. No gallop. Pulmonary:      Effort: Pulmonary effort is normal. No respiratory distress. Breath sounds: Normal breath sounds. No wheezing or rales. Chest:      Chest wall: No tenderness. Abdominal:      General: Bowel sounds are normal. There is no distension. Palpations: Abdomen is soft. There is no mass. Tenderness: There is no abdominal tenderness. There is no right CVA tenderness, left CVA tenderness, guarding or rebound. Musculoskeletal:      Right lower leg: No edema. Left lower leg: No edema. Skin:     General: Skin is warm and dry. Neurological:      Mental Status: He is alert and oriented to person, place, and time.    Psychiatric:         Mood and Affect: Mood normal.         Behavior: Behavior normal.

## 2023-11-03 SDOH — HEALTH STABILITY: PHYSICAL HEALTH: ON AVERAGE, HOW MANY DAYS PER WEEK DO YOU ENGAGE IN MODERATE TO STRENUOUS EXERCISE (LIKE A BRISK WALK)?: 0 DAYS

## 2023-11-03 ASSESSMENT — LIFESTYLE VARIABLES
HOW OFTEN DURING THE LAST YEAR HAVE YOU FOUND THAT YOU WERE NOT ABLE TO STOP DRINKING ONCE YOU HAD STARTED: 0
HOW OFTEN DURING THE LAST YEAR HAVE YOU BEEN UNABLE TO REMEMBER WHAT HAPPENED THE NIGHT BEFORE BECAUSE YOU HAD BEEN DRINKING: 0
HOW MANY STANDARD DRINKS CONTAINING ALCOHOL DO YOU HAVE ON A TYPICAL DAY: 1 OR 2
HOW OFTEN DURING THE LAST YEAR HAVE YOU HAD A FEELING OF GUILT OR REMORSE AFTER DRINKING: 0
HOW OFTEN DURING THE LAST YEAR HAVE YOU FAILED TO DO WHAT WAS NORMALLY EXPECTED FROM YOU BECAUSE OF DRINKING: 0
HOW OFTEN DO YOU HAVE SIX OR MORE DRINKS ON ONE OCCASION: 3
HOW OFTEN DURING THE LAST YEAR HAVE YOU FAILED TO DO WHAT WAS NORMALLY EXPECTED FROM YOU BECAUSE OF DRINKING: NEVER
HOW OFTEN DURING THE LAST YEAR HAVE YOU NEEDED AN ALCOHOLIC DRINK FIRST THING IN THE MORNING TO GET YOURSELF GOING AFTER A NIGHT OF HEAVY DRINKING: 0
HAVE YOU OR SOMEONE ELSE BEEN INJURED AS A RESULT OF YOUR DRINKING: 0
HAS A RELATIVE, FRIEND, DOCTOR, OR ANOTHER HEALTH PROFESSIONAL EXPRESSED CONCERN ABOUT YOUR DRINKING OR SUGGESTED YOU CUT DOWN: NO
HOW OFTEN DO YOU HAVE A DRINK CONTAINING ALCOHOL: 2-3 TIMES A WEEK
HOW OFTEN DO YOU HAVE A DRINK CONTAINING ALCOHOL: 4
HOW OFTEN DURING THE LAST YEAR HAVE YOU BEEN UNABLE TO REMEMBER WHAT HAPPENED THE NIGHT BEFORE BECAUSE YOU HAD BEEN DRINKING: NEVER
HOW OFTEN DURING THE LAST YEAR HAVE YOU HAD A FEELING OF GUILT OR REMORSE AFTER DRINKING: NEVER
HOW OFTEN DURING THE LAST YEAR HAVE YOU NEEDED AN ALCOHOLIC DRINK FIRST THING IN THE MORNING TO GET YOURSELF GOING AFTER A NIGHT OF HEAVY DRINKING: NEVER
HOW OFTEN DURING THE LAST YEAR HAVE YOU FOUND THAT YOU WERE NOT ABLE TO STOP DRINKING ONCE YOU HAD STARTED: NEVER
HAVE YOU OR SOMEONE ELSE BEEN INJURED AS A RESULT OF YOUR DRINKING: NO
HAS A RELATIVE, FRIEND, DOCTOR, OR ANOTHER HEALTH PROFESSIONAL EXPRESSED CONCERN ABOUT YOUR DRINKING OR SUGGESTED YOU CUT DOWN: 0
HOW MANY STANDARD DRINKS CONTAINING ALCOHOL DO YOU HAVE ON A TYPICAL DAY: 1

## 2023-11-03 ASSESSMENT — PATIENT HEALTH QUESTIONNAIRE - PHQ9
3. TROUBLE FALLING OR STAYING ASLEEP: 0
SUM OF ALL RESPONSES TO PHQ QUESTIONS 1-9: 2
9. THOUGHTS THAT YOU WOULD BE BETTER OFF DEAD, OR OF HURTING YOURSELF: 0
5. POOR APPETITE OR OVEREATING: 0
1. LITTLE INTEREST OR PLEASURE IN DOING THINGS: 1
8. MOVING OR SPEAKING SO SLOWLY THAT OTHER PEOPLE COULD HAVE NOTICED. OR THE OPPOSITE, BEING SO FIGETY OR RESTLESS THAT YOU HAVE BEEN MOVING AROUND A LOT MORE THAN USUAL: 0
4. FEELING TIRED OR HAVING LITTLE ENERGY: 0
SUM OF ALL RESPONSES TO PHQ QUESTIONS 1-9: 2
6. FEELING BAD ABOUT YOURSELF - OR THAT YOU ARE A FAILURE OR HAVE LET YOURSELF OR YOUR FAMILY DOWN: 0
SUM OF ALL RESPONSES TO PHQ9 QUESTIONS 1 & 2: 2
10. IF YOU CHECKED OFF ANY PROBLEMS, HOW DIFFICULT HAVE THESE PROBLEMS MADE IT FOR YOU TO DO YOUR WORK, TAKE CARE OF THINGS AT HOME, OR GET ALONG WITH OTHER PEOPLE: 0
7. TROUBLE CONCENTRATING ON THINGS, SUCH AS READING THE NEWSPAPER OR WATCHING TELEVISION: 0
2. FEELING DOWN, DEPRESSED OR HOPELESS: 1

## 2023-11-03 ASSESSMENT — COLUMBIA-SUICIDE SEVERITY RATING SCALE - C-SSRS
4. HAVE YOU HAD THESE THOUGHTS AND HAD SOME INTENTION OF ACTING ON THEM?: NO
7. DID THIS OCCUR IN THE LAST THREE MONTHS: NO
5. HAVE YOU STARTED TO WORK OUT OR WORKED OUT THE DETAILS OF HOW TO KILL YOURSELF? DO YOU INTEND TO CARRY OUT THIS PLAN?: NO
3. HAVE YOU BEEN THINKING ABOUT HOW YOU MIGHT KILL YOURSELF?: NO

## 2023-11-06 ENCOUNTER — OFFICE VISIT (OUTPATIENT)
Age: 73
End: 2023-11-06
Payer: MEDICARE

## 2023-11-06 VITALS
HEIGHT: 69 IN | SYSTOLIC BLOOD PRESSURE: 131 MMHG | WEIGHT: 232.4 LBS | OXYGEN SATURATION: 96 % | BODY MASS INDEX: 34.42 KG/M2 | HEART RATE: 64 BPM | RESPIRATION RATE: 18 BRPM | TEMPERATURE: 98 F | DIASTOLIC BLOOD PRESSURE: 77 MMHG

## 2023-11-06 DIAGNOSIS — E29.1 HYPOGONADISM MALE: ICD-10-CM

## 2023-11-06 DIAGNOSIS — Z79.4 TYPE 2 DIABETES MELLITUS WITH MICROALBUMINURIA, WITH LONG-TERM CURRENT USE OF INSULIN (HCC): ICD-10-CM

## 2023-11-06 DIAGNOSIS — H61.23 CERUMINOSIS, BILATERAL: ICD-10-CM

## 2023-11-06 DIAGNOSIS — E11.29 TYPE 2 DIABETES MELLITUS WITH MICROALBUMINURIA, WITH LONG-TERM CURRENT USE OF INSULIN (HCC): ICD-10-CM

## 2023-11-06 DIAGNOSIS — Z00.00 MEDICARE ANNUAL WELLNESS VISIT, SUBSEQUENT: Primary | ICD-10-CM

## 2023-11-06 DIAGNOSIS — R80.9 TYPE 2 DIABETES MELLITUS WITH MICROALBUMINURIA, WITH LONG-TERM CURRENT USE OF INSULIN (HCC): ICD-10-CM

## 2023-11-06 DIAGNOSIS — I10 ESSENTIAL HYPERTENSION WITH GOAL BLOOD PRESSURE LESS THAN 130/80: ICD-10-CM

## 2023-11-06 DIAGNOSIS — N18.30 STAGE 3 CHRONIC KIDNEY DISEASE, UNSPECIFIED WHETHER STAGE 3A OR 3B CKD (HCC): ICD-10-CM

## 2023-11-06 DIAGNOSIS — R39.14 BENIGN PROSTATIC HYPERPLASIA WITH INCOMPLETE BLADDER EMPTYING: ICD-10-CM

## 2023-11-06 DIAGNOSIS — Z87.898 HISTORY OF DIARRHEA: ICD-10-CM

## 2023-11-06 DIAGNOSIS — N40.1 BENIGN PROSTATIC HYPERPLASIA WITH INCOMPLETE BLADDER EMPTYING: ICD-10-CM

## 2023-11-06 DIAGNOSIS — E78.00 PURE HYPERCHOLESTEROLEMIA: ICD-10-CM

## 2023-11-06 PROBLEM — M89.9 DISORDER OF BONE: Status: RESOLVED | Noted: 2022-10-24 | Resolved: 2023-11-06

## 2023-11-06 PROBLEM — Z71.89 ADVANCE CARE PLANNING: Status: RESOLVED | Noted: 2017-01-24 | Resolved: 2023-11-06

## 2023-11-06 PROBLEM — M76.60 ACHILLES BURSITIS: Status: RESOLVED | Noted: 2022-10-27 | Resolved: 2023-11-06

## 2023-11-06 PROBLEM — M79.609 PAIN IN LIMB: Status: RESOLVED | Noted: 2022-10-27 | Resolved: 2023-11-06

## 2023-11-06 LAB
ALBUMIN SERPL-MCNC: 3.8 G/DL (ref 3.5–5)
ALBUMIN/GLOB SERPL: 1.3 (ref 1.1–2.2)
ALP SERPL-CCNC: 54 U/L (ref 45–117)
ALT SERPL-CCNC: 24 U/L (ref 12–78)
ANION GAP SERPL CALC-SCNC: 6 MMOL/L (ref 5–15)
AST SERPL-CCNC: 18 U/L (ref 15–37)
BILIRUB SERPL-MCNC: 0.6 MG/DL (ref 0.2–1)
BUN SERPL-MCNC: 17 MG/DL (ref 6–20)
BUN/CREAT SERPL: 15 (ref 12–20)
CALCIUM SERPL-MCNC: 9.3 MG/DL (ref 8.5–10.1)
CHLORIDE SERPL-SCNC: 103 MMOL/L (ref 97–108)
CO2 SERPL-SCNC: 29 MMOL/L (ref 21–32)
CREAT SERPL-MCNC: 1.16 MG/DL (ref 0.7–1.3)
ERYTHROCYTE [DISTWIDTH] IN BLOOD BY AUTOMATED COUNT: 13.3 % (ref 11.5–14.5)
GLOBULIN SER CALC-MCNC: 2.9 G/DL (ref 2–4)
GLUCOSE SERPL-MCNC: 129 MG/DL (ref 65–100)
HBA1C MFR BLD: 7.3 %
HCT VFR BLD AUTO: 40.8 % (ref 36.6–50.3)
HGB BLD-MCNC: 13.1 G/DL (ref 12.1–17)
MCH RBC QN AUTO: 28.9 PG (ref 26–34)
MCHC RBC AUTO-ENTMCNC: 32.1 G/DL (ref 30–36.5)
MCV RBC AUTO: 89.9 FL (ref 80–99)
NRBC # BLD: 0 K/UL (ref 0–0.01)
NRBC BLD-RTO: 0 PER 100 WBC
PLATELET # BLD AUTO: 166 K/UL (ref 150–400)
PMV BLD AUTO: 10.6 FL (ref 8.9–12.9)
POTASSIUM SERPL-SCNC: 4.6 MMOL/L (ref 3.5–5.1)
PROT SERPL-MCNC: 6.7 G/DL (ref 6.4–8.2)
RBC # BLD AUTO: 4.54 M/UL (ref 4.1–5.7)
SODIUM SERPL-SCNC: 138 MMOL/L (ref 136–145)
WBC # BLD AUTO: 7.7 K/UL (ref 4.1–11.1)

## 2023-11-06 PROCEDURE — G8417 CALC BMI ABV UP PARAM F/U: HCPCS | Performed by: INTERNAL MEDICINE

## 2023-11-06 PROCEDURE — 99214 OFFICE O/P EST MOD 30 MIN: CPT | Performed by: INTERNAL MEDICINE

## 2023-11-06 PROCEDURE — 1036F TOBACCO NON-USER: CPT | Performed by: INTERNAL MEDICINE

## 2023-11-06 PROCEDURE — PBSHW AMB POC HEMOGLOBIN A1C: Performed by: INTERNAL MEDICINE

## 2023-11-06 PROCEDURE — 1123F ACP DISCUSS/DSCN MKR DOCD: CPT | Performed by: INTERNAL MEDICINE

## 2023-11-06 PROCEDURE — G0439 PPPS, SUBSEQ VISIT: HCPCS | Performed by: INTERNAL MEDICINE

## 2023-11-06 PROCEDURE — 3017F COLORECTAL CA SCREEN DOC REV: CPT | Performed by: INTERNAL MEDICINE

## 2023-11-06 PROCEDURE — G8427 DOCREV CUR MEDS BY ELIG CLIN: HCPCS | Performed by: INTERNAL MEDICINE

## 2023-11-06 PROCEDURE — G8484 FLU IMMUNIZE NO ADMIN: HCPCS | Performed by: INTERNAL MEDICINE

## 2023-11-06 PROCEDURE — 3075F SYST BP GE 130 - 139MM HG: CPT | Performed by: INTERNAL MEDICINE

## 2023-11-06 PROCEDURE — 2022F DILAT RTA XM EVC RTNOPTHY: CPT | Performed by: INTERNAL MEDICINE

## 2023-11-06 PROCEDURE — 3044F HG A1C LEVEL LT 7.0%: CPT | Performed by: INTERNAL MEDICINE

## 2023-11-06 PROCEDURE — 3078F DIAST BP <80 MM HG: CPT | Performed by: INTERNAL MEDICINE

## 2023-11-06 PROCEDURE — 83036 HEMOGLOBIN GLYCOSYLATED A1C: CPT | Performed by: INTERNAL MEDICINE

## 2023-11-06 RX ORDER — GABAPENTIN 300 MG/1
600 CAPSULE ORAL
Qty: 180 CAPSULE | Refills: 1
Start: 2023-11-06 | End: 2024-05-04

## 2023-11-06 RX ORDER — LISINOPRIL 10 MG/1
10 TABLET ORAL DAILY
Qty: 90 TABLET | Refills: 1
Start: 2023-11-06

## 2023-11-06 NOTE — PROGRESS NOTES
Medicare Annual Wellness Visit    Portia Reilly is here for Medicare AWV and Lab Collection (Fasting. )    Kary was seen today for medicare awv and lab collection. Diagnoses and all orders for this visit:    Medicare annual wellness visit, subsequent  Colonoscopy pending. Recommend COVID-19 2023 booster in the next couple of months. Consider RSV vaccine. Type 2 diabetes mellitus with microalbuminuria, with long-term current use of insulin (HCC)  A1c is slightly above goal on current therapy with Farxiga, NovoLog, metformin, semaglutide and Toujeo. We will adjust insulin as needed. He is likely to improve over the next few months. Repeat A1c in 4 months. -     Comprehensive Metabolic Panel; Future  -     AMB POC HEMOGLOBIN A1C    Essential hypertension with goal blood pressure less than 130/80  +dizziness at times. We will decrease lisinopril to 10 mg daily. Monitor blood pressure at home. -     Comprehensive Metabolic Panel; Future  -     CBC; Future    Diabetic neuropathy is controlled with gabapentin at night.  -     gabapentin (NEURONTIN) 300 MG capsule; Take 2 capsules by mouth nightly for 180 days. TAKE 1 CAPSULE THREE TIMES A DAY Max Daily Amount: 600 mg    Stage 3 chronic kidney disease, unspecified whether stage 3a or 3b CKD (HCC)  Likely stable. Seeing Dr. Leyda Rodrigues innephrology. Check labs today. Continue Farxiga. Avoid NSAID  -     Comprehensive Metabolic Panel; Future    Hypogonadism male  No longer on testosterone therapy and declines further. Cause significant increase in his prostate symptoms and PSA. Pure hypercholesterolemia  Well-controlled on Crestor 10 mg and ezetimibe 10 mg daily. Continue. Ceruminosis, bilateral  Cerumen is too firm to irrigate today. Recommend Debrox drops for 3-5 nights, then return for irrigation anytime or he may go to urgent care. Benign prostatic hyperplasia with incomplete bladder emptying follow-up with urology. Follow-up Dr. Lonnie Stallings.

## 2023-11-28 ENCOUNTER — OFFICE VISIT (OUTPATIENT)
Age: 73
End: 2023-11-28

## 2023-11-28 VITALS
RESPIRATION RATE: 18 BRPM | OXYGEN SATURATION: 97 % | BODY MASS INDEX: 35.49 KG/M2 | SYSTOLIC BLOOD PRESSURE: 124 MMHG | HEART RATE: 68 BPM | TEMPERATURE: 98.1 F | WEIGHT: 234.2 LBS | DIASTOLIC BLOOD PRESSURE: 64 MMHG | HEIGHT: 68 IN

## 2023-11-28 DIAGNOSIS — J01.90 ACUTE BACTERIAL SINUSITIS: Primary | ICD-10-CM

## 2023-11-28 DIAGNOSIS — R05.8 POST-VIRAL COUGH SYNDROME: ICD-10-CM

## 2023-11-28 DIAGNOSIS — B96.89 ACUTE BACTERIAL SINUSITIS: Primary | ICD-10-CM

## 2023-11-28 DIAGNOSIS — R52 BODY ACHES: ICD-10-CM

## 2023-11-28 LAB
INFLUENZA A ANTIGEN, POC: NEGATIVE
INFLUENZA B ANTIGEN, POC: NEGATIVE
VALID INTERNAL CONTROL, POC: YES

## 2023-11-28 RX ORDER — BENZONATATE 200 MG/1
200 CAPSULE ORAL 3 TIMES DAILY PRN
Qty: 30 CAPSULE | Refills: 0 | Status: SHIPPED | OUTPATIENT
Start: 2023-11-28 | End: 2023-12-08

## 2023-11-28 RX ORDER — AMOXICILLIN AND CLAVULANATE POTASSIUM 875; 125 MG/1; MG/1
1 TABLET, FILM COATED ORAL 2 TIMES DAILY
Qty: 14 TABLET | Refills: 0 | Status: SHIPPED | OUTPATIENT
Start: 2023-11-28 | End: 2023-12-05

## 2023-12-01 RX ORDER — ROSUVASTATIN CALCIUM 10 MG/1
TABLET, COATED ORAL
Qty: 90 TABLET | Refills: 0 | Status: SHIPPED | OUTPATIENT
Start: 2023-12-01

## 2023-12-11 RX ORDER — SEMAGLUTIDE 1.34 MG/ML
INJECTION, SOLUTION SUBCUTANEOUS
Qty: 9 ML | Refills: 3 | Status: SHIPPED | OUTPATIENT
Start: 2023-12-11

## 2023-12-26 RX ORDER — MONTELUKAST SODIUM 10 MG/1
10 TABLET ORAL DAILY
Qty: 90 TABLET | Refills: 3 | Status: SHIPPED | OUTPATIENT
Start: 2023-12-26

## 2023-12-26 RX ORDER — ALBUTEROL SULFATE 90 UG/1
AEROSOL, METERED RESPIRATORY (INHALATION)
Qty: 25.5 G | Refills: 2 | Status: SHIPPED | OUTPATIENT
Start: 2023-12-26

## 2023-12-27 RX ORDER — EZETIMIBE 10 MG/1
10 TABLET ORAL DAILY
Qty: 90 TABLET | Refills: 1 | Status: SHIPPED | OUTPATIENT
Start: 2023-12-27

## 2023-12-27 NOTE — TELEPHONE ENCOUNTER
Refill per VO of Dr. Raeann Kan  Last appt: 7/12/2023    Future Appointments   Date Time Provider [address]   1/23/2024  2:00 PM MD ADA Grant BS AMB   3/11/2024  8:40 AM Levon Forrest MD [facility] BS AMB       Requested Prescriptions     Signed Prescriptions Disp Refills    ezetimibe (ZETIA) 10 MG tablet 90 tablet 1     Sig: TAKE 1 TABLET DAILY     Authorizing Provider: Martin Ibarra     Ordering User: Jude Lewis

## 2024-01-16 ENCOUNTER — TELEPHONE (OUTPATIENT)
Age: 74
End: 2024-01-16

## 2024-01-16 DIAGNOSIS — E78.5 HYPERLIPIDEMIA, UNSPECIFIED HYPERLIPIDEMIA TYPE: Primary | ICD-10-CM

## 2024-01-16 NOTE — TELEPHONE ENCOUNTER
Pt. Has an upcoming appt. Asking to have his blood work done, no orders. Would like for the order to be mailed to him.     No call back needed

## 2024-01-20 LAB
ALBUMIN SERPL-MCNC: 4.4 G/DL (ref 3.8–4.8)
ALP SERPL-CCNC: 59 IU/L (ref 44–121)
ALT SERPL-CCNC: 22 IU/L (ref 0–44)
AST SERPL-CCNC: 26 IU/L (ref 0–40)
BILIRUB DIRECT SERPL-MCNC: 0.15 MG/DL (ref 0–0.4)
BILIRUB SERPL-MCNC: 0.4 MG/DL (ref 0–1.2)
CHOLEST SERPL-MCNC: 112 MG/DL (ref 100–199)
HDLC SERPL-MCNC: 49 MG/DL
LDLC SERPL CALC-MCNC: 46 MG/DL (ref 0–99)
PROT SERPL-MCNC: 6.7 G/DL (ref 6–8.5)
TRIGL SERPL-MCNC: 87 MG/DL (ref 0–149)
VLDLC SERPL CALC-MCNC: 17 MG/DL (ref 5–40)

## 2024-01-23 ENCOUNTER — OFFICE VISIT (OUTPATIENT)
Age: 74
End: 2024-01-23
Payer: MEDICARE

## 2024-01-23 VITALS
SYSTOLIC BLOOD PRESSURE: 102 MMHG | WEIGHT: 233 LBS | BODY MASS INDEX: 34.51 KG/M2 | HEIGHT: 69 IN | DIASTOLIC BLOOD PRESSURE: 40 MMHG

## 2024-01-23 DIAGNOSIS — Z79.4 TYPE 2 DIABETES MELLITUS WITH MICROALBUMINURIA, WITH LONG-TERM CURRENT USE OF INSULIN (HCC): ICD-10-CM

## 2024-01-23 DIAGNOSIS — E78.5 HYPERLIPIDEMIA, UNSPECIFIED HYPERLIPIDEMIA TYPE: Primary | ICD-10-CM

## 2024-01-23 DIAGNOSIS — I10 ESSENTIAL (PRIMARY) HYPERTENSION: ICD-10-CM

## 2024-01-23 DIAGNOSIS — R80.9 TYPE 2 DIABETES MELLITUS WITH MICROALBUMINURIA, WITH LONG-TERM CURRENT USE OF INSULIN (HCC): ICD-10-CM

## 2024-01-23 DIAGNOSIS — E66.01 MORBID (SEVERE) OBESITY DUE TO EXCESS CALORIES (HCC): ICD-10-CM

## 2024-01-23 DIAGNOSIS — E11.21 TYPE 2 DIABETES MELLITUS WITH DIABETIC NEPHROPATHY, UNSPECIFIED WHETHER LONG TERM INSULIN USE (HCC): ICD-10-CM

## 2024-01-23 DIAGNOSIS — I65.23 OCCLUSION AND STENOSIS OF BILATERAL CAROTID ARTERIES: ICD-10-CM

## 2024-01-23 DIAGNOSIS — E11.29 TYPE 2 DIABETES MELLITUS WITH MICROALBUMINURIA, WITH LONG-TERM CURRENT USE OF INSULIN (HCC): ICD-10-CM

## 2024-01-23 PROCEDURE — 99214 OFFICE O/P EST MOD 30 MIN: CPT | Performed by: SPECIALIST

## 2024-01-23 PROCEDURE — 1036F TOBACCO NON-USER: CPT | Performed by: SPECIALIST

## 2024-01-23 PROCEDURE — 3074F SYST BP LT 130 MM HG: CPT | Performed by: SPECIALIST

## 2024-01-23 PROCEDURE — 3046F HEMOGLOBIN A1C LEVEL >9.0%: CPT | Performed by: SPECIALIST

## 2024-01-23 PROCEDURE — 3017F COLORECTAL CA SCREEN DOC REV: CPT | Performed by: SPECIALIST

## 2024-01-23 PROCEDURE — G8427 DOCREV CUR MEDS BY ELIG CLIN: HCPCS | Performed by: SPECIALIST

## 2024-01-23 PROCEDURE — G8417 CALC BMI ABV UP PARAM F/U: HCPCS | Performed by: SPECIALIST

## 2024-01-23 PROCEDURE — G8484 FLU IMMUNIZE NO ADMIN: HCPCS | Performed by: SPECIALIST

## 2024-01-23 PROCEDURE — 3078F DIAST BP <80 MM HG: CPT | Performed by: SPECIALIST

## 2024-01-23 PROCEDURE — 2022F DILAT RTA XM EVC RTNOPTHY: CPT | Performed by: SPECIALIST

## 2024-01-23 PROCEDURE — 1123F ACP DISCUSS/DSCN MKR DOCD: CPT | Performed by: SPECIALIST

## 2024-01-23 RX ORDER — GABAPENTIN 300 MG/1
600 CAPSULE ORAL
Qty: 180 CAPSULE | Refills: 1 | Status: SHIPPED | OUTPATIENT
Start: 2024-01-23 | End: 2024-07-21

## 2024-01-23 NOTE — PROGRESS NOTES
Papito Venron is a 73 y.o. male    had concerns including Hypertension, Diabetes (Type 2), and Sleep Apnea.    Vitals:    24 1411   BP: (!) 102/40   Site: Right Upper Arm   Position: Sitting   Weight: 105.7 kg (233 lb)   Height: 1.74 m (5' 8.5\")        Chest pain NO    NAME Papito Vernon         1950      MRN    [id number]      LAST OFFICE APPOINTMENT: 2023     DIAGNOSIS    ICD-10-CM    1. Hyperlipidemia, unspecified hyperlipidemia type  E78.5       2. Morbid (severe) obesity due to excess calories ([facility])  E66.01       3. Essential (primary) hypertension  I10       4. Type 2 diabetes mellitus with diabetic nephropathy, unspecified whether long term insulin use ([facility])  E11.21       5. Occlusion and stenosis of bilateral carotid arteries  I65.23           HOME MEDICATION  Current Outpatient Medications   Medication Sig    gabapentin (NEURONTIN) 300 MG capsule Take 2 capsules by mouth nightly for 180 days. Max Daily Amount: 600 mg    ezetimibe (ZETIA) 10 MG tablet TAKE 1 TABLET DAILY    montelukast (SINGULAIR) 10 MG tablet TAKE 1 TABLET DAILY    albuterol sulfate HFA (PROVENTIL;VENTOLIN;PROAIR) 108 (90 Base) MCG/ACT inhaler USE 2 INHALATIONS EVERY 4 HOURS AS NEEDED FOR WHEEZING    OZEMPIC, 1 MG/DOSE, 4 MG/3ML SOPN INJECT 1 MG UNDER THE SKIN EVERY 7 DAYS    rosuvastatin (CRESTOR) 10 MG tablet TAKE 1 TABLET NIGHTLY    metoprolol tartrate (LOPRESSOR) 25 MG tablet TAKE 1 TABLET TWICE A DAY    lisinopril (PRINIVIL;ZESTRIL) 10 MG tablet Take 1 tablet by mouth daily Decreased to once daily 23    bumetanide (BUMEX) 1 MG tablet Take 1 tablet by mouth Twice a Week    insulin aspart (NOVOLOG FLEXPEN) 100 UNIT/ML injection pen 5 units with meals as needed    FLUoxetine (PROZAC) 40 MG capsule TAKE 1 CAPSULE DAILY (INCREASED 2/10/23)    FARXIGA 5 MG tablet TAKE 1 TABLET DAILY    dutasteride (AVODART) 0.5 MG capsule TAKE 1 CAPSULE DAILY    TOUJEO SOLOSTAR 300 UNIT/ML concentrated injection pen INJECT 40 UNITS 
2/10/23)    FARXIGA 5 MG tablet TAKE 1 TABLET DAILY    dutasteride (AVODART) 0.5 MG capsule TAKE 1 CAPSULE DAILY    TOUJEO SOLOSTAR 300 UNIT/ML concentrated injection pen INJECT 40 UNITS EVERY MORNING AND 20 UNITS EVERY EVENING    ascorbic acid (VITAMIN C) 500 MG tablet Take by mouth daily    aspirin 81 MG EC tablet Take by mouth daily    azelastine (ASTELIN) 0.1 % nasal spray USE 1 SPRAY IN EACH NOSTRIL TWICE A DAY AS DIRECTED    Cholecalciferol 50 MCG (2000 UT) TABS Take by mouth    coenzyme Q10 100 MG CAPS capsule Take by mouth daily    desonide (DESOWEN) 0.05 % lotion as needed    diclofenac sodium (VOLTAREN) 1 % GEL as needed    ketoconazole (NIZORAL) 2 % cream Apply topically as needed    magnesium oxide (MAG-OX) 400 MG tablet Take by mouth daily    metFORMIN (GLUCOPHAGE) 1000 MG tablet TAKE 1 TABLET TWICE A DAY WITH MEALS    nystatin-triamcinolone (MYCOLOG II) 959576-5.1 UNIT/GM-% cream Apply topically 2 times daily    pyridoxine (B-6) 100 MG tablet Take by mouth daily     No current facility-administered medications for this visit.       I have reviewed the nurses notes, vitals, problem list, allergy list, medical history, family, social history and medications.       REVIEW OF SYMPTOMS    As per HPI  General: Pt denies excessive weight gain or loss. Pt is able to conduct ADL's  HEENT: Denies blurred vision, headaches, hearing loss, epistaxis and difficulty swallowing.  Respiratory: Denies cough, congestion, shortness of breath, MARINO, wheezing or stridor.  Cardiovascular: Denies precordial pain, palpitations, edema or PND  Gastrointestinal: Denies poor appetite, indigestion, abdominal pain or blood in stool  Genitourinary: Denies hematuria, dysuria, increased urinary frequency  Musculoskeletal: Denies joint pain or swelling from muscles or joints  Neurologic: Denies tremor, paresthesias, headache, or sensory motor disturbance  Psychiatric: Denies confusion, insomnia, depression  Integumentray: Denies rash,

## 2024-01-23 NOTE — PATIENT INSTRUCTIONS

## 2024-01-25 LAB — MICROALBUMIN/CREATININE RATIO, EXTERNAL: 30

## 2024-02-19 DIAGNOSIS — Z79.4 TYPE 2 DIABETES MELLITUS WITH MICROALBUMINURIA, WITH LONG-TERM CURRENT USE OF INSULIN (HCC): Primary | ICD-10-CM

## 2024-02-19 DIAGNOSIS — R80.9 TYPE 2 DIABETES MELLITUS WITH MICROALBUMINURIA, WITH LONG-TERM CURRENT USE OF INSULIN (HCC): Primary | ICD-10-CM

## 2024-02-19 DIAGNOSIS — E11.29 TYPE 2 DIABETES MELLITUS WITH MICROALBUMINURIA, WITH LONG-TERM CURRENT USE OF INSULIN (HCC): Primary | ICD-10-CM

## 2024-03-04 RX ORDER — ROSUVASTATIN CALCIUM 10 MG/1
TABLET, COATED ORAL
Qty: 90 TABLET | Refills: 3 | Status: SHIPPED | OUTPATIENT
Start: 2024-03-04

## 2024-03-04 RX ORDER — ROSUVASTATIN CALCIUM 10 MG/1
10 TABLET, COATED ORAL NIGHTLY
Qty: 90 TABLET | Refills: 3 | OUTPATIENT
Start: 2024-03-04

## 2024-03-18 ENCOUNTER — OFFICE VISIT (OUTPATIENT)
Age: 74
End: 2024-03-18
Payer: MEDICARE

## 2024-03-18 VITALS
DIASTOLIC BLOOD PRESSURE: 69 MMHG | TEMPERATURE: 97.9 F | OXYGEN SATURATION: 97 % | HEART RATE: 59 BPM | WEIGHT: 236 LBS | BODY MASS INDEX: 34.96 KG/M2 | RESPIRATION RATE: 16 BRPM | SYSTOLIC BLOOD PRESSURE: 121 MMHG | HEIGHT: 69 IN

## 2024-03-18 DIAGNOSIS — J45.20 MILD INTERMITTENT ASTHMA WITHOUT COMPLICATION: ICD-10-CM

## 2024-03-18 DIAGNOSIS — N18.30 STAGE 3 CHRONIC KIDNEY DISEASE, UNSPECIFIED WHETHER STAGE 3A OR 3B CKD (HCC): ICD-10-CM

## 2024-03-18 DIAGNOSIS — R80.9 TYPE 2 DIABETES MELLITUS WITH MICROALBUMINURIA, WITH LONG-TERM CURRENT USE OF INSULIN (HCC): ICD-10-CM

## 2024-03-18 DIAGNOSIS — Z79.4 TYPE 2 DIABETES MELLITUS WITH MICROALBUMINURIA, WITH LONG-TERM CURRENT USE OF INSULIN (HCC): ICD-10-CM

## 2024-03-18 DIAGNOSIS — I10 ESSENTIAL HYPERTENSION WITH GOAL BLOOD PRESSURE LESS THAN 130/80: Primary | ICD-10-CM

## 2024-03-18 DIAGNOSIS — E11.29 TYPE 2 DIABETES MELLITUS WITH MICROALBUMINURIA, WITH LONG-TERM CURRENT USE OF INSULIN (HCC): ICD-10-CM

## 2024-03-18 LAB — HBA1C MFR BLD: 6.8 %

## 2024-03-18 PROCEDURE — 1123F ACP DISCUSS/DSCN MKR DOCD: CPT | Performed by: INTERNAL MEDICINE

## 2024-03-18 PROCEDURE — 99214 OFFICE O/P EST MOD 30 MIN: CPT | Performed by: INTERNAL MEDICINE

## 2024-03-18 PROCEDURE — PBSHW AMB POC HEMOGLOBIN A1C: Performed by: INTERNAL MEDICINE

## 2024-03-18 PROCEDURE — G8427 DOCREV CUR MEDS BY ELIG CLIN: HCPCS | Performed by: INTERNAL MEDICINE

## 2024-03-18 PROCEDURE — 1036F TOBACCO NON-USER: CPT | Performed by: INTERNAL MEDICINE

## 2024-03-18 PROCEDURE — 2022F DILAT RTA XM EVC RTNOPTHY: CPT | Performed by: INTERNAL MEDICINE

## 2024-03-18 PROCEDURE — 83036 HEMOGLOBIN GLYCOSYLATED A1C: CPT | Performed by: INTERNAL MEDICINE

## 2024-03-18 PROCEDURE — 3074F SYST BP LT 130 MM HG: CPT | Performed by: INTERNAL MEDICINE

## 2024-03-18 PROCEDURE — G8484 FLU IMMUNIZE NO ADMIN: HCPCS | Performed by: INTERNAL MEDICINE

## 2024-03-18 PROCEDURE — G8417 CALC BMI ABV UP PARAM F/U: HCPCS | Performed by: INTERNAL MEDICINE

## 2024-03-18 PROCEDURE — 3046F HEMOGLOBIN A1C LEVEL >9.0%: CPT | Performed by: INTERNAL MEDICINE

## 2024-03-18 PROCEDURE — 3017F COLORECTAL CA SCREEN DOC REV: CPT | Performed by: INTERNAL MEDICINE

## 2024-03-18 PROCEDURE — 3078F DIAST BP <80 MM HG: CPT | Performed by: INTERNAL MEDICINE

## 2024-03-18 RX ORDER — TADALAFIL 5 MG/1
5 TABLET ORAL DAILY
COMMUNITY
Start: 2024-02-27

## 2024-03-18 NOTE — PROGRESS NOTES
Papito Vernon is a 73 y.o. male    Chief Complaint   Patient presents with    Follow-up     4 month follow up         /69   Pulse 59   Temp 97.9 °F (36.6 °C)   Resp 16   Ht 1.74 m (5' 8.5\")   Wt 107 kg (236 lb)   SpO2 97%   BMI 35.36 kg/m²         1. Have you been to the ER, urgent care clinic since your last visit?  Hospitalized since your last visit? No    2. Have you seen or consulted any other health care providers outside of the [facility] since your last visit?  Include any pap smears or colon screening. No    Learning Assessment:       No data to display                Fall Risk Assessment:      11/3/2023    10:46 AM 7/31/2023     3:37 PM 6/7/2023     2:27 PM 2/10/2023     9:00 AM 7/19/2022    11:10 AM 2/17/2022    10:39 AM 11/29/2021     8:00 AM   Amb Fall Risk Assessment and TUG Test   Do you feel unsteady or are you worried about falling?  no no no       2 or more falls in past year? no no no       Fall with injury in past year? no no no       Fall in past 12 months?    0 0 0 0   Able to walk?    Yes Yes Yes Yes       Abuse Screening:       No data to display                ADL Screening:       No data to display                
blood pressure less than 130/80, Hearing loss, Hypercholesteremia, Hypogonadism male, Mild intermittent asthma without complication, AMRITA on CPAP, Phimosis, Rotator cuff tear arthropathy of left shoulder, Severe obesity (BMI 35.0-39.9), Tarsal tunnel syndrome, Trigger finger, and Type 2 diabetes mellitus with stage 3 chronic kidney disease, with long-term current use of insulin, unspecified whether stage 3a or 3b CKD (HCC).     has a past surgical history that includes rhinoplasty; cyst removal (5/12/2016); Rotator cuff repair (Right, 2015); cabg, artery-vein, three (2005); Circumcision (2016); Retinal detachment surgery (1984); heent (2012); Colonoscopy (08/07/2020); Coronary artery bypass graft (June 2005); Rotator cuff repair (Left, 08/21/2023); and Colonoscopy (12/21/2023).     reports that he has never smoked. He has never used smokeless tobacco. He reports current alcohol use. He reports that he does not currently use drugs.    family history is not on file. He was adopted.    Physical Exam   Nursing note and vitals reviewed.  Blood pressure 121/69, pulse 59, temperature 97.9 °F (36.6 °C), resp. rate 16, height 1.74 m (5' 8.5\"), weight 107 kg (236 lb), SpO2 97 %.  Constitutional:  No distress.    Eyes: Conjunctivae are normal.   Ears:  Hearing grossly intact  Cardiovascular: Normal rate. regular rhythm, no murmurs or gallops  No edema  Pulmonary/Chest: Effort normal.   CTAB  Musculoskeletal: moves all 4 extremities   Neurological: Alert and oriented to person, place, and time.   Skin: No visible rash noted.   Psychiatric: Normal mood and affect. Behavior is normal.     Papito was seen today for follow-up.    Diagnoses and all orders for this visit:    Essential hypertension with goal blood pressure less than 130/80  Blood pressure is reasonable controlled on current medical therapy with lisinopril, metoprolol.    Stage 3 chronic kidney disease, unspecified whether stage 3a or 3b CKD (HCC)  Most recent renal

## 2024-04-12 DIAGNOSIS — J30.9 ALLERGIC RHINITIS, UNSPECIFIED SEASONALITY, UNSPECIFIED TRIGGER: Primary | ICD-10-CM

## 2024-04-15 DIAGNOSIS — J30.9 ALLERGIC RHINITIS, UNSPECIFIED SEASONALITY, UNSPECIFIED TRIGGER: ICD-10-CM

## 2024-04-15 RX ORDER — AZELASTINE HYDROCHLORIDE 137 UG/1
2 SPRAY, METERED NASAL 2 TIMES DAILY PRN
Qty: 90 ML | Refills: 3 | Status: SHIPPED | OUTPATIENT
Start: 2024-04-15

## 2024-04-15 RX ORDER — AZELASTINE HYDROCHLORIDE 137 UG/1
SPRAY, METERED NASAL
Qty: 90 ML | Refills: 2 | Status: SHIPPED | OUTPATIENT
Start: 2024-04-15 | End: 2024-04-15 | Stop reason: SDUPTHER

## 2024-05-07 ENCOUNTER — OFFICE VISIT (OUTPATIENT)
Age: 74
End: 2024-05-07

## 2024-05-07 VITALS
BODY MASS INDEX: 35.63 KG/M2 | TEMPERATURE: 98.3 F | SYSTOLIC BLOOD PRESSURE: 122 MMHG | DIASTOLIC BLOOD PRESSURE: 69 MMHG | OXYGEN SATURATION: 95 % | HEART RATE: 60 BPM | WEIGHT: 237.8 LBS

## 2024-05-07 DIAGNOSIS — J22 LOWER RESPIRATORY INFECTION: Primary | ICD-10-CM

## 2024-05-07 RX ORDER — AZITHROMYCIN 250 MG/1
TABLET, FILM COATED ORAL
Qty: 6 TABLET | Refills: 0 | Status: SHIPPED | OUTPATIENT
Start: 2024-05-07 | End: 2024-05-17

## 2024-05-07 NOTE — PROGRESS NOTES
Papito Vernon (:  1950) is a 73 y.o. male,Established patient, here for evaluation of the following chief complaint(s):      ASSESSMENT/PLAN:  Visit Diagnoses and Associated Orders       Lower respiratory infection    -  Primary    azithromycin (ZITHROMAX) 250 MG tablet [[id number]]               #LRI  ZITHROMAX    Additional remedies discussed for symptom management:    Nasal Congestion:  Flonase or Nasonex (over the counter) nasal spray, once a day  Saline irrigation kits help wash out sinuses 1-2 times a day  Normal saline nasal spray  Afrin nasal spray no longer than three days  Cough:  Throat lozenges, hot tea, and honey may help  Vicks VapoRub at night to help with cough and relieve muscles aches and pain  If not prescribed a cough medication, Robitussin DM is an option.  It is an over the counter cough medication containing dextromethorphan to help suppress cough at night   *Please only take when absolutely needed, as this is a controlled substance that can cause addiction   *Please only take cough syrup at nighttime as it causes drowsiness   *Do not drive or operate any machinery while taking this medication  If you have high blood pressure, take Coricidin HBP (or the generic form) instead.  Follow instructions on the box.  Congestion:  For thick mucus, take Mucinex (with Guafenesin only) to help thin the mucus.  Follow instructions on the box.  You will need to drink plenty of water with this medication.  Sore Throat:  Lozenges, as needed. Cepacol lozenges will help numb the throat  Chloraseptic spray also helps to numb throat pain  Salt water gargles to soothe throat pain  Sinus pain/pressure:  Warm, wet towel on face to help with facial sinus pain/pressure  Headache/Pain Fever/Body Aches:  If you can take NSAIDs, take Ibuprofen 400-800mg every 8 hours as needed  If you cannot take NSAIDs, take Tylenol 325-500mg every 6 hours as needed  Miscellanous:  Zyrtec/Xyzal/Allegra/Claritin during the day or

## 2024-06-07 DIAGNOSIS — R80.9 TYPE 2 DIABETES MELLITUS WITH MICROALBUMINURIA, WITH LONG-TERM CURRENT USE OF INSULIN (HCC): ICD-10-CM

## 2024-06-07 DIAGNOSIS — E11.29 TYPE 2 DIABETES MELLITUS WITH MICROALBUMINURIA, WITH LONG-TERM CURRENT USE OF INSULIN (HCC): ICD-10-CM

## 2024-06-07 DIAGNOSIS — Z79.4 TYPE 2 DIABETES MELLITUS WITH MICROALBUMINURIA, WITH LONG-TERM CURRENT USE OF INSULIN (HCC): ICD-10-CM

## 2024-06-07 RX ORDER — DAPAGLIFLOZIN 5 MG/1
TABLET, FILM COATED ORAL
Qty: 90 TABLET | Refills: 3 | Status: SHIPPED | OUTPATIENT
Start: 2024-06-07

## 2024-06-08 LAB
ALBUMIN SERPL-MCNC: 4.2 G/DL (ref 3.8–4.8)
ALP SERPL-CCNC: 61 IU/L (ref 44–121)
ALT SERPL-CCNC: 19 IU/L (ref 0–44)
AST SERPL-CCNC: 19 IU/L (ref 0–40)
BILIRUB DIRECT SERPL-MCNC: 0.17 MG/DL (ref 0–0.4)
BILIRUB SERPL-MCNC: 0.5 MG/DL (ref 0–1.2)
CHOLEST SERPL-MCNC: 117 MG/DL (ref 100–199)
HDLC SERPL-MCNC: 49 MG/DL
IMP & REVIEW OF LAB RESULTS: NORMAL
LDLC SERPL CALC-MCNC: 48 MG/DL (ref 0–99)
PROT SERPL-MCNC: 6.4 G/DL (ref 6–8.5)
TRIGL SERPL-MCNC: 108 MG/DL (ref 0–149)
VLDLC SERPL CALC-MCNC: 20 MG/DL (ref 5–40)

## 2024-06-14 ENCOUNTER — OFFICE VISIT (OUTPATIENT)
Age: 74
End: 2024-06-14
Payer: MEDICARE

## 2024-06-14 VITALS
DIASTOLIC BLOOD PRESSURE: 60 MMHG | BODY MASS INDEX: 35.92 KG/M2 | OXYGEN SATURATION: 98 % | SYSTOLIC BLOOD PRESSURE: 118 MMHG | WEIGHT: 237 LBS | HEIGHT: 68 IN | HEART RATE: 62 BPM

## 2024-06-14 DIAGNOSIS — I65.23 OCCLUSION AND STENOSIS OF BILATERAL CAROTID ARTERIES: ICD-10-CM

## 2024-06-14 DIAGNOSIS — I10 ESSENTIAL (PRIMARY) HYPERTENSION: ICD-10-CM

## 2024-06-14 DIAGNOSIS — E66.01 MORBID (SEVERE) OBESITY DUE TO EXCESS CALORIES (HCC): ICD-10-CM

## 2024-06-14 DIAGNOSIS — E78.5 HYPERLIPIDEMIA, UNSPECIFIED HYPERLIPIDEMIA TYPE: Primary | ICD-10-CM

## 2024-06-14 DIAGNOSIS — I25.10 CORONARY ARTERY DISEASE: Primary | ICD-10-CM

## 2024-06-14 DIAGNOSIS — G47.33 OBSTRUCTIVE SLEEP APNEA (ADULT) (PEDIATRIC): ICD-10-CM

## 2024-06-14 DIAGNOSIS — E11.21 TYPE 2 DIABETES MELLITUS WITH DIABETIC NEPHROPATHY, UNSPECIFIED WHETHER LONG TERM INSULIN USE (HCC): ICD-10-CM

## 2024-06-14 PROCEDURE — 2022F DILAT RTA XM EVC RTNOPTHY: CPT | Performed by: SPECIALIST

## 2024-06-14 PROCEDURE — 99214 OFFICE O/P EST MOD 30 MIN: CPT | Performed by: SPECIALIST

## 2024-06-14 PROCEDURE — 3074F SYST BP LT 130 MM HG: CPT | Performed by: SPECIALIST

## 2024-06-14 PROCEDURE — 3078F DIAST BP <80 MM HG: CPT | Performed by: SPECIALIST

## 2024-06-14 PROCEDURE — 3017F COLORECTAL CA SCREEN DOC REV: CPT | Performed by: SPECIALIST

## 2024-06-14 PROCEDURE — 1123F ACP DISCUSS/DSCN MKR DOCD: CPT | Performed by: SPECIALIST

## 2024-06-14 PROCEDURE — G8417 CALC BMI ABV UP PARAM F/U: HCPCS | Performed by: SPECIALIST

## 2024-06-14 PROCEDURE — 1036F TOBACCO NON-USER: CPT | Performed by: SPECIALIST

## 2024-06-14 PROCEDURE — G8427 DOCREV CUR MEDS BY ELIG CLIN: HCPCS | Performed by: SPECIALIST

## 2024-06-14 PROCEDURE — 3046F HEMOGLOBIN A1C LEVEL >9.0%: CPT | Performed by: SPECIALIST

## 2024-06-14 NOTE — H&P (VIEW-ONLY)
whether long term insulin use (HCC)  E11.21       4. Occlusion and stenosis of bilateral carotid arteries  I65.23       5. Morbid (severe) obesity due to excess calories ([facility])  E66.01       6. Obstructive sleep apnea (adult) (pediatric)  G47.33               ASSESSMENT/RECOMMENDATIONS:.       1. CAD, S/p CABG 2005.    -Plan on exercise Cardiolite in preparation for shoulder replacement      2. HTN   - BP is 118/60         3. Dyslipidemia   - On following his cholesterol and his last LDL was at goal at 48  -Continue his current medical regimen  -Repeat labs in 6 months        4. DM   - On insulin.    - A1c I believe is 6.8 now it was 7.2 in the past      5. AMRITA   -He states he is wearing his CPAP machine      6. Carotid disease   - Carotids were normal, 0-9% bilaterally.    -Will hold on carotid ultrasounds      7.Obesity BMI 35  -He is already lost 30 pounds    8.  ID interval 266 ms  -He is on metoprolol twice daily and should his ID interval be greater at the time of his stress test I would reduce his metoprolol to 12.5 mg    9.  Cardiac preoperative stratification for shoulder surgery  -Secondary to inability to gauge his activity level on whether or not he is achieving 4 METS I think we should proceed with a exercise Cardiolite      Return in November    No orders of the defined types were placed in this encounter.      We discussed the expected course, resolution and complications of the diagnosis(es) in detail.  Medication risks, benefits, costs, interactions, and alternatives were discussed as indicated.  I advised him to contact the office if his condition worsens, changes or fails to improve as anticipated. He expressed understanding with the diagnosis(es) and plan        No follow-up provider specified.      I have discussed the diagnosis with  Papito Vernon and the intended plan as seen in the above orders.  Questions were answered concerning future plans.  I have discussed medication side effects and

## 2024-06-14 NOTE — PROGRESS NOTES
SOLOSTAR 300 UNIT/ML concentrated injection pen INJECT 40 UNITS EVERY MORNING AND 20 UNITS EVERY EVENING (Patient taking differently: Inject into the skin as needed)    ascorbic acid (VITAMIN C) 500 MG tablet Take by mouth daily    aspirin 81 MG EC tablet Take by mouth daily    Cholecalciferol 50 MCG (2000 UT) TABS Take by mouth    coenzyme Q10 100 MG CAPS capsule Take by mouth daily    desonide (DESOWEN) 0.05 % lotion as needed    diclofenac sodium (VOLTAREN) 1 % GEL as needed    ketoconazole (NIZORAL) 2 % cream Apply topically as needed    magnesium oxide (MAG-OX) 400 MG tablet Take by mouth daily    nystatin-triamcinolone (MYCOLOG II) 179049-4.1 UNIT/GM-% cream Apply topically 2 times daily    pyridoxine (B-6) 100 MG tablet Take by mouth daily (Patient not taking: Reported on 6/14/2024)     No current facility-administered medications for this visit.       VITAL SIGNS  Wt Readings from Last 3 Encounters:   06/14/24 107.5 kg (237 lb)   05/07/24 107.9 kg (237 lb 12.8 oz)   03/18/24 107 kg (236 lb)     BP Readings from Last 3 Encounters:   06/14/24 118/60   05/07/24 122/69   03/18/24 121/69     Pulse Readings from Last 3 Encounters:   06/14/24 62   05/07/24 60   03/18/24 59         SPECIALTY COMMENTS  1. Stress Test    NM- 3/17/15- no ischemia, EF 65%    NM- 5/29/19-· Abnormal stress myocardial perfusion with very small area of mild perfusion defect of the mid anterior wall and mid infeiror wall with SDS 2 which is reversible. This suggest very small area of focal ischemia. Abnormal septal motion consistent  with prior cardiac surgery. Normal wall motion with normal LV function with LVEF 67%.    · Excellent functional capacity.    · Compared to prior study of 3/17/2015, there is minimal change. The small anterior mid wall stress perfusion defect may be new       2. LE Venous Doppler    6/30/18- no DVT ndaer       3. Lipids    1/3/20- TC 91, HDL 35, LDL 28.4,     7/14/20- TC 75, HDL 32, LDL 17,     1/28/21- 
very small area of mild perfusion defect of the mid anterior wall and mid infeiror wall with SDS 2 which is reversible. This suggest very small area of focal ischemia. Abnormal septal motion consistent  with prior cardiac surgery. Normal wall motion with normal LV function with LVEF 67%.    · Excellent functional capacity.    · Compared to prior study of 3/17/2015, there is minimal change. The small anterior mid wall stress perfusion defect may be new       2. LE Venous Doppler    6/30/18- no DVT nader       3. Lipids    1/3/20- TC 91, HDL 35, LDL 28.4,     7/14/20- TC 75, HDL 32, LDL 17,     1/28/21- TC 95, HDL 37, LDL 36,     8/4/21-, HDL 39, LDL 45, TG 82   2/11/2022: , HDL 45, LDL 56, and    11/25/22- TC 96, HDL 34, LDL 42,   7/11/23- , HDL 49, LDL 51, TG 98  1/19/24- , HDL 49, LDL 46, TG 87  6/7/24-, HDL 49, LDL 48,              4. Carotid Doppler    5/31/19- 0-9% R/L       5. Echo    5/29/19- EF 61-65%      6. LUCAS   2/12/21-No evidence of significant arterial occlusive disease within the right and left lower extremities.   Normal resting LUCAS's of 1.26 on the right and 1.38 on the left.   Normal leg perfusion after exercise bilaterally.              LABORATORY DATA       Lab Results   Component Value Date/Time    WBC 7.7 11/06/2023 09:55 AM    HGB 13.1 11/06/2023 09:55 AM    HCT 40.8 11/06/2023 09:55 AM     11/06/2023 09:55 AM    MCV 89.9 11/06/2023 09:55 AM      Lab Results   Component Value Date/Time     11/06/2023 09:55 AM    K 4.6 11/06/2023 09:55 AM     11/06/2023 09:55 AM    CO2 29 11/06/2023 09:55 AM    BUN 17 11/06/2023 09:55 AM    GFRAA >60 03/30/2021 09:34 AM    GLOB 2.9 11/06/2023 09:55 AM    ALT 19 06/07/2024 09:14 AM          ICD-10-CM    1. Hyperlipidemia, unspecified hyperlipidemia type  E78.5       2. Essential (primary) hypertension  I10       3. Type 2 diabetes mellitus with diabetic nephropathy, unspecified

## 2024-06-20 ENCOUNTER — ANCILLARY PROCEDURE (OUTPATIENT)
Age: 74
End: 2024-06-20
Payer: MEDICARE

## 2024-06-20 VITALS — WEIGHT: 237 LBS | BODY MASS INDEX: 35.92 KG/M2 | HEIGHT: 68 IN

## 2024-06-20 DIAGNOSIS — I25.10 CORONARY ARTERY DISEASE: ICD-10-CM

## 2024-06-20 PROCEDURE — PBSHW PBB SHADOW CHARGE: Performed by: SPECIALIST

## 2024-06-20 PROCEDURE — A9500 TC99M SESTAMIBI: HCPCS | Performed by: SPECIALIST

## 2024-06-20 PROCEDURE — 78452 HT MUSCLE IMAGE SPECT MULT: CPT | Performed by: SPECIALIST

## 2024-06-20 RX ORDER — REGADENOSON 0.08 MG/ML
0.4 INJECTION, SOLUTION INTRAVENOUS
Status: COMPLETED | OUTPATIENT
Start: 2024-06-20 | End: 2024-06-20

## 2024-06-20 RX ORDER — TETRAKIS(2-METHOXYISOBUTYLISOCYANIDE)COPPER(I) TETRAFLUOROBORATE 1 MG/ML
25 INJECTION, POWDER, LYOPHILIZED, FOR SOLUTION INTRAVENOUS
Status: COMPLETED | OUTPATIENT
Start: 2024-06-20 | End: 2024-06-20

## 2024-06-20 RX ADMIN — TECHNETIUM TC-99M SESTAMIBI 25 MILLICURIE: 1 INJECTION INTRAVENOUS at 08:45

## 2024-06-20 RX ADMIN — REGADENOSON 0.4 MG: 0.08 INJECTION, SOLUTION INTRAVENOUS at 08:48

## 2024-06-21 LAB
ECHO BSA: 2.27 M2
NUC STRESS EJECTION FRACTION: 51 %
STRESS BASELINE DIAS BP: 62 MMHG
STRESS BASELINE HR: 62 BPM
STRESS BASELINE SYS BP: 118 MMHG
STRESS ESTIMATED WORKLOAD: 0 METS
STRESS EXERCISE DUR MIN: 0 MIN
STRESS EXERCISE DUR SEC: 0 SEC
STRESS O2 SAT PEAK: 98 %
STRESS O2 SAT REST: 97 %
STRESS PEAK DIAS BP: 58 MMHG
STRESS PEAK SYS BP: 128 MMHG
STRESS PERCENT HR ACHIEVED: 61 %
STRESS POST PEAK HR: 90 BPM
STRESS RATE PRESSURE PRODUCT: NORMAL BPM*MMHG
STRESS TARGET HR: 148 BPM
TID: 0.88

## 2024-06-21 PROCEDURE — 93018 CV STRESS TEST I&R ONLY: CPT | Performed by: SPECIALIST

## 2024-06-21 PROCEDURE — 78452 HT MUSCLE IMAGE SPECT MULT: CPT | Performed by: SPECIALIST

## 2024-06-21 PROCEDURE — PBSHW PBB SHADOW CHARGE: Performed by: SPECIALIST

## 2024-06-21 PROCEDURE — A9500 TC99M SESTAMIBI: HCPCS | Performed by: SPECIALIST

## 2024-06-21 PROCEDURE — 93016 CV STRESS TEST SUPVJ ONLY: CPT | Performed by: SPECIALIST

## 2024-06-21 RX ORDER — TETRAKIS(2-METHOXYISOBUTYLISOCYANIDE)COPPER(I) TETRAFLUOROBORATE 1 MG/ML
25.8 INJECTION, POWDER, LYOPHILIZED, FOR SOLUTION INTRAVENOUS
Status: COMPLETED | OUTPATIENT
Start: 2024-06-21 | End: 2024-06-21

## 2024-06-21 RX ADMIN — TECHNETIUM TC-99M SESTAMIBI 25.8 MILLICURIE: 1 INJECTION INTRAVENOUS at 08:30

## 2024-06-24 ENCOUNTER — TELEPHONE (OUTPATIENT)
Age: 74
End: 2024-06-24

## 2024-06-24 DIAGNOSIS — I25.10 CAD (CORONARY ARTERY DISEASE): Primary | ICD-10-CM

## 2024-06-24 LAB — IMP & REVIEW OF LAB RESULTS: NORMAL

## 2024-06-24 RX ORDER — EZETIMIBE 10 MG/1
10 TABLET ORAL DAILY
Qty: 90 TABLET | Refills: 3 | Status: SHIPPED | OUTPATIENT
Start: 2024-06-24

## 2024-06-24 NOTE — TELEPHONE ENCOUNTER
Sanam this aparna needs a heart catheterization a left heart catheterization as soon as possible since he needs to go out of town in 2 weeks.  If he can get it done this week that would be great.  Please let me know the day and let him know

## 2024-06-24 NOTE — TELEPHONE ENCOUNTER
Refill per VO of Dr. Reich  Last appt: 6/14/2024    Future Appointments   Date Time Provider Department Center   9/16/2024  8:00 AM Jerson Forrest MD [facility] BS AMB   11/20/2024  9:00 AM Inga Reich MD [facility] BS AMB       Requested Prescriptions     Signed Prescriptions Disp Refills    ezetimibe (ZETIA) 10 MG tablet 90 tablet 3     Sig: TAKE 1 TABLET DAILY     Authorizing Provider: SIDNEY DELA CRUZ     Ordering User: COREY REYES

## 2024-06-25 ENCOUNTER — TELEPHONE (OUTPATIENT)
Age: 74
End: 2024-06-25

## 2024-06-25 DIAGNOSIS — I25.10 CORONARY ARTERY DISEASE, UNSPECIFIED VESSEL OR LESION TYPE, UNSPECIFIED WHETHER ANGINA PRESENT, UNSPECIFIED WHETHER NATIVE OR TRANSPLANTED HEART: Primary | ICD-10-CM

## 2024-06-25 NOTE — TELEPHONE ENCOUNTER
Spoke with Pt of [facility] schd. For 7/2/24 At 8am arrive at 6:30am Pt aware that they need a  they must stay on site NPO from Midnight the night before. Check in at the second floor Outpt. Reg. Desk. Pt is to have Labs done between 6/25/24-6/27/24 at [facility].   Medications:  Hold Ozempic 6 days prior to procedure  Hold Cialis 48 hours prior to procedure  Hold Metformin day before and day of procedure  Hold Bumex day of procedure  Take 1/2 unit of the Evening before of Toujeo and hold morning of   VO by /Dr. Cueto/nurse Sanam PEACE

## 2024-06-25 NOTE — TELEPHONE ENCOUNTER
Pt is schedule for a Cath and is asking about what happens if his sugar go high do the night please call him at  [phone]

## 2024-06-26 ENCOUNTER — DIRECT ADMIT ORDERS (OUTPATIENT)
Facility: HOSPITAL | Age: 74
End: 2024-06-26

## 2024-06-26 DIAGNOSIS — I25.10 CAD (CORONARY ARTERY DISEASE): Primary | ICD-10-CM

## 2024-06-26 RX ORDER — SODIUM CHLORIDE 9 MG/ML
INJECTION, SOLUTION INTRAVENOUS PRN
OUTPATIENT
Start: 2024-07-02

## 2024-06-26 RX ORDER — SCOLOPAMINE TRANSDERMAL SYSTEM 1 MG/1
1 PATCH, EXTENDED RELEASE TRANSDERMAL
Qty: 4 PATCH | Refills: 0 | Status: SHIPPED | OUTPATIENT
Start: 2024-06-26

## 2024-06-26 RX ORDER — SODIUM CHLORIDE 0.9 % (FLUSH) 0.9 %
5-40 SYRINGE (ML) INJECTION EVERY 12 HOURS SCHEDULED
OUTPATIENT
Start: 2024-07-02

## 2024-06-26 RX ORDER — SODIUM CHLORIDE 0.9 % (FLUSH) 0.9 %
5-40 SYRINGE (ML) INJECTION PRN
OUTPATIENT
Start: 2024-07-02

## 2024-06-26 RX ORDER — SODIUM CHLORIDE 9 MG/ML
INJECTION, SOLUTION INTRAVENOUS CONTINUOUS
OUTPATIENT
Start: 2024-07-02

## 2024-06-27 LAB
BASOPHILS # BLD AUTO: 0 X10E3/UL (ref 0–0.2)
BASOPHILS NFR BLD AUTO: 1 %
BUN SERPL-MCNC: 20 MG/DL (ref 8–27)
BUN/CREAT SERPL: 17 (ref 10–24)
CHLORIDE SERPL-SCNC: 102 MMOL/L (ref 96–106)
CO2 SERPL-SCNC: 24 MMOL/L (ref 20–29)
CREAT SERPL-MCNC: 1.19 MG/DL (ref 0.76–1.27)
EGFRCR SERPLBLD CKD-EPI 2021: 65 ML/MIN/1.73
EOSINOPHIL # BLD AUTO: 0.2 X10E3/UL (ref 0–0.4)
EOSINOPHIL NFR BLD AUTO: 3 %
ERYTHROCYTE [DISTWIDTH] IN BLOOD BY AUTOMATED COUNT: 13.5 % (ref 11.6–15.4)
GLUCOSE SERPL-MCNC: 101 MG/DL (ref 70–99)
HCT VFR BLD AUTO: 42.4 % (ref 37.5–51)
HGB BLD-MCNC: 13.6 G/DL (ref 13–17.7)
IMM GRANULOCYTES # BLD AUTO: 0 X10E3/UL (ref 0–0.1)
IMM GRANULOCYTES NFR BLD AUTO: 0 %
LYMPHOCYTES # BLD AUTO: 1.5 X10E3/UL (ref 0.7–3.1)
LYMPHOCYTES NFR BLD AUTO: 24 %
MCH RBC QN AUTO: 28.2 PG (ref 26.6–33)
MCHC RBC AUTO-ENTMCNC: 32.1 G/DL (ref 31.5–35.7)
MCV RBC AUTO: 88 FL (ref 79–97)
MONOCYTES # BLD AUTO: 0.5 X10E3/UL (ref 0.1–0.9)
MONOCYTES NFR BLD AUTO: 8 %
NEUTROPHILS # BLD AUTO: 3.9 X10E3/UL (ref 1.4–7)
NEUTROPHILS NFR BLD AUTO: 64 %
PLATELET # BLD AUTO: 163 X10E3/UL (ref 150–450)
POTASSIUM SERPL-SCNC: 4.5 MMOL/L (ref 3.5–5.2)
RBC # BLD AUTO: 4.82 X10E6/UL (ref 4.14–5.8)
SODIUM SERPL-SCNC: 141 MMOL/L (ref 134–144)
WBC # BLD AUTO: 6.1 X10E3/UL (ref 3.4–10.8)

## 2024-06-27 NOTE — TELEPHONE ENCOUNTER
Pt takes PRN insulin and he takes his last BS before he goes to bed. Informed him to use 1/2 his insulin if needed at that time. OK with BS in 200 when he wakes up - tell nurse in cath lab what BS was in am when he gets there at 6:30 am.

## 2024-06-27 NOTE — RESULT ENCOUNTER NOTE
Your potassium and kidney function  are normal as ae your blood counts and this is great news. I hope all is well.    All the best,    Jared

## 2024-06-28 NOTE — TELEPHONE ENCOUNTER
Spoke To Pt:  Just a reminder not to forget your LHC scheduled for Tuesday.  Arriving at 6:30am  You will need a  must stay on site  NPO from midnight   check in at the 2nd floor outpt. reg. Desk.  Medications:  Hold Ozempic 6 days prior to procedure  Hold Cialis 48 hours prior to procedure  Hold Metformin day before and day of procedure  Hold Bumex day of procedure  VO by /Dr. Taylor/nurse Sanam VARGAS

## 2024-07-02 ENCOUNTER — HOSPITAL ENCOUNTER (OUTPATIENT)
Facility: HOSPITAL | Age: 74
Setting detail: OUTPATIENT SURGERY
Discharge: HOME | End: 2024-07-02
Attending: INTERNAL MEDICINE
Payer: MEDICARE

## 2024-07-02 VITALS
RESPIRATION RATE: 18 BRPM | OXYGEN SATURATION: 94 % | BODY MASS INDEX: 36.07 KG/M2 | TEMPERATURE: 97.4 F | SYSTOLIC BLOOD PRESSURE: 110 MMHG | WEIGHT: 238 LBS | DIASTOLIC BLOOD PRESSURE: 59 MMHG | HEIGHT: 68 IN | HEART RATE: 67 BPM

## 2024-07-02 DIAGNOSIS — I25.10 CAD (CORONARY ARTERY DISEASE): ICD-10-CM

## 2024-07-02 PROBLEM — R94.39 ABNORMAL STRESS TEST: Status: ACTIVE | Noted: 2024-07-02

## 2024-07-02 LAB
GLUCOSE BLD STRIP.AUTO-MCNC: 142 MG/DL (ref 65–117)
SERVICE CMNT-IMP: ABNORMAL

## 2024-07-02 PROCEDURE — C1769 GUIDE WIRE: HCPCS | Performed by: INTERNAL MEDICINE

## 2024-07-02 PROCEDURE — 6360000004 HC RX CONTRAST MEDICATION: Performed by: INTERNAL MEDICINE

## 2024-07-02 PROCEDURE — 99153 MOD SED SAME PHYS/QHP EA: CPT | Performed by: INTERNAL MEDICINE

## 2024-07-02 PROCEDURE — 99152 MOD SED SAME PHYS/QHP 5/>YRS: CPT | Performed by: INTERNAL MEDICINE

## 2024-07-02 PROCEDURE — C1894 INTRO/SHEATH, NON-LASER: HCPCS | Performed by: INTERNAL MEDICINE

## 2024-07-02 PROCEDURE — 82962 GLUCOSE BLOOD TEST: CPT

## 2024-07-02 PROCEDURE — 76937 US GUIDE VASCULAR ACCESS: CPT | Performed by: INTERNAL MEDICINE

## 2024-07-02 PROCEDURE — C1760 CLOSURE DEV, VASC: HCPCS | Performed by: INTERNAL MEDICINE

## 2024-07-02 PROCEDURE — 2709999900 HC NON-CHARGEABLE SUPPLY: Performed by: INTERNAL MEDICINE

## 2024-07-02 PROCEDURE — 2500000003 HC RX 250 WO HCPCS: Performed by: INTERNAL MEDICINE

## 2024-07-02 PROCEDURE — 6360000002 HC RX W HCPCS: Performed by: INTERNAL MEDICINE

## 2024-07-02 PROCEDURE — 93459 L HRT ART/GRFT ANGIO: CPT | Performed by: INTERNAL MEDICINE

## 2024-07-02 RX ORDER — MIDAZOLAM HYDROCHLORIDE 1 MG/ML
INJECTION INTRAMUSCULAR; INTRAVENOUS PRN
Status: DISCONTINUED | OUTPATIENT
Start: 2024-07-02 | End: 2024-07-02 | Stop reason: HOSPADM

## 2024-07-02 RX ORDER — HEPARIN SODIUM 200 [USP'U]/100ML
INJECTION, SOLUTION INTRAVENOUS PRN
Status: DISCONTINUED | OUTPATIENT
Start: 2024-07-02 | End: 2024-07-02 | Stop reason: HOSPADM

## 2024-07-02 RX ORDER — LIDOCAINE HYDROCHLORIDE 10 MG/ML
INJECTION, SOLUTION INFILTRATION; PERINEURAL PRN
Status: DISCONTINUED | OUTPATIENT
Start: 2024-07-02 | End: 2024-07-02 | Stop reason: HOSPADM

## 2024-07-02 RX ORDER — SODIUM CHLORIDE 0.9 % (FLUSH) 0.9 %
5-40 SYRINGE (ML) INJECTION EVERY 12 HOURS SCHEDULED
Status: DISCONTINUED | OUTPATIENT
Start: 2024-07-02 | End: 2024-07-02 | Stop reason: HOSPADM

## 2024-07-02 RX ORDER — SODIUM CHLORIDE 9 MG/ML
INJECTION, SOLUTION INTRAVENOUS PRN
Status: DISCONTINUED | OUTPATIENT
Start: 2024-07-02 | End: 2024-07-02 | Stop reason: HOSPADM

## 2024-07-02 RX ORDER — VITAMIN B COMPLEX
1 CAPSULE ORAL DAILY
COMMUNITY

## 2024-07-02 RX ORDER — FENTANYL CITRATE 50 UG/ML
INJECTION, SOLUTION INTRAMUSCULAR; INTRAVENOUS PRN
Status: DISCONTINUED | OUTPATIENT
Start: 2024-07-02 | End: 2024-07-02 | Stop reason: HOSPADM

## 2024-07-02 RX ORDER — SODIUM CHLORIDE 0.9 % (FLUSH) 0.9 %
5-40 SYRINGE (ML) INJECTION PRN
Status: DISCONTINUED | OUTPATIENT
Start: 2024-07-02 | End: 2024-07-02 | Stop reason: HOSPADM

## 2024-07-02 RX ORDER — SODIUM CHLORIDE 9 MG/ML
INJECTION, SOLUTION INTRAVENOUS CONTINUOUS
Status: DISCONTINUED | OUTPATIENT
Start: 2024-07-02 | End: 2024-07-02 | Stop reason: HOSPADM

## 2024-07-02 RX ORDER — ACETAMINOPHEN 325 MG/1
650 TABLET ORAL EVERY 4 HOURS PRN
Status: DISCONTINUED | OUTPATIENT
Start: 2024-07-02 | End: 2024-07-02 | Stop reason: HOSPADM

## 2024-07-02 NOTE — INTERVAL H&P NOTE
Update History & Physical    The patient's History and Physical of  6/14/24,  was reviewed with the patient and I examined the patient. There was no change.      Plan: The risks, benefits, expected outcome, and alternative to the recommended procedure have been discussed with the patient. Patient understands and wants to proceed with the procedure.     Electronically signed by Tio Loya MD on 7/2/2024 at 8:17 AM

## 2024-07-02 NOTE — PROCEDURES
BRIEF PROCEDURE NOTE    Date of Procedure: 7/2/2024   Preoperative Diagnosis: Hx of CABG - abnormal stress test  Postoperative Diagnosis: Patent SVG to Diag/SVG to PDA/LIMA to LAD; Severe LAD/RCA dz;Mod dz - LCflex  Procedure: Left heart cath, LV angiography, coronary angiography  Interventional Cardiologist: Tio Loya MD  Assistant : none  Anesthesia: local + IV sedation  I administered moderate sedation throughout this procedure. An independent trained observer pushed medications at my direction, and monitored the patient’s level of consciousness and physiological status throughout.  Estimated Blood Loss: Minimal    Access:    R CFA - Ultrasound/Fluoroscopic guided micropuncture access - 6 F sheath    Catheters: RCA :JR4                    LCA : JL4    Findings:     L Main: Large; MLI    LAD: Med; Prox 40% ; Mid 100%    LCflex: Med; Mid 50%; OM1 - very small;OM2- Med; MLI    RCA: Dominant; Prox/mid- diffuse subtotally occluded    SVG to prob Diag - patent    SVG to PDA - patent    LVEDP: Nml    LVEF:Not assessed    No significant gradient across aortic valve.    PCI:none          Specimens Removed : None    Devices implanted : None    Complications: None    Closure Device: R CFA - manual    Findings:  Patent SVG to Diag/SVG to PDA/LIMA to LAD; Severe LAD/RCA dz;Mod dz - Lcflex    Cont med management    See full cath note.    Complications: none    Tio Loya MD

## 2024-07-02 NOTE — PROGRESS NOTES
7:00 am  Patient arrived. ID and allergies verified verbally with patient. Pt voices understanding of procedure to be performed. Consent obtained. Pt prepped for procedure. Pt denies contrast allergy. Patient denies taking any blood thinners.      Metformin- Sunday  Blood sugar-142  Cialis more than a week      8:07 AM  TRANSFER - OUT REPORT:    Verbal report given to Yasemin on [facility]  being transferred to cath lab for ordered procedure       Report consisted of patient's Situation, Background, Assessment and   Recommendations(SBAR).     Information from the following report(s) Nurse Handoff Report was reviewed with the receiving nurse.           Lines:   Peripheral IV Left Hand (Active)       Peripheral IV 07/02/24 Left Forearm (Active)        Opportunity for questions and clarification was provided.      Patient transported with:  Registered Nurse      9:00 am  TRANSFER - IN REPORT:    Verbal report received from Ana on [facility]  being received from cath lab for routine post-op      Report consisted of patient's Situation, Background, Assessment and   Recommendations(SBAR).     Information from the following report(s) Nurse Handoff Report was reviewed with the receiving nurse.    Opportunity for questions and clarification was provided.      Assessment completed upon patient's arrival to unit and care assumed.     11:00 am  Discharge instructions and prescriptions reviewed with  Patient and friend. Opportunity provided for questions. Patient and friend verbalized understanding. Signed copy of discharge placed in the front of patient's chart.         12:30 pm  Patient ambulated to bathroom tolerated well.  IV and tele removed.     12:45 pm  Patient escorted via wheelchair to entrance.  Friend driving. Patient discharged into care of Friend.

## 2024-07-02 NOTE — DISCHARGE INSTRUCTIONS
Cardiac Catheterization/Angiography Discharge Instructions    *Check the puncture site frequently for swelling or bleeding. If you see any bleeding, lie down and apply pressure over the area and call 911. Notify your doctor for any redness, swelling, drainage or oozing from the puncture site. Notify your doctor for any fever or chills.    *If the leg or arm with the puncture becomes cold, numb or painful, call Dr *** at  [phone]    *Activity should be limited for the next 48 hours. Climb stairs as little as possible and avoid any stooping, bending or strenuous activity for 48 hours. No heavy lifting (anything over 10 pounds) for five days.    *Do not drive for 24 hours.    *You may resume your usual diet. Drink more fluids than usual.    *Have a responsible person drive you home and stay with you for at least 24 hours after your heart catheterization/angiography.    *You may remove the bandage from your groin in 24 hours. You may shower in 24 hours. No tub baths, hot tubs or swimming for one week. Do not place any lotions, creams, powders, ointments over the puncture site for one week. You may place a clean band-aid over the puncture site each day for 5 days. Change this daily.

## 2024-07-04 LAB — ECHO BSA: 2.28 M2

## 2024-07-11 DIAGNOSIS — F34.1 DYSTHYMIC DISORDER: Primary | ICD-10-CM

## 2024-07-11 DIAGNOSIS — F41.9 ANXIETY: ICD-10-CM

## 2024-07-11 RX ORDER — FLUOXETINE HYDROCHLORIDE 40 MG/1
CAPSULE ORAL
Qty: 90 CAPSULE | Refills: 3 | Status: SHIPPED | OUTPATIENT
Start: 2024-07-11

## 2024-07-23 DIAGNOSIS — R80.9 TYPE 2 DIABETES MELLITUS WITH MICROALBUMINURIA, WITH LONG-TERM CURRENT USE OF INSULIN (HCC): ICD-10-CM

## 2024-07-23 DIAGNOSIS — Z79.4 TYPE 2 DIABETES MELLITUS WITH MICROALBUMINURIA, WITH LONG-TERM CURRENT USE OF INSULIN (HCC): ICD-10-CM

## 2024-07-23 DIAGNOSIS — E11.29 TYPE 2 DIABETES MELLITUS WITH MICROALBUMINURIA, WITH LONG-TERM CURRENT USE OF INSULIN (HCC): ICD-10-CM

## 2024-07-24 RX ORDER — MOMETASONE FUROATE AND FORMOTEROL FUMARATE DIHYDRATE 200; 5 UG/1; UG/1
AEROSOL RESPIRATORY (INHALATION)
Qty: 13 G | Refills: 11 | Status: SHIPPED | OUTPATIENT
Start: 2024-07-24

## 2024-07-24 RX ORDER — GABAPENTIN 300 MG/1
300 CAPSULE ORAL 2 TIMES DAILY
Qty: 180 CAPSULE | Refills: 1 | Status: SHIPPED | OUTPATIENT
Start: 2024-07-24 | End: 2025-01-20

## 2024-09-06 DIAGNOSIS — Z79.4 TYPE 2 DIABETES MELLITUS WITH MICROALBUMINURIA, WITH LONG-TERM CURRENT USE OF INSULIN (HCC): Primary | ICD-10-CM

## 2024-09-06 DIAGNOSIS — R80.9 TYPE 2 DIABETES MELLITUS WITH MICROALBUMINURIA, WITH LONG-TERM CURRENT USE OF INSULIN (HCC): Primary | ICD-10-CM

## 2024-09-06 DIAGNOSIS — E11.29 TYPE 2 DIABETES MELLITUS WITH MICROALBUMINURIA, WITH LONG-TERM CURRENT USE OF INSULIN (HCC): Primary | ICD-10-CM

## 2024-09-06 RX ORDER — INSULIN GLARGINE 300 U/ML
INJECTION, SOLUTION SUBCUTANEOUS
Qty: 18 ML | Refills: 3 | Status: SHIPPED | OUTPATIENT
Start: 2024-09-06

## 2024-09-10 NOTE — PROGRESS NOTES
6w0d    Component      Latest Ref Rng 2024  2:26 PM   PROGESTERONE      ng/mL 28.06    HCG Quantitative      <=4 mUnits/mL 49,038 (H)       Pt completed initial OB appt via phone call with Jose Alberto 24 with Melanie Mcknight.  Per chart review, Patient's last menstrual period was 2024 (exact date).    Nurse Navigator appt scheduled with [facility] 24.  Pt currently still has future appts with Jose Alberto in October.    Message routed to Dr. Sveta Quigley to schedule US and FOB appt/labs? Any certain day or time for pt to come in?    6w0d     Assessment and Plan     1. Dyspepsia  Comments: Will start treatment with Omeprazole. Simethicone to be taken only as needed. Meds mode of use discussed. Return instructions given. Orders:  -     omeprazole (PRILOSEC) 40 mg capsule; Take 1 Capsule by mouth daily for 30 days. , Normal, Disp-30 Capsule, R-2  -     simethicone (MYLICON) 80 mg chewable tablet; Take 1 Tablet by mouth every six (6) hours as needed for Flatulence for up to 15 days. , Normal, Disp-60 Tablet, R-0  2. Essential hypertension with goal blood pressure less than 130/80  Comments:  Chronic, stable reading. Pt to continue with same treatment. BP Readings from Last 3 Encounters:   03/17/23 121/69   02/10/23 131/68   11/30/22 136/78      Benefits, risks, possible drug interactions, and side effects of all new medications were reviewed with the patient. Pt verbalized understanding. Return to clinic:    Follow-up and Dispositions    Return if symptoms worsen or fail to improve. An electronic signature was used to authenticate this note. Frances Birmingham, [address] [facility]  [facility] [place]  [address] [id number]  3/17/2023      Future Appointments   Date Time Provider Daisy Bryan   5/30/2023  3:00 PM [facility]   6/9/2023  8:00 AM Toan Villa MD [facility] BS AMB        History of Present Illness   Chief Complaint     Altagracia Ventura is a 71 y.o. male here for had concerns including Abdominal Pain (Abdominal pain; frequent bowel movements; started around two weeks ago). Abdominal discomfort: Pt presented today with epigastric discomfort, \"feeling bloated\", and mild acid reflux x 2-4 weeks. Pt also notes 2-3 bowel movement daily, with form, brown stools. Pt bowel habits is one BM per day. Pt takes Gas-x OTC PO tablet with symptom improvement. Pt admits symptoms have improved since onset. Denies any aggravating factors.  Denies rectal bleeding, nausea, vomiting. Last colonoscopy two year ago, found to have polyps otherwise normal, repeat in 3 years. Denies any sick contact or closed contact with same complaints. Hypertension: taking medications as instructed, no medication side effects noted, no TIA's, no chest pain on exertion, no dyspnea on exertion, no swelling of ankles. Review of Systems  Review of Systems   Constitutional: Negative. Negative for chills, fever, malaise/fatigue and weight loss. Respiratory: Negative. Negative for cough and shortness of breath. Cardiovascular: Negative. Negative for chest pain and leg swelling. Gastrointestinal:  Positive for heartburn. Negative for abdominal pain, blood in stool, constipation, diarrhea, melena, nausea and vomiting. Genitourinary: Negative. Negative for dysuria, frequency and urgency. Musculoskeletal: Negative. Neurological: Negative. Negative for dizziness and headaches. Past Medical History     Allergies   Allergen Reactions    Cefdinir Rash    Codeine Nausea Only        Current Outpatient Medications   Medication Sig    omeprazole (PRILOSEC) 40 mg capsule Take 1 Capsule by mouth daily for 30 days. simethicone (MYLICON) 80 mg chewable tablet Take 1 Tablet by mouth every six (6) hours as needed for Flatulence for up to 15 days. lisinopriL (PRINIVIL, ZESTRIL) 10 mg tablet TAKE 1 TABLET TWICE A DAY    Toujeo SoloStar U-300 Insulin 300 unit/mL (1.5 mL) inpn pen INJECT 40 UNITS EVERY MORNING AND 20 UNITS EVERY EVENING    dutasteride (AVODART) 0.5 mg capsule TAKE 1 CAPSULE DAILY    metFORMIN (GLUCOPHAGE) 1,000 mg tablet TAKE 1 TABLET TWICE A DAY WITH MEALS    FLUoxetine (PROzac) 40 mg capsule Take 1 Capsule by mouth daily. Increased 2/10/23    azelastine (ASTELIN) 137 mcg (0.1 %) nasal spray USE 1 SPRAY IN EACH NOSTRIL TWICE A DAY AS DIRECTED    ezetimibe (ZETIA) 10 mg tablet Take 1 Tablet by mouth daily.     rosuvastatin (CRESTOR) 10 mg tablet TAKE 1 TABLET NIGHTLY    gabapentin (NEURONTIN) 300 mg capsule TAKE 1 CAPSULE THREE TIMES A DAY    mometasone-formoterol (Dulera) 200-5 mcg/actuation HFA inhaler Take 2 Puffs by inhalation two (2) times a day. Indications: controller medication for asthma    Ozempic 1 mg/dose (4 mg/3 mL) pnij 1 mg by SubCUTAneous route every seven (7) days. metoprolol tartrate (LOPRESSOR) 25 mg tablet TAKE 1 TABLET TWICE A DAY    Farxiga 5 mg tab tablet TAKE 1 TABLET DAILY    albuterol (PROVENTIL HFA, VENTOLIN HFA, PROAIR HFA) 90 mcg/actuation inhaler Take 2 Puffs by inhalation every four (4) hours as needed for Wheezing. nystatin-triamcinolone (MYCOLOG II) topical cream Apply  to affected area two (2) times a day. bumetanide (BUMEX) 1 mg tablet TAKE 1 TABLET EVERY MONDAY, WEDNESDAY AND FRIDAY    NovoLOG Flexpen U-100 Insulin 100 unit/mL (3 mL) inpn INJECT 10 TO 30 UNITS THREE TIMES A DAY WITH MEALS PER SLIDING SCALE    mupirocin (BACTROBAN) 2 % ointment mupirocin 2 % topical ointment    Insulin Needles, Disposable, 31 gauge x 5/16\" ndle 5x daily with insulin. IDDMII. E11.21    aspirin delayed-release 81 mg tablet Take 1 Tab by mouth daily. co-enzyme Q-10 (CO Q-10) 100 mg capsule Take 200 mg by mouth daily. pyridoxine, vitamin B6, (VITAMIN B-6) 100 mg tablet Take 200 mg by mouth daily. ascorbic acid, vitamin C, (VITAMIN C) 500 mg tablet Take 500 mg by mouth daily. cholecalciferol, vitamin D3, 50 mcg (2,000 unit) tab Take  by mouth. With vitamin K2.    magnesium oxide (MAG-OX) 400 mg tablet Take 400 mg by mouth daily. FreeStyle Belkis 14 Day Sensor kit 1 Units by Does Not Apply route every fourteen (14) days. DMII  E11.29; E11.21    OMEGA-3 FATTY ACIDS/FISH OIL (FISH OIL EXTRA STRENGTH PO) Take 1,400 mg by mouth daily (after breakfast). ketoconazole (NIZORAL) 2 % topical cream Apply  to affected area as needed for Skin Irritation. VOLTAREN 1 % gel 2 g as needed. desonide (TRIDESILON) 0.05 % topical lotion as needed. VITAMIN B COMPLEX PO Take 1 Tablet by mouth daily. No current facility-administered medications for this visit. Patient Active Problem List   Diagnosis Code    CAD (coronary artery disease) I25.10    Hypogonadism male E29.1    AR (allergic rhinitis) J30.9    ED (erectile dysfunction) N52.9    Essential hypertension with goal blood pressure less than 130/80 I10    Advance care planning Z71.89    Mild intermittent asthma without complication O50.46    Pure hypercholesterolemia E78.00    Type 2 diabetes mellitus with microalbuminuria, with long-term current use of insulin (HCC) E11.29, R80.9, Z79.4    Dysthymia F34.1    Leg length inequality M21.70    Primary localized osteoarthrosis of ankle and foot M19.079    Tarsal tunnel syndrome G57.50    Blood type A+ Z67.10    Trigger finger M65.30    Arthritis M19.90    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    BPH (benign prostatic hyperplasia) N40.0    Anxiety F41.9    Hearing loss H91.90    Hypercholesteremia E78.00    LEONA on CPAP G47.33, Z99.89     Past Surgical History:   Procedure Laterality Date    HX CIRCUMCISION  2016    HX CYST REMOVAL  5/12/2016    neck and chest    HX HEENT  2012    right eye prosthesis    HX ORTHOPAEDIC      right shoulder surgery, rotator cuff repair    HX RETINAL DETACHMENT REPAIR  1984    R Blindness    HX RHINOPLASTY      septoplasty    OR CORONARY ARTERY BYP W/VEIN & ARTERY GRAFT 3 VEIN  2005    [facility]      Social History     Tobacco Use    Smoking status: Never     Passive exposure: Yes    Smokeless tobacco: Never   Substance Use Topics    Alcohol use:  Yes     Alcohol/week: 6.0 standard drinks     Types: 6 Cans of beer per week     Comment: 1 beer or so per day      Family History   Adopted: Yes   Family history unknown: Yes        Physical Exam   Vitals:       Visit Vitals  /69 (BP 1 Location: Left upper arm, BP Patient Position: Sitting, BP Cuff Size: Adult)   Pulse 64   Temp 97.9 °F (36.6 °C) (Oral)   Resp 16   Ht 5' 9\" (1.753 m)   Wt 237 lb (107.5 kg)   SpO2 97%   BMI 35.00 kg/m²        Physical Exam  Constitutional:       Appearance: Normal appearance. He is obese. HENT:      Head: Normocephalic. Cardiovascular:      Rate and Rhythm: Normal rate and regular rhythm. Pulses: Normal pulses. Heart sounds: Normal heart sounds. Pulmonary:      Effort: Pulmonary effort is normal.      Breath sounds: Normal breath sounds. Abdominal:      General: Bowel sounds are normal. There is no distension. Palpations: Abdomen is soft. There is no mass. Tenderness: There is no abdominal tenderness. There is no right CVA tenderness, left CVA tenderness, guarding or rebound. Hernia: No hernia is present. Musculoskeletal:         General: Normal range of motion. Right lower leg: No edema. Left lower leg: No edema. Skin:     General: Skin is warm and dry. Neurological:      Mental Status: He is alert.    Psychiatric:         Mood and Affect: Mood normal.         Behavior: Behavior normal.

## 2024-09-13 SDOH — ECONOMIC STABILITY: FOOD INSECURITY: WITHIN THE PAST 12 MONTHS, THE FOOD YOU BOUGHT JUST DIDN'T LAST AND YOU DIDN'T HAVE MONEY TO GET MORE.: NEVER TRUE

## 2024-09-13 SDOH — ECONOMIC STABILITY: INCOME INSECURITY: HOW HARD IS IT FOR YOU TO PAY FOR THE VERY BASICS LIKE FOOD, HOUSING, MEDICAL CARE, AND HEATING?: NOT VERY HARD

## 2024-09-13 SDOH — ECONOMIC STABILITY: FOOD INSECURITY: WITHIN THE PAST 12 MONTHS, YOU WORRIED THAT YOUR FOOD WOULD RUN OUT BEFORE YOU GOT MONEY TO BUY MORE.: NEVER TRUE

## 2024-09-16 ENCOUNTER — OFFICE VISIT (OUTPATIENT)
Age: 74
End: 2024-09-16
Payer: MEDICARE

## 2024-09-16 VITALS
HEART RATE: 57 BPM | BODY MASS INDEX: 35.58 KG/M2 | TEMPERATURE: 97.6 F | DIASTOLIC BLOOD PRESSURE: 69 MMHG | OXYGEN SATURATION: 94 % | HEIGHT: 68 IN | SYSTOLIC BLOOD PRESSURE: 111 MMHG | RESPIRATION RATE: 19 BRPM | WEIGHT: 234.8 LBS

## 2024-09-16 DIAGNOSIS — E78.00 PURE HYPERCHOLESTEROLEMIA: ICD-10-CM

## 2024-09-16 DIAGNOSIS — F41.9 ANXIETY: ICD-10-CM

## 2024-09-16 DIAGNOSIS — R80.9 TYPE 2 DIABETES MELLITUS WITH MICROALBUMINURIA, WITH LONG-TERM CURRENT USE OF INSULIN (HCC): Primary | ICD-10-CM

## 2024-09-16 DIAGNOSIS — M12.812 ROTATOR CUFF TEAR ARTHROPATHY OF LEFT SHOULDER: ICD-10-CM

## 2024-09-16 DIAGNOSIS — Z79.4 TYPE 2 DIABETES MELLITUS WITH MICROALBUMINURIA, WITH LONG-TERM CURRENT USE OF INSULIN (HCC): Primary | ICD-10-CM

## 2024-09-16 DIAGNOSIS — N40.1 BENIGN PROSTATIC HYPERPLASIA WITH INCOMPLETE BLADDER EMPTYING: ICD-10-CM

## 2024-09-16 DIAGNOSIS — E11.29 TYPE 2 DIABETES MELLITUS WITH MICROALBUMINURIA, WITH LONG-TERM CURRENT USE OF INSULIN (HCC): Primary | ICD-10-CM

## 2024-09-16 DIAGNOSIS — R39.14 BENIGN PROSTATIC HYPERPLASIA WITH INCOMPLETE BLADDER EMPTYING: ICD-10-CM

## 2024-09-16 DIAGNOSIS — M75.102 ROTATOR CUFF TEAR ARTHROPATHY OF LEFT SHOULDER: ICD-10-CM

## 2024-09-16 DIAGNOSIS — I10 ESSENTIAL HYPERTENSION WITH GOAL BLOOD PRESSURE LESS THAN 130/80: ICD-10-CM

## 2024-09-16 LAB — HBA1C MFR BLD: 6.8 %

## 2024-09-16 PROCEDURE — 2022F DILAT RTA XM EVC RTNOPTHY: CPT | Performed by: INTERNAL MEDICINE

## 2024-09-16 PROCEDURE — G8417 CALC BMI ABV UP PARAM F/U: HCPCS | Performed by: INTERNAL MEDICINE

## 2024-09-16 PROCEDURE — 1036F TOBACCO NON-USER: CPT | Performed by: INTERNAL MEDICINE

## 2024-09-16 PROCEDURE — 99214 OFFICE O/P EST MOD 30 MIN: CPT | Performed by: INTERNAL MEDICINE

## 2024-09-16 PROCEDURE — 83036 HEMOGLOBIN GLYCOSYLATED A1C: CPT | Performed by: INTERNAL MEDICINE

## 2024-09-16 PROCEDURE — 3017F COLORECTAL CA SCREEN DOC REV: CPT | Performed by: INTERNAL MEDICINE

## 2024-09-16 PROCEDURE — G8427 DOCREV CUR MEDS BY ELIG CLIN: HCPCS | Performed by: INTERNAL MEDICINE

## 2024-09-16 PROCEDURE — 3078F DIAST BP <80 MM HG: CPT | Performed by: INTERNAL MEDICINE

## 2024-09-16 PROCEDURE — 3074F SYST BP LT 130 MM HG: CPT | Performed by: INTERNAL MEDICINE

## 2024-09-16 PROCEDURE — 1123F ACP DISCUSS/DSCN MKR DOCD: CPT | Performed by: INTERNAL MEDICINE

## 2024-09-16 PROCEDURE — PBSHW AMB POC HEMOGLOBIN A1C: Performed by: INTERNAL MEDICINE

## 2024-09-16 PROCEDURE — 3046F HEMOGLOBIN A1C LEVEL >9.0%: CPT | Performed by: INTERNAL MEDICINE

## 2024-09-16 RX ORDER — LISINOPRIL 5 MG/1
5 TABLET ORAL DAILY
Qty: 90 TABLET | Refills: 1 | Status: SHIPPED | OUTPATIENT
Start: 2024-09-16

## 2024-09-16 RX ORDER — LISINOPRIL 5 MG/1
5 TABLET ORAL DAILY
Qty: 90 TABLET | Refills: 1 | Status: SHIPPED
Start: 2024-09-16 | End: 2024-09-16

## 2024-09-16 RX ORDER — TADALAFIL 5 MG/1
5 TABLET ORAL DAILY
Qty: 90 TABLET | Refills: 3 | Status: SHIPPED | OUTPATIENT
Start: 2024-09-16

## 2024-09-16 ASSESSMENT — PATIENT HEALTH QUESTIONNAIRE - PHQ9
SUM OF ALL RESPONSES TO PHQ9 QUESTIONS 1 & 2: 0
4. FEELING TIRED OR HAVING LITTLE ENERGY: NOT AT ALL
6. FEELING BAD ABOUT YOURSELF - OR THAT YOU ARE A FAILURE OR HAVE LET YOURSELF OR YOUR FAMILY DOWN: NOT AT ALL
5. POOR APPETITE OR OVEREATING: NOT AT ALL
2. FEELING DOWN, DEPRESSED OR HOPELESS: NOT AT ALL
SUM OF ALL RESPONSES TO PHQ QUESTIONS 1-9: 0
10. IF YOU CHECKED OFF ANY PROBLEMS, HOW DIFFICULT HAVE THESE PROBLEMS MADE IT FOR YOU TO DO YOUR WORK, TAKE CARE OF THINGS AT HOME, OR GET ALONG WITH OTHER PEOPLE: NOT DIFFICULT AT ALL
3. TROUBLE FALLING OR STAYING ASLEEP: NOT AT ALL
1. LITTLE INTEREST OR PLEASURE IN DOING THINGS: NOT AT ALL
SUM OF ALL RESPONSES TO PHQ QUESTIONS 1-9: 0
8. MOVING OR SPEAKING SO SLOWLY THAT OTHER PEOPLE COULD HAVE NOTICED. OR THE OPPOSITE, BEING SO FIGETY OR RESTLESS THAT YOU HAVE BEEN MOVING AROUND A LOT MORE THAN USUAL: NOT AT ALL
SUM OF ALL RESPONSES TO PHQ QUESTIONS 1-9: 0
9. THOUGHTS THAT YOU WOULD BE BETTER OFF DEAD, OR OF HURTING YOURSELF: NOT AT ALL
SUM OF ALL RESPONSES TO PHQ QUESTIONS 1-9: 0
7. TROUBLE CONCENTRATING ON THINGS, SUCH AS READING THE NEWSPAPER OR WATCHING TELEVISION: NOT AT ALL

## 2024-10-02 DIAGNOSIS — N18.31 CHRONIC KIDNEY DISEASE, STAGE 3A (HCC): ICD-10-CM

## 2024-10-02 RX ORDER — BUMETANIDE 1 MG/1
TABLET ORAL
Qty: 39 TABLET | Refills: 3 | Status: SHIPPED | OUTPATIENT
Start: 2024-10-02

## 2024-11-05 RX ORDER — INSULIN ASPART 100 [IU]/ML
INJECTION, SOLUTION INTRAVENOUS; SUBCUTANEOUS
Qty: 5 ADJUSTABLE DOSE PRE-FILLED PEN SYRINGE | Refills: 5 | Status: SHIPPED | OUTPATIENT
Start: 2024-11-05

## 2024-11-06 ENCOUNTER — TELEPHONE (OUTPATIENT)
Age: 74
End: 2024-11-06

## 2024-11-06 ENCOUNTER — PATIENT MESSAGE (OUTPATIENT)
Age: 74
End: 2024-11-06

## 2024-11-06 NOTE — TELEPHONE ENCOUNTER
Spoke with Kathy charles [employer] and she advised that patients Novolog Flex Pen is not covered but the Humalog quick pen is covered. She advised she will fax over a key for cover my meds to get authorization. Grateful for the call.

## 2024-11-06 NOTE — TELEPHONE ENCOUNTER
Norma called from [employer] and she is requesting a call back to discuss Novolog. She stated that the medication is not covered by the patient insurance.     Norma: [phone]  Reference number [id number]

## 2024-11-08 NOTE — TELEPHONE ENCOUNTER
Spoke with Elsa at [employer] to obtain authorization for patient Novolog Flex pen. Received cassia# [id number] Auth for 10/9/24-11/8/25. Grateful for the call.

## 2024-12-03 ENCOUNTER — OFFICE VISIT (OUTPATIENT)
Facility: CLINIC | Age: 74
End: 2024-12-03
Payer: MEDICARE

## 2024-12-03 VITALS
WEIGHT: 234.2 LBS | HEIGHT: 68 IN | BODY MASS INDEX: 35.49 KG/M2 | DIASTOLIC BLOOD PRESSURE: 72 MMHG | HEART RATE: 61 BPM | RESPIRATION RATE: 18 BRPM | TEMPERATURE: 98 F | OXYGEN SATURATION: 95 % | SYSTOLIC BLOOD PRESSURE: 134 MMHG

## 2024-12-03 DIAGNOSIS — E11.29 TYPE 2 DIABETES MELLITUS WITH MICROALBUMINURIA, WITH LONG-TERM CURRENT USE OF INSULIN (HCC): Primary | ICD-10-CM

## 2024-12-03 DIAGNOSIS — Z91.81 AT HIGH RISK FOR FALLS: ICD-10-CM

## 2024-12-03 DIAGNOSIS — R80.9 TYPE 2 DIABETES MELLITUS WITH MICROALBUMINURIA, WITH LONG-TERM CURRENT USE OF INSULIN (HCC): Primary | ICD-10-CM
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
## 2024-12-03 DIAGNOSIS — N18.30 STAGE 3 CHRONIC KIDNEY DISEASE, UNSPECIFIED WHETHER STAGE 3A OR 3B CKD (HCC): ICD-10-CM

## 2024-12-03 DIAGNOSIS — Z79.4 TYPE 2 DIABETES MELLITUS WITH MICROALBUMINURIA, WITH LONG-TERM CURRENT USE OF INSULIN (HCC): Primary | ICD-10-CM

## 2024-12-03 LAB — HBA1C MFR BLD: 6.7 %

## 2024-12-03 PROCEDURE — 83036 HEMOGLOBIN GLYCOSYLATED A1C: CPT | Performed by: INTERNAL MEDICINE

## 2024-12-03 PROCEDURE — 1126F AMNT PAIN NOTED NONE PRSNT: CPT | Performed by: INTERNAL MEDICINE

## 2024-12-03 PROCEDURE — PBSHW AMB POC HEMOGLOBIN A1C: Performed by: INTERNAL MEDICINE

## 2024-12-03 PROCEDURE — G8417 CALC BMI ABV UP PARAM F/U: HCPCS | Performed by: INTERNAL MEDICINE

## 2024-12-03 PROCEDURE — 3046F HEMOGLOBIN A1C LEVEL >9.0%: CPT | Performed by: INTERNAL MEDICINE

## 2024-12-03 PROCEDURE — 1123F ACP DISCUSS/DSCN MKR DOCD: CPT | Performed by: INTERNAL MEDICINE

## 2024-12-03 PROCEDURE — 3078F DIAST BP <80 MM HG: CPT | Performed by: INTERNAL MEDICINE

## 2024-12-03 PROCEDURE — G8484 FLU IMMUNIZE NO ADMIN: HCPCS | Performed by: INTERNAL MEDICINE

## 2024-12-03 PROCEDURE — 1036F TOBACCO NON-USER: CPT | Performed by: INTERNAL MEDICINE

## 2024-12-03 PROCEDURE — 99214 OFFICE O/P EST MOD 30 MIN: CPT | Performed by: INTERNAL MEDICINE

## 2024-12-03 PROCEDURE — 3017F COLORECTAL CA SCREEN DOC REV: CPT | Performed by: INTERNAL MEDICINE

## 2024-12-03 PROCEDURE — G8428 CUR MEDS NOT DOCUMENT: HCPCS | Performed by: INTERNAL MEDICINE

## 2024-12-03 PROCEDURE — 2022F DILAT RTA XM EVC RTNOPTHY: CPT | Performed by: INTERNAL MEDICINE

## 2024-12-03 PROCEDURE — 3075F SYST BP GE 130 - 139MM HG: CPT | Performed by: INTERNAL MEDICINE

## 2024-12-03 NOTE — PROGRESS NOTES
\"Have you been to the ER, urgent care clinic since your last visit?  Hospitalized since your last visit?\"    NO    “Have you seen or consulted any other health care providers outside our system since your last visit?”    YES - When: approximately 2  weeks ago.  Where and Why: [facility].             
20 UNITS EVERY EVENING    metoprolol tartrate (LOPRESSOR) 25 MG tablet TAKE 1 TABLET TWICE A DAY    gabapentin (NEURONTIN) 300 MG capsule Take 1 capsule by mouth in the morning and at bedtime for 180 days. Max Daily Amount: 600 mg    DULERA 200-5 MCG/ACT inhaler USE 2 INHALATIONS TWICE A DAY. (CONTROLLER MEDICATION FOR ASTHMA)    FLUoxetine (PROZAC) 40 MG capsule TAKE 1 CAPSULE DAILY (INCREASED 2/10/23)    b complex vitamins capsule Take 1 capsule by mouth daily    ezetimibe (ZETIA) 10 MG tablet TAKE 1 TABLET DAILY    FARXIGA 5 MG tablet TAKE 1 TABLET DAILY    Azelastine HCl 137 MCG/SPRAY SOLN 2 Inhalations by Nasal route 2 times daily as needed (allergies)    rosuvastatin (CRESTOR) 10 MG tablet TAKE 1 TABLET NIGHTLY    metFORMIN (GLUCOPHAGE) 1000 MG tablet TAKE 1 TABLET TWICE A DAY WITH MEALS    montelukast (SINGULAIR) 10 MG tablet TAKE 1 TABLET DAILY    albuterol sulfate HFA (PROVENTIL;VENTOLIN;PROAIR) 108 (90 Base) MCG/ACT inhaler USE 2 INHALATIONS EVERY 4 HOURS AS NEEDED FOR WHEEZING    OZEMPIC, 1 MG/DOSE, 4 MG/3ML SOPN INJECT 1 MG UNDER THE SKIN EVERY 7 DAYS    dutasteride (AVODART) 0.5 MG capsule TAKE 1 CAPSULE DAILY    ascorbic acid (VITAMIN C) 500 MG tablet Take by mouth daily    aspirin 81 MG EC tablet Take by mouth daily    coenzyme Q10 100 MG CAPS capsule Take by mouth daily    desonide (DESOWEN) 0.05 % lotion as needed    diclofenac sodium (VOLTAREN) 1 % GEL as needed    ketoconazole (NIZORAL) 2 % cream Apply topically as needed    magnesium oxide (MAG-OX) 400 MG tablet Take by mouth daily    nystatin-triamcinolone (MYCOLOG II) 342560-7.1 UNIT/GM-% cream Apply topically 2 times daily     No current facility-administered medications for this visit.     On the basis of positive falls risk screening, assessment and plan is as follows: home safety tips provided.

## 2024-12-04 LAB — ALBUMIN/CREATININE RATIO, EXTERNAL: 84

## 2024-12-09 RX ORDER — MONTELUKAST SODIUM 10 MG/1
10 TABLET ORAL DAILY
Qty: 90 TABLET | Refills: 3 | Status: SHIPPED | OUTPATIENT
Start: 2024-12-09

## 2024-12-21 DIAGNOSIS — R80.9 TYPE 2 DIABETES MELLITUS WITH MICROALBUMINURIA, WITH LONG-TERM CURRENT USE OF INSULIN (HCC): Primary | ICD-10-CM

## 2024-12-21 DIAGNOSIS — E11.29 TYPE 2 DIABETES MELLITUS WITH MICROALBUMINURIA, WITH LONG-TERM CURRENT USE OF INSULIN (HCC): Primary | ICD-10-CM

## 2024-12-21 DIAGNOSIS — Z79.4 TYPE 2 DIABETES MELLITUS WITH MICROALBUMINURIA, WITH LONG-TERM CURRENT USE OF INSULIN (HCC): Primary | ICD-10-CM

## 2024-12-23 ENCOUNTER — PATIENT MESSAGE (OUTPATIENT)
Facility: CLINIC | Age: 74
End: 2024-12-23

## 2024-12-23 RX ORDER — SEMAGLUTIDE 1.34 MG/ML
INJECTION, SOLUTION SUBCUTANEOUS
Qty: 9 ML | Refills: 3 | Status: SHIPPED | OUTPATIENT
Start: 2024-12-23

## 2025-02-12 DIAGNOSIS — Z79.4 TYPE 2 DIABETES MELLITUS WITH MICROALBUMINURIA, WITH LONG-TERM CURRENT USE OF INSULIN (HCC): ICD-10-CM

## 2025-02-12 DIAGNOSIS — E11.29 TYPE 2 DIABETES MELLITUS WITH MICROALBUMINURIA, WITH LONG-TERM CURRENT USE OF INSULIN (HCC): ICD-10-CM

## 2025-02-12 DIAGNOSIS — R80.9 TYPE 2 DIABETES MELLITUS WITH MICROALBUMINURIA, WITH LONG-TERM CURRENT USE OF INSULIN (HCC): ICD-10-CM

## 2025-02-12 NOTE — TELEPHONE ENCOUNTER
PCP: Jerson Forrest MD    Last Appointment: 12/3/2024    Future Appointments   Date Time Provider Department Center   4/7/2025  8:20 AM Jerson Forrest MD [facility] ECC DEP       Requested Prescriptions     Pending Prescriptions Disp Refills    metFORMIN (GLUCOPHAGE) 1000 MG tablet [Pharmacy Med Name: METFORMIN HCL TABS 1000MG] 180 tablet 3     Sig: TAKE 1 TABLET TWICE A DAY WITH MEALS       LAST ORDERED: 2/19/24      Kaitlin \"Thiago\" CAIN Matute

## 2025-02-21 RX ORDER — ROSUVASTATIN CALCIUM 10 MG/1
10 TABLET, COATED ORAL NIGHTLY
Qty: 90 TABLET | Refills: 0 | Status: SHIPPED | OUTPATIENT
Start: 2025-02-21

## 2025-02-26 ENCOUNTER — OFFICE VISIT (OUTPATIENT)
Facility: CLINIC | Age: 75
End: 2025-02-26
Payer: MEDICARE

## 2025-02-26 VITALS
SYSTOLIC BLOOD PRESSURE: 104 MMHG | OXYGEN SATURATION: 95 % | HEART RATE: 69 BPM | WEIGHT: 223.8 LBS | TEMPERATURE: 97.9 F | BODY MASS INDEX: 33.92 KG/M2 | DIASTOLIC BLOOD PRESSURE: 65 MMHG | HEIGHT: 68 IN

## 2025-02-26 DIAGNOSIS — D64.9 ANEMIA, UNSPECIFIED TYPE: ICD-10-CM

## 2025-02-26 DIAGNOSIS — D64.9 ANEMIA, UNSPECIFIED TYPE: Primary | ICD-10-CM

## 2025-02-26 DIAGNOSIS — R53.83 OTHER FATIGUE: ICD-10-CM

## 2025-02-26 DIAGNOSIS — N18.30 STAGE 3 CHRONIC KIDNEY DISEASE, UNSPECIFIED WHETHER STAGE 3A OR 3B CKD (HCC): ICD-10-CM

## 2025-02-26 DIAGNOSIS — R80.9 TYPE 2 DIABETES MELLITUS WITH MICROALBUMINURIA, WITH LONG-TERM CURRENT USE OF INSULIN (HCC): ICD-10-CM

## 2025-02-26 DIAGNOSIS — L81.2 FRECKLE: ICD-10-CM

## 2025-02-26 DIAGNOSIS — Z79.4 TYPE 2 DIABETES MELLITUS WITH MICROALBUMINURIA, WITH LONG-TERM CURRENT USE OF INSULIN (HCC): ICD-10-CM

## 2025-02-26 DIAGNOSIS — Z12.11 ENCOUNTER FOR SCREENING FECAL OCCULT BLOOD TESTING: ICD-10-CM

## 2025-02-26 DIAGNOSIS — E11.29 TYPE 2 DIABETES MELLITUS WITH MICROALBUMINURIA, WITH LONG-TERM CURRENT USE OF INSULIN (HCC): ICD-10-CM

## 2025-02-26 DIAGNOSIS — R19.5 LOOSE STOOLS: ICD-10-CM

## 2025-02-26 DIAGNOSIS — R15.2 FECAL URGENCY: ICD-10-CM

## 2025-02-26 PROCEDURE — 1036F TOBACCO NON-USER: CPT | Performed by: INTERNAL MEDICINE

## 2025-02-26 PROCEDURE — 3046F HEMOGLOBIN A1C LEVEL >9.0%: CPT | Performed by: INTERNAL MEDICINE

## 2025-02-26 PROCEDURE — 99214 OFFICE O/P EST MOD 30 MIN: CPT | Performed by: INTERNAL MEDICINE

## 2025-02-26 PROCEDURE — 1123F ACP DISCUSS/DSCN MKR DOCD: CPT | Performed by: INTERNAL MEDICINE

## 2025-02-26 PROCEDURE — 1126F AMNT PAIN NOTED NONE PRSNT: CPT | Performed by: INTERNAL MEDICINE

## 2025-02-26 PROCEDURE — 3078F DIAST BP <80 MM HG: CPT | Performed by: INTERNAL MEDICINE

## 2025-02-26 PROCEDURE — G8417 CALC BMI ABV UP PARAM F/U: HCPCS | Performed by: INTERNAL MEDICINE

## 2025-02-26 PROCEDURE — G8427 DOCREV CUR MEDS BY ELIG CLIN: HCPCS | Performed by: INTERNAL MEDICINE

## 2025-02-26 PROCEDURE — 1160F RVW MEDS BY RX/DR IN RCRD: CPT | Performed by: INTERNAL MEDICINE

## 2025-02-26 PROCEDURE — 2022F DILAT RTA XM EVC RTNOPTHY: CPT | Performed by: INTERNAL MEDICINE

## 2025-02-26 PROCEDURE — 3017F COLORECTAL CA SCREEN DOC REV: CPT | Performed by: INTERNAL MEDICINE

## 2025-02-26 PROCEDURE — 3074F SYST BP LT 130 MM HG: CPT | Performed by: INTERNAL MEDICINE

## 2025-02-26 PROCEDURE — 1159F MED LIST DOCD IN RCRD: CPT | Performed by: INTERNAL MEDICINE

## 2025-02-26 SDOH — ECONOMIC STABILITY: FOOD INSECURITY: WITHIN THE PAST 12 MONTHS, THE FOOD YOU BOUGHT JUST DIDN'T LAST AND YOU DIDN'T HAVE MONEY TO GET MORE.: NEVER TRUE

## 2025-02-26 SDOH — ECONOMIC STABILITY: FOOD INSECURITY: WITHIN THE PAST 12 MONTHS, YOU WORRIED THAT YOUR FOOD WOULD RUN OUT BEFORE YOU GOT MONEY TO BUY MORE.: NEVER TRUE

## 2025-02-26 ASSESSMENT — PATIENT HEALTH QUESTIONNAIRE - PHQ9
10. IF YOU CHECKED OFF ANY PROBLEMS, HOW DIFFICULT HAVE THESE PROBLEMS MADE IT FOR YOU TO DO YOUR WORK, TAKE CARE OF THINGS AT HOME, OR GET ALONG WITH OTHER PEOPLE: NOT DIFFICULT AT ALL
9. THOUGHTS THAT YOU WOULD BE BETTER OFF DEAD, OR OF HURTING YOURSELF: NOT AT ALL
7. TROUBLE CONCENTRATING ON THINGS, SUCH AS READING THE NEWSPAPER OR WATCHING TELEVISION: NOT AT ALL
2. FEELING DOWN, DEPRESSED OR HOPELESS: NOT AT ALL
SUM OF ALL RESPONSES TO PHQ QUESTIONS 1-9: 2
6. FEELING BAD ABOUT YOURSELF - OR THAT YOU ARE A FAILURE OR HAVE LET YOURSELF OR YOUR FAMILY DOWN: NOT AT ALL
4. FEELING TIRED OR HAVING LITTLE ENERGY: SEVERAL DAYS
SUM OF ALL RESPONSES TO PHQ9 QUESTIONS 1 & 2: 0
5. POOR APPETITE OR OVEREATING: NOT AT ALL
1. LITTLE INTEREST OR PLEASURE IN DOING THINGS: NOT AT ALL
3. TROUBLE FALLING OR STAYING ASLEEP: SEVERAL DAYS
SUM OF ALL RESPONSES TO PHQ QUESTIONS 1-9: 2
SUM OF ALL RESPONSES TO PHQ QUESTIONS 1-9: 2
8. MOVING OR SPEAKING SO SLOWLY THAT OTHER PEOPLE COULD HAVE NOTICED. OR THE OPPOSITE, BEING SO FIGETY OR RESTLESS THAT YOU HAVE BEEN MOVING AROUND A LOT MORE THAN USUAL: NOT AT ALL
SUM OF ALL RESPONSES TO PHQ QUESTIONS 1-9: 2

## 2025-02-26 NOTE — PROGRESS NOTES
HISTORY OF PRESENT ILLNESS    Chief Complaint   Patient presents with    Rash     Patient stated its a bunch of spots on stomach area, patient was unsure of amount of time on body.    Diarrhea     2-3 times a week       Presents for follow-up    History of Present Illness  The patient is a 74-year-old male who presents for evaluation of diarrhea, rash, anemia, and fatigue.    He reports experiencing diarrhea 2 to 3 times per week, often without prior warning. The volume of stool varies, and he typically has one bowel movement per day, occasionally followed by another. He does not experience any associated pain or cramping. He reports no presence of blood in his stool. He has not been on any recent antibiotics and maintains a consistent diet, including a morning cup of coffee with lactose-free milk. He is currently participating in a clinical trial for an RNA drug targeting norovirus but notes that the diarrhea predates his involvement in the trial. He has been advised to report any instances of more than 3 episodes of diarrhea per week or any combination of vomiting and diarrhea. He has not yet reached the threshold of 3 episodes per week. He has undergone blood work as part of the trial protocol. He occasionally experiences awakenings at night due to the need to use the bathroom, but this is not a nightly occurrence. Imodium use results in a delay of subsequent bowel movements for several days. He has been on metformin and Ozempic for an extended period. His last colonoscopy was conducted in December 2023, during which 2 small polyps were identified.    He expresses concern over a rash on his abdomen, characterized by red spots. He is uncertain whether these are freckles or indicative of a metabolic condition. He does not recall having these spots previously.    He underwent lab work in December 2024, as ordered by his nephrologist, and had similar tests conducted a month prior. He utilized a chat GPT program to

## 2025-02-26 NOTE — PROGRESS NOTES
Identified pt with two pt identifiers(name and ). Reviewed record in preparation for visit and have obtained necessary documentation. All patient medications has been reviewed.  Chief Complaint   Patient presents with    Rash     Patient stated its a bunch of spots on stomach area, patient was unsure of amount of time on body.    Diarrhea     2-3 times a week       Health Maintenance Due   Topic    Annual Wellness Visit (Medicare)     Diabetic Alb to Cr ratio (uACR) test      Health Maintenance Review: Patient reminded of \"due or due soon\" health maintenance. I have asked the patient to contact his/her primary care provider (PCP) for follow-up on his/her health maintenance.    Wt Readings from Last 3 Encounters:   25 101.5 kg (223 lb 12.8 oz)   24 106.2 kg (234 lb 3.2 oz)   24 106.5 kg (234 lb 12.8 oz)     Temp Readings from Last 3 Encounters:   25 97.9 °F (36.6 °C)   24 98 °F (36.7 °C) (Oral)   24 97.6 °F (36.4 °C) (Oral)     BP Readings from Last 3 Encounters:   25 104/65   24 134/72   24 111/69     Pulse Readings from Last 3 Encounters:   25 69   24 61   24 57       1. \"Have you been to the ER, urgent care clinic since your last visit?  Hospitalized since your last visit?\" No    2. \"Have you seen or consulted any other health care providers outside of the [facility] since your last visit?\" No     3. For patients aged 45-75: Has the patient had a colonoscopy / FIT/ Cologuard? Yes - Care Gap present. Most recent result on file    Patient is accompanied by self I have received verbal consent from Papito Vernon to discuss any/all medical information while they are present in the room.

## 2025-02-28 LAB
25(OH)D3 SERPL-MCNC: 81.5 NG/ML (ref 30–100)
BASOPHILS # BLD: 0.03 K/UL (ref 0–0.1)
BASOPHILS NFR BLD: 0.4 % (ref 0–1)
DIFFERENTIAL METHOD BLD: NORMAL
EOSINOPHIL # BLD: 0.28 K/UL (ref 0–0.4)
EOSINOPHIL NFR BLD: 4 % (ref 0–7)
ERYTHROCYTE [DISTWIDTH] IN BLOOD BY AUTOMATED COUNT: 13.8 % (ref 11.5–14.5)
ERYTHROCYTE [SEDIMENTATION RATE] IN BLOOD: 33 MM/HR (ref 0–20)
FERRITIN SERPL-MCNC: 106 NG/ML (ref 26–388)
HCT VFR BLD AUTO: 40.2 % (ref 36.6–50.3)
HGB BLD-MCNC: 12.3 G/DL (ref 12.1–17)
IMM GRANULOCYTES # BLD AUTO: 0.01 K/UL (ref 0–0.04)
IMM GRANULOCYTES NFR BLD AUTO: 0.1 % (ref 0–0.5)
IRON SATN MFR SERPL: 9 % (ref 20–50)
IRON SERPL-MCNC: 29 UG/DL (ref 35–150)
LYMPHOCYTES # BLD: 1.42 K/UL (ref 0.8–3.5)
LYMPHOCYTES NFR BLD: 20.2 % (ref 12–49)
MCH RBC QN AUTO: 26.8 PG (ref 26–34)
MCHC RBC AUTO-ENTMCNC: 30.6 G/DL (ref 30–36.5)
MCV RBC AUTO: 87.6 FL (ref 80–99)
MONOCYTES # BLD: 0.55 K/UL (ref 0–1)
MONOCYTES NFR BLD: 7.8 % (ref 5–13)
NEUTS SEG # BLD: 4.74 K/UL (ref 1.8–8)
NEUTS SEG NFR BLD: 67.5 % (ref 32–75)
NRBC # BLD: 0 K/UL (ref 0–0.01)
NRBC BLD-RTO: 0 PER 100 WBC
PLATELET # BLD AUTO: 197 K/UL (ref 150–400)
PMV BLD AUTO: 10.4 FL (ref 8.9–12.9)
RBC # BLD AUTO: 4.59 M/UL (ref 4.1–5.7)
RETICS # AUTO: 0.07 M/UL (ref 0.03–0.1)
RETICS/RBC NFR AUTO: 1.6 % (ref 0.7–2.1)
TIBC SERPL-MCNC: 317 UG/DL (ref 250–450)
TSH SERPL DL<=0.05 MIU/L-ACNC: 1.74 UIU/ML (ref 0.36–3.74)
VIT B12 SERPL-MCNC: 306 PG/ML (ref 193–986)
WBC # BLD AUTO: 7 K/UL (ref 4.1–11.1)

## 2025-03-05 LAB — HEMOCCULT STL QL IA: POSITIVE

## 2025-03-06 DIAGNOSIS — Z12.11 ENCOUNTER FOR SCREENING FECAL OCCULT BLOOD TESTING: ICD-10-CM

## 2025-03-06 DIAGNOSIS — Z12.11 ENCOUNTER FOR SCREENING FECAL OCCULT BLOOD TESTING: Primary | ICD-10-CM

## 2025-03-20 ENCOUNTER — PATIENT MESSAGE (OUTPATIENT)
Facility: CLINIC | Age: 75
End: 2025-03-20

## 2025-03-20 DIAGNOSIS — Z79.4 TYPE 2 DIABETES MELLITUS WITH MICROALBUMINURIA, WITH LONG-TERM CURRENT USE OF INSULIN (HCC): Primary | ICD-10-CM

## 2025-03-20 DIAGNOSIS — R80.9 TYPE 2 DIABETES MELLITUS WITH MICROALBUMINURIA, WITH LONG-TERM CURRENT USE OF INSULIN (HCC): Primary | ICD-10-CM

## 2025-03-20 DIAGNOSIS — E11.29 TYPE 2 DIABETES MELLITUS WITH MICROALBUMINURIA, WITH LONG-TERM CURRENT USE OF INSULIN (HCC): Primary | ICD-10-CM

## 2025-03-20 RX ORDER — PEN NEEDLE, DIABETIC 31 GX5/16"
1 NEEDLE, DISPOSABLE MISCELLANEOUS DAILY
Qty: 100 EACH | Refills: 3 | Status: SHIPPED | OUTPATIENT
Start: 2025-03-20

## 2025-03-28 ENCOUNTER — PATIENT MESSAGE (OUTPATIENT)
Facility: CLINIC | Age: 75
End: 2025-03-28

## 2025-04-01 ENCOUNTER — PATIENT MESSAGE (OUTPATIENT)
Facility: CLINIC | Age: 75
End: 2025-04-01

## 2025-04-01 DIAGNOSIS — E11.29 TYPE 2 DIABETES MELLITUS WITH MICROALBUMINURIA, WITH LONG-TERM CURRENT USE OF INSULIN (HCC): ICD-10-CM

## 2025-04-01 DIAGNOSIS — R80.9 TYPE 2 DIABETES MELLITUS WITH MICROALBUMINURIA, WITH LONG-TERM CURRENT USE OF INSULIN (HCC): ICD-10-CM

## 2025-04-01 DIAGNOSIS — Z79.4 TYPE 2 DIABETES MELLITUS WITH MICROALBUMINURIA, WITH LONG-TERM CURRENT USE OF INSULIN (HCC): ICD-10-CM

## 2025-04-02 RX ORDER — PEN NEEDLE, DIABETIC 31 GX5/16"
1 NEEDLE, DISPOSABLE MISCELLANEOUS DAILY
Qty: 100 EACH | Refills: 3 | Status: SHIPPED | OUTPATIENT
Start: 2025-04-02

## 2025-04-03 ENCOUNTER — TELEPHONE (OUTPATIENT)
Facility: CLINIC | Age: 75
End: 2025-04-03

## 2025-04-03 SDOH — HEALTH STABILITY: PHYSICAL HEALTH: ON AVERAGE, HOW MANY MINUTES DO YOU ENGAGE IN EXERCISE AT THIS LEVEL?: 20 MIN

## 2025-04-03 SDOH — HEALTH STABILITY: PHYSICAL HEALTH: ON AVERAGE, HOW MANY DAYS PER WEEK DO YOU ENGAGE IN MODERATE TO STRENUOUS EXERCISE (LIKE A BRISK WALK)?: 1 DAY

## 2025-04-03 ASSESSMENT — PATIENT HEALTH QUESTIONNAIRE - PHQ9
9. THOUGHTS THAT YOU WOULD BE BETTER OFF DEAD, OR OF HURTING YOURSELF: NOT AT ALL
4. FEELING TIRED OR HAVING LITTLE ENERGY: NOT AT ALL
7. TROUBLE CONCENTRATING ON THINGS, SUCH AS READING THE NEWSPAPER OR WATCHING TELEVISION: NOT AT ALL
6. FEELING BAD ABOUT YOURSELF - OR THAT YOU ARE A FAILURE OR HAVE LET YOURSELF OR YOUR FAMILY DOWN: NOT AT ALL
SUM OF ALL RESPONSES TO PHQ QUESTIONS 1-9: 0
8. MOVING OR SPEAKING SO SLOWLY THAT OTHER PEOPLE COULD HAVE NOTICED. OR THE OPPOSITE, BEING SO FIGETY OR RESTLESS THAT YOU HAVE BEEN MOVING AROUND A LOT MORE THAN USUAL: NOT AT ALL
10. IF YOU CHECKED OFF ANY PROBLEMS, HOW DIFFICULT HAVE THESE PROBLEMS MADE IT FOR YOU TO DO YOUR WORK, TAKE CARE OF THINGS AT HOME, OR GET ALONG WITH OTHER PEOPLE: NOT DIFFICULT AT ALL
3. TROUBLE FALLING OR STAYING ASLEEP: NOT AT ALL
2. FEELING DOWN, DEPRESSED OR HOPELESS: NOT AT ALL
SUM OF ALL RESPONSES TO PHQ QUESTIONS 1-9: 0
5. POOR APPETITE OR OVEREATING: NOT AT ALL
SUM OF ALL RESPONSES TO PHQ QUESTIONS 1-9: 0
SUM OF ALL RESPONSES TO PHQ QUESTIONS 1-9: 0
1. LITTLE INTEREST OR PLEASURE IN DOING THINGS: NOT AT ALL

## 2025-04-03 ASSESSMENT — LIFESTYLE VARIABLES
HOW OFTEN DO YOU HAVE A DRINK CONTAINING ALCOHOL: 4
HOW OFTEN DO YOU HAVE A DRINK CONTAINING ALCOHOL: 2-3 TIMES A WEEK
HOW MANY STANDARD DRINKS CONTAINING ALCOHOL DO YOU HAVE ON A TYPICAL DAY: 1 OR 2
HOW MANY STANDARD DRINKS CONTAINING ALCOHOL DO YOU HAVE ON A TYPICAL DAY: 1
HOW OFTEN DO YOU HAVE SIX OR MORE DRINKS ON ONE OCCASION: 1

## 2025-04-07 ENCOUNTER — OFFICE VISIT (OUTPATIENT)
Facility: CLINIC | Age: 75
End: 2025-04-07
Payer: MEDICARE

## 2025-04-07 VITALS
HEIGHT: 68 IN | WEIGHT: 230.6 LBS | DIASTOLIC BLOOD PRESSURE: 64 MMHG | SYSTOLIC BLOOD PRESSURE: 106 MMHG | BODY MASS INDEX: 34.95 KG/M2 | HEART RATE: 56 BPM | OXYGEN SATURATION: 96 % | TEMPERATURE: 98 F

## 2025-04-07 DIAGNOSIS — Z00.00 MEDICARE ANNUAL WELLNESS VISIT, SUBSEQUENT: Primary | ICD-10-CM

## 2025-04-07 DIAGNOSIS — N18.31 CHRONIC KIDNEY DISEASE, STAGE 3A (HCC): ICD-10-CM

## 2025-04-07 DIAGNOSIS — J45.20 MILD INTERMITTENT ASTHMA WITHOUT COMPLICATION: ICD-10-CM

## 2025-04-07 DIAGNOSIS — E78.00 PURE HYPERCHOLESTEROLEMIA: ICD-10-CM

## 2025-04-07 DIAGNOSIS — H61.21 HEARING LOSS DUE TO CERUMEN IMPACTION, RIGHT: ICD-10-CM

## 2025-04-07 DIAGNOSIS — Z79.4 TYPE 2 DIABETES MELLITUS WITH MICROALBUMINURIA, WITH LONG-TERM CURRENT USE OF INSULIN (HCC): ICD-10-CM

## 2025-04-07 DIAGNOSIS — R80.9 TYPE 2 DIABETES MELLITUS WITH MICROALBUMINURIA, WITH LONG-TERM CURRENT USE OF INSULIN (HCC): ICD-10-CM

## 2025-04-07 DIAGNOSIS — R19.5 OCCULT BLOOD POSITIVE STOOL: ICD-10-CM

## 2025-04-07 DIAGNOSIS — E11.29 TYPE 2 DIABETES MELLITUS WITH MICROALBUMINURIA, WITH LONG-TERM CURRENT USE OF INSULIN (HCC): ICD-10-CM

## 2025-04-07 PROBLEM — E66.01 MORBID (SEVERE) OBESITY DUE TO EXCESS CALORIES: Status: ACTIVE | Noted: 2025-04-07

## 2025-04-07 LAB — HBA1C MFR BLD: 7.2 %

## 2025-04-07 PROCEDURE — 83036 HEMOGLOBIN GLYCOSYLATED A1C: CPT | Performed by: INTERNAL MEDICINE

## 2025-04-07 PROCEDURE — 69210 REMOVE IMPACTED EAR WAX UNI: CPT | Performed by: INTERNAL MEDICINE

## 2025-04-07 PROCEDURE — 99213 OFFICE O/P EST LOW 20 MIN: CPT | Performed by: INTERNAL MEDICINE

## 2025-04-07 RX ORDER — ROSUVASTATIN CALCIUM 10 MG/1
10 TABLET, COATED ORAL NIGHTLY
Qty: 90 TABLET | Refills: 1
Start: 2025-04-07

## 2025-04-07 RX ORDER — BUMETANIDE 1 MG/1
TABLET ORAL
Qty: 39 TABLET | Refills: 3
Start: 2025-04-07

## 2025-04-07 NOTE — PROGRESS NOTES
Identified pt with two pt identifiers(name and ). Reviewed record in preparation for visit and have obtained necessary documentation. All patient medications has been reviewed.  No chief complaint on file.      Health Maintenance Due   Topic    Annual Wellness Visit (Medicare)     Diabetic Alb to Cr ratio (uACR) test     COVID-19 Vaccine ( season)     Health Maintenance Review: Patient reminded of \"due or due soon\" health maintenance. I have asked the patient to contact his/her primary care provider (PCP) for follow-up on his/her health maintenance.    Wt Readings from Last 3 Encounters:   25 104.6 kg (230 lb 9.6 oz)   25 101.5 kg (223 lb 12.8 oz)   24 106.2 kg (234 lb 3.2 oz)     Temp Readings from Last 3 Encounters:   25 98 °F (36.7 °C)   25 97.9 °F (36.6 °C)   24 98 °F (36.7 °C) (Oral)     BP Readings from Last 3 Encounters:   25 106/64   25 104/65   24 134/72     Pulse Readings from Last 3 Encounters:   25 56   25 69   24 61       1. \"Have you been to the ER, urgent care clinic since your last visit?  Hospitalized since your last visit?\" No    2. \"Have you seen or consulted any other health care providers outside of the [facility] since your last visit?\" No     3. For patients aged 45-75: Has the patient had a colonoscopy / FIT/ Cologuard? Yes - Care Gap present. Most recent result on file    Patient is accompanied by self I have received verbal consent from Papito Vernon to discuss any/all medical information while they are present in the room.

## 2025-04-07 NOTE — PROGRESS NOTES
Medicare Annual Wellness Visit    Papito Vernon is here for No chief complaint on file.    Assessment & Plan   Medicare annual wellness visit, subsequent    Hearing loss due to cerumen impaction, right  Ceruminosis is noted.  Wax is removed by syringing using a spray bottle with a focused water stream into and also a manual curette.. Instructions for home care to prevent wax buildup are given.    Type 2 diabetes mellitus with microalbuminuria, with long-term current use of insulin (HCC)  Worsening A1c since stopping metformin.  Will resume the low-dose if tolerable.  Continue the rest of his medication regimen.  Repeat A1c in 3-4 months  -     AMB POC HEMOGLOBIN A1C  -     metFORMIN (GLUCOPHAGE) 1000 MG tablet; Take 0.5 tablets by mouth daily (before dinner), Disp-90 tablet, R-1NO PRINT    Occult blood positive stool  Seeing GI.  FIT + 2/28/2020 5 repeat now that he is off of metformin and not having diarrhea.  Consider upper endoscopy.  While colonoscopy December 2023    Chronic kidney disease, stage 3a (HCC)   stable.  Using Bumex about once a week.  -     bumetanide (BUMEX) 1 MG tablet; Take daily as needed for edema, Disp-39 tablet, R-3NO PRINT    Body mass index [BMI] 35.0-35.9, adult (Z68.35)  The patient is asked to make an attempt to improve diet and exercise patterns to aid in medical management of this problem.    Mild intermittent asthma without complication  Stable asymptomatic on current regimen.    Pure hypercholesterolemia  This condition is controlled on current medication regimen as written in medication list.  Medications refilled.  Continue Zetia and Crestor       No follow-ups on file.     Subjective   History of Present Illness  The patient is a 74-year-old male who presents for his annual wellness visit and follow-up.    He reports an improvement in his fall frequency following a reduction in his antihypertensive medication. Less dizziness and lightheadedness are experienced, although these

## 2025-04-09 LAB — HEMOCCULT STL QL IA: POSITIVE

## 2025-04-13 ENCOUNTER — RESULTS FOLLOW-UP (OUTPATIENT)
Facility: CLINIC | Age: 75
End: 2025-04-13

## 2025-05-01 ENCOUNTER — TELEPHONE (OUTPATIENT)
Facility: CLINIC | Age: 75
End: 2025-05-01

## 2025-05-01 NOTE — TELEPHONE ENCOUNTER
Spoke with Brice from [employer] regarding patients Free Style Reese 3 Plus. He reports it will need a new script for 2025 and he will fax it over for Dr. Forrest's signature. Grateful for the call.

## 2025-05-13 ENCOUNTER — PATIENT MESSAGE (OUTPATIENT)
Facility: CLINIC | Age: 75
End: 2025-05-13

## 2025-05-29 DIAGNOSIS — I10 ESSENTIAL HYPERTENSION WITH GOAL BLOOD PRESSURE LESS THAN 130/80: Primary | ICD-10-CM

## 2025-05-29 RX ORDER — LISINOPRIL 5 MG/1
TABLET ORAL
Qty: 90 TABLET | Refills: 3 | Status: SHIPPED | OUTPATIENT
Start: 2025-05-29

## 2025-06-02 DIAGNOSIS — Z79.4 TYPE 2 DIABETES MELLITUS WITH MICROALBUMINURIA, WITH LONG-TERM CURRENT USE OF INSULIN (HCC): ICD-10-CM

## 2025-06-02 DIAGNOSIS — R80.9 TYPE 2 DIABETES MELLITUS WITH MICROALBUMINURIA, WITH LONG-TERM CURRENT USE OF INSULIN (HCC): ICD-10-CM

## 2025-06-02 DIAGNOSIS — E11.29 TYPE 2 DIABETES MELLITUS WITH MICROALBUMINURIA, WITH LONG-TERM CURRENT USE OF INSULIN (HCC): ICD-10-CM

## 2025-06-02 RX ORDER — DAPAGLIFLOZIN 5 MG/1
5 TABLET, FILM COATED ORAL DAILY
Qty: 90 TABLET | Refills: 3 | Status: ACTIVE | OUTPATIENT
Start: 2025-06-02

## 2025-06-19 RX ORDER — EZETIMIBE 10 MG/1
10 TABLET ORAL DAILY
Qty: 90 TABLET | Refills: 3 | OUTPATIENT
Start: 2025-06-19

## 2025-06-19 NOTE — TELEPHONE ENCOUNTER
Refill per VO of Dr. Reich  Last appt: 6/14/2024    NV needed in November.  Pt cx 7/2/24  Pt cx 11/20/24    Future Appointments   Date Time Provider Department Center   8/7/2025  8:20 AM Jerson Forrest MD [facility] ECC DEP       Requested Prescriptions     Refused Prescriptions Disp Refills    ezetimibe (ZETIA) 10 MG tablet [Pharmacy Med Name: EZETIMIBE TABS 10MG] 90 tablet 3     Sig: TAKE 1 TABLET DAILY     Refused By: ALEXI VALADEZ     Reason for Refusal: Patient needs an appointment

## 2025-06-20 ENCOUNTER — TELEPHONE (OUTPATIENT)
Age: 75
End: 2025-06-20

## 2025-07-07 ENCOUNTER — OFFICE VISIT (OUTPATIENT)
Age: 75
End: 2025-07-07

## 2025-07-07 VITALS
SYSTOLIC BLOOD PRESSURE: 120 MMHG | BODY MASS INDEX: 36.83 KG/M2 | OXYGEN SATURATION: 95 % | HEART RATE: 83 BPM | HEIGHT: 68 IN | DIASTOLIC BLOOD PRESSURE: 60 MMHG | TEMPERATURE: 98.8 F | RESPIRATION RATE: 16 BRPM | WEIGHT: 243 LBS

## 2025-07-07 DIAGNOSIS — F41.9 ANXIETY: ICD-10-CM

## 2025-07-07 DIAGNOSIS — J06.9 VIRAL UPPER RESPIRATORY TRACT INFECTION: Primary | ICD-10-CM

## 2025-07-07 DIAGNOSIS — F34.1 DYSTHYMIC DISORDER: ICD-10-CM

## 2025-07-07 DIAGNOSIS — R68.89 FLU-LIKE SYMPTOMS: ICD-10-CM

## 2025-07-07 LAB
INFLUENZA A ANTIGEN, POC: NEGATIVE
INFLUENZA B ANTIGEN, POC: NEGATIVE
Lab: NORMAL
PERFORMING INSTRUMENT: NORMAL
QC PASS/FAIL: NORMAL
SARS-COV-2, POC: NORMAL

## 2025-07-07 RX ORDER — FLUOXETINE HYDROCHLORIDE 40 MG/1
CAPSULE ORAL
Qty: 90 CAPSULE | Refills: 3 | Status: SHIPPED | OUTPATIENT
Start: 2025-07-07

## 2025-07-07 ASSESSMENT — ENCOUNTER SYMPTOMS
CHEST TIGHTNESS: 0
WHEEZING: 0
SORE THROAT: 0
RHINORRHEA: 1
TROUBLE SWALLOWING: 0
SINUS PRESSURE: 0
COUGH: 1
SHORTNESS OF BREATH: 0

## 2025-07-07 NOTE — PROGRESS NOTES
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    I have discussed the diagnosis with the patient and the intended plan as seen in the above orders.  The patient has received an after-visit summary and questions were answered concerning future plans.  I have discussed medication side effects and warnings with the patient as well. The patient agrees and understands above plan.       An electronic signature was used to authenticate this note.  -- Cassidy Winkler PA-C

## 2025-07-28 DIAGNOSIS — I10 ESSENTIAL HYPERTENSION WITH GOAL BLOOD PRESSURE LESS THAN 130/80: Primary | ICD-10-CM

## 2025-07-28 RX ORDER — METOPROLOL TARTRATE 25 MG/1
25 TABLET, FILM COATED ORAL 2 TIMES DAILY
Qty: 180 TABLET | Refills: 3 | Status: SHIPPED | OUTPATIENT
Start: 2025-07-28

## 2025-07-29 DIAGNOSIS — R80.9 TYPE 2 DIABETES MELLITUS WITH MICROALBUMINURIA, WITH LONG-TERM CURRENT USE OF INSULIN (HCC): ICD-10-CM

## 2025-07-29 DIAGNOSIS — Z79.4 TYPE 2 DIABETES MELLITUS WITH MICROALBUMINURIA, WITH LONG-TERM CURRENT USE OF INSULIN (HCC): ICD-10-CM

## 2025-07-29 DIAGNOSIS — E11.29 TYPE 2 DIABETES MELLITUS WITH MICROALBUMINURIA, WITH LONG-TERM CURRENT USE OF INSULIN (HCC): ICD-10-CM

## 2025-07-30 RX ORDER — GABAPENTIN 300 MG/1
CAPSULE ORAL
Qty: 180 CAPSULE | Refills: 1 | Status: SHIPPED | OUTPATIENT
Start: 2025-07-30 | End: 2026-01-26

## 2025-08-07 ENCOUNTER — OFFICE VISIT (OUTPATIENT)
Facility: CLINIC | Age: 75
End: 2025-08-07
Payer: MEDICARE

## 2025-08-07 VITALS
RESPIRATION RATE: 15 BRPM | HEIGHT: 68 IN | SYSTOLIC BLOOD PRESSURE: 118 MMHG | OXYGEN SATURATION: 94 % | WEIGHT: 221.8 LBS | DIASTOLIC BLOOD PRESSURE: 70 MMHG | TEMPERATURE: 97.8 F | BODY MASS INDEX: 33.62 KG/M2 | HEART RATE: 57 BPM

## 2025-08-07 DIAGNOSIS — J45.20 MILD INTERMITTENT ASTHMA WITHOUT COMPLICATION: ICD-10-CM

## 2025-08-07 DIAGNOSIS — R80.9 TYPE 2 DIABETES MELLITUS WITH MICROALBUMINURIA, WITH LONG-TERM CURRENT USE OF INSULIN (HCC): Primary | ICD-10-CM

## 2025-08-07 DIAGNOSIS — I10 ESSENTIAL HYPERTENSION WITH GOAL BLOOD PRESSURE LESS THAN 130/80: ICD-10-CM

## 2025-08-07 DIAGNOSIS — E11.29 TYPE 2 DIABETES MELLITUS WITH MICROALBUMINURIA, WITH LONG-TERM CURRENT USE OF INSULIN (HCC): Primary | ICD-10-CM

## 2025-08-07 DIAGNOSIS — R97.20: ICD-10-CM

## 2025-08-07 DIAGNOSIS — N18.31 STAGE 3A CHRONIC KIDNEY DISEASE (HCC): ICD-10-CM

## 2025-08-07 DIAGNOSIS — Z79.4 TYPE 2 DIABETES MELLITUS WITH MICROALBUMINURIA, WITH LONG-TERM CURRENT USE OF INSULIN (HCC): Primary | ICD-10-CM

## 2025-08-07 DIAGNOSIS — R05.2 SUBACUTE COUGH: ICD-10-CM

## 2025-08-07 DIAGNOSIS — N40.1: ICD-10-CM

## 2025-08-07 LAB — HBA1C MFR BLD: 7.9 %

## 2025-08-07 PROCEDURE — 99214 OFFICE O/P EST MOD 30 MIN: CPT | Performed by: INTERNAL MEDICINE

## 2025-08-07 PROCEDURE — 83036 HEMOGLOBIN GLYCOSYLATED A1C: CPT | Performed by: INTERNAL MEDICINE

## 2025-08-07 RX ORDER — METFORMIN HYDROCHLORIDE 500 MG/1
500 TABLET, EXTENDED RELEASE ORAL
Qty: 30 TABLET | Refills: 1 | Status: SHIPPED | OUTPATIENT
Start: 2025-08-07

## 2025-08-11 DIAGNOSIS — J45.20 MILD INTERMITTENT ASTHMA WITHOUT COMPLICATION: Primary | ICD-10-CM

## 2025-08-12 ENCOUNTER — PATIENT MESSAGE (OUTPATIENT)
Facility: CLINIC | Age: 75
End: 2025-08-12

## 2025-08-12 DIAGNOSIS — J30.9 ALLERGIC RHINITIS, UNSPECIFIED SEASONALITY, UNSPECIFIED TRIGGER: ICD-10-CM

## 2025-08-12 DIAGNOSIS — J45.20 MILD INTERMITTENT ASTHMA WITHOUT COMPLICATION: ICD-10-CM

## 2025-08-13 RX ORDER — AZELASTINE HYDROCHLORIDE 137 UG/1
SPRAY, METERED NASAL
Qty: 60 ML | Refills: 2 | Status: SHIPPED | OUTPATIENT
Start: 2025-08-13

## 2025-08-19 DIAGNOSIS — Z79.4 TYPE 2 DIABETES MELLITUS WITH MICROALBUMINURIA, WITH LONG-TERM CURRENT USE OF INSULIN (HCC): ICD-10-CM

## 2025-08-19 DIAGNOSIS — R80.9 TYPE 2 DIABETES MELLITUS WITH MICROALBUMINURIA, WITH LONG-TERM CURRENT USE OF INSULIN (HCC): ICD-10-CM

## 2025-08-19 DIAGNOSIS — E11.29 TYPE 2 DIABETES MELLITUS WITH MICROALBUMINURIA, WITH LONG-TERM CURRENT USE OF INSULIN (HCC): ICD-10-CM

## 2025-08-20 RX ORDER — INSULIN GLARGINE 300 U/ML
INJECTION, SOLUTION SUBCUTANEOUS
Qty: 18 ML | Refills: 3 | Status: SHIPPED | OUTPATIENT
Start: 2025-08-20

## 2025-08-31 ENCOUNTER — PATIENT MESSAGE (OUTPATIENT)
Facility: CLINIC | Age: 75
End: 2025-08-31

## 2025-09-02 DIAGNOSIS — E11.29 TYPE 2 DIABETES MELLITUS WITH MICROALBUMINURIA, WITH LONG-TERM CURRENT USE OF INSULIN (HCC): Primary | ICD-10-CM

## 2025-09-02 DIAGNOSIS — E11.29 TYPE 2 DIABETES MELLITUS WITH MICROALBUMINURIA, WITH LONG-TERM CURRENT USE OF INSULIN (HCC): ICD-10-CM

## 2025-09-02 DIAGNOSIS — R80.9 TYPE 2 DIABETES MELLITUS WITH MICROALBUMINURIA, WITH LONG-TERM CURRENT USE OF INSULIN (HCC): ICD-10-CM

## 2025-09-02 DIAGNOSIS — Z79.4 TYPE 2 DIABETES MELLITUS WITH MICROALBUMINURIA, WITH LONG-TERM CURRENT USE OF INSULIN (HCC): Primary | ICD-10-CM

## 2025-09-02 DIAGNOSIS — R80.9 TYPE 2 DIABETES MELLITUS WITH MICROALBUMINURIA, WITH LONG-TERM CURRENT USE OF INSULIN (HCC): Primary | ICD-10-CM

## 2025-09-02 DIAGNOSIS — Z79.4 TYPE 2 DIABETES MELLITUS WITH MICROALBUMINURIA, WITH LONG-TERM CURRENT USE OF INSULIN (HCC): ICD-10-CM

## 2025-09-02 RX ORDER — SEMAGLUTIDE 2.68 MG/ML
2 INJECTION, SOLUTION SUBCUTANEOUS
Qty: 9 ML | Refills: 2 | Status: ACTIVE | OUTPATIENT
Start: 2025-09-02

## 2025-09-03 ENCOUNTER — PATIENT MESSAGE (OUTPATIENT)
Facility: CLINIC | Age: 75
End: 2025-09-03

## 2025-09-03 RX ORDER — METFORMIN HYDROCHLORIDE 500 MG/1
500 TABLET, EXTENDED RELEASE ORAL
Qty: 90 TABLET | Refills: 1 | Status: SHIPPED | OUTPATIENT
Start: 2025-09-03

## 2025-09-04 ENCOUNTER — TELEPHONE (OUTPATIENT)
Facility: CLINIC | Age: 75
End: 2025-09-04

## (undated) DEVICE — HEART CATH-SFMC: Brand: MEDLINE INDUSTRIES, INC.

## (undated) DEVICE — CATHETER KIT JL 4 JL5 6 FRX100 CM 110 CM 145 PGTL DXTERITY

## (undated) DEVICE — GUIDEWIRE VASC L180CM DIA0.035IN 3MM PTFE J TIP EXCHG FIX

## (undated) DEVICE — ANGIO-SEAL VIP VASCULAR CLOSURE DEVICE: Brand: ANGIO-SEAL

## (undated) DEVICE — KENDALL DL ECG DUAL CONNECT RADIOLUCENT LEAD WIRES, 5-LEAD, SINGLE PATIENT USE: Brand: KENDALL

## (undated) DEVICE — PINNACLE PRECISION ACCESS SYSTEM INTRODUCER SHEATH: Brand: PINNACLE PRECISION ACCESS SYSTEM

## (undated) DEVICE — MEDI-TRACE CADENCE ADULT, DEFIBRILLATION ELECTRODE -RTS  (10 PR/PK) - PHYSIO-CONTROL: Brand: MEDI-TRACE CADENCE